# Patient Record
Sex: MALE | Race: WHITE | NOT HISPANIC OR LATINO | Employment: OTHER | URBAN - METROPOLITAN AREA
[De-identification: names, ages, dates, MRNs, and addresses within clinical notes are randomized per-mention and may not be internally consistent; named-entity substitution may affect disease eponyms.]

---

## 2022-11-21 ENCOUNTER — TELEPHONE (OUTPATIENT)
Dept: ONCOLOGY | Facility: CLINIC | Age: 47
End: 2022-11-21

## 2022-11-21 NOTE — TELEPHONE ENCOUNTER
Caller: DAVID PANIAGUA    Relationship: Emergency Contact    Best call back number: [phone]    What is the best time to reach you: ANYTIME    Who are you requesting to speak with (clinical staff, provider,  specific staff member): SCHEDULING    What was the call regarding: PLEASE CALL DAVID TO R/S PTS MISSED NEW PT APPT.     Do you require a callback: YES

## 2022-11-29 NOTE — PROGRESS NOTES
"REFERRING PROVIDER:    José Garcia, APRN  [address] [place] [address]    REASON FOR CONSULTATION:    Thrombocytopenia    HISTORY OF PRESENT ILLNESS:  Helder Abbott is a 47 y.o. male who is referred today for further evaluation of thrombocytopenia.    CBC on 8/30/2022 with a white blood cell count of 6.2, hemoglobin 17.0, platelets 123,000.  TSH normal.  Vitamin B12 greater than 2000.    Prior to that platelets were 152,000 on 6/29/2022.    He complains of right hip pain.  Otherwise no new issues.  He had a nosebleed a couple of weeks ago but this was a transient issue and he has not had any other nosebleeds or any other bleeding.        Past Medical History:   Diagnosis Date   • Benign essential hypertension    • Bipolar disorder (HCC)    • Dyslipidemia    • GERD (gastroesophageal reflux disease)    • History of stroke    • Hypothyroidism        No past surgical history on file.    SOCIAL HISTORY:   has no history on file for tobacco use, alcohol use, and drug use.    FAMILY HISTORY:  family history is not on file.    ALLERGIES:  No Known Allergies    MEDICATIONS:  The medication list has been reviewed with the patient by the medical assistant, and the list has been updated in the electronic medical record, which I reviewed.  Medication dosages and frequencies were confirmed to be accurate.    Review of Systems   Musculoskeletal: Positive for arthralgias (Right hip pain).   All other systems reviewed and are negative.      PHYSICAL EXAMINATION:  Vitals:    11/30/22 1412   BP: 128/90   Pulse: 71   Resp: 18   Temp: 97.9 °F (36.6 °C)   TempSrc: Temporal   SpO2: 99%   Weight: 86.2 kg (190 lb)   Height: 185.4 cm (73\")   PainSc: 10-Worst pain ever   PainLoc: Leg  Comment: right     General:  No acute distress, awake, alert and oriented  Skin:  Warm and dry, no visible rash  HEENT:  Normocephalic/atraumatic.  Chest:  Normal respiratory effort  Extremities:  No visible clubbing, cyanosis, or " edema  Neuro/psych:  Grossly nonfocal.  Normal mood and affect.    DIAGNOSTIC DATA:  CBC & Differential (11/30/2022 13:34)      IMAGING:    None reviewed    ASSESSMENT:  This is a 47 y.o. male with:    *Thrombocytopenia  · CBC on 8/30/2022 with a white blood cell count of 6.2, hemoglobin 17.0, platelets 123,000.  TSH normal.  Vitamin B12 greater than 2000.  · Prior to that platelets were 152,000 on 6/29/2022.  • 11/30/2022: Platelets normal at 154,000.  Normal white blood cell count and hemoglobin.    *Bipolar disorder    *History of stroke    PLAN:   1. Platelets are normal today.  The lowest platelet count I have access to was 123,000.  Mild thrombocytopenia may be due to medication.  As long as platelets are over 100,000 there is really no concern.  Should platelets drop further I will be glad to reevaluate.  No follow-up will be scheduled at this time.  We are certainly available to see him at any point if needed.    ORDERS PLACED DURING THIS ENCOUNTER  Orders Placed This Encounter   Procedures   • CBC & Differential     Standing Status:   Future     Number of Occurrences:   1     Standing Expiration Date:   11/29/2023     Order Specific Question:   Manual Differential     Answer:   No     Order Specific Question:   Release to patient     Answer:   Routine Release

## 2022-11-30 ENCOUNTER — CONSULT (OUTPATIENT)
Dept: ONCOLOGY | Facility: CLINIC | Age: 47
End: 2022-11-30

## 2022-11-30 ENCOUNTER — LAB (OUTPATIENT)
Dept: LAB | Facility: HOSPITAL | Age: 47
End: 2022-11-30

## 2022-11-30 VITALS
DIASTOLIC BLOOD PRESSURE: 90 MMHG | HEART RATE: 71 BPM | WEIGHT: 190 LBS | BODY MASS INDEX: 25.18 KG/M2 | OXYGEN SATURATION: 99 % | RESPIRATION RATE: 18 BRPM | HEIGHT: 73 IN | TEMPERATURE: 97.9 F | SYSTOLIC BLOOD PRESSURE: 128 MMHG

## 2022-11-30 DIAGNOSIS — D69.6 THROMBOCYTOPENIA: Primary | ICD-10-CM

## 2022-11-30 DIAGNOSIS — D69.6 THROMBOCYTOPENIA: ICD-10-CM

## 2022-11-30 LAB
BASOPHILS # BLD AUTO: 0.03 10*3/MM3 (ref 0–0.2)
BASOPHILS NFR BLD AUTO: 0.4 % (ref 0–1.5)
DEPRECATED RDW RBC AUTO: 48.3 FL (ref 37–54)
EOSINOPHIL # BLD AUTO: 0.01 10*3/MM3 (ref 0–0.4)
EOSINOPHIL NFR BLD AUTO: 0.1 % (ref 0.3–6.2)
ERYTHROCYTE [DISTWIDTH] IN BLOOD BY AUTOMATED COUNT: 15.6 % (ref 12.3–15.4)
HCT VFR BLD AUTO: 46.3 % (ref 37.5–51)
HGB BLD-MCNC: 15.7 G/DL (ref 13–17.7)
IMM GRANULOCYTES # BLD AUTO: 0.04 10*3/MM3 (ref 0–0.05)
IMM GRANULOCYTES NFR BLD AUTO: 0.5 % (ref 0–0.5)
LYMPHOCYTES # BLD AUTO: 2.89 10*3/MM3 (ref 0.7–3.1)
LYMPHOCYTES NFR BLD AUTO: 36.6 % (ref 19.6–45.3)
MCH RBC QN AUTO: 29.4 PG (ref 26.6–33)
MCHC RBC AUTO-ENTMCNC: 33.9 G/DL (ref 31.5–35.7)
MCV RBC AUTO: 86.7 FL (ref 79–97)
MONOCYTES # BLD AUTO: 0.84 10*3/MM3 (ref 0.1–0.9)
MONOCYTES NFR BLD AUTO: 10.6 % (ref 5–12)
NEUTROPHILS NFR BLD AUTO: 4.08 10*3/MM3 (ref 1.7–7)
NEUTROPHILS NFR BLD AUTO: 51.8 % (ref 42.7–76)
NRBC BLD AUTO-RTO: 0 /100 WBC (ref 0–0.2)
PLATELET # BLD AUTO: 154 10*3/MM3 (ref 140–450)
PMV BLD AUTO: 10.8 FL (ref 6–12)
RBC # BLD AUTO: 5.34 10*6/MM3 (ref 4.14–5.8)
WBC NRBC COR # BLD: 7.89 10*3/MM3 (ref 3.4–10.8)

## 2022-11-30 PROCEDURE — 85025 COMPLETE CBC W/AUTO DIFF WBC: CPT

## 2022-11-30 PROCEDURE — 36415 COLL VENOUS BLD VENIPUNCTURE: CPT

## 2022-11-30 PROCEDURE — 99203 OFFICE O/P NEW LOW 30 MIN: CPT | Performed by: INTERNAL MEDICINE

## 2022-11-30 RX ORDER — LISINOPRIL 40 MG/1
TABLET ORAL
COMMUNITY
Start: 2022-11-18

## 2022-11-30 RX ORDER — ARIPIPRAZOLE 400 MG
KIT INTRAMUSCULAR
COMMUNITY
Start: 2022-10-11

## 2022-11-30 RX ORDER — DIVALPROEX SODIUM 500 MG/1
TABLET, DELAYED RELEASE ORAL
COMMUNITY
Start: 2022-10-27

## 2022-11-30 RX ORDER — MIRTAZAPINE 15 MG/1
TABLET, FILM COATED ORAL
COMMUNITY
Start: 2022-10-11

## 2022-11-30 RX ORDER — TRIAMCINOLONE ACETONIDE 1 MG/G
CREAM TOPICAL
COMMUNITY
Start: 2022-10-19

## 2022-11-30 RX ORDER — LEVOTHYROXINE SODIUM 0.05 MG/1
TABLET ORAL
COMMUNITY
Start: 2022-11-18

## 2022-11-30 RX ORDER — APIXABAN 5 MG/1
TABLET, FILM COATED ORAL
COMMUNITY
Start: 2022-11-21

## 2022-11-30 RX ORDER — METOPROLOL SUCCINATE 25 MG/1
TABLET, EXTENDED RELEASE ORAL
COMMUNITY
Start: 2022-11-18

## 2022-11-30 RX ORDER — BACLOFEN 5 MG/1
TABLET ORAL
COMMUNITY
Start: 2022-11-29

## 2022-11-30 RX ORDER — FAMOTIDINE 20 MG/1
TABLET, FILM COATED ORAL
COMMUNITY
Start: 2022-11-18

## 2022-11-30 RX ORDER — DIGOXIN 125 MCG
TABLET ORAL
COMMUNITY
Start: 2022-11-18

## 2022-11-30 RX ORDER — ATORVASTATIN CALCIUM 40 MG/1
TABLET, FILM COATED ORAL
COMMUNITY
Start: 2022-11-18

## 2022-11-30 RX ORDER — OLANZAPINE 5 MG/1
TABLET, ORALLY DISINTEGRATING ORAL
COMMUNITY
Start: 2022-11-28

## 2022-11-30 RX ORDER — PSEUDOEPHED/ACETAMINOPH/DIPHEN 30MG-500MG
500 TABLET ORAL 4 TIMES DAILY PRN
COMMUNITY
Start: 2022-11-15

## 2022-11-30 RX ORDER — NYSTATIN 100000 [USP'U]/G
POWDER TOPICAL
COMMUNITY
Start: 2022-11-18

## 2022-11-30 RX ORDER — BENZTROPINE MESYLATE 0.5 MG/1
TABLET ORAL
COMMUNITY
Start: 2022-10-11

## 2022-11-30 RX ORDER — IBUPROFEN 600 MG/1
600 TABLET ORAL 4 TIMES DAILY
COMMUNITY
Start: 2022-11-15

## 2024-04-30 ENCOUNTER — APPOINTMENT (OUTPATIENT)
Dept: GENERAL RADIOLOGY | Facility: HOSPITAL | Age: 49
DRG: 388 | End: 2024-04-30
Payer: MEDICARE

## 2024-04-30 ENCOUNTER — APPOINTMENT (OUTPATIENT)
Dept: CT IMAGING | Facility: HOSPITAL | Age: 49
DRG: 388 | End: 2024-04-30
Payer: MEDICARE

## 2024-04-30 ENCOUNTER — HOSPITAL ENCOUNTER (INPATIENT)
Facility: HOSPITAL | Age: 49
LOS: 6 days | Discharge: HOME | DRG: 388 | End: 2024-05-06
Attending: EMERGENCY MEDICINE | Admitting: STUDENT IN AN ORGANIZED HEALTH CARE EDUCATION/TRAINING PROGRAM
Payer: MEDICARE

## 2024-04-30 DIAGNOSIS — K56.600 PARTIAL SMALL BOWEL OBSTRUCTION: ICD-10-CM

## 2024-04-30 DIAGNOSIS — Z79.01 CHRONIC ANTICOAGULATION: ICD-10-CM

## 2024-04-30 DIAGNOSIS — K56.41 FECAL IMPACTION: ICD-10-CM

## 2024-04-30 DIAGNOSIS — R63.8 DECREASED ORAL INTAKE: ICD-10-CM

## 2024-04-30 DIAGNOSIS — N17.9 AKI (ACUTE KIDNEY INJURY): ICD-10-CM

## 2024-04-30 DIAGNOSIS — R19.7 DIARRHEA, UNSPECIFIED TYPE: ICD-10-CM

## 2024-04-30 DIAGNOSIS — R10.84 GENERALIZED ABDOMINAL PAIN: Primary | ICD-10-CM

## 2024-04-30 DIAGNOSIS — R29.6 FREQUENT FALLS: ICD-10-CM

## 2024-04-30 PROBLEM — I10 ESSENTIAL HYPERTENSION: Status: ACTIVE | Noted: 2024-04-30

## 2024-04-30 PROBLEM — K21.9 GERD WITHOUT ESOPHAGITIS: Status: ACTIVE | Noted: 2024-04-30

## 2024-04-30 PROBLEM — F31.9 BIPOLAR DISORDER: Chronic | Status: ACTIVE | Noted: 2024-04-30

## 2024-04-30 PROBLEM — Z86.73 HISTORY OF CVA (CEREBROVASCULAR ACCIDENT): Status: ACTIVE | Noted: 2024-04-30

## 2024-04-30 PROBLEM — E03.9 HYPOTHYROIDISM (ACQUIRED): Status: ACTIVE | Noted: 2024-04-30

## 2024-04-30 PROBLEM — E78.5 HLD (HYPERLIPIDEMIA): Status: ACTIVE | Noted: 2024-04-30

## 2024-04-30 LAB
ABO GROUP BLD: NORMAL
ALBUMIN SERPL-MCNC: 3.9 G/DL (ref 3.5–5.2)
ALBUMIN/GLOB SERPL: 1.5 G/DL
ALP SERPL-CCNC: 80 U/L (ref 39–117)
ALT SERPL W P-5'-P-CCNC: 9 U/L (ref 1–41)
ANION GAP SERPL CALCULATED.3IONS-SCNC: 9.5 MMOL/L (ref 5–15)
AST SERPL-CCNC: 18 U/L (ref 1–40)
B PARAPERT DNA SPEC QL NAA+PROBE: NOT DETECTED
B PERT DNA SPEC QL NAA+PROBE: NOT DETECTED
BASOPHILS # BLD AUTO: 0.02 10*3/MM3 (ref 0–0.2)
BASOPHILS NFR BLD AUTO: 0.3 % (ref 0–1.5)
BILIRUB SERPL-MCNC: 0.5 MG/DL (ref 0–1.2)
BILIRUB UR QL STRIP: NEGATIVE
BLD GP AB SCN SERPL QL: NEGATIVE
BUN SERPL-MCNC: 16 MG/DL (ref 6–20)
BUN/CREAT SERPL: 10.3 (ref 7–25)
C PNEUM DNA NPH QL NAA+NON-PROBE: NOT DETECTED
CALCIUM SPEC-SCNC: 9.5 MG/DL (ref 8.6–10.5)
CHLORIDE SERPL-SCNC: 101 MMOL/L (ref 98–107)
CK SERPL-CCNC: 78 U/L (ref 20–200)
CLARITY UR: CLEAR
CO2 SERPL-SCNC: 27.5 MMOL/L (ref 22–29)
COLOR UR: ABNORMAL
CREAT SERPL-MCNC: 1.55 MG/DL (ref 0.76–1.27)
D-LACTATE SERPL-SCNC: 0.8 MMOL/L (ref 0.5–2)
D-LACTATE SERPL-SCNC: 2.6 MMOL/L (ref 0.5–2)
DEPRECATED RDW RBC AUTO: 45.5 FL (ref 37–54)
DIGOXIN SERPL-MCNC: 0.7 NG/ML (ref 0.6–1.2)
EGFRCR SERPLBLD CKD-EPI 2021: 54.5 ML/MIN/1.73
EOSINOPHIL # BLD AUTO: 0.01 10*3/MM3 (ref 0–0.4)
EOSINOPHIL NFR BLD AUTO: 0.1 % (ref 0.3–6.2)
ERYTHROCYTE [DISTWIDTH] IN BLOOD BY AUTOMATED COUNT: 14.1 % (ref 12.3–15.4)
FLUAV SUBTYP SPEC NAA+PROBE: NOT DETECTED
FLUBV RNA ISLT QL NAA+PROBE: NOT DETECTED
GLOBULIN UR ELPH-MCNC: 2.6 GM/DL
GLUCOSE SERPL-MCNC: 96 MG/DL (ref 65–99)
GLUCOSE UR STRIP-MCNC: NEGATIVE MG/DL
HADV DNA SPEC NAA+PROBE: NOT DETECTED
HCOV 229E RNA SPEC QL NAA+PROBE: NOT DETECTED
HCOV HKU1 RNA SPEC QL NAA+PROBE: NOT DETECTED
HCOV NL63 RNA SPEC QL NAA+PROBE: NOT DETECTED
HCOV OC43 RNA SPEC QL NAA+PROBE: NOT DETECTED
HCT VFR BLD AUTO: 40.7 % (ref 37.5–51)
HGB BLD-MCNC: 13.1 G/DL (ref 13–17.7)
HGB UR QL STRIP.AUTO: NEGATIVE
HMPV RNA NPH QL NAA+NON-PROBE: NOT DETECTED
HPIV1 RNA ISLT QL NAA+PROBE: NOT DETECTED
HPIV2 RNA SPEC QL NAA+PROBE: NOT DETECTED
HPIV3 RNA NPH QL NAA+PROBE: NOT DETECTED
HPIV4 P GENE NPH QL NAA+PROBE: NOT DETECTED
IMM GRANULOCYTES # BLD AUTO: 0.06 10*3/MM3 (ref 0–0.05)
IMM GRANULOCYTES NFR BLD AUTO: 0.8 % (ref 0–0.5)
INR PPP: 1.2 (ref 0.9–1.1)
KETONES UR QL STRIP: ABNORMAL
LEUKOCYTE ESTERASE UR QL STRIP.AUTO: NEGATIVE
LIPASE SERPL-CCNC: 18 U/L (ref 13–60)
LYMPHOCYTES # BLD AUTO: 1.27 10*3/MM3 (ref 0.7–3.1)
LYMPHOCYTES NFR BLD AUTO: 17.2 % (ref 19.6–45.3)
M PNEUMO IGG SER IA-ACNC: NOT DETECTED
MAGNESIUM SERPL-MCNC: 1.6 MG/DL (ref 1.6–2.6)
MCH RBC QN AUTO: 28.7 PG (ref 26.6–33)
MCHC RBC AUTO-ENTMCNC: 32.2 G/DL (ref 31.5–35.7)
MCV RBC AUTO: 89.1 FL (ref 79–97)
MONOCYTES # BLD AUTO: 0.36 10*3/MM3 (ref 0.1–0.9)
MONOCYTES NFR BLD AUTO: 4.9 % (ref 5–12)
NEUTROPHILS NFR BLD AUTO: 5.65 10*3/MM3 (ref 1.7–7)
NEUTROPHILS NFR BLD AUTO: 76.7 % (ref 42.7–76)
NITRITE UR QL STRIP: NEGATIVE
NRBC BLD AUTO-RTO: 0 /100 WBC (ref 0–0.2)
NT-PROBNP SERPL-MCNC: 1257 PG/ML (ref 0–450)
PH UR STRIP.AUTO: 5.5 [PH] (ref 5–8)
PLATELET # BLD AUTO: 162 10*3/MM3 (ref 140–450)
PMV BLD AUTO: 10.2 FL (ref 6–12)
POTASSIUM SERPL-SCNC: 4.1 MMOL/L (ref 3.5–5.2)
PROCALCITONIN SERPL-MCNC: 0.06 NG/ML (ref 0–0.25)
PROT SERPL-MCNC: 6.5 G/DL (ref 6–8.5)
PROT UR QL STRIP: NEGATIVE
PROTHROMBIN TIME: 15.5 SECONDS (ref 11.7–14.2)
RBC # BLD AUTO: 4.57 10*6/MM3 (ref 4.14–5.8)
RH BLD: POSITIVE
RHINOVIRUS RNA SPEC NAA+PROBE: NOT DETECTED
RSV RNA NPH QL NAA+NON-PROBE: NOT DETECTED
SARS-COV-2 RNA NPH QL NAA+NON-PROBE: NOT DETECTED
SODIUM SERPL-SCNC: 138 MMOL/L (ref 136–145)
SP GR UR STRIP: 1.02 (ref 1–1.03)
T&S EXPIRATION DATE: NORMAL
TROPONIN T SERPL HS-MCNC: 34 NG/L
TSH SERPL DL<=0.05 MIU/L-ACNC: 3.19 UIU/ML (ref 0.27–4.2)
UROBILINOGEN UR QL STRIP: ABNORMAL
VALPROATE SERPL-MCNC: 94 MCG/ML (ref 50–125)
WBC NRBC COR # BLD AUTO: 7.37 10*3/MM3 (ref 3.4–10.8)

## 2024-04-30 PROCEDURE — 87040 BLOOD CULTURE FOR BACTERIA: CPT | Performed by: STUDENT IN AN ORGANIZED HEALTH CARE EDUCATION/TRAINING PROGRAM

## 2024-04-30 PROCEDURE — 81003 URINALYSIS AUTO W/O SCOPE: CPT | Performed by: EMERGENCY MEDICINE

## 2024-04-30 PROCEDURE — 93005 ELECTROCARDIOGRAM TRACING: CPT | Performed by: EMERGENCY MEDICINE

## 2024-04-30 PROCEDURE — 86900 BLOOD TYPING SEROLOGIC ABO: CPT | Performed by: EMERGENCY MEDICINE

## 2024-04-30 PROCEDURE — 25010000002 ONDANSETRON PER 1 MG: Performed by: EMERGENCY MEDICINE

## 2024-04-30 PROCEDURE — 83735 ASSAY OF MAGNESIUM: CPT | Performed by: EMERGENCY MEDICINE

## 2024-04-30 PROCEDURE — 84484 ASSAY OF TROPONIN QUANT: CPT | Performed by: EMERGENCY MEDICINE

## 2024-04-30 PROCEDURE — 83880 ASSAY OF NATRIURETIC PEPTIDE: CPT | Performed by: EMERGENCY MEDICINE

## 2024-04-30 PROCEDURE — 83690 ASSAY OF LIPASE: CPT | Performed by: EMERGENCY MEDICINE

## 2024-04-30 PROCEDURE — 80053 COMPREHEN METABOLIC PANEL: CPT | Performed by: EMERGENCY MEDICINE

## 2024-04-30 PROCEDURE — 99285 EMERGENCY DEPT VISIT HI MDM: CPT

## 2024-04-30 PROCEDURE — 85610 PROTHROMBIN TIME: CPT | Performed by: EMERGENCY MEDICINE

## 2024-04-30 PROCEDURE — 82550 ASSAY OF CK (CPK): CPT | Performed by: EMERGENCY MEDICINE

## 2024-04-30 PROCEDURE — 36415 COLL VENOUS BLD VENIPUNCTURE: CPT

## 2024-04-30 PROCEDURE — 74176 CT ABD & PELVIS W/O CONTRAST: CPT

## 2024-04-30 PROCEDURE — 70450 CT HEAD/BRAIN W/O DYE: CPT

## 2024-04-30 PROCEDURE — 80162 ASSAY OF DIGOXIN TOTAL: CPT | Performed by: EMERGENCY MEDICINE

## 2024-04-30 PROCEDURE — 80164 ASSAY DIPROPYLACETIC ACD TOT: CPT | Performed by: EMERGENCY MEDICINE

## 2024-04-30 PROCEDURE — 25010000002 PIPERACILLIN SOD-TAZOBACTAM PER 1 G: Performed by: STUDENT IN AN ORGANIZED HEALTH CARE EDUCATION/TRAINING PROGRAM

## 2024-04-30 PROCEDURE — 84443 ASSAY THYROID STIM HORMONE: CPT | Performed by: EMERGENCY MEDICINE

## 2024-04-30 PROCEDURE — 85025 COMPLETE CBC W/AUTO DIFF WBC: CPT | Performed by: EMERGENCY MEDICINE

## 2024-04-30 PROCEDURE — 87147 CULTURE TYPE IMMUNOLOGIC: CPT | Performed by: STUDENT IN AN ORGANIZED HEALTH CARE EDUCATION/TRAINING PROGRAM

## 2024-04-30 PROCEDURE — 86901 BLOOD TYPING SEROLOGIC RH(D): CPT | Performed by: EMERGENCY MEDICINE

## 2024-04-30 PROCEDURE — 83605 ASSAY OF LACTIC ACID: CPT | Performed by: EMERGENCY MEDICINE

## 2024-04-30 PROCEDURE — 93010 ELECTROCARDIOGRAM REPORT: CPT | Performed by: INTERNAL MEDICINE

## 2024-04-30 PROCEDURE — 86850 RBC ANTIBODY SCREEN: CPT | Performed by: EMERGENCY MEDICINE

## 2024-04-30 PROCEDURE — 0202U NFCT DS 22 TRGT SARS-COV-2: CPT | Performed by: EMERGENCY MEDICINE

## 2024-04-30 PROCEDURE — 87154 CUL TYP ID BLD PTHGN 6+ TRGT: CPT | Performed by: STUDENT IN AN ORGANIZED HEALTH CARE EDUCATION/TRAINING PROGRAM

## 2024-04-30 PROCEDURE — 71045 X-RAY EXAM CHEST 1 VIEW: CPT

## 2024-04-30 PROCEDURE — 25810000003 SODIUM CHLORIDE 0.9 % SOLUTION: Performed by: EMERGENCY MEDICINE

## 2024-04-30 PROCEDURE — 84145 PROCALCITONIN (PCT): CPT | Performed by: EMERGENCY MEDICINE

## 2024-04-30 RX ORDER — SODIUM CHLORIDE 9 MG/ML
125 INJECTION, SOLUTION INTRAVENOUS CONTINUOUS
Status: DISCONTINUED | OUTPATIENT
Start: 2024-04-30 | End: 2024-05-02

## 2024-04-30 RX ORDER — PANTOPRAZOLE SODIUM 40 MG/10ML
40 INJECTION, POWDER, LYOPHILIZED, FOR SOLUTION INTRAVENOUS
Status: DISCONTINUED | OUTPATIENT
Start: 2024-05-01 | End: 2024-05-06 | Stop reason: HOSPADM

## 2024-04-30 RX ORDER — SODIUM CHLORIDE 0.9 % (FLUSH) 0.9 %
10 SYRINGE (ML) INJECTION AS NEEDED
Status: DISCONTINUED | OUTPATIENT
Start: 2024-04-30 | End: 2024-05-06 | Stop reason: HOSPADM

## 2024-04-30 RX ORDER — PANTOPRAZOLE SODIUM 40 MG/10ML
40 INJECTION, POWDER, LYOPHILIZED, FOR SOLUTION INTRAVENOUS ONCE
Status: COMPLETED | OUTPATIENT
Start: 2024-04-30 | End: 2024-04-30

## 2024-04-30 RX ORDER — BISACODYL 10 MG
10 SUPPOSITORY, RECTAL RECTAL DAILY PRN
Status: DISCONTINUED | OUTPATIENT
Start: 2024-04-30 | End: 2024-05-06 | Stop reason: HOSPADM

## 2024-04-30 RX ORDER — AMOXICILLIN 250 MG
2 CAPSULE ORAL 2 TIMES DAILY
Status: DISCONTINUED | OUTPATIENT
Start: 2024-04-30 | End: 2024-05-06 | Stop reason: HOSPADM

## 2024-04-30 RX ORDER — ONDANSETRON 2 MG/ML
4 INJECTION INTRAMUSCULAR; INTRAVENOUS EVERY 6 HOURS PRN
Status: DISCONTINUED | OUTPATIENT
Start: 2024-04-30 | End: 2024-05-06 | Stop reason: HOSPADM

## 2024-04-30 RX ORDER — POLYETHYLENE GLYCOL 3350 17 G/17G
17 POWDER, FOR SOLUTION ORAL DAILY PRN
Status: DISCONTINUED | OUTPATIENT
Start: 2024-04-30 | End: 2024-05-06 | Stop reason: HOSPADM

## 2024-04-30 RX ORDER — SODIUM CHLORIDE 9 MG/ML
40 INJECTION, SOLUTION INTRAVENOUS AS NEEDED
Status: DISCONTINUED | OUTPATIENT
Start: 2024-04-30 | End: 2024-05-06 | Stop reason: HOSPADM

## 2024-04-30 RX ORDER — BISACODYL 5 MG/1
5 TABLET, DELAYED RELEASE ORAL DAILY PRN
Status: DISCONTINUED | OUTPATIENT
Start: 2024-04-30 | End: 2024-05-06 | Stop reason: HOSPADM

## 2024-04-30 RX ORDER — ONDANSETRON 2 MG/ML
4 INJECTION INTRAMUSCULAR; INTRAVENOUS ONCE
Status: COMPLETED | OUTPATIENT
Start: 2024-04-30 | End: 2024-04-30

## 2024-04-30 RX ORDER — SODIUM CHLORIDE 0.9 % (FLUSH) 0.9 %
10 SYRINGE (ML) INJECTION EVERY 12 HOURS SCHEDULED
Status: DISCONTINUED | OUTPATIENT
Start: 2024-04-30 | End: 2024-05-06 | Stop reason: HOSPADM

## 2024-04-30 RX ORDER — NITROGLYCERIN 0.4 MG/1
0.4 TABLET SUBLINGUAL
Status: DISCONTINUED | OUTPATIENT
Start: 2024-04-30 | End: 2024-05-06 | Stop reason: HOSPADM

## 2024-04-30 RX ADMIN — SODIUM CHLORIDE 100 ML/HR: 900 INJECTION INTRAVENOUS at 18:16

## 2024-04-30 RX ADMIN — PIPERACILLIN AND TAZOBACTAM 3.38 G: 3; .375 INJECTION, POWDER, FOR SOLUTION INTRAVENOUS at 22:17

## 2024-04-30 RX ADMIN — PANTOPRAZOLE SODIUM 40 MG: 40 INJECTION, POWDER, FOR SOLUTION INTRAVENOUS at 17:42

## 2024-04-30 RX ADMIN — SODIUM CHLORIDE 1000 ML: 9 INJECTION, SOLUTION INTRAVENOUS at 17:33

## 2024-04-30 RX ADMIN — SODIUM CHLORIDE 500 ML: 9 INJECTION, SOLUTION INTRAVENOUS at 18:14

## 2024-04-30 RX ADMIN — ONDANSETRON 4 MG: 2 INJECTION INTRAMUSCULAR; INTRAVENOUS at 17:42

## 2024-04-30 NOTE — ED PROVIDER NOTES
ED Course as of 04/30/24 1948 Tue Apr 30, 2024   1810 EKG ER MD interpretation   Time: 18: 06  Rhythm and rate: Atrial fibrillation with a rate of 85  Axis: Normal  P waves: Normal  QRS complexes: Normal  ST segments: no elevation nor depressions  Baseline wander but no obvious T wave abnormalities [AR]   1836 Per patient's [facility] records patient's creatinine was 0.88 on January 23, 2024.  Patient does have an JOSIAH today with a bump in his creatinine to 1.55.    Patient has an elevated lactic acid being treated appropriately with IV fluids currently and no specific infectious source identified yet.  Patient's viral respiratory panel, urine, and CT abdomen/pelvis are pending at this time. [AR]   1903 Pt care assumed from Dr. Dickinson pending CT scan and admission. [MP]   1926 Consult placed to general surgery and to A.  Soapsuds enema ordered [MP]   1940 Spoke with Dr. Sheldon with [facility].  I reviewed patient presentation and ED findings.  He agrees to admit the patient to a [facility] observation bed. [MP]   1947 I spoke with Dr. Lennon with general surgery. Reviewed patient presentation and ED findings. He recommends NPO, can put NG if patient starts vomiting, agrees with enema. [MP]      ED Course User Index  [AR] Kailee Dickinson MD  [MP] Krystal Davidson PA-C Pleiss, Melanie M, PA-C  04/30/24 1948

## 2024-04-30 NOTE — Clinical Note
Level of Care: Med/Surg [1]   Diagnosis: Partial bowel obstruction [[id number]]   Admitting Physician: DANIEL CARPIO [[id number]]   Attending Physician: DANIEL CARPIO [[id number]]

## 2024-04-30 NOTE — ED NOTES
Patient's caregiver is concerned that the patient's eating has decreased significantly. Patient pointed to his stomach and said it was painful.

## 2024-04-30 NOTE — H&P
Patient Name:  Helder Abbott  YOB: 1975  MRN:  [id number]  Admit Date:  4/30/2024  Patient Care Team:  José Garcia APRN as PCP - General (Family Medicine)  Gian Marquez MD as Consulting Physician (Hematology and Oncology)  José Garcia APRN as Referring Physician (Family Medicine)  Phoenix Santa MD as Consulting Physician (Hematology and Oncology)      Subjective   History Present Illness     Chief Complaint   Patient presents with    Fatigue     Increased weakness, increase in falls    Fall     History of Present Illness    Patient is a 49-year-old male with a history of including but not limited to bipolar disorder, essential hypertension, hypothyroidism, CVA , atrial fibrillation on Eliquis, moderate cognitive impairment at baseline presents to the ED with complaint of acute weakness, poor intake, diarrhea, falls.  History obtained from caregiver at bedside as patient unable to provide much history secondary to AMS.  Caregiver reports he started taking care of the patient about 1 week ago, and patient confused at baseline however has been more lethargic lately per caregiver.  Has been having diarrhea, 3-4 times a day, however when asked if he had normal BM caregiver reported he is normal for formed BM likely was approximately 1 week ago.  Patient had a fall approximately 2 weeks ago, sustained face and head injury, was evaluated in ED and CT at that time showed left forehead scalp hematoma/laceration,  no evidence of underlying fractures or an acute intracranial process.  Patient is oriented to name only, speech very difficult to understand.  Unable to describe much symptoms, when asked if he has abdominal pain did replied yes.        Review of Systems   UTO 2/2 AMS  Personal History     Past Medical History:   Diagnosis Date    Benign essential hypertension     Bipolar disorder     Dyslipidemia     GERD (gastroesophageal reflux disease)     Heart disease     History of  stroke     Hypothyroidism      No past surgical history on file.  Family History   Problem Relation Age of Onset    Diabetes Other     Hypertension Other     Heart disease Other      Social History     Tobacco Use    Smoking status: Never   Substance Use Topics    Alcohol use: Never    Drug use: Never     No current facility-administered medications on file prior to encounter.     Current Outpatient Medications on File Prior to Encounter   Medication Sig Dispense Refill    Abilify Maintena 400 MG Suspension Reconstituted ER IM injection ER       Acetaminophen Extra Strength 500 MG tablet Take 500 mg by mouth 4 (Four) Times a Day As Needed.      atorvastatin (LIPITOR) 40 MG tablet       Baclofen 5 MG tablet       benztropine (COGENTIN) 0.5 MG tablet       Diclofenac Sodium (VOLTAREN) 1 % gel gel       digoxin (LANOXIN) 125 MCG tablet       divalproex (DEPAKOTE) 500 MG DR tablet       Eliquis 5 MG tablet tablet       famotidine (PEPCID) 20 MG tablet       ibuprofen (ADVIL,MOTRIN) 600 MG tablet Take 600 mg by mouth 4 (Four) Times a Day.      levothyroxine (SYNTHROID, LEVOTHROID) 50 MCG tablet       lisinopril (PRINIVIL,ZESTRIL) 40 MG tablet       metoprolol succinate XL (TOPROL-XL) 25 MG 24 hr tablet       mirtazapine (REMERON) 15 MG tablet       nystatin 211013 UNIT/GM powder       OLANZapine zydis (zyPREXA) 5 MG disintegrating tablet       triamcinolone (KENALOG) 0.1 % cream        Allergies   Allergen Reactions    Clonazepam Unknown (See Comments)    Fluoxetine Unknown (See Comments)    Grass Unknown - High Severity    Grass Pollen(K-O-R-T-Swt Kennedy) Unknown - Low Severity    Hydroxyzine Unknown (See Comments)    Methylphenidate Unknown (See Comments)    Quetiapine Unknown - Low Severity    Risperidone Unknown (See Comments)       Objective    Objective     Vital Signs  Temp:  [97.2 °F (36.2 °C)] 97.2 °F (36.2 °C)  Heart Rate:  [64-90] 86  Resp:  [18] 18  BP: ()/(46-94) 108/70  SpO2:  [91 %-100 %] 96 %  on   ;    Device (Oxygen Therapy): room air  There is no height or weight on file to calculate BMI.    Physical Exam    General: Laying in bed, lethargic, ill-appearing,  HEENT: Normocephalic, atraumatic  Eyes: PERRL, EOMI, anicteric sclerae  Lungs: CTA, no wheezing  CV: Regular rate and rhythm, no murmurs rubs   Abdomen: Soft, mildly distended, tenderness to palpation  Extremities: No significant peripheral edema  Skin: Clean/dry/intact, no rashes  Neuro: Oriented to name only, no gross focal neurological deficits appreciated, moving all extremities spontaneously        Results Review:  I reviewed the patient's new clinical results.  I reviewed the patient's new imaging results and agree with the interpretation.  I reviewed the patient's other test results and agree with the interpretation  I personally viewed and interpreted the patient's EKG/Telemetry data  Discussed with ED provider.    Lab Results (last 24 hours)       Procedure Component Value Units Date/Time    CBC & Differential [[id number]]  (Abnormal) Collected: 04/30/24 1732    Specimen: Blood Updated: 04/30/24 1801    Narrative:      The following orders were created for panel order CBC & Differential.  Procedure                               Abnormality         Status                     ---------                               -----------         ------                     CBC Auto Differential[[id number]]        Abnormal            Final result                 Please view results for these tests on the individual orders.    Comprehensive Metabolic Panel [[id number]]  (Abnormal) Collected: 04/30/24 1732    Specimen: Blood Updated: 04/30/24 1830     Glucose 96 mg/dL      BUN 16 mg/dL      Creatinine 1.55 mg/dL      Sodium 138 mmol/L      Potassium 4.1 mmol/L      Chloride 101 mmol/L      CO2 27.5 mmol/L      Calcium 9.5 mg/dL      Total Protein 6.5 g/dL      Albumin 3.9 g/dL      ALT (SGPT) 9 U/L      AST (SGOT) 18 U/L      Alkaline Phosphatase 80 U/L      Total  Bilirubin 0.5 mg/dL      Globulin 2.6 gm/dL      A/G Ratio 1.5 g/dL      BUN/Creatinine Ratio 10.3     Anion Gap 9.5 mmol/L      eGFR 54.5 mL/min/1.73     Narrative:      GFR Normal >60  Chronic Kidney Disease <60  Kidney Failure <15      Protime-INR [[id number]]  (Abnormal) Collected: 04/30/24 1732    Specimen: Blood Updated: 04/30/24 1811     Protime 15.5 Seconds      INR 1.20    Lipase [[id number]]  (Normal) Collected: 04/30/24 1732    Specimen: Blood Updated: 04/30/24 1830     Lipase 18 U/L     BNP [[id number]]  (Abnormal) Collected: 04/30/24 1732    Specimen: Blood Updated: 04/30/24 1831     proBNP 1,257.0 pg/mL     Narrative:      This assay is used as an aid in the diagnosis of individuals suspected of having heart failure. It can be used as an aid in the diagnosis of acute decompensated heart failure (ADHF) in patients presenting with signs and symptoms of ADHF to the emergency department (ED). In addition, NT-proBNP of <300 pg/mL indicates ADHF is not likely.    Age Range Result Interpretation  NT-proBNP Concentration (pg/mL:      <50             Positive            >450                   Gray                 300-450                    Negative             <300    50-75           Positive            >900                  Gray                300-900                  Negative            <300      >75             Positive            >1800                  Gray                300-1800                  Negative            <300    Single High Sensitivity Troponin T [[id number]]  (Abnormal) Collected: 04/30/24 1732    Specimen: Blood Updated: 04/30/24 1831     HS Troponin T 34 ng/L     Narrative:      High Sensitive Troponin T Reference Range:  <14.0 ng/L- Negative Female for AMI  <22.0 ng/L- Negative Male for AMI  >=14 - Abnormal Female indicating possible myocardial injury.  >=22 - Abnormal Male indicating possible myocardial injury.   Clinicians would have to utilize clinical acumen, EKG, Troponin, and serial  "changes to determine if it is an Acute Myocardial Infarction or myocardial injury due to an underlying chronic condition.         Lactic Acid, Plasma [[id number]]  (Abnormal) Collected: 04/30/24 1732    Specimen: Blood Updated: 04/30/24 1827     Lactate 2.6 mmol/L     Procalcitonin [[id number]]  (Normal) Collected: 04/30/24 1732    Specimen: Blood Updated: 04/30/24 1831     Procalcitonin 0.06 ng/mL     Narrative:      As a Marker for Sepsis (Non-Neonates):    1. <0.5 ng/mL represents a low risk of severe sepsis and/or septic shock.  2. >2 ng/mL represents a high risk of severe sepsis and/or septic shock.    As a Marker for Lower Respiratory Tract Infections that require antibiotic therapy:    PCT on Admission    Antibiotic Therapy       6-12 Hrs later    >0.5                Strongly Recommended  >0.25 - <0.5        Recommended   0.1 - 0.25          Discouraged              Remeasure/reassess PCT  <0.1                Strongly Discouraged     Remeasure/reassess PCT    As 28 day mortality risk marker: \"Change in Procalcitonin Result\" (>80% or <=80%) if Day 0 (or Day 1) and Day 4 values are available. Refer to http://www.[employer]-pct-calculator.com    Change in PCT <=80%  A decrease of PCT levels below or equal to 80% defines a positive change in PCT test result representing a higher risk for 28-day all-cause mortality of patients diagnosed with severe sepsis for septic shock.    Change in PCT >80%  A decrease of PCT levels of more than 80% defines a negative change in PCT result representing a lower risk for 28-day all-cause mortality of patients diagnosed with severe sepsis or septic shock.       CK [[id number]]  (Normal) Collected: 04/30/24 1732    Specimen: Blood Updated: 04/30/24 1830     Creatine Kinase 78 U/L     Magnesium [[id number]]  (Normal) Collected: 04/30/24 1732    Specimen: Blood Updated: 04/30/24 1830     Magnesium 1.6 mg/dL     TSH [[id number]]  (Normal) Collected: 04/30/24 1732    Specimen: Blood Updated: " 04/30/24 1831     TSH 3.190 uIU/mL     Digoxin Level [[id number]]  (Normal) Collected: 04/30/24 1732    Specimen: Blood Updated: 04/30/24 1830     Digoxin 0.70 ng/mL     Valproic Acid Level, Total [[id number]]  (Normal) Collected: 04/30/24 1732    Specimen: Blood Updated: 04/30/24 1830     Valproic Acid 94.0 mcg/mL     Narrative:      Therapeutic Ranges for Valproic Acid    Epilepsy:       mcg/ml  Bipolar/Enedelia  up to 125 mcg/ml      CBC Auto Differential [[id number]]  (Abnormal) Collected: 04/30/24 1732    Specimen: Blood Updated: 04/30/24 1801     WBC 7.37 10*3/mm3      RBC 4.57 10*6/mm3      Hemoglobin 13.1 g/dL      Hematocrit 40.7 %      MCV 89.1 fL      MCH 28.7 pg      MCHC 32.2 g/dL      RDW 14.1 %      RDW-SD 45.5 fl      MPV 10.2 fL      Platelets 162 10*3/mm3      Neutrophil % 76.7 %      Lymphocyte % 17.2 %      Monocyte % 4.9 %      Eosinophil % 0.1 %      Basophil % 0.3 %      Immature Grans % 0.8 %      Neutrophils, Absolute 5.65 10*3/mm3      Lymphocytes, Absolute 1.27 10*3/mm3      Monocytes, Absolute 0.36 10*3/mm3      Eosinophils, Absolute 0.01 10*3/mm3      Basophils, Absolute 0.02 10*3/mm3      Immature Grans, Absolute 0.06 10*3/mm3      nRBC 0.0 /100 WBC     Respiratory Panel PCR w/COVID-19(SARS-CoV-2) LOPEZ/MAXINE/VALENTE/PAD/COR/JERILYN In-House, NP Swab in [facility], 2 HR TAT - Swab, Nasopharynx [[id number]]  (Normal) Collected: 04/30/24 1756    Specimen: Swab from Nasopharynx Updated: 04/30/24 1854     ADENOVIRUS, PCR Not Detected     Coronavirus 229E Not Detected     Coronavirus HKU1 Not Detected     Coronavirus NL63 Not Detected     Coronavirus OC43 Not Detected     COVID19 Not Detected     Human Metapneumovirus Not Detected     Human Rhinovirus/Enterovirus Not Detected     Influenza A PCR Not Detected     Influenza B PCR Not Detected     Parainfluenza Virus 1 Not Detected     Parainfluenza Virus 2 Not Detected     Parainfluenza Virus 3 Not Detected     Parainfluenza Virus 4 Not Detected     RSV, PCR  Not Detected     Bordetella pertussis pcr Not Detected     Bordetella parapertussis PCR Not Detected     Chlamydophila pneumoniae PCR Not Detected     Mycoplasma pneumo by PCR Not Detected    Narrative:      In the setting of a positive respiratory panel with a viral infection PLUS a negative procalcitonin without other underlying concern for bacterial infection, consider observing off antibiotics or discontinuation of antibiotics and continue supportive care. If the respiratory panel is positive for atypical bacterial infection (Bordetella pertussis, Chlamydophila pneumoniae, or Mycoplasma pneumoniae), consider antibiotic de-escalation to target atypical bacterial infection.    Urinalysis With Microscopic If Indicated (No Culture) - Urine, Clean Catch [[id number]]  (Abnormal) Collected: 04/30/24 1903    Specimen: Urine, Clean Catch Updated: 04/30/24 1937     Color, UA Dark Yellow     Appearance, UA Clear     pH, UA 5.5     Specific Gravity, UA 1.020     Glucose, UA Negative     Ketones, UA Trace     Bilirubin, UA Negative     Blood, UA Negative     Protein, UA Negative     Leuk Esterase, UA Negative     Nitrite, UA Negative     Urobilinogen, UA 1.0 E.U./dL    Narrative:      Urine microscopic not indicated.    STAT Lactic Acid, Reflex [[id number]] Collected: 04/30/24 2103    Specimen: Blood Updated: 04/30/24 2107            Imaging Results (Last 24 Hours)       Procedure Component Value Units Date/Time    CT Abdomen Pelvis Without Contrast [[id number]] Collected: 04/30/24 1900     Updated: 04/30/24 1909    Narrative:      CT ABDOMEN PELVIS WO CONTRAST-     HISTORY: Fall, hypotension, abdominal pain.     TECHNIQUE:  CT of the abdomen and pelvis was performed without  intravenous contrast. Reformatted images were reviewed. Evaluation of  solid organs and vasculature is limited without intravenous contrast.  Radiation dose reduction techniques were utilized, including automated  exposure control and exposure modulation  based on body size.     COMPARISON: None     FINDINGS:     Lung bases and pleural spaces are clear.  The liver is normal in size. The gallbladder is present. The spleen is  normal in size. There are no pancreatic calcifications. The adrenal  glands are within normal limits. There are no renal stones or  hydronephrosis.  No pathologically enlarged abdominal or pelvic lymph nodes are  identified. There is at least mild calcific atherosclerosis. There is  small volume ascites.  The appendix is within normal limits. There is a relatively large  inspissated rectal and sigmoid colonic stool burden. The distal small  bowel is diffusely dilated and predominantly fluid-filled to the level  of the cecum. The bladder is well distended. The prostate is present.  There is multilevel degenerative disc disease. There is transitional  lumbosacral anatomy. There is mild leftward curvature of the lumbar  spine. There are old rib fractures.          Impression:         1.  Large distal colonic and rectal stool burden concerning for fecal  impaction. Relatively diffusely dilated fluid-filled loops of small  bowel to the level of the cecum may be due to at least partial  obstruction due to the distal stool burden versus ileus and/or  nonspecific enteritis in the appropriate clinical setting.  2.  Small-volume ascites, which may be reactive.  3.  No CT evidence of acute traumatic injury in the abdomen or pelvis.        This report was finalized on 4/30/2024 7:06 PM by Dr. Akua Gutierrez M.D  on Workstation: [id number]       CT Head Without Contrast [[id number]] Collected: 04/30/24 1900     Updated: 04/30/24 1907    Narrative:      CT HEAD WO CONTRAST-     INDICATIONS: Trauma     TECHNIQUE: Radiation dose reduction techniques were utilized, including  automated exposure control and exposure modulation based on body size.  Noncontrast head CT     COMPARISON: None available     FINDINGS:        The images are degraded by motion artifact.      No acute intracranial hemorrhage, midline shift or mass effect. No acute  territorial infarct is identified.     Ventricles, cisterns, cerebral sulci are unremarkable for patient age.     The visualized paranasal sinuses, orbits, mastoid air cells are  unremarkable. There appears to be a nondisplaced anterior nasal  fracture, axial image 11, although assessment is limited by motion  artifact. Subcutaneous soft tissue swelling is seen of the forehead.             Impression:         No acute intracranial hemorrhage or hydrocephalus. If there is further  clinical concern, MRI could be considered for further evaluation.     This report was finalized on 4/30/2024 7:04 PM by Dr. Alfred Cavazos M.D on Workstation: [id number]       XR Chest 1 View [[id number]] Collected: 04/30/24 1809     Updated: 04/30/24 1812    Narrative:      XR CHEST 1 VW-     HISTORY: Male who is 49 years-old, cough and chills     TECHNIQUE: Frontal view of the chest     COMPARISON: None available     FINDINGS: Heart, mediastinum and pulmonary vasculature are unremarkable.  No focal pulmonary consolidation, pleural effusion, or pneumothorax,  follow-up as indications persist. Gas-filled loops of bowel are partly  included, correlate clinically. No acute osseous process.       Impression:      As described.           This report was finalized on 4/30/2024 6:09 PM by Dr. Alfred Cavazos M.D on Workstation: [id number]                   ECG 12 Lead Altered Mental Status   Preliminary Result   HEART RATE= 85  bpm   RR Interval= 706  ms   CA Interval=   ms   P Horizontal Axis=   deg   P Front Axis=   deg   QRSD Interval= 87  ms   QT Interval= 348  ms   QTcB= 414  ms   QRS Axis= 4  deg   T Wave Axis=   deg   - ABNORMAL ECG -   Atrial fibrillation   Ventricular premature complex   Low voltage, extremity and precordial leads   Anteroseptal infarct, old   Nonspecific T abnormalities, lateral leads   Baseline wander in lead(s) III,V1,V4,V6   Electronically  Signed By:    Date and Time of Study: 2024-04-30 18:06:56           Assessment/Plan     Active Hospital Problems    Diagnosis  POA    **Partial bowel obstruction [K56.600]  Yes    JOSIAH (acute kidney injury) [N17.9]  Unknown    Essential hypertension [I10]  Unknown    Hypothyroidism (acquired) [E03.9]  Unknown    History of CVA (cerebrovascular accident) [Z86.73]  Not Applicable    GERD without esophagitis [K21.9]  Unknown    HLD (hyperlipidemia) [E78.5]  Unknown    Bipolar disorder [F31.9]  Unknown    Fecal impaction [K56.41]  Unknown      Resolved Hospital Problems   No resolved problems to display.     Patient is a 49-year-old male with a history of including but not limited to bipolar disorder, essential hypertension, hypothyroidism, CVA , atrial fibrillation on Eliquis, moderate cognitive impairment at baseline presents to the ED with complaint of acute weakness, poor intake, diarrhea, falls    Fecal impaction/partial small bowel obstruction/versus ileus/enteritis  -CT scan showed large distal colonic and rectal stool burden concerning for fecal impaction. Relatively diffusely dilated fluid-filled loops of small bowel to the level of the cecum may be due to at least partialobstruction due to the distal stool burden versus ileus and/or nonspecific enteritis in the appropriate clinical setting. 2.  Small-volume ascites, which may be reactive.  Patient's blood pressure was 70/46 on admission, WBC normal, lactic acid was elevated at 2.6, afebrile.  Blood pressure improved with IV fluids.  Due to hypotension elevated lactic acid we will start on Zosyn empirically, blood cultures ordered.  Will repeat lactic acid.  -Strict n.p.o.,   -Supportive care, IV fluids, antiemetics  -Gastrointestinal panel ordered in the setting of diarrhea and enteritis findings on CT  -Soapsuds enema ordered in the ED-monitor response  -General surgery consulted      Atrial fibrillation  -On metoprolol, digoxin, Eliquis-holding currently  secondary to strict n.p.o.  -Initiated on IV metoprolol 2.5 mg every 8 hours  -Follow-up general surgery recommendations in a.m., if no interventions can be initiated therapeutic Lovenox if not cleared for p.o. intake, if cleared for p.o. intake Eliquis can be resumed.    Hypothyroidism  -TSH normal, resume p.o. levothyroxine once cleared for p.o. intake      Bipolar disorder/moderate cognitive impairment/history of CVA  -Likely secondary to dehydration/fecal impaction as above  -Initial CT head with no acute findings  -Holding meds secondary to strict n.p.o.  -Neurology consult in a.m.      JOSIAH  -Creatinine 1.55 on admission, creatinine on 02/07/2024 was 0.88  -Suspect prerenal secondary to diarrhea/decreased p.o. intake IV fluids  -Repeat BMP in a.m.        I discussed the patient's findings and my recommendations with patient and ED provider.    VTE Prophylaxis - SCDs.  Code Status - Full code.       Smiley Sheldon MD  [facility]  04/30/24  21:53 EDT

## 2024-04-30 NOTE — CASE MANAGEMENT/SOCIAL WORK
Discharge Planning Assessment  [facility]     Patient Name: Helder Abbott  MRN: [id number]  Today's Date: 4/30/2024    Admit Date: 4/30/2024        Discharge Needs Assessment    No documentation.                  Discharge Plan       Row Name 04/30/24 1741       Plan    Plan Comments Patient with legal guardian on file and [place]. No paperwork has been scanned into Epic, but consent to treat was obtained by registration team from [facility] with [employer]. Banner is present upon chart review. Verified with [facility] that patient's legal guardian is Carla Velasco. Unable to request paperwork from [employer]. ER disposition pending. CHERELLE Wei CCP manager updated, per policy. LEONIDES Singh RN                  Continued Care and Services - Admitted Since 4/30/2024    No active coordination exists for this encounter.          Demographic Summary    No documentation.                  Functional Status    No documentation.                  Psychosocial    No documentation.                  Abuse/Neglect    No documentation.                  Legal    No documentation.                  Substance Abuse    No documentation.                  Patient Forms    No documentation.                     Santos Ngo RN

## 2024-04-30 NOTE — ED PROVIDER NOTES
" EMERGENCY DEPARTMENT ENCOUNTER    Room Number:  20/20  PCP: José Garcia APRN  Independent Historians: Guardian    HPI:  Chief Complaint: had concerns including Fatigue (Increased weakness, increase in falls) and Fall.      A complete HPI/ROS/PMH/PSH/SH/FH are unobtainable due to: None and Poor historian with autism and cognitive impairment    Chronic or social conditions impacting patient care (Social Determinants of Health): None      Context: Helder Abbott is a 49 y.o. male with a medical history of A-fib on digoxin and Eliquis, bipolar disorder, prior stroke, hypothyroidism, cognitive impairment who presents to the ED c/o acute gen weakness, no appetite and very little po intake, diarrhea, cough, chills, and dec activity with several falls all over the last week.  He had some behavioral issues and falls even 2-3 weeks ago and sustained face and head injury s/p ER eval and f/u with pcp at [facility].  Today patient more \"lethargic\" than baseline and seems to be getting worse so caregiver brought him in for eval.          Review of prior external notes (non-ED) -and- Review of prior external test results outside of this encounter: Cr in Jan 2024 was 0.88    Prescription drug monitoring program review:     N/A    PAST MEDICAL HISTORY  Active Ambulatory Problems     Diagnosis Date Noted    No Active Ambulatory Problems     Resolved Ambulatory Problems     Diagnosis Date Noted    No Resolved Ambulatory Problems     Past Medical History:   Diagnosis Date    Benign essential hypertension     Bipolar disorder     Dyslipidemia     GERD (gastroesophageal reflux disease)     Heart disease     History of stroke     Hypothyroidism          PAST SURGICAL HISTORY  No past surgical history on file.      FAMILY HISTORY  Family History   Problem Relation Age of Onset    Diabetes Other     Hypertension Other     Heart disease Other          SOCIAL HISTORY  Social History     Socioeconomic History    Marital status: " Single   Tobacco Use    Smoking status: Never   Substance and Sexual Activity    Alcohol use: Never    Drug use: Never         ALLERGIES  Clonazepam, Fluoxetine, Grass, Grass pollen([id number]-t-swt benigno), Hydroxyzine, Methylphenidate, Quetiapine, and Risperidone        REVIEW OF SYSTEMS  Review of Systems  Included in HPI  All systems reviewed and negative except for those discussed in HPI.      PHYSICAL EXAM    I have reviewed the triage vital signs and nursing notes.    ED Triage Vitals   Temp Heart Rate Resp BP SpO2   04/30/24 1638 04/30/24 1638 04/30/24 1638 04/30/24 1649 04/30/24 1638   97.2 °F (36.2 °C) 90 18 (!) 70/46 91 %      Temp src Heart Rate Source Patient Position BP Location FiO2 (%)   04/30/24 1638 04/30/24 1638 -- 04/30/24 1649 --   Tympanic Monitor  Left arm        Physical Exam  GENERAL: dysarthric speech with some yes and no answers, alert, ill appearing with healing wounds to face and ecchymoses that are faint and resolving  SKIN: Warm, dry, generalized pallor  HENT: Normocephalic, facial injury as above  EYES: no scleral icterus, eomi  CV: regular rhythm, regular rate  RESPIRATORY: normal effort, diminished at the bases with no wheezing and no rhonchi  ABDOMEN: soft, upper abd ttp with no rebound, exam limited by patient cooperation as he keeps holding his right arm over upper abdomen and moaning in pain, nondistended  NEURO: alert, moves all extremities, follows some commands                                                                LAB RESULTS  Recent Results (from the past 24 hour(s))   Comprehensive Metabolic Panel    Collection Time: 04/30/24  5:32 PM    Specimen: Blood   Result Value Ref Range    Glucose 96 65 - 99 mg/dL    BUN 16 6 - 20 mg/dL    Creatinine 1.55 (H) 0.76 - 1.27 mg/dL    Sodium 138 136 - 145 mmol/L    Potassium 4.1 3.5 - 5.2 mmol/L    Chloride 101 98 - 107 mmol/L    CO2 27.5 22.0 - 29.0 mmol/L    Calcium 9.5 8.6 - 10.5 mg/dL    Total Protein 6.5 6.0 - 8.5 g/dL    Albumin  3.9 3.5 - 5.2 g/dL    ALT (SGPT) 9 1 - 41 U/L    AST (SGOT) 18 1 - 40 U/L    Alkaline Phosphatase 80 39 - 117 U/L    Total Bilirubin 0.5 0.0 - 1.2 mg/dL    Globulin 2.6 gm/dL    A/G Ratio 1.5 g/dL    BUN/Creatinine Ratio 10.3 7.0 - 25.0    Anion Gap 9.5 5.0 - 15.0 mmol/L    eGFR 54.5 (L) >60.0 mL/min/1.73   Protime-INR    Collection Time: 04/30/24  5:32 PM    Specimen: Blood   Result Value Ref Range    Protime 15.5 (H) 11.7 - 14.2 Seconds    INR 1.20 (H) 0.90 - 1.10   Lipase    Collection Time: 04/30/24  5:32 PM    Specimen: Blood   Result Value Ref Range    Lipase 18 13 - 60 U/L   BNP    Collection Time: 04/30/24  5:32 PM    Specimen: Blood   Result Value Ref Range    proBNP 1,257.0 (H) 0.0 - 450.0 pg/mL   Single High Sensitivity Troponin T    Collection Time: 04/30/24  5:32 PM    Specimen: Blood   Result Value Ref Range    HS Troponin T 34 (H) <22 ng/L   Lactic Acid, Plasma    Collection Time: 04/30/24  5:32 PM    Specimen: Blood   Result Value Ref Range    Lactate 2.6 (C) 0.5 - 2.0 mmol/L   Procalcitonin    Collection Time: 04/30/24  5:32 PM    Specimen: Blood   Result Value Ref Range    Procalcitonin 0.06 0.00 - 0.25 ng/mL   CK    Collection Time: 04/30/24  5:32 PM    Specimen: Blood   Result Value Ref Range    Creatine Kinase 78 20 - 200 U/L   Magnesium    Collection Time: 04/30/24  5:32 PM    Specimen: Blood   Result Value Ref Range    Magnesium 1.6 1.6 - 2.6 mg/dL   TSH    Collection Time: 04/30/24  5:32 PM    Specimen: Blood   Result Value Ref Range    TSH 3.190 0.270 - 4.200 uIU/mL   Digoxin Level    Collection Time: 04/30/24  5:32 PM    Specimen: Blood   Result Value Ref Range    Digoxin 0.70 0.60 - 1.20 ng/mL   Valproic Acid Level, Total    Collection Time: 04/30/24  5:32 PM    Specimen: Blood   Result Value Ref Range    Valproic Acid 94.0 50.0 - 125.0 mcg/mL   Type & Screen    Collection Time: 04/30/24  5:32 PM    Specimen: Blood   Result Value Ref Range    ABO Type O     RH type Positive     Antibody  Screen Negative     T&S Expiration Date 5/3/2024 11:59:59 PM    CBC Auto Differential    Collection Time: 04/30/24  5:32 PM    Specimen: Blood   Result Value Ref Range    WBC 7.37 3.40 - 10.80 10*3/mm3    RBC 4.57 4.14 - 5.80 10*6/mm3    Hemoglobin 13.1 13.0 - 17.7 g/dL    Hematocrit 40.7 37.5 - 51.0 %    MCV 89.1 79.0 - 97.0 fL    MCH 28.7 26.6 - 33.0 pg    MCHC 32.2 31.5 - 35.7 g/dL    RDW 14.1 12.3 - 15.4 %    RDW-SD 45.5 37.0 - 54.0 fl    MPV 10.2 6.0 - 12.0 fL    Platelets 162 140 - 450 10*3/mm3    Neutrophil % 76.7 (H) 42.7 - 76.0 %    Lymphocyte % 17.2 (L) 19.6 - 45.3 %    Monocyte % 4.9 (L) 5.0 - 12.0 %    Eosinophil % 0.1 (L) 0.3 - 6.2 %    Basophil % 0.3 0.0 - 1.5 %    Immature Grans % 0.8 (H) 0.0 - 0.5 %    Neutrophils, Absolute 5.65 1.70 - 7.00 10*3/mm3    Lymphocytes, Absolute 1.27 0.70 - 3.10 10*3/mm3    Monocytes, Absolute 0.36 0.10 - 0.90 10*3/mm3    Eosinophils, Absolute 0.01 0.00 - 0.40 10*3/mm3    Basophils, Absolute 0.02 0.00 - 0.20 10*3/mm3    Immature Grans, Absolute 0.06 (H) 0.00 - 0.05 10*3/mm3    nRBC 0.0 0.0 - 0.2 /100 WBC   Respiratory Panel PCR w/COVID-19(SARS-CoV-2) LOPEZ/MAXINE/VALENTE/PAD/COR/JERILYN In-House, NP Swab in [facility], 2 HR TAT - Swab, Nasopharynx    Collection Time: 04/30/24  5:56 PM    Specimen: Nasopharynx; Swab   Result Value Ref Range    ADENOVIRUS, PCR Not Detected Not Detected    Coronavirus 229E Not Detected Not Detected    Coronavirus HKU1 Not Detected Not Detected    Coronavirus NL63 Not Detected Not Detected    Coronavirus OC43 Not Detected Not Detected    COVID19 Not Detected Not Detected - Ref. Range    Human Metapneumovirus Not Detected Not Detected    Human Rhinovirus/Enterovirus Not Detected Not Detected    Influenza A PCR Not Detected Not Detected    Influenza B PCR Not Detected Not Detected    Parainfluenza Virus 1 Not Detected Not Detected    Parainfluenza Virus 2 Not Detected Not Detected    Parainfluenza Virus 3 Not Detected Not Detected    Parainfluenza Virus 4 Not  Detected Not Detected    RSV, PCR Not Detected Not Detected    Bordetella pertussis pcr Not Detected Not Detected    Bordetella parapertussis PCR Not Detected Not Detected    Chlamydophila pneumoniae PCR Not Detected Not Detected    Mycoplasma pneumo by PCR Not Detected Not Detected   ECG 12 Lead Altered Mental Status    Collection Time: 04/30/24  6:06 PM   Result Value Ref Range    QT Interval 348 ms    QTC Interval 414 ms   Urinalysis With Microscopic If Indicated (No Culture) - Urine, Clean Catch    Collection Time: 04/30/24  7:03 PM    Specimen: Urine, Clean Catch   Result Value Ref Range    Color, UA Dark Yellow (A) Yellow, Straw    Appearance, UA Clear Clear    pH, UA 5.5 5.0 - 8.0    Specific Gravity, UA 1.020 1.005 - 1.030    Glucose, UA Negative Negative    Ketones, UA Trace (A) Negative    Bilirubin, UA Negative Negative    Blood, UA Negative Negative    Protein, UA Negative Negative    Leuk Esterase, UA Negative Negative    Nitrite, UA Negative Negative    Urobilinogen, UA 1.0 E.U./dL 0.2 - 1.0 E.U./dL   STAT Lactic Acid, Reflex    Collection Time: 04/30/24  9:03 PM    Specimen: Blood   Result Value Ref Range    Lactate 0.8 0.5 - 2.0 mmol/L         RADIOLOGY  CT Abdomen Pelvis Without Contrast    Result Date: 4/30/2024  CT ABDOMEN PELVIS WO CONTRAST-  HISTORY: Fall, hypotension, abdominal pain.  TECHNIQUE:  CT of the abdomen and pelvis was performed without intravenous contrast. Reformatted images were reviewed. Evaluation of solid organs and vasculature is limited without intravenous contrast. Radiation dose reduction techniques were utilized, including automated exposure control and exposure modulation based on body size.  COMPARISON: None  FINDINGS:  Lung bases and pleural spaces are clear. The liver is normal in size. The gallbladder is present. The spleen is normal in size. There are no pancreatic calcifications. The adrenal glands are within normal limits. There are no renal stones or  hydronephrosis. No pathologically enlarged abdominal or pelvic lymph nodes are identified. There is at least mild calcific atherosclerosis. There is small volume ascites. The appendix is within normal limits. There is a relatively large inspissated rectal and sigmoid colonic stool burden. The distal small bowel is diffusely dilated and predominantly fluid-filled to the level of the cecum. The bladder is well distended. The prostate is present. There is multilevel degenerative disc disease. There is transitional lumbosacral anatomy. There is mild leftward curvature of the lumbar spine. There are old rib fractures.        1.  Large distal colonic and rectal stool burden concerning for fecal impaction. Relatively diffusely dilated fluid-filled loops of small bowel to the level of the cecum may be due to at least partial obstruction due to the distal stool burden versus ileus and/or nonspecific enteritis in the appropriate clinical setting. 2.  Small-volume ascites, which may be reactive. 3.  No CT evidence of acute traumatic injury in the abdomen or pelvis.   This report was finalized on 4/30/2024 7:06 PM by Dr. Akua Gutierrez M.D on Workstation: [id number]      CT Head Without Contrast    Result Date: 4/30/2024  CT HEAD WO CONTRAST-  INDICATIONS: Trauma  TECHNIQUE: Radiation dose reduction techniques were utilized, including automated exposure control and exposure modulation based on body size. Noncontrast head CT  COMPARISON: None available  FINDINGS:   The images are degraded by motion artifact.  No acute intracranial hemorrhage, midline shift or mass effect. No acute territorial infarct is identified.  Ventricles, cisterns, cerebral sulci are unremarkable for patient age.  The visualized paranasal sinuses, orbits, mastoid air cells are unremarkable. There appears to be a nondisplaced anterior nasal fracture, axial image 11, although assessment is limited by motion artifact. Subcutaneous soft tissue swelling is  seen of the forehead.         No acute intracranial hemorrhage or hydrocephalus. If there is further clinical concern, MRI could be considered for further evaluation.  This report was finalized on 4/30/2024 7:04 PM by Dr. Alfred Cavazos M.D on Workstation: [id number]      XR Chest 1 View    Result Date: 4/30/2024  XR CHEST 1 VW-  HISTORY: Male who is 49 years-old, cough and chills  TECHNIQUE: Frontal view of the chest  COMPARISON: None available  FINDINGS: Heart, mediastinum and pulmonary vasculature are unremarkable. No focal pulmonary consolidation, pleural effusion, or pneumothorax, follow-up as indications persist. Gas-filled loops of bowel are partly included, correlate clinically. No acute osseous process.      As described.    This report was finalized on 4/30/2024 6:09 PM by Dr. Alfred Cavazos M.D on Workstation: [id number]         MEDICATIONS GIVEN IN ER  Medications   sodium chloride 0.9 % flush 10 mL (has no administration in time range)   sodium chloride 0.9 % infusion (100 mL/hr Intravenous New Bag 4/30/24 1816)   piperacillin-tazobactam (ZOSYN) 3.375 g IVPB in 100 mL NS MBP (CD) (3.375 g Intravenous New Bag 4/30/24 2217)   piperacillin-tazobactam (ZOSYN) 3.375 g IVPB in 100 mL NS MBP (CD) (has no administration in time range)   sodium chloride 0.9 % flush 10 mL (has no administration in time range)   sodium chloride 0.9 % flush 10 mL (has no administration in time range)   sodium chloride 0.9 % infusion 40 mL (has no administration in time range)   sennosides-docusate (PERICOLACE) 8.6-50 MG per tablet 2 tablet (has no administration in time range)     And   polyethylene glycol (MIRALAX) packet 17 g (has no administration in time range)     And   bisacodyl (DULCOLAX) EC tablet 5 mg (has no administration in time range)     And   bisacodyl (DULCOLAX) suppository 10 mg (has no administration in time range)   nitroglycerin (NITROSTAT) SL tablet 0.4 mg (has no administration in time range)   metoprolol  tartrate (LOPRESSOR) injection 2.5 mg (has no administration in time range)   pantoprazole (PROTONIX) injection 40 mg (has no administration in time range)   ondansetron (ZOFRAN) injection 4 mg (has no administration in time range)   sodium chloride 0.9 % bolus 1,000 mL (0 mL Intravenous Stopped 4/30/24 1903)   pantoprazole (PROTONIX) injection 40 mg (40 mg Intravenous Given 4/30/24 1742)   ondansetron (ZOFRAN) injection 4 mg (4 mg Intravenous Given 4/30/24 1742)   sodium chloride 0.9 % bolus 500 mL (0 mL Intravenous Stopped 4/30/24 1903)         ORDERS PLACED DURING THIS VISIT:  Orders Placed This Encounter   Procedures    Respiratory Panel PCR w/COVID-19(SARS-CoV-2) LOPE[facility]/MAXINE/VALENTE/PAD/COR/JERILYN In-House, NP Swab in UTM/VTM, 2 HR TAT - Swab, Nasopharynx    Gastrointestinal Panel, PCR - Stool, Per Rectum    Blood Culture - Blood,    Blood Culture - Blood,    XR Chest 1 View    CT Head Without Contrast    CT Abdomen Pelvis Without Contrast    Comprehensive Metabolic Panel    Protime-INR    Lipase    Urinalysis With Microscopic If Indicated (No Culture) - Urine, Clean Catch    BNP    Single High Sensitivity Troponin T    Lactic Acid, Plasma    Procalcitonin    CK    Magnesium    TSH    Digoxin Level    Valproic Acid Level, Total    CBC Auto Differential    STAT Lactic Acid, Reflex    Basic Metabolic Panel    CBC (No Diff)    Magnesium    Phosphorus    NPO Diet NPO Type: Strict NPO    Soap suds enema    Vital Signs    Notify Physician (With Default Parameters)    Up with assistance    Intake & Output    Weigh patient    Daily Weights    Oral Care    Place Sequential Compression Device    Maintain Sequential Compression Device    Maintain IV Access    Telemetry - Place Orders & Notify Provider of Results When Patient Experiences Acute Chest Pain, Dysrhythmia or Respiratory Distress    May Be Off Telemetry for Tests    Code Status and Medical Interventions:    [facility] (on-call MD unless specified) Details    Surgery (on-call MD  unless specified)    Inpatient Neurology Consult General    ECG 12 Lead Altered Mental Status    Type & Screen    Insert Peripheral IV    Insert 2nd peripheral IV    Insert Peripheral IV    Initiate Observation Status    Inpatient Admission    Transfer Patient    CBC & Differential         OUTPATIENT MEDICATION MANAGEMENT:  Current Facility-Administered Medications Ordered in Epic   Medication Dose Route Frequency Provider Last Rate Last Admin    sennosides-docusate (PERICOLACE) 8.6-50 MG per tablet 2 tablet  2 tablet Oral BID Smiley Sheldon MD        And    polyethylene glycol (MIRALAX) packet 17 g  17 g Oral Daily PRN Smiley Sheldon MD        And    bisacodyl (DULCOLAX) EC tablet 5 mg  5 mg Oral Daily PRN Smiley Sheldon MD        And    bisacodyl (DULCOLAX) suppository 10 mg  10 mg Rectal Daily PRN Smiley Sheldon MD        metoprolol tartrate (LOPRESSOR) injection 2.5 mg  2.5 mg Intravenous Q8H Smiley Sheldon MD        nitroglycerin (NITROSTAT) SL tablet 0.4 mg  0.4 mg Sublingual Q5 Min PRN Smiley Sheldon MD        ondansetron (ZOFRAN) injection 4 mg  4 mg Intravenous Q6H PRN Smiley Sheldon MD        [START ON 5/1/2024] pantoprazole (PROTONIX) injection 40 mg  40 mg Intravenous Q AM Smiley Sheldon MD        piperacillin-tazobactam (ZOSYN) 3.375 g IVPB in 100 mL NS MBP (CD)  3.375 g Intravenous Once Smiley Sheldon MD   3.375 g at 04/30/24 2217    [START ON 5/1/2024] piperacillin-tazobactam (ZOSYN) 3.375 g IVPB in 100 mL NS MBP (CD)  3.375 g Intravenous Q8H Smiley Sheldon MD        sodium chloride 0.9 % flush 10 mL  10 mL Intravenous PRN Kailee Dickinson MD        sodium chloride 0.9 % flush 10 mL  10 mL Intravenous Q12H Smiley Sheldon MD        sodium chloride 0.9 % flush 10 mL  10 mL Intravenous PRN Smiley Sheldon MD        sodium chloride 0.9 % infusion 40 mL  40 mL Intravenous PRN Smiley Sheldon MD        sodium chloride 0.9 %  infusion  100 mL/hr Intravenous Continuous Kailee Dickinson  mL/hr at 04/30/24 1816 100 mL/hr at 04/30/24 1816     Current Outpatient Medications Ordered in Epic   Medication Sig Dispense Refill    Abilify Maintena 400 MG Suspension Reconstituted ER IM injection ER       Acetaminophen Extra Strength 500 MG tablet Take 500 mg by mouth 4 (Four) Times a Day As Needed.      atorvastatin (LIPITOR) 40 MG tablet       Baclofen 5 MG tablet       benztropine (COGENTIN) 0.5 MG tablet       Diclofenac Sodium (VOLTAREN) 1 % gel gel       digoxin (LANOXIN) 125 MCG tablet       divalproex (DEPAKOTE) 500 MG DR tablet       Eliquis 5 MG tablet tablet       famotidine (PEPCID) 20 MG tablet       ibuprofen (ADVIL,MOTRIN) 600 MG tablet Take 600 mg by mouth 4 (Four) Times a Day.      levothyroxine (SYNTHROID, LEVOTHROID) 50 MCG tablet       lisinopril (PRINIVIL,ZESTRIL) 40 MG tablet       metoprolol succinate XL (TOPROL-XL) 25 MG 24 hr tablet       mirtazapine (REMERON) 15 MG tablet       nystatin 890736 UNIT/GM powder       OLANZapine zydis (zyPREXA) 5 MG disintegrating tablet       triamcinolone (KENALOG) 0.1 % cream            PROCEDURES  Procedures            PROGRESS, DATA ANALYSIS, CONSULTS, AND MEDICAL DECISION MAKING  All labs have been independently interpreted by me.  All radiology studies have been reviewed by me. All EKG's have been independently viewed and interpreted by me.  Discussion below represents my analysis of pertinent findings related to patient's condition, differential diagnosis, treatment plan and final disposition.    Differential diagnosis includes but is not limited to   The differential diagnosis for this patient includes: appendicitis, pancreatitis, cholecystitis/biliary colic, PUD, gastritis, pneumonia, ureteral stone, sbo, hernia, colitis, diverticulitis, AAA, malignancy, gastroenteritis, food intolerances. Abdominal exam is without peritoneal signs.  This patient is ill-appearing and his  exam and History complicated by his chronic cognitive impairment and inability to communicate effectively.  Plan serum labs to include sepsis screening, urinalysis, respiratory viral panel, CT head given the patient is on anticoagulation, chest x-ray to assess for pneumonia, and CT abdomen pelvis given patient's significant upper abdominal tenderness and history of decreased p.o. intake and diarrhea, IV fluids to be bolused given patient's soft initial blood pressure, will offer pain control as needed as well.  Will closely reassess          ED Course as of 04/30/24 2228 Tue Apr 30, 2024   1810 EKG ER MD interpretation   Time: 18: 06  Rhythm and rate: Atrial fibrillation with a rate of 85  Axis: Normal  P waves: Normal  QRS complexes: Normal  ST segments: no elevation nor depressions  Baseline wander but no obvious T wave abnormalities [AR]   1836 Per patient's [facility] records patient's creatinine was 0.88 on January 23, 2024.  Patient does have an JOSIAH today with a bump in his creatinine to 1.55.    Patient has an elevated lactic acid being treated appropriately with IV fluids currently and no specific infectious source identified yet.  Patient's viral respiratory panel, urine, and CT abdomen/pelvis are pending at this time. [AR]   1903 Pt care assumed from Dr. Dickinson pending CT scan and admission. [MP]   1926 Consult placed to general surgery and to [facility].  Soapsuds enema ordered [MP]   1940 Spoke with Dr. Sheldon with [facility].  I reviewed patient presentation and ED findings.  He agrees to admit the patient to a [facility] observation bed. [MP]   1947 I spoke with Dr. Lennon with general surgery. Reviewed patient presentation and ED findings. He recommends NPO, can put NG if patient starts vomiting, agrees with enema. [MP]      ED Course User Index  [AR] Kailee Dickinson MD  [MP] Krystal Davidson PA-C             AS OF 22:28 EDT VITALS:    BP - 124/93  HR - 83  TEMP - 97.2 °F (36.2 °C) (Tympanic)  O2 SATS -  90%      COMPLEXITY OF CARE  The patient requires admission.    DIAGNOSIS  Final diagnoses:   Generalized abdominal pain   Decreased oral intake   Diarrhea, unspecified type   Frequent falls   Chronic anticoagulation   JOSIAH (acute kidney injury)   Fecal impaction   Partial small bowel obstruction         DISPOSITION  ED Disposition       ED Disposition   Decision to Admit    Condition   --    Comment   Level of Care: Med/Surg [1]   Diagnosis: Partial bowel obstruction [007034]   Admitting Physician: DANIEL CARPIO [[id number]]   Attending Physician: DANIEL CARPIO [[id number]]   Certification: I Certify That Inpatient Hospital Services Are Medically Necessary For Greater Than 2 Midnights                  Please note that portions of this document were completed with a voice recognition program.    Note Disclaimer: At [facility], we believe that sharing information builds trust and better relationships. You are receiving this note because you recently visited [facility]. It is possible you will see health information before a provider has talked with you about it. This kind of information can be easy to misunderstand. To help you fully understand what it means for your health, we urge you to discuss this note with your provider.         Kailee Dickinson MD  04/30/24 8161

## 2024-05-01 LAB
ANION GAP SERPL CALCULATED.3IONS-SCNC: 9.3 MMOL/L (ref 5–15)
BUN SERPL-MCNC: 14 MG/DL (ref 6–20)
BUN/CREAT SERPL: 15.7 (ref 7–25)
CALCIUM SPEC-SCNC: 8.1 MG/DL (ref 8.6–10.5)
CHLORIDE SERPL-SCNC: 106 MMOL/L (ref 98–107)
CO2 SERPL-SCNC: 23.7 MMOL/L (ref 22–29)
CREAT SERPL-MCNC: 0.89 MG/DL (ref 0.76–1.27)
DEPRECATED RDW RBC AUTO: 49.5 FL (ref 37–54)
EGFRCR SERPLBLD CKD-EPI 2021: 105.1 ML/MIN/1.73
ERYTHROCYTE [DISTWIDTH] IN BLOOD BY AUTOMATED COUNT: 14.7 % (ref 12.3–15.4)
GLUCOSE SERPL-MCNC: 65 MG/DL (ref 65–99)
HCT VFR BLD AUTO: 34.6 % (ref 37.5–51)
HGB BLD-MCNC: 11.3 G/DL (ref 13–17.7)
MAGNESIUM SERPL-MCNC: 1.5 MG/DL (ref 1.6–2.6)
MAGNESIUM SERPL-MCNC: 2 MG/DL (ref 1.6–2.6)
MCH RBC QN AUTO: 29.7 PG (ref 26.6–33)
MCHC RBC AUTO-ENTMCNC: 32.7 G/DL (ref 31.5–35.7)
MCV RBC AUTO: 91.1 FL (ref 79–97)
PHOSPHATE SERPL-MCNC: 3.2 MG/DL (ref 2.5–4.5)
PLATELET # BLD AUTO: 119 10*3/MM3 (ref 140–450)
PMV BLD AUTO: 10.3 FL (ref 6–12)
POTASSIUM SERPL-SCNC: 4.3 MMOL/L (ref 3.5–5.2)
QT INTERVAL: 348 MS
QTC INTERVAL: 414 MS
RBC # BLD AUTO: 3.8 10*6/MM3 (ref 4.14–5.8)
SODIUM SERPL-SCNC: 139 MMOL/L (ref 136–145)
WBC NRBC COR # BLD AUTO: 5.28 10*3/MM3 (ref 3.4–10.8)

## 2024-05-01 PROCEDURE — 84100 ASSAY OF PHOSPHORUS: CPT | Performed by: STUDENT IN AN ORGANIZED HEALTH CARE EDUCATION/TRAINING PROGRAM

## 2024-05-01 PROCEDURE — 87507 IADNA-DNA/RNA PROBE TQ 12-25: CPT | Performed by: STUDENT IN AN ORGANIZED HEALTH CARE EDUCATION/TRAINING PROGRAM

## 2024-05-01 PROCEDURE — 25010000002 MAGNESIUM SULFATE IN D5W 1G/100ML (PREMIX) 1-5 GM/100ML-% SOLUTION: Performed by: INTERNAL MEDICINE

## 2024-05-01 PROCEDURE — 25810000003 SODIUM CHLORIDE 0.9 % SOLUTION: Performed by: EMERGENCY MEDICINE

## 2024-05-01 PROCEDURE — 99221 1ST HOSP IP/OBS SF/LOW 40: CPT | Performed by: SURGERY

## 2024-05-01 PROCEDURE — 25010000002 PIPERACILLIN SOD-TAZOBACTAM PER 1 G: Performed by: STUDENT IN AN ORGANIZED HEALTH CARE EDUCATION/TRAINING PROGRAM

## 2024-05-01 PROCEDURE — 80048 BASIC METABOLIC PNL TOTAL CA: CPT | Performed by: STUDENT IN AN ORGANIZED HEALTH CARE EDUCATION/TRAINING PROGRAM

## 2024-05-01 PROCEDURE — 25810000003 SODIUM CHLORIDE 0.9 % SOLUTION: Performed by: INTERNAL MEDICINE

## 2024-05-01 PROCEDURE — 99222 1ST HOSP IP/OBS MODERATE 55: CPT | Performed by: NURSE PRACTITIONER

## 2024-05-01 PROCEDURE — 36415 COLL VENOUS BLD VENIPUNCTURE: CPT | Performed by: STUDENT IN AN ORGANIZED HEALTH CARE EDUCATION/TRAINING PROGRAM

## 2024-05-01 PROCEDURE — 97162 PT EVAL MOD COMPLEX 30 MIN: CPT

## 2024-05-01 PROCEDURE — 87040 BLOOD CULTURE FOR BACTERIA: CPT | Performed by: STUDENT IN AN ORGANIZED HEALTH CARE EDUCATION/TRAINING PROGRAM

## 2024-05-01 PROCEDURE — 83735 ASSAY OF MAGNESIUM: CPT | Performed by: INTERNAL MEDICINE

## 2024-05-01 PROCEDURE — 83735 ASSAY OF MAGNESIUM: CPT | Performed by: STUDENT IN AN ORGANIZED HEALTH CARE EDUCATION/TRAINING PROGRAM

## 2024-05-01 PROCEDURE — 85027 COMPLETE CBC AUTOMATED: CPT | Performed by: STUDENT IN AN ORGANIZED HEALTH CARE EDUCATION/TRAINING PROGRAM

## 2024-05-01 RX ORDER — MAGNESIUM SULFATE 1 G/100ML
1 INJECTION INTRAVENOUS
Status: COMPLETED | OUTPATIENT
Start: 2024-05-01 | End: 2024-05-01

## 2024-05-01 RX ADMIN — PIPERACILLIN AND TAZOBACTAM 3.38 G: 3; .375 INJECTION, POWDER, FOR SOLUTION INTRAVENOUS at 20:22

## 2024-05-01 RX ADMIN — Medication 10 ML: at 00:05

## 2024-05-01 RX ADMIN — METOPROLOL TARTRATE 2.5 MG: 1 INJECTION, SOLUTION INTRAVENOUS at 00:07

## 2024-05-01 RX ADMIN — PIPERACILLIN AND TAZOBACTAM 3.38 G: 3; .375 INJECTION, POWDER, FOR SOLUTION INTRAVENOUS at 05:43

## 2024-05-01 RX ADMIN — PANTOPRAZOLE SODIUM 40 MG: 40 INJECTION, POWDER, FOR SOLUTION INTRAVENOUS at 05:30

## 2024-05-01 RX ADMIN — MAGNESIUM SULFATE HEPTAHYDRATE 1 G: 1 INJECTION, SOLUTION INTRAVENOUS at 12:00

## 2024-05-01 RX ADMIN — Medication 10 ML: at 07:20

## 2024-05-01 RX ADMIN — METOPROLOL TARTRATE 2.5 MG: 1 INJECTION, SOLUTION INTRAVENOUS at 05:32

## 2024-05-01 RX ADMIN — SODIUM CHLORIDE 100 ML/HR: 900 INJECTION INTRAVENOUS at 04:11

## 2024-05-01 RX ADMIN — METOPROLOL TARTRATE 2.5 MG: 1 INJECTION, SOLUTION INTRAVENOUS at 14:01

## 2024-05-01 RX ADMIN — METOPROLOL TARTRATE 2.5 MG: 1 INJECTION, SOLUTION INTRAVENOUS at 20:22

## 2024-05-01 RX ADMIN — Medication 10 ML: at 20:22

## 2024-05-01 RX ADMIN — MAGNESIUM SULFATE HEPTAHYDRATE 1 G: 1 INJECTION, SOLUTION INTRAVENOUS at 14:01

## 2024-05-01 RX ADMIN — MAGNESIUM SULFATE HEPTAHYDRATE 1 G: 1 INJECTION, SOLUTION INTRAVENOUS at 13:01

## 2024-05-01 RX ADMIN — PIPERACILLIN AND TAZOBACTAM 3.38 G: 3; .375 INJECTION, POWDER, FOR SOLUTION INTRAVENOUS at 11:56

## 2024-05-01 RX ADMIN — SODIUM CHLORIDE 125 ML/HR: 900 INJECTION INTRAVENOUS at 22:24

## 2024-05-01 NOTE — PLAN OF CARE
Goal Outcome Evaluation:      Pt admitted this shift , database and assessment completed. Lethargic, disoriented x 4. 2 L NC. Afib on tele, afebril. No N/V noted. Plan of care is ongoing.

## 2024-05-01 NOTE — PROGRESS NOTES
Discharge Planning Assessment  [facility]     Patient Name: Helder Abbott  MRN: [id number]  Today's Date: 5/1/2024    Admit Date: 4/30/2024    Plan: Return to family group home with caregiver   Discharge Needs Assessment       Row Name 05/01/24 0949       Living Environment    People in Home other (see comments)    Unique Family Situation Caregiver Karan Waddell [phone]    Current Living Arrangements home    Potentially Unsafe Housing Conditions none    Primary Care Provided by other (see comments)  Caregiver    Provides Primary Care For no one, unable/limited ability to care for self    Family Caregiver if Needed other (see comments)    Quality of Family Relationships non-existent    Able to Return to Prior Arrangements yes       Resource/Environmental Concerns    Resource/Environmental Concerns none    Transportation Concerns none       Transition Planning    Patient/Family Anticipates Transition to home with help/services    Patient/Family Anticipated Services at Transition none       Discharge Needs Assessment    Equipment Currently Used at Home none    Concerns to be Addressed denies needs/concerns at this time    Anticipated Changes Related to Illness none    Equipment Needed After Discharge none                   Discharge Plan       Row Name 05/01/24 0941       Plan    Plan Return to family group home with caregiver    Plan Comments Spoke with guardian Carla Velasco [phone] by telephone.  Patient lives in a family care home with caregiver Karan Waddell [phone].  PCP is José Garcia with [facility].  Pharmacy is [facility] in [place], [place].  Copy of guardianship paperwork requested and guardian will send.  Plan is to return to group home at [place].  CCP will follow.  Kar VILLARREAL                  Continued Care and Services - Admitted Since 4/30/2024    No active coordination exists for this encounter.       Expected Discharge Date and Time       Expected Discharge Date  Expected Discharge Time    May 4, 2024            Demographic Summary       Row Name 05/01/24 0948       General Information    Admission Type inpatient    Arrived From home    Referral Source admission list    Reason for Consult discharge planning    Preferred Language English                   Functional Status       Row Name 05/01/24 0948       Functional Status    Usual Activity Tolerance poor    Current Activity Tolerance poor       Functional Status, IADL    Medications completely dependent    Meal Preparation completely dependent    Housekeeping completely dependent    Laundry completely dependent    Shopping completely dependent       Mental Status Summary    Recent Changes in Mental Status/Cognitive Functioning unable to assess                          Becky S. [place], RN

## 2024-05-01 NOTE — CONSULTS
"Nutrition Services    Patient Name:  Helder Abbott  YOB: 1975  MRN: [id number]  Admit Date:  4/30/2024    Assessment Date:  05/01/24    Summary: Consulted per RN Admit Screen/ MSA:  Consulted per RN Admit Screen for weight loss and FERNY. Pt with history of bipolar disorder, HTN, CVA, hypothyroidism, Afib, and moderate cognitive impairment at baseline here after fatigue, weakness, poor po intake, diarrhea and fall at home. CT abdomen showed fecal impaction/partial SBO vs ileus/enteritis. Enema given and pt had 1 BM 4/30 and 2 BM's today. Surgery consulted and only recommends bowel regimen with no indication for disimpaction at this time.   NPO x 2 days  Meds: IVF@125 ml/hr, protonix, pericolce, zosyn  Labs reviewed.   BMI 20.9  Pt with 30 lb (16%) wt loss x 7 months.    Severe Malnutrition (Based on ASPEN/AND criteria, pt meets nutrition diagnosis of Severe Malnutrition of Chronic Disease due to inadequate po intake, 30 lb (16%) wt loss x 7 months and severe fat/muscle wasting noted on NFPE.)    Recommend:  Advance diet as tolerates to regular diet.  Stronger bowel regimen to include laxative daily.  Boost Plus BID (chocolate) once diet advanced.    Will follow per protocol.    CLINICAL NUTRITION ASSESSMENT      Reason for Assessment Malnutrition Severity Assessment (MSA), MST score 2+, Nurse Admission Screen     Diagnosis/Problem   fatigue, weakness, poor po intake, diarrhea and fall at home. CT abdomen showed fecal impaction/partial SBO vs ileus/enteritis   Medical/Surgical History Past Medical History:   Diagnosis Date    Benign essential hypertension     Bipolar disorder     Dyslipidemia     GERD (gastroesophageal reflux disease)     Heart disease     History of stroke     Hypothyroidism        History reviewed. No pertinent surgical history.     Anthropometrics        Current Height  Current Weight  BMI kg/m2 Height: 185.4 cm (73\")  Weight: 72.1 kg (158 lb 15.2 oz) (05/01/24 0534)  Body mass " "index is 20.97 kg/m².   Adjusted BMI (if applicable)    BMI Category Normal/Healthy (18.4 - 24.9)   Ideal Body Weight (IBW) 176 lb   Usual Body Weight (UBW) 188 lb   Weight Trend Loss, Amount/Timeframe: 30 lb (16%) x 7 months   Weight History Wt Readings from Last 30 Encounters:   05/01/24 0534 72.1 kg (158 lb 15.2 oz)   04/30/24 2323 72.2 kg (159 lb 2.8 oz)   11/30/22 1412 86.2 kg (190 lb)      --  Estimated/Assessed Needs        Current Weight  Weight: 72.1 kg (158 lb 15.2 oz) (05/01/24 0534)       Energy Requirements    Weight for Calculation 72 kg   Method for Estimation  25 kcal/kg, 30 kcal/kg   EST Needs (kcal/day) 5805-7542 kcals       Protein Requirements    Weight for Calculation 72 kg   EST Protein Needs (g/kg) 1.2 - 1.5 gm/kg   EST Daily Needs (g/day)  g       Fluid Requirements     Method for Estimation 30 mL/kg    EST Needs (mL/day) 2160 ml     Labs       Pertinent Labs    Results from last 7 days   Lab Units 05/01/24  0543 04/30/24  1732   SODIUM mmol/L 139 138   POTASSIUM mmol/L 4.3 4.1   CHLORIDE mmol/L 106 101   CO2 mmol/L 23.7 27.5   BUN mg/dL 14 16   CREATININE mg/dL 0.89 1.55*   CALCIUM mg/dL 8.1* 9.5   BILIRUBIN mg/dL  --  0.5   ALK PHOS U/L  --  80   ALT (SGPT) U/L  --  9   AST (SGOT) U/L  --  18   GLUCOSE mg/dL 65 96     Results from last 7 days   Lab Units 05/01/24  0543 04/30/24  1732   MAGNESIUM mg/dL 1.5* 1.6   PHOSPHORUS mg/dL 3.2  --    HEMOGLOBIN g/dL 11.3* 13.1   HEMATOCRIT % 34.6* 40.7   WBC 10*3/mm3 5.28 7.37   ALBUMIN g/dL  --  3.9     Results from last 7 days   Lab Units 05/01/24  0543 04/30/24  1732   INR   --  1.20*   PLATELETS 10*3/mm3 119* 162     COVID19   Date Value Ref Range Status   04/30/2024 Not Detected Not Detected - Ref. Range Final     No results found for: \"HGBA1C\"       Medications           Scheduled Medications metoprolol tartrate, 2.5 mg, Intravenous, Q8H  pantoprazole, 40 mg, Intravenous, Q AM  piperacillin-tazobactam, 3.375 g, Intravenous, " Q8H  senna-docusate sodium, 2 tablet, Oral, BID  sodium chloride, 10 mL, Intravenous, Q12H       Infusions sodium chloride, 125 mL/hr, Last Rate: 125 mL/hr (05/01/24 1431)       PRN Medications   senna-docusate sodium **AND** polyethylene glycol **AND** bisacodyl **AND** bisacodyl    nitroglycerin    ondansetron    [COMPLETED] Insert Peripheral IV **AND** sodium chloride    sodium chloride    sodium chloride     Physical Findings          General Findings confused, disoriented, impaired speech, room air   Oral/Mouth Cavity tooth or teeth missing   Edema  not assessed   Gastrointestinal diarrhea, fecal incontinence, hypoactive bowel sounds, last bowel movement: 5/1 after enema   Skin  skin intact, bruising   Tubes/Drains/Lines none   NFPE See Malnutrition Severity Assessment, Date Completed: 5/1   --  Malnutrition Severity Assessment      Patient meets criteria for : Severe Malnutrition (Based on ASPEN/AND criteria, pt meets nutrition diagnosis of Severe Malnutrition of Chronic Disease due to inadequate po intake, 30 lb (16%) wt loss x 7 months and severe fat/muscle wasting noted on NFPE.)  Malnutrition Type (Last 8 Hours)       Malnutrition Severity Assessment       Row Name 05/01/24 1726       Malnutrition Severity Assessment    Malnutrition Type Chronic Disease - Related Malnutrition      Row Name 05/01/24 1726       Insufficient Energy Intake     Insufficient Energy Intake Findings Severe    Insufficient Energy Intake  <75% of est. energy requirement for > or equal to 3 months      Row Name 05/01/24 1726       Unintentional Weight Loss     Unintentional Weight Loss Findings Severe    Unintentional Weight Loss  Weight loss greater than 10% in six months  30 lb (16%) wt loss x 7 months      Row Name 05/01/24 1726       Muscle Loss    Loss of Muscle Mass Findings Severe    Judaism Region Moderate - slight depression    Clavicle Bone Region Severe - protruding prominent bone    Acromion Bone Region Severe - squared  shoulders, bones, and acromion process protrusion prominent    Scapular Bone Region Severe - prominent bones, depressions easily visible between ribs, scapula, spine, shoulders    Dorsal Hand Region Severe - prominent depression    Patellar Region Severe - prominent bone, square looking, very little muscle definition    Anterior Thigh Region Severe - line/depression along thigh, obviously thin    Posterior Calf Region Severe - thin with very little definition/firmness      Row Name 05/01/24 1726       Fat Loss    Subcutaneous Fat Loss Findings Severe    Orbital Region  Severe - pronounced hollowness/depression, dark circles, loose saggy skin    Upper Arm Region Severe - mostly skin, very little space between folds, fingers touch      Row Name 05/01/24 1726       Criteria Met (Must meet criteria for severity in at least 2 of these categories: M Wasting, Fat Loss, Fluid, Secondary Signs, Wt. Status, Intake)    Patient meets criteria for  Severe Malnutrition  Based on ASPEN/AND criteria, pt meets nutrition diagnosis of Severe Malnutrition of Chronic Disease due to inadequate po intake, 30 lb (16%) wt loss x 7 months and severe fat/muscle wasting noted on NFPE.                       Current Nutrition Orders & Evaluation of Intake       Oral Nutrition     Food Allergies NKFA   Current PO Diet NPO Diet NPO Type: Strict NPO   Supplement n/a   PO Evaluation     % PO Intake N/a    Factors Affecting Intake: altered GI function, fatigue, weakness   --  PES STATEMENT / NUTRITION DIAGNOSIS      Nutrition Dx Problem  Problem: Malnutrition (severe)  Etiology: Factors Affecting Nutrition - FERNY, weakness, fatigue    Signs/Symptoms: Report of Minimal PO Intake and Unintended Weight Change     NUTRITION INTERVENTION / PLAN OF CARE      Intervention Goal(s) Maintain nutrition status, Reduce/improve symptoms, Meet estimated needs, Disease management/therapy, Initiate feeding/diet, Tolerate PO , Increase intake, Accepts oral nutrition  supplement, and Maintain weight         RD Intervention/Action Await initiation/advancement of PO diet, Continue to monitor, Care plan reviewed, and Recommend/order: oral nutritional supplement   --      Prescription/Orders:       PO Diet ADAT to regular diet      Supplements Boost BID (chocolate) once diet advanced      Enteral Nutrition       Parenteral Nutrition    New Prescription Ordered? No, recommended   --      Monitor/Evaluation Per protocol   Discharge Plan/Needs Pending clinical course   --    RD to follow per protocol.      Electronically signed by:  Becki Trejo RD  05/01/24 16:34 EDT

## 2024-05-01 NOTE — PLAN OF CARE
Goal Outcome Evaluation:  Plan of Care Reviewed With: patient              Patient is a 49 y.o. male admitted to [facility] for partial SBO, JOSIAH, decreased oral intake, falls on 4/30/2024. PMHx includes bipolar disorder, HTN, CVA, aifb. Patient is unable to state PLOF today but per chart lives in group home. He is Aox2 this AM. Today, patient performed bed mobility with CGA and sat EOB with CGA. Pt denies STS and ambulation today. He does report some chest pain and RN notified. Strength, activity tolerance, pain deficits noted. Patient may benefit from skilled PT services to address functional deficits and improve level of independence prior to discharge.       Anticipated Discharge Disposition (PT):  (plan to return to group home. PT rec PT at [place])

## 2024-05-01 NOTE — PROGRESS NOTES
Name: Helder Abbott ADMIT: 2024   : 1975  PCP: José Garcia APRN    MRN: [id number] LOS: 1 days   AGE/SEX: 49 y.o. male  ROOM: [facility]   Subjective   Chief Complaint   Patient presents with    Fatigue     Increased weakness, increase in falls    Fall     Patient is a 49-year-old male with a history of including but not limited to bipolar disorder, essential hypertension, hypothyroidism, CVA , atrial fibrillation on Eliquis, moderate cognitive impairment at baseline presents to the ED with complaint of acute weakness, poor intake. CT with fecal impaction possible p.bowel obstruction    On IVFs  S/p enema  +BM      Objective   Vital Signs  Temp:  [97.2 °F (36.2 °C)-98.5 °F (36.9 °C)] 98.2 °F (36.8 °C)  Heart Rate:  [64-90] 82  Resp:  [16-18] 18  BP: ()/() 115/75  SpO2:  [90 %-100 %] 100 %  on  Flow (L/min):  [2] 2;   Device (Oxygen Therapy): room air  Body mass index is 20.97 kg/m².    Physical Exam  Constitutional:       General: He is in acute distress.      Appearance: He is ill-appearing.   HENT:      Head: Normocephalic and atraumatic.   Eyes:      General: No scleral icterus.  Cardiovascular:      Rate and Rhythm: Normal rate and regular rhythm.      Heart sounds: Normal heart sounds.   Pulmonary:      Effort: Pulmonary effort is normal. No respiratory distress.      Breath sounds: Normal breath sounds.   Abdominal:      General: There is no distension.      Palpations: Abdomen is soft.      Tenderness: There is no abdominal tenderness. There is no guarding.   Musculoskeletal:      Cervical back: Neck supple.   Neurological:      Mental Status: He is alert.     Awake but is chronically and acutely ill. Oriented to self, poor overall memory and cognition.     Results Review:       I reviewed the patient's new clinical results.  Results from last 7 days   Lab Units 24  0543 24  1732   WBC 10*3/mm3 5.28 7.37   HEMOGLOBIN g/dL 11.3* 13.1   PLATELETS 10*3/mm3 119* 162  "    Results from last 7 days   Lab Units 05/01/24  0543 04/30/24  1732   SODIUM mmol/L 139 138   POTASSIUM mmol/L 4.3 4.1   CHLORIDE mmol/L 106 101   CO2 mmol/L 23.7 27.5   BUN mg/dL 14 16   CREATININE mg/dL 0.89 1.55*   GLUCOSE mg/dL 65 96   Estimated Creatinine Clearance: 102.4 mL/min (by C-G formula based on SCr of 0.89 mg/dL).  Results from last 7 days   Lab Units 04/30/24  1732   ALBUMIN g/dL 3.9   BILIRUBIN mg/dL 0.5   ALK PHOS U/L 80   AST (SGOT) U/L 18   ALT (SGPT) U/L 9     Results from last 7 days   Lab Units 05/01/24  0543 04/30/24  1732   CALCIUM mg/dL 8.1* 9.5   ALBUMIN g/dL  --  3.9   MAGNESIUM mg/dL 1.5* 1.6   PHOSPHORUS mg/dL 3.2  --      Results from last 7 days   Lab Units 04/30/24  2103 04/30/24  1732   PROCALCITONIN ng/mL  --  0.06   LACTATE mmol/L 0.8 2.6*       Coag   Results from last 7 days   Lab Units 04/30/24  1732   INR  1.20*     HbA1C No results found for: \"HGBA1C\"  Infection   Results from last 7 days   Lab Units 04/30/24  1732   PROCALCITONIN ng/mL 0.06     Radiology(recent) CT Abdomen Pelvis Without Contrast    Result Date: 4/30/2024   1.  Large distal colonic and rectal stool burden concerning for fecal impaction. Relatively diffusely dilated fluid-filled loops of small bowel to the level of the cecum may be due to at least partial obstruction due to the distal stool burden versus ileus and/or nonspecific enteritis in the appropriate clinical setting. 2.  Small-volume ascites, which may be reactive. 3.  No CT evidence of acute traumatic injury in the abdomen or pelvis.   This report was finalized on 4/30/2024 7:06 PM by Dr. Akua Gutierrez M.D on Workstation: [id number]      CT Head Without Contrast    Result Date: 4/30/2024   No acute intracranial hemorrhage or hydrocephalus. If there is further clinical concern, MRI could be considered for further evaluation.  This report was finalized on 4/30/2024 7:04 PM by Dr. Alfred Cavazos M.D on Workstation: [id number]      XR Chest 1 " "View    Result Date: 4/30/2024  As described.    This report was finalized on 4/30/2024 6:09 PM by Dr. Alfred Cavazos M.D on Workstation: [id number]     HS Troponin T   Date Value Ref Range Status   04/30/2024 34 (H) <22 ng/L Final     No components found for: \"TSH;2\"    magnesium sulfate, 1 g, Intravenous, Q1H  metoprolol tartrate, 2.5 mg, Intravenous, Q8H  pantoprazole, 40 mg, Intravenous, Q AM  piperacillin-tazobactam, 3.375 g, Intravenous, Q8H  senna-docusate sodium, 2 tablet, Oral, BID  sodium chloride, 10 mL, Intravenous, Q12H      sodium chloride, 125 mL/hr, Last Rate: 100 mL/hr (05/01/24 0411)    NPO Diet NPO Type: Strict NPO      Assessment & Plan      Active Hospital Problems    Diagnosis  POA    **Partial bowel obstruction [K56.600]  Yes    JOSIAH (acute kidney injury) [N17.9]  Unknown    Essential hypertension [I10]  Unknown    Hypothyroidism (acquired) [E03.9]  Unknown    History of CVA (cerebrovascular accident) [Z86.73]  Not Applicable    GERD without esophagitis [K21.9]  Unknown    HLD (hyperlipidemia) [E78.5]  Unknown    Bipolar disorder [F31.9]  Unknown    Fecal impaction [K56.41]  Unknown      Resolved Hospital Problems   No resolved problems to display.       Patient is a 49-year-old male with a history of including but not limited to bipolar disorder, essential hypertension, hypothyroidism, CVA , atrial fibrillation on Eliquis, moderate cognitive impairment at baseline presents to the ED with complaint of acute weakness, poor intake. CT with fecal impaction possible p.bowel obstruction     Fecal impaction/partial small bowel obstruction/versus ileus/enteritis  -Suspect symptoms related to fecal impaction. Given enemas. Surgery consulted     Due to hypotension elevated lactic acid was started on Zosyn empirically,  -Supportive care, IV fluids, antiemetics       Atrial fibrillation  -On metoprolol, digoxin, Eliquis-holding currently secondary to strict n.p.o.  -Initiated on IV metoprolol 2.5 mg " every 8 hours  -Follow-up general surgery recommendations in a.m., if no interventions can be initiated therapeutic Lovenox if not cleared for p.o. intake, if cleared for p.o. intake Eliquis can be resumed.     Hypothyroidism  -TSH normal, resume p.o. levothyroxine once cleared for p.o. intake        Bipolar disorder/moderate cognitive impairment/history of CVA  -Likely secondary to dehydration/fecal impaction as above  -Initial CT head with no acute findings        JOSIAH  -improved with fluids      Hypomag  -replace      VTE Prophylaxis - SCDs.      ROSA MARIA RN  Reviewed records     Vinny Suggs MD  [facility]  05/01/24  09:57 EDT

## 2024-05-01 NOTE — THERAPY EVALUATION
Patient Name: Helder Abbott  : 1975    MRN: [id number]                              Today's Date: 2024       Admit Date: 2024    Visit Dx:     ICD-10-CM ICD-9-CM   1. Generalized abdominal pain  R10.84 789.07   2. Decreased oral intake  R63.8 783.9   3. Diarrhea, unspecified type  R19.7 787.91   4. Frequent falls  R29.6 V15.88   5. Chronic anticoagulation  Z79.01 V58.61   6. JOSIAH (acute kidney injury)  N17.9 584.9   7. Fecal impaction  K56.41 560.32   8. Partial small bowel obstruction  K56.600 560.9     Patient Active Problem List   Diagnosis    JOSIAH (acute kidney injury)    Essential hypertension    Hypothyroidism (acquired)    History of CVA (cerebrovascular accident)    GERD without esophagitis    HLD (hyperlipidemia)    Bipolar disorder    Partial bowel obstruction    Fecal impaction     Past Medical History:   Diagnosis Date    Benign essential hypertension     Bipolar disorder     Dyslipidemia     GERD (gastroesophageal reflux disease)     Heart disease     History of stroke     Hypothyroidism      History reviewed. No pertinent surgical history.   General Information       Row Name 24 1133          Physical Therapy Time and Intention    Document Type evaluation  -CB     Mode of Treatment individual therapy;physical therapy  -CB       Row Name 24 1133          General Information    Patient Profile Reviewed yes  -CB     Prior Level of Function --  multiple falls but pt unable to state PLOF.  -CB     Existing Precautions/Restrictions fall   -CB     Barriers to Rehab cognitive status  -CB       Row Name 24 1133          Living Environment    People in Home --  group home  -CB       Row Name 24 1133          Cognition    Orientation Status (Cognition) oriented to;person;place  -CB       Row Name 24 1133          Safety Issues, Functional Mobility    Safety Issues Affecting Function (Mobility) insight into deficits/self-awareness  -CB     Impairments Affecting  Function (Mobility) endurance/activity tolerance;strength;pain  -CB               User Key  (r) = Recorded By, (t) = Taken By, (c) = Cosigned By      Initials Name Provider Type    Shirlene Hazel PT Physical Therapist                   Mobility       Row Name 05/01/24 1137          Bed Mobility    Bed Mobility supine-sit;sit-supine  -CB     Supine-Sit [place] (Bed Mobility) contact guard  -CB     Sit-Supine [place] (Bed Mobility) contact guard  -CB     Assistive Device (Bed Mobility) head of bed elevated;bed rails  -CB     Comment, (Bed Mobility) sat EOB 5min CGA  -CB               User Key  (r) = Recorded By, (t) = Taken By, (c) = Cosigned By      Initials Name Provider Type    Shirlene Hazel PT Physical Therapist                   Obj/Interventions       Row Name 05/01/24 1138          Range of Motion Comprehensive    General Range of Motion bilateral lower extremity ROM WFL  -CB       Row Name 05/01/24 1138          Strength Comprehensive (MMT)    General Manual Muscle Testing (MMT) Assessment lower extremity strength deficits identified  -CB       Row Name 05/01/24 1138          Balance    Balance Assessment sitting static balance  -CB     Static Sitting Balance contact guard;verbal cues  -CB     Position, Sitting Balance sitting edge of bed  -CB               User Key  (r) = Recorded By, (t) = Taken By, (c) = Cosigned By      Initials Name Provider Type    Shirlene Hazel, JIMMIE Physical Therapist                   Goals/Plan       Row Name 05/01/24 1140          Bed Mobility Goal 1 (PT)    Activity/Assistive Device (Bed Mobility Goal 1, PT) bed mobility activities, all  -CB     [place] Level/Cues Needed (Bed Mobility Goal 1, PT) standby assist  -CB     Time Frame (Bed Mobility Goal 1, PT) long term goal (LTG);1 week  -CB       Row Name 05/01/24 1140          Transfer Goal 1 (PT)    Activity/Assistive Device (Transfer Goal 1, PT) sit-to-stand/stand-to-sit;bed-to-chair/chair-to-bed  -CB      [place] Level/Cues Needed (Transfer Goal 1, PT) standby assist  -CB     Time Frame (Transfer Goal 1, PT) long term goal (LTG);1 week  -CB       Row Name 05/01/24 1140          Gait Training Goal 1 (PT)    Activity/Assistive Device (Gait Training Goal 1, PT) gait (walking locomotion);improve balance and speed;increase endurance/gait distance;decrease fall risk;diminish gait deviation  -CB     [place] Level (Gait Training Goal 1, PT) contact guard required  -CB     Distance (Gait Training Goal 1, PT) 50ft  -CB     Time Frame (Gait Training Goal 1, PT) long term goal (LTG);1 week  -CB       Row Name 05/01/24 1149          Therapy Assessment/Plan (PT)    Planned Therapy Interventions (PT) balance training;bed mobility training;gait training;home exercise program;patient/family education;transfer training;strengthening  -CB               User Key  (r) = Recorded By, (t) = Taken By, (c) = Cosigned By      Initials Name Provider Type    CB Shirlene Holman, PT Physical Therapist                   Clinical Impression       Row Name 05/01/24 1146          Pain    Pre/Posttreatment Pain Comment pt reports chest pain- RN notified  -CB     Pain Intervention(s) Repositioned;Rest  -CB       Row Name 05/01/24 1142          Plan of Care Review    Plan of Care Reviewed With patient  -CB     Outcome Evaluation Patient is a 49 y.o. male admitted to [facility] for partial SBO, JOSIAH, decreased oral intake, falls on 4/30/2024. PMHx includes bipolar disorder, HTN, CVA, aifb. Patient is unable to state PLOF today but per chart lives in group home. He is Aox2 this AM. Today, patient performed bed mobility with CGA and sat EOB with CGA. Pt denies STS and ambulation today. He does report some chest pain and RN notified. Strength, activity tolerance, pain deficits noted. Patient may benefit from skilled PT services to address functional deficits and improve level of independence prior to discharge.  -CB       Row Name 05/01/24 4488           Therapy Assessment/Plan (PT)    Rehab Potential (PT) good, to achieve stated therapy goals  -CB     Criteria for Skilled Interventions Met (PT) yes  -CB     Therapy Frequency (PT) 5 times/wk  -CB       Row Name 05/01/24 1140          Positioning and Restraints    Pre-Treatment Position in bed  -CB     Post Treatment Position bed  -CB     In Bed notified nsg;side lying right;call light within reach;encouraged to call for assist;exit alarm on;side rails up x3  -CB               User Key  (r) = Recorded By, (t) = Taken By, (c) = Cosigned By      Initials Name Provider Type    Shirlene Hazel, PT Physical Therapist                   Outcome Measures       Row Name 05/01/24 1144 05/01/24 0721       How much help from another person do you currently need...    Turning from your back to your side while in flat bed without using bedrails? 3  -CB 2  -MS    Moving from lying on back to sitting on the side of a flat bed without bedrails? 3  -CB 2  -MS    Moving to and from a bed to a chair (including a wheelchair)? 2  -CB 2  -MS    Standing up from a chair using your arms (e.g., wheelchair, bedside chair)? 2  -CB 1  -MS    Climbing 3-5 steps with a railing? 1  -CB 1  -MS    To walk in hospital room? 1  -CB 1  -MS    AM-PAC 6 Clicks Score (PT) 12  -CB 9  -MS    Highest Level of Mobility Goal 4 --> Transfer to chair/commode  -CB 3 --> Sit at edge of bed  -MS      Row Name 05/01/24 0000          How much help from another person do you currently need...    Turning from your back to your side while in flat bed without using bedrails? 2  -YR     Moving from lying on back to sitting on the side of a flat bed without bedrails? 2  -YR     Moving to and from a bed to a chair (including a wheelchair)? 2  -YR     Standing up from a chair using your arms (e.g., wheelchair, bedside chair)? 1  -YR     Climbing 3-5 steps with a railing? 1  -YR     To walk in hospital room? 1  -YR     AM-PAC 6 Clicks Score (PT) 9  -YR     Highest Level of  Mobility Goal 3 --> Sit at edge of bed  -YR       Row Name 05/01/24 1144          Functional Assessment    Outcome Measure Options AM-PAC 6 Clicks Basic Mobility (PT)  -CB               User Key  (r) = Recorded By, (t) = Taken By, (c) = Cosigned By      Initials Name Provider Type    Santos Child, RN Registered Nurse    Shirlene Hazel, PT Physical Therapist    Mariya Han RN Registered Nurse                                 Physical Therapy Education       Title: PT OT SLP Therapies (In Progress)       Topic: Physical Therapy (In Progress)       Point: Mobility training (In Progress)       Learning Progress Summary             Patient Acceptance, E,TB, NR by CB at 5/1/2024 1144                         Point: Home exercise program (Not Started)       Learner Progress:  Not documented in this visit.              Point: Body mechanics (Not Started)       Learner Progress:  Not documented in this visit.              Point: Precautions (Not Started)       Learner Progress:  Not documented in this visit.                              User Key       Initials Effective Dates Name Provider Type Discipline     10/22/21 -  Shirlene Holman, PT Physical Therapist PT                  PT Recommendation and Plan  Planned Therapy Interventions (PT): balance training, bed mobility training, gait training, home exercise program, patient/family education, transfer training, strengthening  Plan of Care Reviewed With: patient  Outcome Evaluation: Patient is a 49 y.o. male admitted to [facility] for partial SBO, JOSIAH, decreased oral intake, falls on 4/30/2024. PMHx includes bipolar disorder, HTN, CVA, aifb. Patient is unable to state PLOF today but per chart lives in group home. He is Aox2 this AM. Today, patient performed bed mobility with CGA and sat EOB with CGA. Pt denies STS and ambulation today. He does report some chest pain and RN notified. Strength, activity tolerance, pain deficits noted. Patient may benefit from  skilled PT services to address functional deficits and improve level of independence prior to discharge.     Time Calculation:         PT Charges       Row Name 05/01/24 1147             Time Calculation    Start Time 1045  -CB      Stop Time 1056  -CB      Time Calculation (min) 11 min  -CB      PT Received On 05/01/24  -CB      PT - Next Appointment 05/02/24  -CB      PT Goal Re-Cert Due Date 05/08/24  -CB                User Key  (r) = Recorded By, (t) = Taken By, (c) = Cosigned By      Initials Name Provider Type    CB Shirlene Holman, PT Physical Therapist                  Therapy Charges for Today       Code Description Service Date Service Provider Modifiers Qty    14528321867 HC PT EVAL MOD COMPLEXITY 3 5/1/2024 Shirlene Holman, PT GP 1            PT G-Codes  Outcome Measure Options: AM-PAC 6 Clicks Basic Mobility (PT)  AM-PAC 6 Clicks Score (PT): 12  PT Discharge Summary  Anticipated Discharge Disposition (PT):  (plan to return to group home. PT rec PT at dc)    Shirlene Holman PT  5/1/2024

## 2024-05-01 NOTE — CONSULTS
Neurology Consult Note    Consult Date: 5/1/2024    Referring MD: Kailee Dickinson MD    Reason for Consult I have been asked to see the patient in neurological consultation to render advice and opinion regarding AMS    Helder Abbott is a 49 y.o. male with hypertension, hyperlipidemia hypothyroidism, reported history of stroke, bipolar disorder, GERD, A-fib on Eliquis, and reported moderate cognitive repair, currently residing in a group home, who presented with weakness, decreased p.o. intake, diarrhea, falls, and lethargy.CT abdomen concerning for fecal impaction/partial small bowel obstruction/versus ileus/enteritis.    Neurology consulted for altered mental status.  Patient unable to provide history.  I did call and speak with his caregiver Javan Diehl who reports patient is a salgado of the [facility] and has been residing in a group home.  Karan has only been caring for the patient for the last week but reports over the past 3 days the patient has become lethargic with decreased p.o. intake increased falls.  He states the patient has not missed any medications.  The patient is ambulatory and conversant at baseline.    Eliquis is currently on hold.  He was on atorvastatin 40 mg daily prior to admission.  Initial CT head was negative for acute findings, ECG showed A-fib, initially he has been hypotensive with SBP down to 70/46 but has been as high as 207/186.  He has been afebrile, initial white count 11.4, now normal, creatinine was 1.5, improved to 0.89.  Initial lactic acid level was 2.6. TSH 3.19, CK 78, VPA level 94, UA unremarkable. He was started on antibiotics.  On exam he does have lethargy and dysarthria and is unable to provide much history, c/o abdominal pain, pupils dilated but symmetric/reactive on exam today.    Past Medical/Surgical Hx:  Past Medical History:   Diagnosis Date    Benign essential hypertension     Bipolar disorder     Dyslipidemia     GERD (gastroesophageal reflux disease)      Heart disease     History of stroke     Hypothyroidism      History reviewed. No pertinent surgical history.    Medications On Admission  Medications Prior to Admission   Medication Sig Dispense Refill Last Dose    atorvastatin (LIPITOR) 40 MG tablet    4/29/2024    benztropine (COGENTIN) 0.5 MG tablet Take 2 tablets by mouth 2 (Two) Times a Day.   Patient Taking Differently    digoxin (LANOXIN) 125 MCG tablet    4/30/2024    divalproex (DEPAKOTE) 500 MG DR tablet    4/29/2024    Eliquis 5 MG tablet tablet    4/30/2024    famotidine (PEPCID) 20 MG tablet    4/30/2024    levothyroxine (SYNTHROID, LEVOTHROID) 50 MCG tablet    4/30/2024    lisinopril (PRINIVIL,ZESTRIL) 40 MG tablet    4/30/2024    metoprolol succinate XL (TOPROL-XL) 25 MG 24 hr tablet    4/30/2024    mirtazapine (REMERON) 15 MG tablet    4/29/2024    OLANZapine zydis (zyPREXA) 5 MG disintegrating tablet    4/29/2024    triamcinolone (KENALOG) 0.1 % cream    4/29/2024    Abilify Maintena 400 MG Suspension Reconstituted ER IM injection ER        Acetaminophen Extra Strength 500 MG tablet Take 1 tablet by mouth 4 (Four) Times a Day As Needed.   Unknown    Baclofen 5 MG tablet    Unknown    Diclofenac Sodium (VOLTAREN) 1 % gel gel    Unknown    ibuprofen (ADVIL,MOTRIN) 600 MG tablet Take 1 tablet by mouth 4 (Four) Times a Day.   Unknown    nystatin 463177 UNIT/GM powder    Unknown       Allergies:  Allergies   Allergen Reactions    Clonazepam Unknown (See Comments)    Fluoxetine Unknown (See Comments)    Grass Unknown - High Severity    Grass Pollen(K-O-R-T-Swt Kennedy) Unknown - Low Severity    Hydroxyzine Unknown (See Comments)    Methylphenidate Unknown (See Comments)    Quetiapine Unknown - Low Severity    Risperidone Unknown (See Comments)       Social Hx:  Social History     Socioeconomic History    Marital status: Single   Tobacco Use    Smoking status: Never   Vaping Use    Vaping status: Never Used   Substance and Sexual Activity    Alcohol use:  "Never    Drug use: Never    Sexual activity: Defer       Family Hx:  Family History   Problem Relation Age of Onset    Diabetes Other     Hypertension Other     Heart disease Other        REVIEW OF SYSTEMS:  Constitutional: [No fevers]   Eye: [No change in vision]   HEENT: NO h/a,+ slurred speech  Respiratory: No SOB/cough  Cardiovascular: No CP/syncope  Gastrointestinal: +abdominal pain, diarrhea  Genitourinary: [Normal bladder function]   Musculoskeletal: fall with facial and BLE ecchymosis  Skin: [No itching, burning, pain]   Endocrinology: [No heat or cold intolerance]   Psychiatric: Increased sleep  Neurologic: [See HPI, above]     Exam    /75 (BP Location: Left arm, Patient Position: Lying)   Pulse 82   Temp 98.2 °F (36.8 °C) (Oral)   Resp 18   Ht 185.4 cm (73\")   Wt 72.1 kg (158 lb 15.2 oz)   SpO2 100%   BMI 20.97 kg/m²   General appearance: Appears older than stated age, NAD.  HEENT: Normocephalic.   Neck and s: Supple  Cardiac: Regular rate and rhythm. No murmurs.   Chest Exam: Clear to auscultation bilaterally, no wheezes, no rhonchi.  Extremities: Normal, no edema.   Skin: No rashes or birthmarks.     Higher integrative function: Drowsy, arouses to voice.  Oriented to self, city, and hospital only.  Not oriented to age/date/recent events.  Speech not understandable at times.  Follows a few simple commands.  Cranial nerves: Visual fields grossly intact, extraocular movements full, pupils are 7 mm, round, reactive.  Face appears symmetric, symmetric palatal rise, tongue midline.  Moderate to severe dysarthria.  Motor: Bilateral upper extremities at least 4 out of 5, moving bilateral lower extremities spontaneously and symmetrically, at least 2 out of 5.  Bilateral upper extremity asterixis.  Sensation: Intact to light touch in all extremities.  Station and gait: Deferred.  Muscle stretch reflexes: Plantar reflexes are flexor bilaterally.   Coordination: Not following complex " "commands.          DATA:    Lab Results   Component Value Date    GLUCOSE 65 05/01/2024    CALCIUM 8.1 (L) 05/01/2024     05/01/2024    K 4.3 05/01/2024    CO2 23.7 05/01/2024     05/01/2024    BUN 14 05/01/2024    CREATININE 0.89 05/01/2024    BCR 15.7 05/01/2024    ANIONGAP 9.3 05/01/2024     Lab Results   Component Value Date    WBC 5.28 05/01/2024    HGB 11.3 (L) 05/01/2024    HCT 34.6 (L) 05/01/2024    MCV 91.1 05/01/2024     (L) 05/01/2024     No results found for: \"CHOL\"  No results found for: \"HDL\"  No results found for: \"LDL\"  No results found for: \"TRIG\"  No results found for: \"HGBA1C\"  Lab Results   Component Value Date    INR 1.20 (H) 04/30/2024    INR 1.1 07/29/2023    PROTIME 15.5 (H) 04/30/2024    PROTIME 11.7 07/29/2023       Imaging review: CT head images viewed by me, no acute findings seen.     CT Abdomen Pelvis Without Contrast    Result Date: 4/30/2024  CT ABDOMEN PELVIS WO CONTRAST-  HISTORY: Fall, hypotension, abdominal pain.  TECHNIQUE:  CT of the abdomen and pelvis was performed without intravenous contrast. Reformatted images were reviewed. Evaluation of solid organs and vasculature is limited without intravenous contrast. Radiation dose reduction techniques were utilized, including automated exposure control and exposure modulation based on body size.  COMPARISON: None  FINDINGS:  Lung bases and pleural spaces are clear. The liver is normal in size. The gallbladder is present. The spleen is normal in size. There are no pancreatic calcifications. The adrenal glands are within normal limits. There are no renal stones or hydronephrosis. No pathologically enlarged abdominal or pelvic lymph nodes are identified. There is at least mild calcific atherosclerosis. There is small volume ascites. The appendix is within normal limits. There is a relatively large inspissated rectal and sigmoid colonic stool burden. The distal small bowel is diffusely dilated and predominantly " fluid-filled to the level of the cecum. The bladder is well distended. The prostate is present. There is multilevel degenerative disc disease. There is transitional lumbosacral anatomy. There is mild leftward curvature of the lumbar spine. There are old rib fractures.        1.  Large distal colonic and rectal stool burden concerning for fecal impaction. Relatively diffusely dilated fluid-filled loops of small bowel to the level of the cecum may be due to at least partial obstruction due to the distal stool burden versus ileus and/or nonspecific enteritis in the appropriate clinical setting. 2.  Small-volume ascites, which may be reactive. 3.  No CT evidence of acute traumatic injury in the abdomen or pelvis.   This report was finalized on 4/30/2024 7:06 PM by Dr. Akua Gutierrez M.D on Workstation: [id number]      CT Head Without Contrast    Result Date: 4/30/2024  CT HEAD WO CONTRAST-  INDICATIONS: Trauma  TECHNIQUE: Radiation dose reduction techniques were utilized, including automated exposure control and exposure modulation based on body size. Noncontrast head CT  COMPARISON: None available  FINDINGS:   The images are degraded by motion artifact.  No acute intracranial hemorrhage, midline shift or mass effect. No acute territorial infarct is identified.  Ventricles, cisterns, cerebral sulci are unremarkable for patient age.  The visualized paranasal sinuses, orbits, mastoid air cells are unremarkable. There appears to be a nondisplaced anterior nasal fracture, axial image 11, although assessment is limited by motion artifact. Subcutaneous soft tissue swelling is seen of the forehead.         No acute intracranial hemorrhage or hydrocephalus. If there is further clinical concern, MRI could be considered for further evaluation.  This report was finalized on 4/30/2024 7:04 PM by Dr. Alfred Cavazos M.D on Workstation: [id number]      XR Chest 1 View    Result Date: 4/30/2024  XR CHEST 1 VW-  HISTORY: Male who is  49 years-old, cough and chills  TECHNIQUE: Frontal view of the chest  COMPARISON: None available  FINDINGS: Heart, mediastinum and pulmonary vasculature are unremarkable. No focal pulmonary consolidation, pleural effusion, or pneumothorax, follow-up as indications persist. Gas-filled loops of bowel are partly included, correlate clinically. No acute osseous process.      As described.    This report was finalized on 4/30/2024 6:09 PM by Dr. Alfred Cavazos M.D on Workstation: [id number]         Impression/Plan:  1) AMS felt most likely related to acute medical issues (fecal impaction, hypotension, elevated lactic acid, JOSIAH, etc) in the setting of moderate cognitive impairment with history of bipolar disorder and prior stroke.  We are also concerned he may be going through withdrawal as his pupils are dilated, recommend restarting any of his psychiatric medications which may have been held.  Exam is overall nonfocal.  No further neurologic workup recommended at this time however if underlying medical conditions improve and mental status does not we can consider further testing at that time.  Would restart Eliquis when okay from a medical standpoint (surgery consulted).      2) history of A-fib, anticoagulated PTA  3) bipolar disorder  4) h/o stroke  5) JOSIAH/dehydration- improved with fluids.    The patient was seen with Dr Waller today. Will see again as needed.      Niacinamide Counseling: I recommended taking niacin or niacinamide, also know as vitamin B3, twice daily. Recent evidence suggests that taking vitamin B3 (500 mg twice daily) can reduce the risk of actinic keratoses and non-melanoma skin cancers. Side effects of vitamin B3 include flushing and headache.

## 2024-05-01 NOTE — ED NOTES
Nursing report ED to floor  Helder Abbott  49 y.o.  male    HPI :  HPI (Adult)  Stated Reason for Visit: patient in for a fall that injured his left knee area, increased fatigue and falls  History Obtained From: other (see comments) (P)    Chief Complaint  Chief Complaint   Patient presents with    Fatigue     Increased weakness, increase in falls    Fall       Admitting doctor:   Smiley Sheldon MD    Admitting diagnosis:   The primary encounter diagnosis was Generalized abdominal pain. Diagnoses of Decreased oral intake, Diarrhea, unspecified type, Frequent falls, Chronic anticoagulation, JOSIAH (acute kidney injury), Fecal impaction, and Partial small bowel obstruction were also pertinent to this visit.    Code status:   Current Code Status       Date Active Code Status Order ID Comments User Context       4/30/2024 2119 CPR (Attempt to Resuscitate) [id number]  Smiley Sheldon MD ED        Question Answer    Code Status (Patient has no pulse and is not breathing) CPR (Attempt to Resuscitate)    Medical Interventions (Patient has pulse or is breathing) Full Support    Level Of Support Discussed With Patient                    Allergies:   Clonazepam, Fluoxetine, Grass, Grass pollen([id number]-[id number]-swt benigno), Hydroxyzine, Methylphenidate, Quetiapine, and Risperidone    Isolation:   No active isolations    Intake and Output  No intake or output data in the 24 hours ending 04/30/24 2229    Weight:   There were no vitals filed for this visit.    Most recent vitals:   Vitals:    04/30/24 1921 04/30/24 2036 04/30/24 2041 04/30/24 2206   BP: 103/94 108/70  124/93   BP Location:       Pulse:  86  83   Resp:       Temp:       TempSrc:       SpO2:   96% 90%       Active LDAs/IV Access:   Lines, Drains & Airways       Active LDAs       Name Placement date Placement time Site Days    Peripheral IV 04/30/24 1721 Anterior;Left Forearm 04/30/24  1721  Forearm  less than 1    Peripheral IV 04/30/24 1934 Anterior;Right;Upper Arm  04/30/24  1934  Arm  less than 1                    Labs (abnormal labs have a star):   Labs Reviewed   COMPREHENSIVE METABOLIC PANEL - Abnormal; Notable for the following components:       Result Value    Creatinine 1.55 (*)     eGFR 54.5 (*)     All other components within normal limits    Narrative:     GFR Normal >60  Chronic Kidney Disease <60  Kidney Failure <15     PROTIME-INR - Abnormal; Notable for the following components:    Protime 15.5 (*)     INR 1.20 (*)     All other components within normal limits   URINALYSIS W/ MICROSCOPIC IF INDICATED (NO CULTURE) - Abnormal; Notable for the following components:    Color, UA Dark Yellow (*)     Ketones, UA Trace (*)     All other components within normal limits    Narrative:     Urine microscopic not indicated.   BNP (IN-HOUSE) - Abnormal; Notable for the following components:    proBNP 1,257.0 (*)     All other components within normal limits    Narrative:     This assay is used as an aid in the diagnosis of individuals suspected of having heart failure. It can be used as an aid in the diagnosis of acute decompensated heart failure (ADHF) in patients presenting with signs and symptoms of ADHF to the emergency department (ED). In addition, NT-proBNP of <300 pg/mL indicates ADHF is not likely.    Age Range Result Interpretation  NT-proBNP Concentration (pg/mL:      <50             Positive            >450                   Gray                 300-450                    Negative             <300    50-75           Positive            >900                  Gray                300-900                  Negative            <300      >75             Positive            >1800                  Gray                300-1800                  Negative            <300   SINGLE HS TROPONIN T - Abnormal; Notable for the following components:    HS Troponin T 34 (*)     All other components within normal limits    Narrative:     High Sensitive Troponin T Reference Range:  <14.0  ng/L- Negative Female for AMI  <22.0 ng/L- Negative Male for AMI  >=14 - Abnormal Female indicating possible myocardial injury.  >=22 - Abnormal Male indicating possible myocardial injury.   Clinicians would have to utilize clinical acumen, EKG, Troponin, and serial changes to determine if it is an Acute Myocardial Infarction or myocardial injury due to an underlying chronic condition.        LACTIC ACID, PLASMA - Abnormal; Notable for the following components:    Lactate 2.6 (*)     All other components within normal limits   CBC WITH AUTO DIFFERENTIAL - Abnormal; Notable for the following components:    Neutrophil % 76.7 (*)     Lymphocyte % 17.2 (*)     Monocyte % 4.9 (*)     Eosinophil % 0.1 (*)     Immature Grans % 0.8 (*)     Immature Grans, Absolute 0.06 (*)     All other components within normal limits   RESPIRATORY PANEL PCR W/ COVID-19 (SARS-COV-2), NP SWAB IN UTM/VTP, 2 HR TAT - Normal    Narrative:     In the setting of a positive respiratory panel with a viral infection PLUS a negative procalcitonin without other underlying concern for bacterial infection, consider observing off antibiotics or discontinuation of antibiotics and continue supportive care. If the respiratory panel is positive for atypical bacterial infection (Bordetella pertussis, Chlamydophila pneumoniae, or Mycoplasma pneumoniae), consider antibiotic de-escalation to target atypical bacterial infection.   LIPASE - Normal   PROCALCITONIN - Normal    Narrative:     As a Marker for Sepsis (Non-Neonates):    1. <0.5 ng/mL represents a low risk of severe sepsis and/or septic shock.  2. >2 ng/mL represents a high risk of severe sepsis and/or septic shock.    As a Marker for Lower Respiratory Tract Infections that require antibiotic therapy:    PCT on Admission    Antibiotic Therapy       6-12 Hrs later    >0.5                Strongly Recommended  >0.25 - <0.5        Recommended   0.1 - 0.25          Discouraged              Remeasure/reassess  "PCT  <0.1                Strongly Discouraged     Remeasure/reassess PCT    As 28 day mortality risk marker: \"Change in Procalcitonin Result\" (>80% or <=80%) if Day 0 (or Day 1) and Day 4 values are available. Refer to http://[email or web address]-pct-calculator.com    Change in PCT <=80%  A decrease of PCT levels below or equal to 80% defines a positive change in PCT test result representing a higher risk for 28-day all-cause mortality of patients diagnosed with severe sepsis for septic shock.    Change in PCT >80%  A decrease of PCT levels of more than 80% defines a negative change in PCT result representing a lower risk for 28-day all-cause mortality of patients diagnosed with severe sepsis or septic shock.      CK - Normal   MAGNESIUM - Normal   TSH - Normal   DIGOXIN LEVEL - Normal   VALPROIC ACID LEVEL, TOTAL - Normal    Narrative:     Therapeutic Ranges for Valproic Acid    Epilepsy:       mcg/ml  Bipolar/Enedelia  up to 125 mcg/ml     LACTIC ACID, REFLEX - Normal   GASTROINTESTINAL PANEL, PCR (PREFERRED) DOES NOT INCLUDE CDIFF   BLOOD CULTURE   BLOOD CULTURE   BASIC METABOLIC PANEL   CBC (NO DIFF)   MAGNESIUM   PHOSPHORUS   TYPE AND SCREEN   CBC AND DIFFERENTIAL    Narrative:     The following orders were created for panel order CBC & Differential.  Procedure                               Abnormality         Status                     ---------                               -----------         ------                     CBC Auto Differential[[id number]]        Abnormal            Final result                 Please view results for these tests on the individual orders.       EKG:   ECG 12 Lead Altered Mental Status   Preliminary Result   HEART RATE= 85  bpm   RR Interval= 706  ms   IN Interval=   ms   P Horizontal Axis=   deg   P Front Axis=   deg   QRSD Interval= 87  ms   QT Interval= 348  ms   QTcB= 414  ms   QRS Axis= 4  deg   T Wave Axis=   deg   - ABNORMAL ECG -   Atrial fibrillation   Ventricular premature " complex   Low voltage, extremity and precordial leads   Anteroseptal infarct, old   Nonspecific T abnormalities, lateral leads   Baseline wander in lead(s) III,V1,V4,V6   Electronically Signed By:    Date and Time of Study: 2024-04-30 18:06:56          Meds given in ED:   Medications   sodium chloride 0.9 % flush 10 mL (has no administration in time range)   sodium chloride 0.9 % infusion (100 mL/hr Intravenous New Bag 4/30/24 1816)   piperacillin-tazobactam (ZOSYN) 3.375 g IVPB in 100 mL NS MBP (CD) (3.375 g Intravenous New Bag 4/30/24 2217)   piperacillin-tazobactam (ZOSYN) 3.375 g IVPB in 100 mL NS MBP (CD) (has no administration in time range)   sodium chloride 0.9 % flush 10 mL (has no administration in time range)   sodium chloride 0.9 % flush 10 mL (has no administration in time range)   sodium chloride 0.9 % infusion 40 mL (has no administration in time range)   sennosides-docusate (PERICOLACE) 8.6-50 MG per tablet 2 tablet (has no administration in time range)     And   polyethylene glycol (MIRALAX) packet 17 g (has no administration in time range)     And   bisacodyl (DULCOLAX) EC tablet 5 mg (has no administration in time range)     And   bisacodyl (DULCOLAX) suppository 10 mg (has no administration in time range)   nitroglycerin (NITROSTAT) SL tablet 0.4 mg (has no administration in time range)   metoprolol tartrate (LOPRESSOR) injection 2.5 mg (has no administration in time range)   pantoprazole (PROTONIX) injection 40 mg (has no administration in time range)   ondansetron (ZOFRAN) injection 4 mg (has no administration in time range)   sodium chloride 0.9 % bolus 1,000 mL (0 mL Intravenous Stopped 4/30/24 1903)   pantoprazole (PROTONIX) injection 40 mg (40 mg Intravenous Given 4/30/24 1742)   ondansetron (ZOFRAN) injection 4 mg (4 mg Intravenous Given 4/30/24 1742)   sodium chloride 0.9 % bolus 500 mL (0 mL Intravenous Stopped 4/30/24 1903)       Imaging results:  CT Abdomen Pelvis Without  Contrast    Result Date: 4/30/2024   1.  Large distal colonic and rectal stool burden concerning for fecal impaction. Relatively diffusely dilated fluid-filled loops of small bowel to the level of the cecum may be due to at least partial obstruction due to the distal stool burden versus ileus and/or nonspecific enteritis in the appropriate clinical setting. 2.  Small-volume ascites, which may be reactive. 3.  No CT evidence of acute traumatic injury in the abdomen or pelvis.   This report was finalized on 4/30/2024 7:06 PM by Dr. Akua Gutierrez M.D on Workstation: [id number]      CT Head Without Contrast    Result Date: 4/30/2024   No acute intracranial hemorrhage or hydrocephalus. If there is further clinical concern, MRI could be considered for further evaluation.  This report was finalized on 4/30/2024 7:04 PM by Dr. Alfred Cavazos M.D on Workstation: [facility] [employer]      XR Chest 1 View    Result Date: 4/30/2024  As described.    This report was finalized on 4/30/2024 6:09 PM by Dr. Alfred Cavazos M.D on Workstation: [facility]       Ambulatory status:   - bedrest    Social issues:   Social History     Socioeconomic History    Marital status: Single   Tobacco Use    Smoking status: Never   Substance and Sexual Activity    Alcohol use: Never    Drug use: Never       Peripheral Neurovascular  Peripheral Neurovascular (Adult)  Peripheral Neurovascular WDL: .WDL except, neurovascular assessment lower  LLE Neurovascular Assessment  Sensation LLE: tenderness present (left knee skin tear)    Neuro Cognitive  Neuro Cognitive (Adult)  Cognitive/Neuro/Behavioral WDL: orientation, .WDL except  Orientation: disoriented x 4  Gilberto Coma Scale  Best Eye Response: 4-->(E4) spontaneous  Best Motor Response: 5-->(M5) localizes pain  Best Verbal Response: 2-->(V2) incomprehensible speech  Gilberto Coma Scale Score: 11    Learning  Learning Assessment (Adult)  Learning Readiness and Ability: cognitive limitation  noted    Respiratory  Respiratory (Adult)  Airway WDL: WDL  Respiratory WDL  Respiratory WDL: WDL    Abdominal Pain       Pain Assessments  Pain (Adult)  (0-10) Pain Rating: Rest: 9  Pain Location: other (see comments) (right leg)    NIH Stroke Scale       Amy Wen RN  04/30/24 22:29 EDT

## 2024-05-01 NOTE — CONSULTS
Cc: Constipation    History of presenting illness:   This is a unfortunate 49-year-old gentleman with a history of stroke and cognitive impairment and minimal mobility.  History is largely gathered from the chart and from patient's nurse as he is not able to add very much.  Apparently he has been more lethargic recently.  He had a fall at home and there was some concern about abdominal pain and abdominal distention which prompted a CT demonstrating evidence of constipation and possible fecal impaction.  He received an enema in the emergency department with some results and has had a few bowel movements during the day today.    Past Medical History: Hypertension, dyslipidemia, gastroesophageal reflux disease, stroke, hypothyroidism    Past Surgical History: Diabetes, hypertension, coronary artery disease    Medications: Abilify, Lipitor, baclofen, Cogentin, digoxin, Depakote, Eliquis, famotidine, levothyroxine, metoprolol, lisinopril, Remeron, Zyprexa    Allergies: Multiple, reviewed and reconciled in epic    Social History: He lives with assistance 24/7.  Non-smoker.    Family History: Negative for known colon or rectal cancer    Review of Systems:  This patient's baseline mental status, confusion and slurring of speech makes meaningful review of systems and possible    Physical Exam:  BMI: 21.0  Temperature 98.2 heart rate 82 blood pressure 115/75  General: alert and oriented, appropriate, no acute distress  Eyes: No scleral icterus, extraocular movements are intact  Neck: Supple without lymphadenopathy or thyromegaly, trachea is in the midline  Respiratory: There is good bilateral chest expansion, no use of accessory muscles is noted  Cardiovascular: No jugular venous distention or peripheral edema is seen  Gastrointestinal: Distended but soft, mild tenderness in the lower abdomen.  No guarding.  No obvious hernia appreciated.    Laboratory data: White blood cell count 5.3 hemoglobin 11.3, chemistries are in  reasonable order    Imaging data: CT reviewed and this demonstrates significant stool within the rectosigmoid.  No obvious evidence for stercoral colitis.      Assessment and plan:   -Chronic constipation and fecal impaction, he is having some bowel function now  -Enema received earlier, given the fact that he had a fairly large bowel movement earlier today I would recommend a bowel regimen only  -No indication for disimpaction at this time      Que Wright MD, FACS  General, Minimally Invasive and Endoscopic Surgery  [facility]    [address], [address]  [place], [place], [address]  P: [phone]  F: [phone]     BMI is within normal parameters. No other follow-up for BMI required.

## 2024-05-01 NOTE — PLAN OF CARE
Problem: Adult Inpatient Plan of Care  Goal: Plan of Care Review  Outcome: Ongoing, Progressing  Flowsheets (Taken 5/1/2024 1530)  Progress: improving  Plan of Care Reviewed With: patient  Outcome Evaluation: Patient has been pleasant and cooperative during shift. No complaints of pain, nausea or SOA. AOx1-2, Afib, room air, turns self. Sat EOB with PT. Caregiver at bedside. 2 bowel movements during dayshift. Increased IVFs to 125mL/hr. Replaced magnesium. Will continue to monitor and assist patient as needed.  Goal: Patient-Specific Goal (Individualized)  Outcome: Ongoing, Progressing  Goal: Absence of Hospital-Acquired Illness or Injury  Outcome: Ongoing, Progressing  Intervention: Identify and Manage Fall Risk  Recent Flowsheet Documentation  Taken 5/1/2024 1315 by Santos Tejeda RN  Safety Promotion/Fall Prevention:   assistive device/personal items within reach   fall prevention program maintained   nonskid shoes/slippers when out of bed   safety round/check completed  Taken 5/1/2024 1200 by Santos Tejeda RN  Safety Promotion/Fall Prevention:   assistive device/personal items within reach   fall prevention program maintained   nonskid shoes/slippers when out of bed   safety round/check completed  Taken 5/1/2024 1000 by Santos Tejeda RN  Safety Promotion/Fall Prevention:   assistive device/personal items within reach   fall prevention program maintained   nonskid shoes/slippers when out of bed   safety round/check completed  Taken 5/1/2024 0721 by Santos Tejeda RN  Safety Promotion/Fall Prevention:   assistive device/personal items within reach   fall prevention program maintained   nonskid shoes/slippers when out of bed   safety round/check completed  Intervention: Prevent Skin Injury  Recent Flowsheet Documentation  Taken 5/1/2024 1315 by Santos Tejeda RN  Body Position:   right   turned   position changed independently  Skin Protection:   tubing/devices free from skin contact    incontinence pads utilized  Taken 5/1/2024 1200 by Santos Tejeda RN  Body Position:   right   turned   position changed independently  Taken 5/1/2024 1000 by Santos Tejeda RN  Body Position: supine  Taken 5/1/2024 0721 by Santos Tejeda RN  Body Position:   right   turned   position changed independently  Skin Protection:   tubing/devices free from skin contact   incontinence pads utilized  Goal: Optimal Comfort and Wellbeing  Outcome: Ongoing, Progressing  Goal: Readiness for Transition of Care  Outcome: Ongoing, Progressing     Problem: Skin Injury Risk Increased  Goal: Skin Health and Integrity  Outcome: Ongoing, Progressing  Intervention: Optimize Skin Protection  Recent Flowsheet Documentation  Taken 5/1/2024 1315 by Santos Tejeda RN  Pressure Reduction Techniques: frequent weight shift encouraged  Head of Bed (HOB) Positioning: HOB flat  Pressure Reduction Devices: pressure-redistributing mattress utilized  Skin Protection:   tubing/devices free from skin contact   incontinence pads utilized  Taken 5/1/2024 1200 by Santos Tejeda RN  Head of Bed (HOB) Positioning: HOB flat  Taken 5/1/2024 1000 by Santos Tejeda RN  Head of Bed (HOB) Positioning: HOB at 30-45 degrees  Taken 5/1/2024 0721 by Santos Tejeda RN  Pressure Reduction Techniques:   frequent weight shift encouraged   weight shift assistance provided  Head of Bed (HOB) Positioning: HOB at 30 degrees  Pressure Reduction Devices: pressure-redistributing mattress utilized  Skin Protection:   tubing/devices free from skin contact   incontinence pads utilized     Problem: Behavioral Health Comorbidity  Goal: Maintenance of Behavioral Health Symptom Control  Outcome: Ongoing, Progressing     Problem: Hypertension Comorbidity  Goal: Blood Pressure in Desired Range  Outcome: Ongoing, Progressing     Problem: Fall Injury Risk  Goal: Absence of Fall and Fall-Related Injury  Outcome: Ongoing, Progressing  Intervention: Promote Injury-Free  Environment  Recent Flowsheet Documentation  Taken 5/1/2024 1315 by Santos Tejeda RN  Safety Promotion/Fall Prevention:   assistive device/personal items within reach   fall prevention program maintained   nonskid shoes/slippers when out of bed   safety round/check completed  Taken 5/1/2024 1200 by Santos Tejeda RN  Safety Promotion/Fall Prevention:   assistive device/personal items within reach   fall prevention program maintained   nonskid shoes/slippers when out of bed   safety round/check completed  Taken 5/1/2024 1000 by Santos Tejeda RN  Safety Promotion/Fall Prevention:   assistive device/personal items within reach   fall prevention program maintained   nonskid shoes/slippers when out of bed   safety round/check completed  Taken 5/1/2024 0721 by Santos Tejeda RN  Safety Promotion/Fall Prevention:   assistive device/personal items within reach   fall prevention program maintained   nonskid shoes/slippers when out of bed   safety round/check completed   Goal Outcome Evaluation:  Plan of Care Reviewed With: patient        Progress: improving  Outcome Evaluation: Patient has been pleasant and cooperative during shift. No complaints of pain, nausea or SOA. AOx1-2, Afib, room air, turns self. Sat EOB with PT. Caregiver at bedside. 2 bowel movements during dayshift. Increased IVFs to 125mL/hr. Replaced magnesium. Will continue to monitor and assist patient as needed.

## 2024-05-01 NOTE — PLAN OF CARE
Goal Outcome Evaluation:              Outcome Evaluation: New orders received. Spoke to RN. Surgery consulted. Will hold clinical swallow evaluation this date for POC. Speech to check back next date for PO appropriateness. RN in agreement.

## 2024-05-01 NOTE — CASE MANAGEMENT/SOCIAL WORK
Continued Stay Note  [facility]     Patient Name: Helder Abbott  MRN: [id number]  Today's Date: 5/1/2024    Admit Date: 4/30/2024    Plan: Return to family care home   Discharge Plan       Row Name 05/01/24 1540       Plan    Plan Return to family care home    Plan Comments CCP spoke with patient's caregiver, Karan; states prior to admission patient was independent with his mobility and he assisted him bathing. Patient does not use DME. Patient's caregiver  is working with [facility] [employer] agency on obtaining a shower chair through his Medicaid waiver. Patient's caregiver provides 24/7 care. Patient's caregiver to provide transportation at d/c. CCP will follow and assist as needed. Amy CARIAS                   Discharge Codes    No documentation.                 Expected Discharge Date and Time       Expected Discharge Date Expected Discharge Time    May 4, 2024               ARETHA Ferrera

## 2024-05-01 NOTE — PLAN OF CARE
Problem: Malnutrition  Goal: Improved Nutritional Intake  Outcome: Ongoing, Not Progressing   Goal Outcome Evaluation:   Advance diet as tolerates to regular diet. Boost Plus BID (chocolate) once diet advanced. Will follow closely for diet advancement and tolerance.

## 2024-05-01 NOTE — NURSING NOTE
Lab notified about 2nd blood culture collection in order to start ABX  therapy. Nursing will continue to monitor.

## 2024-05-02 PROBLEM — E43 SEVERE MALNUTRITION: Status: ACTIVE | Noted: 2024-05-02

## 2024-05-02 LAB
ADV 40+41 DNA STL QL NAA+NON-PROBE: NOT DETECTED
ALBUMIN SERPL-MCNC: 2.7 G/DL (ref 3.5–5.2)
ALBUMIN/GLOB SERPL: 1.4 G/DL
ALP SERPL-CCNC: 52 U/L (ref 39–117)
ALT SERPL W P-5'-P-CCNC: 7 U/L (ref 1–41)
ANION GAP SERPL CALCULATED.3IONS-SCNC: 8.2 MMOL/L (ref 5–15)
AST SERPL-CCNC: 16 U/L (ref 1–40)
ASTRO TYP 1-8 RNA STL QL NAA+NON-PROBE: NOT DETECTED
BACTERIA BLD CULT: ABNORMAL
BILIRUB SERPL-MCNC: 0.5 MG/DL (ref 0–1.2)
BOTTLE TYPE: ABNORMAL
BUN SERPL-MCNC: 7 MG/DL (ref 6–20)
BUN/CREAT SERPL: 14.3 (ref 7–25)
C CAYETANENSIS DNA STL QL NAA+NON-PROBE: NOT DETECTED
C COLI+JEJ+UPSA DNA STL QL NAA+NON-PROBE: NOT DETECTED
CALCIUM SPEC-SCNC: 7.9 MG/DL (ref 8.6–10.5)
CHLORIDE SERPL-SCNC: 110 MMOL/L (ref 98–107)
CO2 SERPL-SCNC: 22.8 MMOL/L (ref 22–29)
CREAT SERPL-MCNC: 0.49 MG/DL (ref 0.76–1.27)
CRYPTOSP DNA STL QL NAA+NON-PROBE: NOT DETECTED
DEPRECATED RDW RBC AUTO: 47.4 FL (ref 37–54)
E HISTOLYT DNA STL QL NAA+NON-PROBE: NOT DETECTED
EAEC PAA PLAS AGGR+AATA ST NAA+NON-PRB: NOT DETECTED
EC STX1+STX2 GENES STL QL NAA+NON-PROBE: NOT DETECTED
EGFRCR SERPLBLD CKD-EPI 2021: 125.8 ML/MIN/1.73
EPEC EAE GENE STL QL NAA+NON-PROBE: NOT DETECTED
ERYTHROCYTE [DISTWIDTH] IN BLOOD BY AUTOMATED COUNT: 13.9 % (ref 12.3–15.4)
ETEC LTA+ST1A+ST1B TOX ST NAA+NON-PROBE: NOT DETECTED
G LAMBLIA DNA STL QL NAA+NON-PROBE: NOT DETECTED
GLOBULIN UR ELPH-MCNC: 1.9 GM/DL
GLUCOSE SERPL-MCNC: 60 MG/DL (ref 65–99)
HCT VFR BLD AUTO: 35 % (ref 37.5–51)
HGB BLD-MCNC: 11.1 G/DL (ref 13–17.7)
MAGNESIUM SERPL-MCNC: 1.6 MG/DL (ref 1.6–2.6)
MCH RBC QN AUTO: 29.4 PG (ref 26.6–33)
MCHC RBC AUTO-ENTMCNC: 31.7 G/DL (ref 31.5–35.7)
MCV RBC AUTO: 92.6 FL (ref 79–97)
NOROVIRUS GI+II RNA STL QL NAA+NON-PROBE: NOT DETECTED
P SHIGELLOIDES DNA STL QL NAA+NON-PROBE: NOT DETECTED
PHOSPHATE SERPL-MCNC: 2.5 MG/DL (ref 2.5–4.5)
PLATELET # BLD AUTO: 87 10*3/MM3 (ref 140–450)
PMV BLD AUTO: 10.3 FL (ref 6–12)
POTASSIUM SERPL-SCNC: 4 MMOL/L (ref 3.5–5.2)
PROT SERPL-MCNC: 4.6 G/DL (ref 6–8.5)
RBC # BLD AUTO: 3.78 10*6/MM3 (ref 4.14–5.8)
RVA RNA STL QL NAA+NON-PROBE: NOT DETECTED
S ENT+BONG DNA STL QL NAA+NON-PROBE: NOT DETECTED
SAPO I+II+IV+V RNA STL QL NAA+NON-PROBE: NOT DETECTED
SHIGELLA SP+EIEC IPAH ST NAA+NON-PROBE: NOT DETECTED
SODIUM SERPL-SCNC: 141 MMOL/L (ref 136–145)
V CHOL+PARA+VUL DNA STL QL NAA+NON-PROBE: NOT DETECTED
V CHOLERAE DNA STL QL NAA+NON-PROBE: NOT DETECTED
WBC NRBC COR # BLD AUTO: 4.63 10*3/MM3 (ref 3.4–10.8)
Y ENTEROCOL DNA STL QL NAA+NON-PROBE: NOT DETECTED

## 2024-05-02 PROCEDURE — 80053 COMPREHEN METABOLIC PANEL: CPT | Performed by: INTERNAL MEDICINE

## 2024-05-02 PROCEDURE — 84100 ASSAY OF PHOSPHORUS: CPT | Performed by: INTERNAL MEDICINE

## 2024-05-02 PROCEDURE — 83735 ASSAY OF MAGNESIUM: CPT | Performed by: INTERNAL MEDICINE

## 2024-05-02 PROCEDURE — 93005 ELECTROCARDIOGRAM TRACING: CPT | Performed by: INTERNAL MEDICINE

## 2024-05-02 PROCEDURE — 93010 ELECTROCARDIOGRAM REPORT: CPT | Performed by: INTERNAL MEDICINE

## 2024-05-02 PROCEDURE — 85027 COMPLETE CBC AUTOMATED: CPT | Performed by: INTERNAL MEDICINE

## 2024-05-02 PROCEDURE — 92610 EVALUATE SWALLOWING FUNCTION: CPT

## 2024-05-02 PROCEDURE — 25010000002 PIPERACILLIN SOD-TAZOBACTAM PER 1 G: Performed by: STUDENT IN AN ORGANIZED HEALTH CARE EDUCATION/TRAINING PROGRAM

## 2024-05-02 PROCEDURE — 99222 1ST HOSP IP/OBS MODERATE 55: CPT | Performed by: INTERNAL MEDICINE

## 2024-05-02 PROCEDURE — 99231 SBSQ HOSP IP/OBS SF/LOW 25: CPT | Performed by: SURGERY

## 2024-05-02 PROCEDURE — 25810000003 SODIUM CHLORIDE 0.9 % SOLUTION: Performed by: INTERNAL MEDICINE

## 2024-05-02 RX ORDER — ATORVASTATIN CALCIUM 20 MG/1
10 TABLET, FILM COATED ORAL DAILY
Status: DISCONTINUED | OUTPATIENT
Start: 2024-05-02 | End: 2024-05-06 | Stop reason: HOSPADM

## 2024-05-02 RX ORDER — FAMOTIDINE 20 MG/1
20 TABLET, FILM COATED ORAL
Status: DISCONTINUED | OUTPATIENT
Start: 2024-05-02 | End: 2024-05-02

## 2024-05-02 RX ORDER — ACETAMINOPHEN 325 MG/1
650 TABLET ORAL EVERY 6 HOURS PRN
Status: DISCONTINUED | OUTPATIENT
Start: 2024-05-02 | End: 2024-05-06 | Stop reason: HOSPADM

## 2024-05-02 RX ORDER — DIVALPROEX SODIUM 500 MG/1
500 TABLET, DELAYED RELEASE ORAL EVERY 12 HOURS SCHEDULED
Status: DISCONTINUED | OUTPATIENT
Start: 2024-05-02 | End: 2024-05-06 | Stop reason: HOSPADM

## 2024-05-02 RX ORDER — IBUPROFEN 200 MG
1 TABLET ORAL EVERY 8 HOURS SCHEDULED
Status: DISPENSED | OUTPATIENT
Start: 2024-05-02 | End: 2024-05-05

## 2024-05-02 RX ORDER — LEVOTHYROXINE SODIUM 0.05 MG/1
50 TABLET ORAL
Status: DISCONTINUED | OUTPATIENT
Start: 2024-05-02 | End: 2024-05-06 | Stop reason: HOSPADM

## 2024-05-02 RX ORDER — DIGOXIN 125 MCG
125 TABLET ORAL DAILY
Status: DISCONTINUED | OUTPATIENT
Start: 2024-05-02 | End: 2024-05-06 | Stop reason: HOSPADM

## 2024-05-02 RX ORDER — BACLOFEN 10 MG/1
5 TABLET ORAL EVERY 12 HOURS SCHEDULED
Status: DISCONTINUED | OUTPATIENT
Start: 2024-05-02 | End: 2024-05-06 | Stop reason: HOSPADM

## 2024-05-02 RX ORDER — DEXTROSE AND SODIUM CHLORIDE 5; .45 G/100ML; G/100ML
75 INJECTION, SOLUTION INTRAVENOUS CONTINUOUS
Status: DISCONTINUED | OUTPATIENT
Start: 2024-05-02 | End: 2024-05-06 | Stop reason: HOSPADM

## 2024-05-02 RX ORDER — BENZTROPINE MESYLATE 1 MG/1
1 TABLET ORAL 2 TIMES DAILY
Status: DISCONTINUED | OUTPATIENT
Start: 2024-05-02 | End: 2024-05-06 | Stop reason: HOSPADM

## 2024-05-02 RX ORDER — METOPROLOL SUCCINATE 25 MG/1
25 TABLET, EXTENDED RELEASE ORAL
Status: DISCONTINUED | OUTPATIENT
Start: 2024-05-02 | End: 2024-05-06 | Stop reason: HOSPADM

## 2024-05-02 RX ADMIN — APIXABAN 5 MG: 5 TABLET, FILM COATED ORAL at 20:27

## 2024-05-02 RX ADMIN — BENZTROPINE MESYLATE 1 MG: 1 TABLET ORAL at 11:55

## 2024-05-02 RX ADMIN — PANTOPRAZOLE SODIUM 40 MG: 40 INJECTION, POWDER, FOR SOLUTION INTRAVENOUS at 06:15

## 2024-05-02 RX ADMIN — LEVOTHYROXINE SODIUM 50 MCG: 50 TABLET ORAL at 11:56

## 2024-05-02 RX ADMIN — DIVALPROEX SODIUM 500 MG: 500 TABLET, DELAYED RELEASE ORAL at 20:27

## 2024-05-02 RX ADMIN — BACITRACIN ZINC, NEOMYCIN, POLYMYXIN B 1 APPLICATION: 400; 3.5; 5 OINTMENT TOPICAL at 16:15

## 2024-05-02 RX ADMIN — BENZTROPINE MESYLATE 1 MG: 1 TABLET ORAL at 20:27

## 2024-05-02 RX ADMIN — NITROGLYCERIN 0.4 MG: 0.4 TABLET SUBLINGUAL at 20:46

## 2024-05-02 RX ADMIN — NITROGLYCERIN 0.4 MG: 0.4 TABLET SUBLINGUAL at 20:36

## 2024-05-02 RX ADMIN — PIPERACILLIN AND TAZOBACTAM 3.38 G: 3; .375 INJECTION, POWDER, FOR SOLUTION INTRAVENOUS at 04:36

## 2024-05-02 RX ADMIN — Medication 10 ML: at 11:58

## 2024-05-02 RX ADMIN — DIVALPROEX SODIUM 500 MG: 500 TABLET, DELAYED RELEASE ORAL at 11:54

## 2024-05-02 RX ADMIN — ATORVASTATIN CALCIUM 10 MG: 20 TABLET, FILM COATED ORAL at 11:56

## 2024-05-02 RX ADMIN — BACITRACIN ZINC, NEOMYCIN, POLYMYXIN B 1 APPLICATION: 400; 3.5; 5 OINTMENT TOPICAL at 20:47

## 2024-05-02 RX ADMIN — APIXABAN 5 MG: 5 TABLET, FILM COATED ORAL at 11:56

## 2024-05-02 RX ADMIN — Medication 10 ML: at 22:18

## 2024-05-02 RX ADMIN — BACLOFEN 5 MG: 10 TABLET ORAL at 11:55

## 2024-05-02 RX ADMIN — BACLOFEN 5 MG: 10 TABLET ORAL at 20:27

## 2024-05-02 RX ADMIN — ACETAMINOPHEN 325MG 650 MG: 325 TABLET ORAL at 18:27

## 2024-05-02 RX ADMIN — SENNOSIDES AND DOCUSATE SODIUM 2 TABLET: 50; 8.6 TABLET ORAL at 20:27

## 2024-05-02 RX ADMIN — SENNOSIDES AND DOCUSATE SODIUM 2 TABLET: 50; 8.6 TABLET ORAL at 11:56

## 2024-05-02 RX ADMIN — DEXTROSE AND SODIUM CHLORIDE 75 ML/HR: 5; 450 INJECTION, SOLUTION INTRAVENOUS at 11:57

## 2024-05-02 RX ADMIN — SODIUM CHLORIDE 125 ML/HR: 900 INJECTION INTRAVENOUS at 06:13

## 2024-05-02 RX ADMIN — METOPROLOL SUCCINATE 25 MG: 25 TABLET, EXTENDED RELEASE ORAL at 11:55

## 2024-05-02 RX ADMIN — DIGOXIN 125 MCG: 125 TABLET ORAL at 11:55

## 2024-05-02 NOTE — SIGNIFICANT NOTE
"   05/02/24 1456   OTHER   Discipline physical therapist   Rehab Time/Intention   Session Not Performed other (see comments)  (Pt declined activity; stating \"I don't want to fall\". Educated pt that PT could assist to prevent falls; however, pt became emotional and repeated that he was fearful of falling. Will follow up tomorrow for PT.)   Recommendation   PT - Next Appointment 05/03/24       "

## 2024-05-02 NOTE — PLAN OF CARE
Goal Outcome Evaluation:                      Patient alert with confusion. Patient turns self in bed. Patient with c/o leg pain. Medicated with tylenol. Patient with diet after being seen by SLP. Patient in A-fib and on room air. According to aid patient has had 4 BM's all pretty much liquid. Patient took medications with applesauce.  No acute distress noted, will continue to monitor.

## 2024-05-02 NOTE — PROGRESS NOTES
Follow-up constipation    Subjective:  Multiple bowel movements today.  Complains of some leg pain.  Multiple loose stools today.    Objective:  Afebrile with stable vitals  General: Chronic ill appearance, confused and speech is garbled  Abdomen: Soft, benign, minimally distended    Assessment and plan:  -Constipation and possible fecal impaction related to immobility  -Continue bowel regimen  -Okay for diet  -We will see as needed    Que Wright MD  General and Endoscopic Surgery  [facility]    [address], [address]  [place], [place], [address]  P: [phone]  F: [phone]

## 2024-05-02 NOTE — PROGRESS NOTES
Name: Helder Abbott ADMIT: 2024   : 1975  PCP: José Garcia APRN    MRN: [id number] LOS: 2 days   AGE/SEX: 49 y.o. male  ROOM: [facility]/   Subjective   Chief Complaint   Patient presents with    Fatigue     Increased weakness, increase in falls    Fall     Patient is a 49-year-old male with a history of including but not limited to bipolar disorder, essential hypertension, hypothyroidism, CVA , atrial fibrillation on Eliquis, moderate cognitive impairment at baseline presents to the ED with complaint of acute weakness, poor intake. CT with fecal impaction possible p.bowel obstruction    On IVFs  +BM  Feels a little poor but denies abd pain      Objective   Vital Signs  Temp:  [97.9 °F (36.6 °C)-98.4 °F (36.9 °C)] 98.4 °F (36.9 °C)  Heart Rate:  [71-82] 82  Resp:  [16-18] 16  BP: ()/(65-84) 109/65  SpO2:  [100 %] 100 %  on   ;   Device (Oxygen Therapy): room air  Body mass index is 22.16 kg/m².    Physical Exam  Constitutional:       General: He is in acute distress.      Appearance: He is ill-appearing.   HENT:      Head: Normocephalic and atraumatic.   Eyes:      General: No scleral icterus.  Cardiovascular:      Rate and Rhythm: Normal rate and regular rhythm.      Heart sounds: Normal heart sounds.   Pulmonary:      Effort: Pulmonary effort is normal. No respiratory distress.      Breath sounds: Normal breath sounds.   Abdominal:      General: There is no distension.      Palpations: Abdomen is soft.      Tenderness: There is no abdominal tenderness. There is no guarding.   Musculoskeletal:      Cervical back: Neck supple.   Neurological:      Mental Status: He is alert.     Awake but is chronically and acutely ill. Oriented to self, poor overall memory and cognition. Similar exam to yesterday     Results Review:       I reviewed the patient's new clinical results.  Results from last 7 days   Lab Units 24  0632 24  0543 24  1732   WBC 10*3/mm3 4.63 5.28 7.37  "  HEMOGLOBIN g/dL 11.1* 11.3* 13.1   PLATELETS 10*3/mm3 87* 119* 162     Results from last 7 days   Lab Units 05/02/24 0632 05/01/24 0543 04/30/24  1732   SODIUM mmol/L 141 139 138   POTASSIUM mmol/L 4.0 4.3 4.1   CHLORIDE mmol/L 110* 106 101   CO2 mmol/L 22.8 23.7 27.5   BUN mg/dL 7 14 16   CREATININE mg/dL 0.49* 0.89 1.55*   GLUCOSE mg/dL 60* 65 96   Estimated Creatinine Clearance: 196.5 mL/min (A) (by C-G formula based on SCr of 0.49 mg/dL (L)).  Results from last 7 days   Lab Units 05/02/24 0632 04/30/24  1732   ALBUMIN g/dL 2.7* 3.9   BILIRUBIN mg/dL 0.5 0.5   ALK PHOS U/L 52 80   AST (SGOT) U/L 16 18   ALT (SGPT) U/L 7 9     Results from last 7 days   Lab Units 05/02/24 0632 05/01/24 2011 05/01/24 0543 04/30/24  1732   CALCIUM mg/dL 7.9*  --  8.1* 9.5   ALBUMIN g/dL 2.7*  --   --  3.9   MAGNESIUM mg/dL 1.6 2.0 1.5* 1.6   PHOSPHORUS mg/dL 2.5  --  3.2  --      Results from last 7 days   Lab Units 04/30/24 2103 04/30/24  1732   PROCALCITONIN ng/mL  --  0.06   LACTATE mmol/L 0.8 2.6*       Coag   Results from last 7 days   Lab Units 04/30/24  1732   INR  1.20*     HbA1C No results found for: \"HGBA1C\"  Infection   Results from last 7 days   Lab Units 05/01/24 0543 04/30/24 2215 04/30/24  1732   BLOODCX  No growth at 24 hours Abnormal Stain*  --    BCIDPCR   --  Staph spp, not aureus or lugdunensis. Identification by BCID2 PCR.*  --    PROCALCITONIN ng/mL  --   --  0.06     Radiology(recent) CT Abdomen Pelvis Without Contrast    Result Date: 4/30/2024   1.  Large distal colonic and rectal stool burden concerning for fecal impaction. Relatively diffusely dilated fluid-filled loops of small bowel to the level of the cecum may be due to at least partial obstruction due to the distal stool burden versus ileus and/or nonspecific enteritis in the appropriate clinical setting. 2.  Small-volume ascites, which may be reactive. 3.  No CT evidence of acute traumatic injury in the abdomen or pelvis.   This report was " "finalized on 4/30/2024 7:06 PM by Dr. Akua Gutierrez M.D on Workstation: [id number]      CT Head Without Contrast    Result Date: 4/30/2024   No acute intracranial hemorrhage or hydrocephalus. If there is further clinical concern, MRI could be considered for further evaluation.  This report was finalized on 4/30/2024 7:04 PM by Dr. Alfred Cavazos M.D on Workstation: [id number]      XR Chest 1 View    Result Date: 4/30/2024  As described.    This report was finalized on 4/30/2024 6:09 PM by Dr. Alfred Cavazos M.D on Workstation: [id number]     HS Troponin T   Date Value Ref Range Status   04/30/2024 34 (H) <22 ng/L Final     No components found for: \"TSH;2\"    apixaban, 5 mg, Oral, Q12H  atorvastatin, 10 mg, Oral, Daily  Baclofen, 5 mg, Oral, Q12H  benztropine, 1 mg, Oral, BID  digoxin, 125 mcg, Oral, Daily  divalproex, 500 mg, Oral, Q12H  levothyroxine, 50 mcg, Oral, Q AM  metoprolol succinate XL, 25 mg, Oral, Q24H  pantoprazole, 40 mg, Intravenous, Q AM  piperacillin-tazobactam, 3.375 g, Intravenous, Q8H  senna-docusate sodium, 2 tablet, Oral, BID  sodium chloride, 10 mL, Intravenous, Q12H      sodium chloride, 125 mL/hr, Last Rate: 125 mL/hr (05/02/24 0613)    NPO Diet NPO Type: Strict NPO      Assessment & Plan      Active Hospital Problems    Diagnosis  POA    **Partial bowel obstruction [K56.600]  Yes    Severe malnutrition [E43]  Yes    JOSIAH (acute kidney injury) [N17.9]  Unknown    Essential hypertension [I10]  Unknown    Hypothyroidism (acquired) [E03.9]  Unknown    History of CVA (cerebrovascular accident) [Z86.73]  Not Applicable    GERD without esophagitis [K21.9]  Unknown    HLD (hyperlipidemia) [E78.5]  Unknown    Bipolar disorder [F31.9]  Unknown    Fecal impaction [K56.41]  Unknown      Resolved Hospital Problems   No resolved problems to display.       Patient is a 49-year-old male with a history of including but not limited to bipolar disorder, essential hypertension, hypothyroidism, CVA , atrial " fibrillation on Eliquis, moderate cognitive impairment at baseline presents to the ED with complaint of acute weakness, poor intake. CT with fecal impaction possible bowel obstruction     Fecal impaction/partial small bowel obstruction/versus ileus/enteritis  -Suspect symptoms related to fecal impaction. Given enemas. Surgery consulted. Will have on stool softeners orally     Diet after evaluation from .      Due to hypotension elevated lactic acid was started on Zosyn empirically. He does have +BC but suspect it is contaminant. Will consult ID- I think we can probably stop abx but can see what they think.        Atrial fibrillation  -On metoprolol, digoxin, Eliquis    Thrombocytopenia  -Etiology unclear. Check B12, folate. Recheck in AM. If drops further may need Hematology consultation and holding of eliquis.      Hypothyroidism  -TSH normal, resume p.o. levothyroxine         Bipolar disorder/moderate cognitive impairment/history of CVA  -Likely secondary to dehydration/fecal impaction as above  -Initial CT head with no acute findings  -Seen by Neurology who recommended restarting his chronic Psychiatric medications.         JOSIAH  -improved with fluids      Hypomag  -replace as needed     VTE Prophylaxis - SCDs, eliquis    Dispo- SNF, maybe 5/4.     ROSA MARIA RN  Reviewed records     Vinny Suggs MD  [facility]  05/02/24  09:57 EDT

## 2024-05-02 NOTE — PROGRESS NOTES
"Nutrition Services    Patient Name:  Helder Abbott  YOB: 1975  MRN: [id number]  Admit Date:  4/30/2024    Assessment Date:  05/02/24    Summary: MSA Follow up:  Pt had 3 BM's today and 5 BM's 5/1. SLP evaluated swallow today and pt advanced to regular diet with thins. Pt excited to eat and tolerated well with 75% po intake at lunch today. Will add Boost Plus BID.    Recommend:  Boost Plus BID (chocolate), will order.    Will follow per protocol.    CLINICAL NUTRITION ASSESSMENT      Reason for Assessment Follow-up Protocol     Diagnosis/Problem   fatigue, weakness, poor po intake, diarrhea and fall at home. CT abdomen showed fecal impaction/partial SBO vs ileus/enteritis         Anthropometrics        Current Height  Current Weight  BMI kg/m2 Height: 185.4 cm (73\")  Weight: 76.2 kg (167 lb 15.9 oz) (05/02/24 0500)  Body mass index is 22.16 kg/m².   Adjusted BMI (if applicable)    BMI Category Normal/Healthy (18.4 - 24.9)   Ideal Body Weight (IBW) 176 lb   Usual Body Weight (UBW) 188 lb   Weight Trend Loss, Amount/Timeframe: 30 lb (16%) x 7 months   Weight History Wt Readings from Last 30 Encounters:   05/02/24 0500 76.2 kg (167 lb 15.9 oz)   05/01/24 0534 72.1 kg (158 lb 15.2 oz)   04/30/24 2323 72.2 kg (159 lb 2.8 oz)   11/30/22 1412 86.2 kg (190 lb)      --  Estimated/Assessed Needs        Current Weight  Weight: 76.2 kg (167 lb 15.9 oz) (05/02/24 0500)       Energy Requirements    Weight for Calculation 72 kg   Method for Estimation  25 kcal/kg, 30 kcal/kg   EST Needs (kcal/day) 5806-0139 kcals       Protein Requirements    Weight for Calculation 72 kg   EST Protein Needs (g/kg) 1.2 - 1.5 gm/kg   EST Daily Needs (g/day)  g       Fluid Requirements     Method for Estimation 30 mL/kg    EST Needs (mL/day) 2160 ml     Labs       Pertinent Labs    Results from last 7 days   Lab Units 05/02/24  0632 05/01/24  0543 04/30/24  1732   SODIUM mmol/L 141 139 138   POTASSIUM mmol/L 4.0 4.3 4.1 " "  CHLORIDE mmol/L 110* 106 101   CO2 mmol/L 22.8 23.7 27.5   BUN mg/dL 7 14 16   CREATININE mg/dL 0.49* 0.89 1.55*   CALCIUM mg/dL 7.9* 8.1* 9.5   BILIRUBIN mg/dL 0.5  --  0.5   ALK PHOS U/L 52  --  80   ALT (SGPT) U/L 7  --  9   AST (SGOT) U/L 16  --  18   GLUCOSE mg/dL 60* 65 96     Results from last 7 days   Lab Units 05/02/24  0632 05/01/24 2011 05/01/24  0543   MAGNESIUM mg/dL 1.6 2.0 1.5*   PHOSPHORUS mg/dL 2.5  --  3.2   HEMOGLOBIN g/dL 11.1*  --  11.3*   HEMATOCRIT % 35.0*  --  34.6*   WBC 10*3/mm3 4.63  --  5.28   ALBUMIN g/dL 2.7*  --   --      Results from last 7 days   Lab Units 05/02/24  0632 05/01/24 0543 04/30/24  1732   INR   --   --  1.20*   PLATELETS 10*3/mm3 87* 119* 162     COVID19   Date Value Ref Range Status   04/30/2024 Not Detected Not Detected - Ref. Range Final     No results found for: \"HGBA1C\"       Medications           Scheduled Medications apixaban, 5 mg, Oral, Q12H  atorvastatin, 10 mg, Oral, Daily  Baclofen, 5 mg, Oral, Q12H  benztropine, 1 mg, Oral, BID  digoxin, 125 mcg, Oral, Daily  divalproex, 500 mg, Oral, Q12H  levothyroxine, 50 mcg, Oral, Q AM  metoprolol succinate XL, 25 mg, Oral, Q24H  neomycin-bacitracin-polymyxin, 1 Application, Topical, Q8H  pantoprazole, 40 mg, Intravenous, Q AM  senna-docusate sodium, 2 tablet, Oral, BID  sodium chloride, 10 mL, Intravenous, Q12H       Infusions dextrose 5 % and sodium chloride 0.45 %, 75 mL/hr, Last Rate: 75 mL/hr (05/02/24 1157)       PRN Medications   acetaminophen    senna-docusate sodium **AND** polyethylene glycol **AND** bisacodyl **AND** bisacodyl    nitroglycerin    ondansetron    [COMPLETED] Insert Peripheral IV **AND** sodium chloride    sodium chloride    sodium chloride     Physical Findings          General Findings confused, disoriented, impaired speech, room air   Oral/Mouth Cavity tooth or teeth missing   Edema  not assessed   Gastrointestinal diarrhea, fecal incontinence, hypoactive bowel sounds, last bowel " movement: 5/1 x5, 5/2 x 3   Skin  skin intact, bruising   Tubes/Drains/Lines none   NFPE See Malnutrition Severity Assessment, Date Completed: 5/1   --  Malnutrition Severity Assessment      Patient meets criteria for : Severe Malnutrition (Based on ASPEN/AND criteria, pt meets nutrition diagnosis of Severe Malnutrition of Chronic Disease due to inadequate po intake, 30 lb (16%) wt loss x 7 months and severe fat/muscle wasting noted on NFPE.)           Current Nutrition Orders & Evaluation of Intake       Oral Nutrition     Food Allergies NKFA   Current PO Diet Diet: Regular/House; Fluid Consistency: Thin (IDDSI 0)   Supplement n/a   PO Evaluation     % PO Intake 75% lunch    Factors Affecting Intake: altered GI function, fatigue, weakness   --  PES STATEMENT / NUTRITION DIAGNOSIS      Nutrition Dx Problem  Problem: Malnutrition (severe)  Etiology: Factors Affecting Nutrition - FERNY, weakness, fatigue    Signs/Symptoms: Report of Minimal PO Intake and Unintended Weight Change     NUTRITION INTERVENTION / PLAN OF CARE      Intervention Goal(s) Maintain nutrition status, Reduce/improve symptoms, Meet estimated needs, Disease management/therapy, Initiate feeding/diet, Tolerate PO , Increase intake, Accepts oral nutrition supplement, and Maintain weight         RD Intervention/Action Encourage intake, Continue to monitor, Care plan reviewed, and Recommend/order: oral nutritional supplement   --      Prescription/Orders:       PO Diet       Supplements Boost BID (chocolate)       Enteral Nutrition       Parenteral Nutrition    New Prescription Ordered? Yes   --      Monitor/Evaluation Per protocol   Discharge Plan/Needs Pending clinical course   --    RD to follow per protocol.      Electronically signed by:  Becki Trejo RD  05/02/24 16:45 EDT

## 2024-05-02 NOTE — PLAN OF CARE
Goal Outcome Evaluation:         Pt resting in bed quietly. Bm this shift. Reoriented as needed. Turns self.

## 2024-05-02 NOTE — THERAPY EVALUATION
Acute Care - Speech Language Pathology   Swallow Initial Evaluation [facility]     Patient Name: Helder Abbott  : 1975  MRN: [id number]  Today's Date: 2024               Admit Date: 2024    Visit Dx:     ICD-10-CM ICD-9-CM   1. Generalized abdominal pain  R10.84 789.07   2. Decreased oral intake  R63.8 783.9   3. Diarrhea, unspecified type  R19.7 787.91   4. Frequent falls  R29.6 V15.88   5. Chronic anticoagulation  Z79.01 V58.61   6. JOSIAH (acute kidney injury)  N17.9 584.9   7. Fecal impaction  K56.41 560.32   8. Partial small bowel obstruction  K56.600 560.9     Patient Active Problem List   Diagnosis    JOSIAH (acute kidney injury)    Essential hypertension    Hypothyroidism (acquired)    History of CVA (cerebrovascular accident)    GERD without esophagitis    HLD (hyperlipidemia)    Bipolar disorder    Partial bowel obstruction    Fecal impaction    Severe malnutrition     Past Medical History:   Diagnosis Date    Benign essential hypertension     Bipolar disorder     Dyslipidemia     GERD (gastroesophageal reflux disease)     Heart disease     History of stroke     Hypothyroidism      History reviewed. No pertinent surgical history.    SLP Recommendation and Plan  SLP Swallowing Diagnosis: R/O pharyngeal dysphagia (24)  SLP Diet Recommendation: regular textures, thin liquids (24)  Recommended Precautions and Strategies: upright posture during/after eating, small bites of food and sips of liquid (24)  SLP Rec. for Method of Medication Administration: meds whole, as tolerated (24)     Monitor for Signs of Aspiration: yes, notify SLP if any concerns (24)     Swallow Criteria for Skilled Therapeutic Interventions Met: demonstrates skilled criteria (24)  Anticipated Discharge Disposition (SLP): unknown (24)  Rehab Potential/Prognosis, Swallowing: good, to achieve stated therapy goals (24)  Therapy Frequency  (Swallow): PRN (05/02/24 1100)  Predicted Duration Therapy Intervention (Days): until discharge (05/02/24 1100)  Oral Care Recommendations: Oral Care BID/PRN (05/02/24 1100)                                        Plan of Care Reviewed With: patient  Outcome Evaluation: Clinical swallow eval completed. Pt took trials of ice, thin (spoon/cup/straw), nectar thick (cup), pureed, soft, mixed, and regular solids. Pt had an audible swallow with spoon sips of thin. Swallow appeared timely for all other trials. Pt was happy to eat. Mastication was functional for solids. Slight lingual residue was noted with regular, but pt cleared with subsequent swallows or liquid wash. Vocal quality remianed clear throughout the eval. Recommend regular and thin; meds as tolerated; upright for meals and 30 min after; slow rate; small bites/sips. ST to follow.      SWALLOW EVALUATION (Last 72 Hours)       SLP Adult Swallow Evaluation       Row Name 05/02/24 1100                   Rehab Evaluation    Document Type evaluation  -AW        Subjective Information no complaints  -AW        Patient Observations alert;cooperative;agree to therapy  -AW        Patient/Family/Caregiver Comments/Observations Pt stated name, confused.  -AW        Patient Effort good  -AW        Symptoms Noted During/After Treatment none  -AW           General Information    Patient Profile Reviewed yes  -AW        Pertinent History Of Current Problem Pt admitted with a fall and AMS; h/o CVA, Bipolar  -AW        Current Method of Nutrition NPO  -AW        Precautions/Limitations, Vision WFL;for purposes of eval  -AW        Precautions/Limitations, Hearing WFL;for purposes of eval  -AW        Prior Level of Function-Communication unknown  -AW        Prior Level of Function-Swallowing unknown  -AW        Plans/Goals Discussed with patient  -AW        Barriers to Rehab cognitive status  -AW        Patient's Goals for Discharge patient did not state  -AW           Pain     Additional Documentation Pain Scale: Numbers Pre/Post-Treatment (Group)  -AW           Pain Scale: Numbers Pre/Post-Treatment    Pretreatment Pain Rating 0/10 - no pain  -AW        Posttreatment Pain Rating 0/10 - no pain  -AW           Oral Motor Structure and Function    Dentition Assessment natural, present and adequate  -AW        Secretion Management WNL/WFL  -AW        Mucosal Quality dry  -AW           Oral Musculature and Cranial Nerve Assessment    Oral Motor General Assessment WFL  -AW           General Eating/Swallowing Observations    Respiratory Support Currently in Use room air  -AW        Eating/Swallowing Skills fed by SLP  -AW        Positioning During Eating upright in bed  -AW        Utensils Used spoon;cup;straw  -AW        Consistencies Trialed ice chips;thin liquids;pureed;soft to chew textures;nectar/syrup-thick liquids;mixed consistency;regular textures  -AW           Clinical Swallow Eval    Clinical Swallow Evaluation Summary Clinical swallow eval completed. Pt took trials of ice, thin (spoon/cup/straw), nectar thick (cup), pureed, soft, mixed, and regular solids. Pt had an audible swallow with spoon sips of thin. Swallow appeared timely for all other trials. Pt was happy to eat. Mastication was functional for solids. Slight lingual residue was noted with regular, but pt cleared with subsequent swallows or liquid wash. Vocal quality remianed clear throughout the eval. Recommend regular and thin; meds as tolerated; upright for meals and 30 min after; slow rate; small bites/sips. ST to follow.  -AW           SLP Evaluation Clinical Impression    SLP Swallowing Diagnosis R/O pharyngeal dysphagia  -AW        Functional Impact risk of aspiration/pneumonia  -AW        Rehab Potential/Prognosis, Swallowing good, to achieve stated therapy goals  -AW        Swallow Criteria for Skilled Therapeutic Interventions Met demonstrates skilled criteria  -AW           Recommendations    Therapy Frequency  (Swallow) PRN  -AW        Predicted Duration Therapy Intervention (Days) until discharge  -AW        SLP Diet Recommendation regular textures;thin liquids  -AW        Recommended Precautions and Strategies upright posture during/after eating;small bites of food and sips of liquid  -AW        Oral Care Recommendations Oral Care BID/PRN  -AW        SLP Rec. for Method of Medication Administration meds whole;as tolerated  -AW        Monitor for Signs of Aspiration yes;notify SLP if any concerns  -AW        Anticipated Discharge Disposition (SLP) unknown  -AW           (STG) Patient will tolerate trials of    Consistencies Trialed (Tolerate trials) regular textures;thin liquids  -AW        Desired Outcome (Tolerate trials) without signs/symptoms of aspiration  -AW        Rock (Tolerate trials) independently (over 90% accuracy)  -AW        Time Frame (Tolerate trials) by discharge  -AW                  User Key  (r) = Recorded By, (t) = Taken By, (c) = Cosigned By      Initials Name Effective Dates    Katherine Webb SLP 08/28/23 -                     EDUCATION  The patient has been educated in the following areas:   Dysphagia (Swallowing Impairment) Oral Care/Hydration.        SLP GOALS       Row Name 05/02/24 1100             (STG) Patient will tolerate trials of    Consistencies Trialed (Tolerate trials) regular textures;thin liquids  -AW      Desired Outcome (Tolerate trials) without signs/symptoms of aspiration  -AW      Rock (Tolerate trials) independently (over 90% accuracy)  -AW      Time Frame (Tolerate trials) by discharge  -AW                User Key  (r) = Recorded By, (t) = Taken By, (c) = Cosigned By      Initials Name Provider Type    Katherine Webb SLP Speech and Language Pathologist                       Time Calculation:    Time Calculation- SLP       Row Name 05/02/24 1148             Time Calculation- SLP    SLP Start Time 1000  -AW      SLP Received On 05/02/24  -AW                 User Key  (r) = Recorded By, (t) = Taken By, (c) = Cosigned By      Initials Name Provider Type    AW Katherine Salvador SLP Speech and Language Pathologist                    Therapy Charges for Today       Code Description Service Date Service Provider Modifiers Qty    [id number]  ST EVAL ORAL PHARYNG SWALLOW 4 5/2/2024 Katherine Salvador SLP GN 1                 RANDOLPH Mas  5/2/2024

## 2024-05-02 NOTE — CONSULTS
Referring Provider: Dr. Vinny Suggs    Reason for Consultation: Positive blood culture positive blood culture    History of present illness:  Helder Abbott is a 49 y.o. who I am asked to evaluate and give opinion for. History is obtained from the patient and review of the old medical records which I summarize/synthesize as follows: He was brought to the emergency room by his caregiver on 4/30 due to concerns to poor p.o. intake and some abdominal pain.  He also reported feeling generalized weakness and was noted to have some lethargy.  He had some initial hypotension that resolved with fluids.  He has been afebrile throughout his hospital stay.  WBC and his Pro-Derick are normal.  CT of the abdomen pelvis showed fecal impaction and a partial bowel obstruction.  He was placed on empiric Zosyn.  Today ID has been consulted because his blood cultures are positive for coagulase-negative Staphylococcus in 1 of 2 sets.    He tells me he also had a fall and has an excoriation on his left forehead, nose, and left knee.    It seems that his bowel obstruction/fecal impaction have improved with an enema.    Past Medical History:   Diagnosis Date    Benign essential hypertension     Bipolar disorder     Dyslipidemia     GERD (gastroesophageal reflux disease)     Heart disease     History of stroke     Hypothyroidism        Antibiotic allergies and intolerances:    None    Medications:    Current Facility-Administered Medications:     apixaban (ELIQUIS) tablet 5 mg, 5 mg, Oral, Q12H, Vinny Suggs MD    atorvastatin (LIPITOR) tablet 10 mg, 10 mg, Oral, Daily, Vinny Suggs MD    baclofen (LIORESAL) tablet 5 mg, 5 mg, Oral, Q12H, Vinny Suggs MD    benztropine (COGENTIN) tablet 1 mg, 1 mg, Oral, BID, Vinny Suggs MD    sennosides-docusate (PERICOLACE) 8.6-50 MG per tablet 2 tablet, 2 tablet, Oral, BID **AND** polyethylene glycol (MIRALAX) packet 17 g, 17 g, Oral, Daily PRN **AND** bisacodyl  (DULCOLAX) EC tablet 5 mg, 5 mg, Oral, Daily PRN **AND** bisacodyl (DULCOLAX) suppository 10 mg, 10 mg, Rectal, Daily PRN, Vinny Suggs MD    dextrose 5 % and sodium chloride 0.45 % infusion, 75 mL/hr, Intravenous, Continuous, Vinny Suggs MD    digoxin (LANOXIN) tablet 125 mcg, 125 mcg, Oral, Daily, Vinny Suggs MD    divalproex (DEPAKOTE) DR tablet 500 mg, 500 mg, Oral, Q12H, Vinny Suggs MD    levothyroxine (SYNTHROID, LEVOTHROID) tablet 50 mcg, 50 mcg, Oral, Q AM, Vinny Suggs MD    metoprolol succinate XL (TOPROL-XL) 24 hr tablet 25 mg, 25 mg, Oral, Q24H, Vinny Suggs MD    nitroglycerin (NITROSTAT) SL tablet 0.4 mg, 0.4 mg, Sublingual, Q5 Min PRN, Vinny Suggs MD    ondansetron (ZOFRAN) injection 4 mg, 4 mg, Intravenous, Q6H PRN, Vinny Suggs MD    pantoprazole (PROTONIX) injection 40 mg, 40 mg, Intravenous, Q AM, Vinny Suggs MD, 40 mg at 05/02/24 0615    piperacillin-tazobactam (ZOSYN) 3.375 g IVPB in 100 mL NS MBP (CD), 3.375 g, Intravenous, Q8H, Vinny Suggs MD, 3.375 g at 05/02/24 0436    [COMPLETED] Insert Peripheral IV, , , Once **AND** sodium chloride 0.9 % flush 10 mL, 10 mL, Intravenous, PRN, Vinny Suggs MD    sodium chloride 0.9 % flush 10 mL, 10 mL, Intravenous, Q12H, Vinny Suggs MD, 10 mL at 05/01/24 2022    sodium chloride 0.9 % flush 10 mL, 10 mL, Intravenous, PRN, Vinny Suggs MD    sodium chloride 0.9 % infusion 40 mL, 40 mL, Intravenous, PRN, Vinny Suggs MD      Objective   Vital Signs   Temp:  [97.9 °F (36.6 °C)-98.4 °F (36.9 °C)] 98.4 °F (36.9 °C)  Heart Rate:  [71-82] 82  Resp:  [16-18] 16  BP: ()/(65-84) 109/65    Physical Exam:   General: awake, difficult to understand speech, follows commands  Eyes: no scleral icterus  Cardiovascular: NR  Respiratory: normal work of breathing on ambient air  GI: Abdomen is soft, not tender or distended, no rebound or guarding  :  no  "Huber catheter  Skin: Excoriation on left forehead, nose, and left knee  Vasc: PIV w/o erythema    Labs:     Lab Results   Component Value Date    WBC 4.63 05/02/2024    HGB 11.1 (L) 05/02/2024    HCT 35.0 (L) 05/02/2024    MCV 92.6 05/02/2024    PLT 87 (L) 05/02/2024       Lab Results   Component Value Date    GLUCOSE 60 (L) 05/02/2024    BUN 7 05/02/2024    CREATININE 0.49 (L) 05/02/2024    BCR 14.3 05/02/2024    CO2 22.8 05/02/2024    CALCIUM 7.9 (L) 05/02/2024    ALBUMIN 2.7 (L) 05/02/2024    AST 16 05/02/2024    ALT 7 05/02/2024       Microbiology:  4/30 RPP: neative  4/30 BCx: coag-neg Staph  5/1 GI PCR: negative    Radiology:  CT A/P:   \"1.  Large distal colonic and rectal stool burden concerning for fecal   impaction. Relatively diffusely dilated fluid-filled loops of small   bowel to the level of the cecum may be due to at least partial   obstruction due to the distal stool burden versus ileus and/or   nonspecific enteritis in the appropriate clinical setting.   2.  Small-volume ascites, which may be reactive.   3.  No CT evidence of acute traumatic injury in the abdomen or pelvis. \"    ASSESSMENT/PLAN:  Contamination of blood culture  Partial small bowel obstruction  Fecal impaction  Bipolar disorder  Atrial fibrillation on Eliquis  History of stroke  Skin excoriation    The blood culture positive for coagulase-negative Staphylococcus in 1 of 2 sets is a contaminant.  I recommend that we discontinue antibiotics at this time.    I will order some Neosporin to be placed on the left knee excoriation.    Thank you for allowing me to be involved in the care of this patient. Infectious diseases will sign off at this time with antibiotics plan in place, but please call me at [phone] if any further ID questions or new ID concerns.      "

## 2024-05-02 NOTE — PLAN OF CARE
Goal Outcome Evaluation:  Plan of Care Reviewed With: patient           Outcome Evaluation: Clinical swallow eval completed. Pt took trials of ice, thin (spoon/cup/straw), nectar thick (cup), pureed, soft, mixed, and regular solids. Pt had an audible swallow with spoon sips of thin. Swallow appeared timely for all other trials. Pt was happy to eat. Mastication was functional for solids. Slight lingual residue was noted with regular, but pt cleared with subsequent swallows or liquid wash. Vocal quality remianed clear throughout the eval. Recommend regular and thin; meds as tolerated; upright for meals and 30 min after; slow rate; small bites/sips. ST to follow.      Anticipated Discharge Disposition (SLP): unknown          SLP Swallowing Diagnosis: R/O pharyngeal dysphagia (05/02/24 1100)

## 2024-05-03 ENCOUNTER — APPOINTMENT (OUTPATIENT)
Dept: GENERAL RADIOLOGY | Facility: HOSPITAL | Age: 49
DRG: 388 | End: 2024-05-03
Payer: MEDICARE

## 2024-05-03 LAB
BACTERIA SPEC AEROBE CULT: ABNORMAL
FOLATE SERPL-MCNC: 6.02 NG/ML (ref 4.78–24.2)
GRAM STN SPEC: ABNORMAL
ISOLATED FROM: ABNORMAL
QT INTERVAL: 378 MS
QTC INTERVAL: 423 MS
VIT B12 BLD-MCNC: 735 PG/ML (ref 211–946)

## 2024-05-03 PROCEDURE — 99231 SBSQ HOSP IP/OBS SF/LOW 25: CPT | Performed by: SURGERY

## 2024-05-03 PROCEDURE — 82746 ASSAY OF FOLIC ACID SERUM: CPT | Performed by: INTERNAL MEDICINE

## 2024-05-03 PROCEDURE — 74018 RADEX ABDOMEN 1 VIEW: CPT

## 2024-05-03 PROCEDURE — 82607 VITAMIN B-12: CPT | Performed by: INTERNAL MEDICINE

## 2024-05-03 PROCEDURE — 25010000002 ONDANSETRON PER 1 MG: Performed by: INTERNAL MEDICINE

## 2024-05-03 RX ADMIN — ACETAMINOPHEN 325MG 650 MG: 325 TABLET ORAL at 12:22

## 2024-05-03 RX ADMIN — DIGOXIN 125 MCG: 125 TABLET ORAL at 08:03

## 2024-05-03 RX ADMIN — Medication 10 ML: at 08:03

## 2024-05-03 RX ADMIN — BACITRACIN ZINC, NEOMYCIN, POLYMYXIN B 1 APPLICATION: 400; 3.5; 5 OINTMENT TOPICAL at 14:12

## 2024-05-03 RX ADMIN — APIXABAN 5 MG: 5 TABLET, FILM COATED ORAL at 20:28

## 2024-05-03 RX ADMIN — LEVOTHYROXINE SODIUM 50 MCG: 50 TABLET ORAL at 03:54

## 2024-05-03 RX ADMIN — ACETAMINOPHEN 325MG 650 MG: 325 TABLET ORAL at 03:54

## 2024-05-03 RX ADMIN — DEXTROSE AND SODIUM CHLORIDE 75 ML/HR: 5; 450 INJECTION, SOLUTION INTRAVENOUS at 01:02

## 2024-05-03 RX ADMIN — SENNOSIDES AND DOCUSATE SODIUM 2 TABLET: 50; 8.6 TABLET ORAL at 08:03

## 2024-05-03 RX ADMIN — APIXABAN 5 MG: 5 TABLET, FILM COATED ORAL at 08:03

## 2024-05-03 RX ADMIN — ONDANSETRON 4 MG: 2 INJECTION INTRAMUSCULAR; INTRAVENOUS at 09:14

## 2024-05-03 RX ADMIN — DIVALPROEX SODIUM 500 MG: 500 TABLET, DELAYED RELEASE ORAL at 20:28

## 2024-05-03 RX ADMIN — BACITRACIN ZINC, NEOMYCIN, POLYMYXIN B 1 APPLICATION: 400; 3.5; 5 OINTMENT TOPICAL at 05:08

## 2024-05-03 RX ADMIN — BENZTROPINE MESYLATE 1 MG: 1 TABLET ORAL at 20:28

## 2024-05-03 RX ADMIN — METOPROLOL SUCCINATE 25 MG: 25 TABLET, EXTENDED RELEASE ORAL at 08:03

## 2024-05-03 RX ADMIN — ATORVASTATIN CALCIUM 10 MG: 20 TABLET, FILM COATED ORAL at 08:03

## 2024-05-03 RX ADMIN — DEXTROSE AND SODIUM CHLORIDE 75 ML/HR: 5; 450 INJECTION, SOLUTION INTRAVENOUS at 14:26

## 2024-05-03 RX ADMIN — Medication 10 ML: at 22:08

## 2024-05-03 RX ADMIN — PANTOPRAZOLE SODIUM 40 MG: 40 INJECTION, POWDER, FOR SOLUTION INTRAVENOUS at 05:08

## 2024-05-03 RX ADMIN — DIVALPROEX SODIUM 500 MG: 500 TABLET, DELAYED RELEASE ORAL at 08:03

## 2024-05-03 RX ADMIN — BACLOFEN 5 MG: 10 TABLET ORAL at 08:03

## 2024-05-03 RX ADMIN — BACLOFEN 5 MG: 10 TABLET ORAL at 20:28

## 2024-05-03 RX ADMIN — ACETAMINOPHEN 325MG 650 MG: 325 TABLET ORAL at 18:26

## 2024-05-03 RX ADMIN — BENZTROPINE MESYLATE 1 MG: 1 TABLET ORAL at 08:03

## 2024-05-03 NOTE — PROGRESS NOTES
Name: Helder Abbott ADMIT: 2024   : 1975  PCP: José Garcia APRN    MRN: [id number] LOS: 3 days   AGE/SEX: 49 y.o. male  ROOM: [facility]     Subjective   Subjective   Patient is seen at bedside, no new complaints.       Objective   Objective   Vital Signs  Temp:  [97.7 °F (36.5 °C)-98 °F (36.7 °C)] 97.9 °F (36.6 °C)  Heart Rate:  [76-84] 80  Resp:  [18] 18  BP: (119-156)/(76-96) 129/86  SpO2:  [96 %-100 %] 100 %  on  Flow (L/min):  [2-22] 2;   Device (Oxygen Therapy): room air  Body mass index is 22.16 kg/m².  Physical Exam  General, intermittently confused  Head and ENT, normocephalic and atraumatic.  Lungs, symmetric expansion, equal air entry bilaterally.  Heart, regular rate and rhythm.  Abdomen, soft and nontender.  Extremities, no clubbing or cyanosis.  Neuro, unable to fully evaluate  Skin: Warm and no rash.  Psych, unable to fully evaluate  Musculoskeletal, joint examination is grossly normal.        Results Review     I reviewed the patient's new clinical results.  Results from last 7 days   Lab Units 24  0632 24  0524  1732   WBC 10*3/mm3 4.63 5.28 7.37   HEMOGLOBIN g/dL 11.1* 11.3* 13.1   PLATELETS 10*3/mm3 87* 119* 162     Results from last 7 days   Lab Units 24  0632 24  0543 24  1732   SODIUM mmol/L 141 139 138   POTASSIUM mmol/L 4.0 4.3 4.1   CHLORIDE mmol/L 110* 106 101   CO2 mmol/L 22.8 23.7 27.5   BUN mg/dL 7 14 16   CREATININE mg/dL 0.49* 0.89 1.55*   GLUCOSE mg/dL 60* 65 96   EGFR mL/min/1.73 125.8 105.1 54.5*     Results from last 7 days   Lab Units 24  0632 24  1732   ALBUMIN g/dL 2.7* 3.9   BILIRUBIN mg/dL 0.5 0.5   ALK PHOS U/L 52 80   AST (SGOT) U/L 16 18   ALT (SGPT) U/L 7 9     Results from last 7 days   Lab Units 24  0632 24  0543 24  1732   CALCIUM mg/dL 7.9*  --  8.1* 9.5   ALBUMIN g/dL 2.7*  --   --  3.9   MAGNESIUM mg/dL 1.6 2.0 1.5* 1.6   PHOSPHORUS mg/dL 2.5  --  3.2  --   "    Results from last 7 days   Lab Units 04/30/24 2103 04/30/24  1732   PROCALCITONIN ng/mL  --  0.06   LACTATE mmol/L 0.8 2.6*     No results found for: \"HGBA1C\", \"POCGLU\"    No radiology results for the last day    I have personally reviewed all medications:  Scheduled Medications  apixaban, 5 mg, Oral, Q12H  atorvastatin, 10 mg, Oral, Daily  Baclofen, 5 mg, Oral, Q12H  benztropine, 1 mg, Oral, BID  digoxin, 125 mcg, Oral, Daily  divalproex, 500 mg, Oral, Q12H  levothyroxine, 50 mcg, Oral, Q AM  metoprolol succinate XL, 25 mg, Oral, Q24H  neomycin-bacitracin-polymyxin, 1 Application, Topical, Q8H  pantoprazole, 40 mg, Intravenous, Q AM  senna-docusate sodium, 2 tablet, Oral, BID  sodium chloride, 10 mL, Intravenous, Q12H    Infusions  dextrose 5 % and sodium chloride 0.45 %, 75 mL/hr, Last Rate: 75 mL/hr (05/03/24 1426)    Diet  Diet: Regular/House; Fluid Consistency: Thin (IDDSI 0)    I have personally reviewed:  [x]  Laboratory   [x]  Microbiology   [x]  Radiology   [x]  EKG/Telemetry  [x]  Cardiology/Vascular   []  Pathology    []  Records       Assessment/Plan     Active Hospital Problems    Diagnosis  POA    **Partial bowel obstruction [K56.600]  Yes    Severe malnutrition [E43]  Yes    JOSIAH (acute kidney injury) [N17.9]  Unknown    Essential hypertension [I10]  Unknown    Hypothyroidism (acquired) [E03.9]  Unknown    History of CVA (cerebrovascular accident) [Z86.73]  Not Applicable    GERD without esophagitis [K21.9]  Unknown    HLD (hyperlipidemia) [E78.5]  Unknown    Bipolar disorder [F31.9]  Unknown    Fecal impaction [K56.41]  Unknown      Resolved Hospital Problems   No resolved problems to display.       49 y.o. male admitted with Partial bowel obstruction.    Patient is a 49-year-old male with a history of including but not limited to bipolar disorder, essential hypertension, hypothyroidism, CVA , atrial fibrillation on Eliquis, moderate cognitive impairment at baseline presents to the ED with " complaint of acute weakness, poor intake. CT with fecal impaction possible bowel obstruction.    Assessment and plan  1.  Fecal impaction, partial small bowel obstruction, surgery on board, follow management recommendations.    2.  Atrial fibrillation, continue current rate control therapy, continue Eliquis.    3.  Thrombocytopenia, drop in platelet count noted.  Monitor platelet counts on repeat labs.    4.  Hypothyroidism, continue Synthroid.    5.  Bipolar disorder, cognitive impairment, history of CVA, chronic conditions.    6.  Acute kidney injury, renal function is back to baseline.    7.  CODE STATUS is full code.  Further plans based on hospital course.      Sagar Gonzalez MD  [facility]  05/03/24  17:01 EDT

## 2024-05-03 NOTE — PROGRESS NOTES
Follow-up constipation    Subjective:  Patient continues to have some on and off abdominal pain.  Difficult to assess overall.    Objective:  Afebrile, vital stable  General: Asleep but easily awakened and then complains of various discomfort, including abdominal  Abdomen: Soft, objectively benign exam    Abdominal film reviewed, there still does appear to be some stool within the rectum, nonobstructive bowel gas pattern    Assessment and plan:  -Chronic constipation and excessive stool within the rectum  -Enema today  -Continue bowel regimen    Que Wright MD  General and Endoscopic Surgery  [facility]    [address], [address]  [place], [place], [address]  P: [phone]  F: [phone]

## 2024-05-03 NOTE — PLAN OF CARE
Goal Outcome Evaluation:  Plan of Care Reviewed With: patient        Progress: improving  Outcome Evaluation: Patient   resting  in  bed.  Complained  of  chest  pain.  EKG  negative.  Nitro x 2 given.     Tylenol given  x 1.    Took  all  meds   well.    Spent  time    coloring  books.  Wound  care  left  knee  done.   Afib  on the  monitor.  Nursing  will  continue to monitor.

## 2024-05-03 NOTE — SIGNIFICANT NOTE
Checked on pt and c/o nausea, abd pain and SAHA, spoke to CHERELLE Bustos-already had all meds he can, currently pt declined PT

## 2024-05-03 NOTE — CASE MANAGEMENT/SOCIAL WORK
Continued Stay Note  [facility]     Patient Name: Helder Abbott  MRN: [id number]  Today's Date: 5/3/2024    Admit Date: 4/30/2024    Plan: Return to family care home   Discharge Plan       Row Name 05/03/24 0954       Plan    Plan Comments CCP left a voicemail for the patient's state guardian Carla Velasco this date at 9:53AM requesting she fax guardianship paperwork to  nurses station at [phone]. CCP to follow. CD, CSW.                   Discharge Codes    No documentation.                 Expected Discharge Date and Time       Expected Discharge Date Expected Discharge Time    May 4, 2024

## 2024-05-03 NOTE — PLAN OF CARE
Goal Outcome Evaluation:                                            Patient alert and oriented with forgetfulness. Patient c/o headache, left leg pain and abdominal pain. The abdominal pain has improved since lunch time-pt medicated per MAR. Patient watching television and coloring provided sheets. Patient with multiple liquid BM's today- patient incontinent of bowel and bladder. IV fluids continue to infuse. No acute distress noted, will continue to monitor.

## 2024-05-04 LAB
ALBUMIN SERPL-MCNC: 3.3 G/DL (ref 3.5–5.2)
ALBUMIN/GLOB SERPL: 1.4 G/DL
ALP SERPL-CCNC: 71 U/L (ref 39–117)
ALT SERPL W P-5'-P-CCNC: 9 U/L (ref 1–41)
ANION GAP SERPL CALCULATED.3IONS-SCNC: 9 MMOL/L (ref 5–15)
AST SERPL-CCNC: 14 U/L (ref 1–40)
BASOPHILS # BLD AUTO: 0.03 10*3/MM3 (ref 0–0.2)
BASOPHILS NFR BLD AUTO: 0.4 % (ref 0–1.5)
BILIRUB SERPL-MCNC: 0.5 MG/DL (ref 0–1.2)
BUN SERPL-MCNC: 3 MG/DL (ref 6–20)
BUN/CREAT SERPL: 5.5 (ref 7–25)
CALCIUM SPEC-SCNC: 8.3 MG/DL (ref 8.6–10.5)
CHLORIDE SERPL-SCNC: 109 MMOL/L (ref 98–107)
CO2 SERPL-SCNC: 25 MMOL/L (ref 22–29)
CREAT SERPL-MCNC: 0.55 MG/DL (ref 0.76–1.27)
DEPRECATED RDW RBC AUTO: 49.5 FL (ref 37–54)
EGFRCR SERPLBLD CKD-EPI 2021: 121.5 ML/MIN/1.73
EOSINOPHIL # BLD AUTO: 0.04 10*3/MM3 (ref 0–0.4)
EOSINOPHIL NFR BLD AUTO: 0.6 % (ref 0.3–6.2)
ERYTHROCYTE [DISTWIDTH] IN BLOOD BY AUTOMATED COUNT: 15.1 % (ref 12.3–15.4)
GLOBULIN UR ELPH-MCNC: 2.3 GM/DL
GLUCOSE SERPL-MCNC: 82 MG/DL (ref 65–99)
HCT VFR BLD AUTO: 35.9 % (ref 37.5–51)
HGB BLD-MCNC: 11.8 G/DL (ref 13–17.7)
IMM GRANULOCYTES # BLD AUTO: 0.04 10*3/MM3 (ref 0–0.05)
IMM GRANULOCYTES NFR BLD AUTO: 0.6 % (ref 0–0.5)
LYMPHOCYTES # BLD AUTO: 1.83 10*3/MM3 (ref 0.7–3.1)
LYMPHOCYTES NFR BLD AUTO: 27.4 % (ref 19.6–45.3)
MCH RBC QN AUTO: 29.5 PG (ref 26.6–33)
MCHC RBC AUTO-ENTMCNC: 32.9 G/DL (ref 31.5–35.7)
MCV RBC AUTO: 89.8 FL (ref 79–97)
MONOCYTES # BLD AUTO: 0.47 10*3/MM3 (ref 0.1–0.9)
MONOCYTES NFR BLD AUTO: 7 % (ref 5–12)
NEUTROPHILS NFR BLD AUTO: 4.26 10*3/MM3 (ref 1.7–7)
NEUTROPHILS NFR BLD AUTO: 64 % (ref 42.7–76)
NRBC BLD AUTO-RTO: 0 /100 WBC (ref 0–0.2)
PLATELET # BLD AUTO: 101 10*3/MM3 (ref 140–450)
PMV BLD AUTO: 10.4 FL (ref 6–12)
POTASSIUM SERPL-SCNC: 3.6 MMOL/L (ref 3.5–5.2)
PROT SERPL-MCNC: 5.6 G/DL (ref 6–8.5)
RBC # BLD AUTO: 4 10*6/MM3 (ref 4.14–5.8)
SODIUM SERPL-SCNC: 143 MMOL/L (ref 136–145)
WBC NRBC COR # BLD AUTO: 6.67 10*3/MM3 (ref 3.4–10.8)

## 2024-05-04 PROCEDURE — 99231 SBSQ HOSP IP/OBS SF/LOW 25: CPT | Performed by: SURGERY

## 2024-05-04 PROCEDURE — 80053 COMPREHEN METABOLIC PANEL: CPT | Performed by: INTERNAL MEDICINE

## 2024-05-04 PROCEDURE — 85025 COMPLETE CBC W/AUTO DIFF WBC: CPT | Performed by: INTERNAL MEDICINE

## 2024-05-04 PROCEDURE — 25010000002 ONDANSETRON PER 1 MG: Performed by: INTERNAL MEDICINE

## 2024-05-04 RX ADMIN — DIVALPROEX SODIUM 500 MG: 500 TABLET, DELAYED RELEASE ORAL at 08:28

## 2024-05-04 RX ADMIN — DIGOXIN 125 MCG: 125 TABLET ORAL at 08:28

## 2024-05-04 RX ADMIN — ACETAMINOPHEN 325MG 650 MG: 325 TABLET ORAL at 14:13

## 2024-05-04 RX ADMIN — METOPROLOL SUCCINATE 25 MG: 25 TABLET, EXTENDED RELEASE ORAL at 08:28

## 2024-05-04 RX ADMIN — DIVALPROEX SODIUM 500 MG: 500 TABLET, DELAYED RELEASE ORAL at 20:30

## 2024-05-04 RX ADMIN — LEVOTHYROXINE SODIUM 50 MCG: 50 TABLET ORAL at 05:59

## 2024-05-04 RX ADMIN — ATORVASTATIN CALCIUM 10 MG: 20 TABLET, FILM COATED ORAL at 08:28

## 2024-05-04 RX ADMIN — BACITRACIN ZINC, NEOMYCIN, POLYMYXIN B 1 APPLICATION: 400; 3.5; 5 OINTMENT TOPICAL at 00:04

## 2024-05-04 RX ADMIN — ONDANSETRON 4 MG: 2 INJECTION INTRAMUSCULAR; INTRAVENOUS at 17:31

## 2024-05-04 RX ADMIN — PANTOPRAZOLE SODIUM 40 MG: 40 INJECTION, POWDER, FOR SOLUTION INTRAVENOUS at 05:59

## 2024-05-04 RX ADMIN — APIXABAN 5 MG: 5 TABLET, FILM COATED ORAL at 08:28

## 2024-05-04 RX ADMIN — SENNOSIDES AND DOCUSATE SODIUM 2 TABLET: 50; 8.6 TABLET ORAL at 08:28

## 2024-05-04 RX ADMIN — BENZTROPINE MESYLATE 1 MG: 1 TABLET ORAL at 20:30

## 2024-05-04 RX ADMIN — SENNOSIDES AND DOCUSATE SODIUM 2 TABLET: 50; 8.6 TABLET ORAL at 20:30

## 2024-05-04 RX ADMIN — Medication 10 ML: at 08:29

## 2024-05-04 RX ADMIN — BACLOFEN 5 MG: 10 TABLET ORAL at 20:30

## 2024-05-04 RX ADMIN — DEXTROSE AND SODIUM CHLORIDE 75 ML/HR: 5; 450 INJECTION, SOLUTION INTRAVENOUS at 03:24

## 2024-05-04 RX ADMIN — BENZTROPINE MESYLATE 1 MG: 1 TABLET ORAL at 08:28

## 2024-05-04 RX ADMIN — BACITRACIN ZINC, NEOMYCIN, POLYMYXIN B 1 APPLICATION: 400; 3.5; 5 OINTMENT TOPICAL at 13:45

## 2024-05-04 RX ADMIN — Medication 10 ML: at 20:31

## 2024-05-04 RX ADMIN — BACLOFEN 5 MG: 10 TABLET ORAL at 08:28

## 2024-05-04 RX ADMIN — BACITRACIN ZINC, NEOMYCIN, POLYMYXIN B 1 APPLICATION: 400; 3.5; 5 OINTMENT TOPICAL at 05:59

## 2024-05-04 RX ADMIN — APIXABAN 5 MG: 5 TABLET, FILM COATED ORAL at 20:30

## 2024-05-04 NOTE — PLAN OF CARE
Goal Outcome Evaluation:      Vitals stable, pt continues to call out for attention, pleasantly confused, no other complications, will continue to monitor

## 2024-05-04 NOTE — PROGRESS NOTES
Name: Helder Abbott ADMIT: 2024   : 1975  PCP: José Garcia APRN    MRN: [id number] LOS: 4 days   AGE/SEX: 49 y.o. male  ROOM: [facility]     Subjective   Subjective   Patient is seen at bedside, no new complaints.       Objective   Objective   Vital Signs  Temp:  [97.5 °F (36.4 °C)-98.2 °F (36.8 °C)] 98.2 °F (36.8 °C)  Heart Rate:  [67-80] 67  Resp:  [18] 18  BP: (129-147)/(76-96) 143/96  SpO2:  [96 %-100 %] 96 %  on  Flow (L/min):  [2] 2;   Device (Oxygen Therapy): room air  Body mass index is 20.88 kg/m².  Physical Exam  General, intermittently confused  Head and ENT, normocephalic and atraumatic.  Lungs, symmetric expansion, equal air entry bilaterally.  Heart, regular rate and rhythm.  Abdomen, soft and nontender.  Extremities, no clubbing or cyanosis.  Neuro, unable to fully evaluate  Skin: Warm and no rash.  Psych, unable to fully evaluate  Musculoskeletal, joint examination is grossly normal.    Copied text material from yesterday's note has been reviewed for appropriate changes and remains accurate as of 5/3/24.      Results Review     I reviewed the patient's new clinical results.  Results from last 7 days   Lab Units 24  0624  0632 24  0543 24  1732   WBC 10*3/mm3 6.67 4.63 5.28 7.37   HEMOGLOBIN g/dL 11.8* 11.1* 11.3* 13.1   PLATELETS 10*3/mm3 101* 87* 119* 162     Results from last 7 days   Lab Units 24  0623 24  0632 24  0543 24  1732   SODIUM mmol/L 143 141 139 138   POTASSIUM mmol/L 3.6 4.0 4.3 4.1   CHLORIDE mmol/L 109* 110* 106 101   CO2 mmol/L 25.0 22.8 23.7 27.5   BUN mg/dL 3* 7 14 16   CREATININE mg/dL 0.55* 0.49* 0.89 1.55*   GLUCOSE mg/dL 82 60* 65 96   EGFR mL/min/1.73 121.5 125.8 105.1 54.5*     Results from last 7 days   Lab Units 24  0623 24  0632 24  1732   ALBUMIN g/dL 3.3* 2.7* 3.9   BILIRUBIN mg/dL 0.5 0.5 0.5   ALK PHOS U/L 71 52 80   AST (SGOT) U/L 14 16 18   ALT (SGPT) U/L 9 7 9     Results from  "last 7 days   Lab Units 05/04/24  0623 05/02/24  0632 05/01/24 2011 05/01/24  0543 04/30/24  1732   CALCIUM mg/dL 8.3* 7.9*  --  8.1* 9.5   ALBUMIN g/dL 3.3* 2.7*  --   --  3.9   MAGNESIUM mg/dL  --  1.6 2.0 1.5* 1.6   PHOSPHORUS mg/dL  --  2.5  --  3.2  --      Results from last 7 days   Lab Units 04/30/24 2103 04/30/24  1732   PROCALCITONIN ng/mL  --  0.06   LACTATE mmol/L 0.8 2.6*     No results found for: \"HGBA1C\", \"POCGLU\"    No radiology results for the last day    I have personally reviewed all medications:  Scheduled Medications  apixaban, 5 mg, Oral, Q12H  atorvastatin, 10 mg, Oral, Daily  Baclofen, 5 mg, Oral, Q12H  benztropine, 1 mg, Oral, BID  digoxin, 125 mcg, Oral, Daily  divalproex, 500 mg, Oral, Q12H  levothyroxine, 50 mcg, Oral, Q AM  metoprolol succinate XL, 25 mg, Oral, Q24H  neomycin-bacitracin-polymyxin, 1 Application, Topical, Q8H  pantoprazole, 40 mg, Intravenous, Q AM  senna-docusate sodium, 2 tablet, Oral, BID  sodium chloride, 10 mL, Intravenous, Q12H    Infusions  dextrose 5 % and sodium chloride 0.45 %, 75 mL/hr, Last Rate: 75 mL/hr (05/04/24 0324)    Diet  Diet: Regular/House; Fluid Consistency: Thin (IDDSI 0)    I have personally reviewed:  [x]  Laboratory   [x]  Microbiology   [x]  Radiology   [x]  EKG/Telemetry  [x]  Cardiology/Vascular   []  Pathology    []  Records       Assessment/Plan     Active Hospital Problems    Diagnosis  POA    **Partial bowel obstruction [K56.600]  Yes    Severe malnutrition [E43]  Yes    JOSIAH (acute kidney injury) [N17.9]  Unknown    Essential hypertension [I10]  Unknown    Hypothyroidism (acquired) [E03.9]  Unknown    History of CVA (cerebrovascular accident) [Z86.73]  Not Applicable    GERD without esophagitis [K21.9]  Unknown    HLD (hyperlipidemia) [E78.5]  Unknown    Bipolar disorder [F31.9]  Unknown    Fecal impaction [K56.41]  Unknown      Resolved Hospital Problems   No resolved problems to display.       49 y.o. male admitted with Partial bowel " obstruction.    Patient is a 49-year-old male with a history of including but not limited to bipolar disorder, essential hypertension, hypothyroidism, CVA , atrial fibrillation on Eliquis, moderate cognitive impairment at baseline presents to the ED with complaint of acute weakness, poor intake. CT with fecal impaction possible bowel obstruction.     Assessment and plan  1.  Fecal impaction, partial small bowel obstruction, surgery on board, follow management recommendations.     2.  Atrial fibrillation, continue current rate control therapy, continue Eliquis.     3.  Thrombocytopenia, platelet counts have improved today.     4.  Hypothyroidism, continue Synthroid.     5.  Bipolar disorder, cognitive impairment, history of CVA, chronic conditions.     6.  Acute kidney injury, renal function is back to baseline.     7.  CODE STATUS is full code.  Further plans based on hospital course.      Sagar Gonzalez MD  [facility]  05/04/24  11:11 EDT

## 2024-05-04 NOTE — PROGRESS NOTES
Follow-up constipation, fecal impaction    Subjective:  Multiple bowel movements yesterday.  No complaints.    Objective:  Afebrile, vital stable  General: Much more awake and alert today, interactive, denies abdominal pain    Assessment and plan:  -Fecal impaction and constipation, improved with bowel regimen and enemas  -Diet as tolerated  -We will see as needed    Que Wright MD  General and Endoscopic Surgery  [facility]    [address], [address]  [place], [place], [address]  P: [phone]  F: [phone]

## 2024-05-04 NOTE — PLAN OF CARE
Goal Outcome Evaluation:         Pt. Alert, oriented x2, confused, oriented to place, time, and person at this shift, Pt. Repositioning in bed self, noted trying to get out of bed at times, redirected to get in bed, Pt. Compliance, to get in bed, v/s stable , 02 sats kept over 90% on room air, no s/s acute distress noted

## 2024-05-05 LAB
ALBUMIN SERPL-MCNC: 2.7 G/DL (ref 3.5–5.2)
ALBUMIN/GLOB SERPL: 1.4 G/DL
ALP SERPL-CCNC: 70 U/L (ref 39–117)
ALT SERPL W P-5'-P-CCNC: 7 U/L (ref 1–41)
ANION GAP SERPL CALCULATED.3IONS-SCNC: 7.3 MMOL/L (ref 5–15)
AST SERPL-CCNC: 13 U/L (ref 1–40)
BASOPHILS # BLD AUTO: 0.02 10*3/MM3 (ref 0–0.2)
BASOPHILS NFR BLD AUTO: 0.3 % (ref 0–1.5)
BILIRUB SERPL-MCNC: 0.3 MG/DL (ref 0–1.2)
BUN SERPL-MCNC: 5 MG/DL (ref 6–20)
BUN/CREAT SERPL: 8.6 (ref 7–25)
CALCIUM SPEC-SCNC: 7.8 MG/DL (ref 8.6–10.5)
CHLORIDE SERPL-SCNC: 105 MMOL/L (ref 98–107)
CO2 SERPL-SCNC: 22.7 MMOL/L (ref 22–29)
CREAT SERPL-MCNC: 0.58 MG/DL (ref 0.76–1.27)
DEPRECATED RDW RBC AUTO: 47.1 FL (ref 37–54)
EGFRCR SERPLBLD CKD-EPI 2021: 119.6 ML/MIN/1.73
EOSINOPHIL # BLD AUTO: 0.08 10*3/MM3 (ref 0–0.4)
EOSINOPHIL NFR BLD AUTO: 1.3 % (ref 0.3–6.2)
ERYTHROCYTE [DISTWIDTH] IN BLOOD BY AUTOMATED COUNT: 14.5 % (ref 12.3–15.4)
GLOBULIN UR ELPH-MCNC: 1.9 GM/DL
GLUCOSE SERPL-MCNC: 78 MG/DL (ref 65–99)
HCT VFR BLD AUTO: 30.9 % (ref 37.5–51)
HGB BLD-MCNC: 10 G/DL (ref 13–17.7)
LYMPHOCYTES # BLD AUTO: 2.74 10*3/MM3 (ref 0.7–3.1)
LYMPHOCYTES NFR BLD AUTO: 43.2 % (ref 19.6–45.3)
MCH RBC QN AUTO: 29 PG (ref 26.6–33)
MCHC RBC AUTO-ENTMCNC: 32.4 G/DL (ref 31.5–35.7)
MCV RBC AUTO: 89.6 FL (ref 79–97)
MONOCYTES # BLD AUTO: 0.52 10*3/MM3 (ref 0.1–0.9)
MONOCYTES NFR BLD AUTO: 8.2 % (ref 5–12)
NEUTROPHILS NFR BLD AUTO: 2.94 10*3/MM3 (ref 1.7–7)
NEUTROPHILS NFR BLD AUTO: 46.4 % (ref 42.7–76)
PLATELET # BLD AUTO: 96 10*3/MM3 (ref 140–450)
PMV BLD AUTO: 10.6 FL (ref 6–12)
POTASSIUM SERPL-SCNC: 3.9 MMOL/L (ref 3.5–5.2)
PROT SERPL-MCNC: 4.6 G/DL (ref 6–8.5)
RBC # BLD AUTO: 3.45 10*6/MM3 (ref 4.14–5.8)
SODIUM SERPL-SCNC: 135 MMOL/L (ref 136–145)
WBC NRBC COR # BLD AUTO: 6.34 10*3/MM3 (ref 3.4–10.8)

## 2024-05-05 PROCEDURE — 80053 COMPREHEN METABOLIC PANEL: CPT | Performed by: INTERNAL MEDICINE

## 2024-05-05 PROCEDURE — 25010000002 ONDANSETRON PER 1 MG: Performed by: INTERNAL MEDICINE

## 2024-05-05 PROCEDURE — 85025 COMPLETE CBC W/AUTO DIFF WBC: CPT | Performed by: INTERNAL MEDICINE

## 2024-05-05 RX ADMIN — BENZTROPINE MESYLATE 1 MG: 1 TABLET ORAL at 20:21

## 2024-05-05 RX ADMIN — DIVALPROEX SODIUM 500 MG: 500 TABLET, DELAYED RELEASE ORAL at 20:21

## 2024-05-05 RX ADMIN — ATORVASTATIN CALCIUM 10 MG: 20 TABLET, FILM COATED ORAL at 07:49

## 2024-05-05 RX ADMIN — DEXTROSE AND SODIUM CHLORIDE 75 ML/HR: 5; 450 INJECTION, SOLUTION INTRAVENOUS at 23:59

## 2024-05-05 RX ADMIN — DIGOXIN 125 MCG: 125 TABLET ORAL at 07:50

## 2024-05-05 RX ADMIN — APIXABAN 5 MG: 5 TABLET, FILM COATED ORAL at 07:48

## 2024-05-05 RX ADMIN — METOPROLOL SUCCINATE 25 MG: 25 TABLET, EXTENDED RELEASE ORAL at 07:49

## 2024-05-05 RX ADMIN — SENNOSIDES AND DOCUSATE SODIUM 2 TABLET: 50; 8.6 TABLET ORAL at 07:49

## 2024-05-05 RX ADMIN — DIVALPROEX SODIUM 500 MG: 500 TABLET, DELAYED RELEASE ORAL at 07:48

## 2024-05-05 RX ADMIN — BACLOFEN 5 MG: 10 TABLET ORAL at 07:49

## 2024-05-05 RX ADMIN — LEVOTHYROXINE SODIUM 50 MCG: 50 TABLET ORAL at 07:49

## 2024-05-05 RX ADMIN — ACETAMINOPHEN 325MG 650 MG: 325 TABLET ORAL at 07:48

## 2024-05-05 RX ADMIN — PANTOPRAZOLE SODIUM 40 MG: 40 INJECTION, POWDER, FOR SOLUTION INTRAVENOUS at 07:50

## 2024-05-05 RX ADMIN — BACLOFEN 5 MG: 10 TABLET ORAL at 20:21

## 2024-05-05 RX ADMIN — APIXABAN 5 MG: 5 TABLET, FILM COATED ORAL at 20:21

## 2024-05-05 RX ADMIN — BENZTROPINE MESYLATE 1 MG: 1 TABLET ORAL at 07:48

## 2024-05-05 RX ADMIN — BACITRACIN ZINC, NEOMYCIN, POLYMYXIN B 1 APPLICATION: 400; 3.5; 5 OINTMENT TOPICAL at 07:54

## 2024-05-05 RX ADMIN — DEXTROSE AND SODIUM CHLORIDE 75 ML/HR: 5; 450 INJECTION, SOLUTION INTRAVENOUS at 12:44

## 2024-05-05 RX ADMIN — ONDANSETRON 4 MG: 2 INJECTION INTRAMUSCULAR; INTRAVENOUS at 11:04

## 2024-05-05 RX ADMIN — ONDANSETRON 4 MG: 2 INJECTION INTRAMUSCULAR; INTRAVENOUS at 17:26

## 2024-05-05 RX ADMIN — Medication 10 ML: at 20:21

## 2024-05-05 RX ADMIN — ACETAMINOPHEN 325MG 650 MG: 325 TABLET ORAL at 17:26

## 2024-05-05 NOTE — PLAN OF CARE
Goal Outcome Evaluation:  Plan of Care Reviewed With: patient           Outcome Evaluation: Oriented but not fully, focused on Lj, roselyning, his caregiver.  Incontinent of stool several times.   Pleasant.

## 2024-05-05 NOTE — PLAN OF CARE
Goal Outcome Evaluation:      Patient resting well during the night without issues. He is using his urinal to void independently, and VSS.

## 2024-05-06 VITALS
BODY MASS INDEX: 21.36 KG/M2 | DIASTOLIC BLOOD PRESSURE: 68 MMHG | TEMPERATURE: 98.6 F | RESPIRATION RATE: 18 BRPM | WEIGHT: 161.16 LBS | OXYGEN SATURATION: 100 % | HEART RATE: 79 BPM | HEIGHT: 73 IN | SYSTOLIC BLOOD PRESSURE: 103 MMHG

## 2024-05-06 LAB — BACTERIA SPEC AEROBE CULT: NORMAL

## 2024-05-06 PROCEDURE — 25010000002 ONDANSETRON PER 1 MG: Performed by: INTERNAL MEDICINE

## 2024-05-06 RX ORDER — POLYETHYLENE GLYCOL 3350 17 G/17G
17 POWDER, FOR SOLUTION ORAL DAILY
Qty: 510 G | Refills: 1 | Status: SHIPPED | OUTPATIENT
Start: 2024-05-06

## 2024-05-06 RX ADMIN — BACLOFEN 5 MG: 10 TABLET ORAL at 08:16

## 2024-05-06 RX ADMIN — Medication 10 ML: at 08:16

## 2024-05-06 RX ADMIN — DIGOXIN 125 MCG: 125 TABLET ORAL at 08:16

## 2024-05-06 RX ADMIN — PANTOPRAZOLE SODIUM 40 MG: 40 INJECTION, POWDER, FOR SOLUTION INTRAVENOUS at 05:19

## 2024-05-06 RX ADMIN — ONDANSETRON 4 MG: 2 INJECTION INTRAMUSCULAR; INTRAVENOUS at 05:19

## 2024-05-06 RX ADMIN — SENNOSIDES AND DOCUSATE SODIUM 2 TABLET: 50; 8.6 TABLET ORAL at 08:16

## 2024-05-06 RX ADMIN — METOPROLOL SUCCINATE 25 MG: 25 TABLET, EXTENDED RELEASE ORAL at 08:16

## 2024-05-06 RX ADMIN — ACETAMINOPHEN 325MG 650 MG: 325 TABLET ORAL at 05:19

## 2024-05-06 RX ADMIN — APIXABAN 5 MG: 5 TABLET, FILM COATED ORAL at 08:16

## 2024-05-06 RX ADMIN — LEVOTHYROXINE SODIUM 50 MCG: 50 TABLET ORAL at 05:19

## 2024-05-06 RX ADMIN — DIVALPROEX SODIUM 500 MG: 500 TABLET, DELAYED RELEASE ORAL at 08:16

## 2024-05-06 RX ADMIN — ATORVASTATIN CALCIUM 10 MG: 20 TABLET, FILM COATED ORAL at 08:16

## 2024-05-06 RX ADMIN — BENZTROPINE MESYLATE 1 MG: 1 TABLET ORAL at 08:16

## 2024-05-06 NOTE — PLAN OF CARE
Goal Outcome Evaluation:         Pt resting in bed quietly. Reoriented as needed. Turns self. Room air. Bm this shift.

## 2024-05-06 NOTE — DISCHARGE SUMMARY
Patient Name: Helder Abbott  : 1975  MRN: [id number]    Date of Admission: 2024  Date of Discharge:  2024  Primary Care Physician: José Garcia APRN      Chief Complaint:   Fatigue (Increased weakness, increase in falls) and Fall      Discharge Diagnoses     Active Hospital Problems    Diagnosis  POA    **Partial bowel obstruction [K56.600]  Yes    Severe malnutrition [E43]  Yes    JOSIAH (acute kidney injury) [N17.9]  Unknown    Essential hypertension [I10]  Unknown    Hypothyroidism (acquired) [E03.9]  Unknown    History of CVA (cerebrovascular accident) [Z86.73]  Not Applicable    GERD without esophagitis [K21.9]  Unknown    HLD (hyperlipidemia) [E78.5]  Unknown    Bipolar disorder [F31.9]  Unknown    Fecal impaction [K56.41]  Unknown      Resolved Hospital Problems   No resolved problems to display.        Hospital Course     Mr. Abbott is a 49 y.o. male with a history of bipolar disorder hypertension hypothyroidism previous stroke A-fib and moderate cognitive impairment who presented to [facility] initially complaining of weakness fatigue poor intake and abdominal discomfort.  Please see the admitting history and physical for further details.  He was found to have possible partial small bowel obstruction and fecal impaction and was admitted to the hospital for further evaluation and treatment.  Aggressive bowel regimen was initiated and he has been having bowel movements with improvement in his symptoms.  He was seen and evaluated by general surgery who quickly signed off with normalization of his symptoms and improved bowel habits.  Is recommended that he be discharged on stool softeners to continue to avoid constipation in the outpatient setting.  He otherwise denies other new issues or complaints today and is stable to discharge back to his group home setting.  Home medications have been reordered and reviewed at discharge.  All plans discussed with the patient and  questions addressed and answered    Day of Discharge     Subjective:  He denies new issues or complaints today.  He is eager to get out of the hospital.  Rested well overnight.  Denies nausea vomiting or abdominal discomfort today    Physical Exam:  Temp:  [97.9 °F (36.6 °C)-98.6 °F (37 °C)] 98.6 °F (37 °C)  Heart Rate:  [78-80] 79  Resp:  [18] 18  BP: (103-172)/() 103/68  Body mass index is 21.26 kg/m².  Physical Exam  Vitals reviewed.   Constitutional:       General: He is not in acute distress.     Appearance: He is ill-appearing.   HENT:      Head:      Comments: Bruising on his forehead and the bridge of his nose  Cardiovascular:      Rate and Rhythm: Normal rate and regular rhythm.   Pulmonary:      Effort: No respiratory distress.      Breath sounds: Normal breath sounds.   Neurological:      General: No focal deficit present.      Mental Status: He is alert. Mental status is at baseline.         Consultants     Consult Orders (all) (From admission, onward)       Start     Ordered    05/02/24 0949  Inpatient Infectious Diseases Consult  Once        Specialty:  Infectious Diseases  Provider:  Davi Levy MD    05/02/24 0949    05/01/24 0702  Inpatient Neurology Consult General  IN AM        Specialty:  Neurology  Provider:  (Not yet assigned)    04/30/24 2135 05/01/24 0118  Inpatient Case Management  Consult  Once        Provider:  (Not yet assigned)    05/01/24 0118    05/01/24 0118  Inpatient Nutrition Consult  Once        Provider:  (Not yet assigned)    05/01/24 0118 04/30/24 1927  [facility] (on-call MD unless specified) Details  Once        Specialty:  Hospitalist  Provider:  (Not yet assigned)    04/30/24 1926 04/30/24 1927  Surgery (on-call MD unless specified)  Once        Specialty:  General Surgery  Provider:  Rodney Lennon MD    04/30/24 1926                  Procedures     * Surgery not found *    Imaging Results (All)       Procedure Component Value Units  Date/Time    XR Abdomen KUB [[id number]] Collected: 05/03/24 1332     Updated: 05/03/24 1344    Narrative:      XR ABDOMEN KUB-     Clinical: Vomiting     FINDINGS: There is a nonobstructive bowel gas pattern seen. Moderate  amount of dense fecal material within the rectum. Potential impaction.  Soft tissue planes preserved. No atypical calcification seen. Remainder  is unremarkable.     This report was finalized on 5/3/2024 1:40 PM by Dr. Benji Grant M.D  on Workstation: [id number]       CT Abdomen Pelvis Without Contrast [[id number]] Collected: 04/30/24 1900     Updated: 04/30/24 1909    Narrative:      CT ABDOMEN PELVIS WO CONTRAST-     HISTORY: Fall, hypotension, abdominal pain.     TECHNIQUE:  CT of the abdomen and pelvis was performed without  intravenous contrast. Reformatted images were reviewed. Evaluation of  solid organs and vasculature is limited without intravenous contrast.  Radiation dose reduction techniques were utilized, including automated  exposure control and exposure modulation based on body size.     COMPARISON: None     FINDINGS:     Lung bases and pleural spaces are clear.  The liver is normal in size. The gallbladder is present. The spleen is  normal in size. There are no pancreatic calcifications. The adrenal  glands are within normal limits. There are no renal stones or  hydronephrosis.  No pathologically enlarged abdominal or pelvic lymph nodes are  identified. There is at least mild calcific atherosclerosis. There is  small volume ascites.  The appendix is within normal limits. There is a relatively large  inspissated rectal and sigmoid colonic stool burden. The distal small  bowel is diffusely dilated and predominantly fluid-filled to the level  of the cecum. The bladder is well distended. The prostate is present.  There is multilevel degenerative disc disease. There is transitional  lumbosacral anatomy. There is mild leftward curvature of the lumbar  spine. There are old rib  fractures.          Impression:         1.  Large distal colonic and rectal stool burden concerning for fecal  impaction. Relatively diffusely dilated fluid-filled loops of small  bowel to the level of the cecum may be due to at least partial  obstruction due to the distal stool burden versus ileus and/or  nonspecific enteritis in the appropriate clinical setting.  2.  Small-volume ascites, which may be reactive.  3.  No CT evidence of acute traumatic injury in the abdomen or pelvis.        This report was finalized on 4/30/2024 7:06 PM by Dr. Akua Gutierrez M.D  on Workstation: [id number]       CT Head Without Contrast [[id number]] Collected: 04/30/24 1900     Updated: 04/30/24 1907    Narrative:      CT HEAD WO CONTRAST-     INDICATIONS: Trauma     TECHNIQUE: Radiation dose reduction techniques were utilized, including  automated exposure control and exposure modulation based on body size.  Noncontrast head CT     COMPARISON: None available     FINDINGS:        The images are degraded by motion artifact.     No acute intracranial hemorrhage, midline shift or mass effect. No acute  territorial infarct is identified.     Ventricles, cisterns, cerebral sulci are unremarkable for patient age.     The visualized paranasal sinuses, orbits, mastoid air cells are  unremarkable. There appears to be a nondisplaced anterior nasal  fracture, axial image 11, although assessment is limited by motion  artifact. Subcutaneous soft tissue swelling is seen of the forehead.             Impression:         No acute intracranial hemorrhage or hydrocephalus. If there is further  clinical concern, MRI could be considered for further evaluation.     This report was finalized on 4/30/2024 7:04 PM by Dr. Alfred Cavazos M.D on Workstation: [id number]       XR Chest 1 View [[id number]] Collected: 04/30/24 1809     Updated: 04/30/24 1812    Narrative:      XR CHEST 1 VW-     HISTORY: Male who is 49 years-old, cough and chills     TECHNIQUE:  "Frontal view of the chest     COMPARISON: None available     FINDINGS: Heart, mediastinum and pulmonary vasculature are unremarkable.  No focal pulmonary consolidation, pleural effusion, or pneumothorax,  follow-up as indications persist. Gas-filled loops of bowel are partly  included, correlate clinically. No acute osseous process.       Impression:      As described.           This report was finalized on 4/30/2024 6:09 PM by Dr. Alfred Cavazos M.D on Workstation: [id number]                 Pertinent Labs     Results from last 7 days   Lab Units 05/05/24 0534 05/04/24 0623 05/02/24 0632 05/01/24  0543   WBC 10*3/mm3 6.34 6.67 4.63 5.28   HEMOGLOBIN g/dL 10.0* 11.8* 11.1* 11.3*   PLATELETS 10*3/mm3 96* 101* 87* 119*     Results from last 7 days   Lab Units 05/05/24 0534 05/04/24 0623 05/02/24 0632 05/01/24  0543   SODIUM mmol/L 135* 143 141 139   POTASSIUM mmol/L 3.9 3.6 4.0 4.3   CHLORIDE mmol/L 105 109* 110* 106   CO2 mmol/L 22.7 25.0 22.8 23.7   BUN mg/dL 5* 3* 7 14   CREATININE mg/dL 0.58* 0.55* 0.49* 0.89   GLUCOSE mg/dL 78 82 60* 65   EGFR mL/min/1.73 119.6 121.5 125.8 105.1     Results from last 7 days   Lab Units 05/05/24 0534 05/04/24 0623 05/02/24 0632 04/30/24  1732   ALBUMIN g/dL 2.7* 3.3* 2.7* 3.9   BILIRUBIN mg/dL 0.3 0.5 0.5 0.5   ALK PHOS U/L 70 71 52 80   AST (SGOT) U/L 13 14 16 18   ALT (SGPT) U/L 7 9 7 9     Results from last 7 days   Lab Units 05/05/24 0534 05/04/24 0623 05/02/24 0632 05/01/24 2011 05/01/24  0543 04/30/24  1732   CALCIUM mg/dL 7.8* 8.3* 7.9*  --  8.1* 9.5   ALBUMIN g/dL 2.7* 3.3* 2.7*  --   --  3.9   MAGNESIUM mg/dL  --   --  1.6 2.0 1.5* 1.6   PHOSPHORUS mg/dL  --   --  2.5  --  3.2  --      Results from last 7 days   Lab Units 04/30/24  1732   LIPASE U/L 18     Results from last 7 days   Lab Units 04/30/24  1732   CK TOTAL U/L 78   HSTROP T ng/L 34*   PROBNP pg/mL 1,257.0*   DIGOXIN LVL ng/mL 0.70           Invalid input(s): \"LDLCALC\"  Results from last 7 days "   Lab Units 05/01/24  0543 04/30/24  2215   BLOODCX  No growth at 5 days Staphylococcus, coagulase negative*   BCIDPCR   --  Staph spp, not aureus or lugdunensis. Identification by BCID2 PCR.*     Results from last 7 days   Lab Units 04/30/24  1756   COVID19  Not Detected       Test Results Pending at Discharge       Discharge Details        Discharge Medications        New Medications        Instructions Start Date   polyethylene glycol 17 g packet  Commonly known as: MIRALAX   17 g, Oral, Daily             Continue These Medications        Instructions Start Date   Abilify Maintena 400 MG Suspension Reconstituted ER IM injection ER  Generic drug: ARIPiprazole ER   No dose, route, or frequency recorded.      Acetaminophen Extra Strength 500 MG tablet   500 mg, Oral, 4 Times Daily PRN      atorvastatin 40 MG tablet  Commonly known as: LIPITOR   No dose, route, or frequency recorded.      Baclofen 5 MG tablet  Commonly known as: LIORESAL   No dose, route, or frequency recorded.      benztropine 0.5 MG tablet  Commonly known as: COGENTIN   1 mg, Oral, 2 Times Daily      Diclofenac Sodium 1 % gel gel  Commonly known as: VOLTAREN   No dose, route, or frequency recorded.      digoxin 125 MCG tablet  Commonly known as: LANOXIN   No dose, route, or frequency recorded.      divalproex 500 MG DR tablet  Commonly known as: DEPAKOTE   No dose, route, or frequency recorded.      Eliquis 5 MG tablet tablet  Generic drug: apixaban   No dose, route, or frequency recorded.      famotidine 20 MG tablet  Commonly known as: PEPCID   No dose, route, or frequency recorded.      ibuprofen 600 MG tablet  Commonly known as: ADVIL,MOTRIN   600 mg, Oral, 4 Times Daily      levothyroxine 50 MCG tablet  Commonly known as: SYNTHROID, LEVOTHROID   No dose, route, or frequency recorded.      lisinopril 40 MG tablet  Commonly known as: PRINIVIL,ZESTRIL   No dose, route, or frequency recorded.      metoprolol succinate XL 25 MG 24 hr  tablet  Commonly known as: TOPROL-XL   No dose, route, or frequency recorded.      mirtazapine 15 MG tablet  Commonly known as: REMERON   No dose, route, or frequency recorded.      nystatin 304041 UNIT/GM powder  Generic drug: nystatin   No dose, route, or frequency recorded.      OLANZapine zydis 5 MG disintegrating tablet  Commonly known as: zyPREXA   No dose, route, or frequency recorded.      triamcinolone 0.1 % cream  Commonly known as: KENALOG   No dose, route, or frequency recorded.               Allergies   Allergen Reactions    Clonazepam Unknown (See Comments)    Fluoxetine Unknown (See Comments)    Grass Unknown - High Severity    Grass Pollen(K-O-R-T-Swt Kennedy) Unknown - Low Severity    Hydroxyzine Unknown (See Comments)    Methylphenidate Unknown (See Comments)    Quetiapine Unknown - Low Severity    Risperidone Unknown (See Comments)       Discharge Disposition:  Home or Self Care      Discharge Diet:  Diet Order   Procedures    Diet: Regular/House; Fluid Consistency: Thin (IDDSI 0)       Discharge Activity:   Activity Instructions       Activity as Tolerated              CODE STATUS:    Code Status and Medical Interventions:   Ordered at: 04/30/24 2119     Level Of Support Discussed With:    Patient     Code Status (Patient has no pulse and is not breathing):    CPR (Attempt to Resuscitate)     Medical Interventions (Patient has pulse or is breathing):    Full Support       No future appointments.  Additional Instructions for the Follow-ups that You Need to Schedule       Discharge Follow-up with PCP   As directed       Currently Documented PCP:    José Garcia APRN    PCP Phone Number:    [phone]     Follow Up Details: 2-3 weeks               Follow-up Information       José Garcia APRN .    Specialty: Family Medicine  Why: 2-3 weeks  Contact information:  [address] [place] [address]  [phone]                             Additional Instructions for the Follow-ups that You  Need to Schedule       Discharge Follow-up with PCP   As directed       Currently Documented PCP:    José Garcia APRN    PCP Phone Number:    [phone]     Follow Up Details: 2-3 weeks            Time Spent on Discharge:  Greater than 30 minutes      Ronnie Rojas MD  [facility]  05/06/24  10:46 EDT

## 2024-05-06 NOTE — PROGRESS NOTES
Name: Helder Abbott ADMIT: 2024   : 1975  PCP: José Garcia APRN    MRN: [id number] LOS: 5 days   AGE/SEX: 49 y.o. male  ROOM: [facility]     Subjective   Subjective   Patient is seen at bedside, no new complaints.       Objective   Objective   Vital Signs  Temp:  [97.6 °F (36.4 °C)-98.1 °F (36.7 °C)] 98.1 °F (36.7 °C)  Heart Rate:  [78-83] 80  Resp:  [18] 18  BP: ()/() 172/113  SpO2:  [98 %-100 %] 99 %  on   ;   Device (Oxygen Therapy): room air  Body mass index is 21.03 kg/m².  Physical Exam  General, awake and alert.  Head and ENT, normocephalic and atraumatic.  Lungs, symmetric expansion, equal air entry bilaterally.  Heart, regular rate and rhythm.  Abdomen, soft and nontender.  Extremities, no clubbing or cyanosis.  Neuro, no focal deficits.  Skin: Warm and no rash.  Psych, normal mood and affect.  Musculoskeletal, joint examination is grossly normal.    Results Review     I reviewed the patient's new clinical results.  Results from last 7 days   Lab Units 24  0534 24  0624  0624  0543   WBC 10*3/mm3 6.34 6.67 4.63 5.28   HEMOGLOBIN g/dL 10.0* 11.8* 11.1* 11.3*   PLATELETS 10*3/mm3 96* 101* 87* 119*     Results from last 7 days   Lab Units 24  0534 24  0624  0632 24  0543   SODIUM mmol/L 135* 143 141 139   POTASSIUM mmol/L 3.9 3.6 4.0 4.3   CHLORIDE mmol/L 105 109* 110* 106   CO2 mmol/L 22.7 25.0 22.8 23.7   BUN mg/dL 5* 3* 7 14   CREATININE mg/dL 0.58* 0.55* 0.49* 0.89   GLUCOSE mg/dL 78 82 60* 65   EGFR mL/min/1.73 119.6 121.5 125.8 105.1     Results from last 7 days   Lab Units 24  0534 24  0623 24  0632 24  1732   ALBUMIN g/dL 2.7* 3.3* 2.7* 3.9   BILIRUBIN mg/dL 0.3 0.5 0.5 0.5   ALK PHOS U/L 70 71 52 80   AST (SGOT) U/L 13 14 16 18   ALT (SGPT) U/L 7 9 7 9     Results from last 7 days   Lab Units 24  0534 24  0623 24  0632 24  0543 24  1732  "  CALCIUM mg/dL 7.8* 8.3* 7.9*  --  8.1* 9.5   ALBUMIN g/dL 2.7* 3.3* 2.7*  --   --  3.9   MAGNESIUM mg/dL  --   --  1.6 2.0 1.5* 1.6   PHOSPHORUS mg/dL  --   --  2.5  --  3.2  --      Results from last 7 days   Lab Units 04/30/24  2103 04/30/24  1732   PROCALCITONIN ng/mL  --  0.06   LACTATE mmol/L 0.8 2.6*     No results found for: \"HGBA1C\", \"POCGLU\"    No radiology results for the last day    I have personally reviewed all medications:  Scheduled Medications  apixaban, 5 mg, Oral, Q12H  atorvastatin, 10 mg, Oral, Daily  Baclofen, 5 mg, Oral, Q12H  benztropine, 1 mg, Oral, BID  digoxin, 125 mcg, Oral, Daily  divalproex, 500 mg, Oral, Q12H  levothyroxine, 50 mcg, Oral, Q AM  metoprolol succinate XL, 25 mg, Oral, Q24H  pantoprazole, 40 mg, Intravenous, Q AM  senna-docusate sodium, 2 tablet, Oral, BID  sodium chloride, 10 mL, Intravenous, Q12H    Infusions  dextrose 5 % and sodium chloride 0.45 %, 75 mL/hr, Last Rate: 75 mL/hr (05/05/24 1244)    Diet  Diet: Regular/House; Fluid Consistency: Thin (IDDSI 0)    I have personally reviewed:  [x]  Laboratory   [x]  Microbiology   [x]  Radiology   [x]  EKG/Telemetry  [x]  Cardiology/Vascular   []  Pathology    []  Records       Assessment/Plan     Active Hospital Problems    Diagnosis  POA    **Partial bowel obstruction [K56.600]  Yes    Severe malnutrition [E43]  Yes    JOSIAH (acute kidney injury) [N17.9]  Unknown    Essential hypertension [I10]  Unknown    Hypothyroidism (acquired) [E03.9]  Unknown    History of CVA (cerebrovascular accident) [Z86.73]  Not Applicable    GERD without esophagitis [K21.9]  Unknown    HLD (hyperlipidemia) [E78.5]  Unknown    Bipolar disorder [F31.9]  Unknown    Fecal impaction [K56.41]  Unknown      Resolved Hospital Problems   No resolved problems to display.       49 y.o. male admitted with Partial bowel obstruction.    Patient is a 49-year-old male with a history of including but not limited to bipolar disorder, essential hypertension, " hypothyroidism, CVA , atrial fibrillation on Eliquis, moderate cognitive impairment at baseline presents to the ED with complaint of acute weakness, poor intake. CT with fecal impaction possible bowel obstruction.     Assessment and plan  1.  Fecal impaction, partial small bowel obstruction, surgery on board, follow management recommendations.     2.  Atrial fibrillation, continue current rate control therapy, continue Eliquis.     3.  Thrombocytopenia, platelet counts have improved today.     4.  Hypothyroidism, continue Synthroid.     5.  Bipolar disorder, cognitive impairment, history of CVA, chronic conditions.     6.  Acute kidney injury, renal function is back to baseline.     7.  CODE STATUS is full code.  Further plans based on hospital course.         Sagar Gonzalez MD  [facility]  05/05/24  22:24 EDT

## 2024-05-06 NOTE — CASE MANAGEMENT/SOCIAL WORK
Continued Stay Note  [facility]     Patient Name: Helder Abbott  MRN: [id number]  Today's Date: 5/6/2024    Admit Date: 4/30/2024    Plan: Return to family care home via care giver Karan   Discharge Plan       Row Name 05/06/24 1333       Plan    Plan Comments ELIZA received guardianship paperwork from Carla this date and placed patient stickers on all 3 papers and placed them in the HIM basket. ELIZA also faxed patient's discharge summary to Carla per her request. Fax confirmation that all 10 pages were faxed successfully this date at 1:32PM. ARETHA HERNANDEZ.      Row Name 05/06/24 1121       Plan    Plan Return to family care home via care giver Karan    Plan Comments ELIZA notified in huddle that the patient is medically stable for discharge. ELIZA called and spoke to the patient's care giver and confirmed he can transport the patient home today. Karan said he will be at the hospital at 3PM to transport the patient home. ELIZA spoke to the patient's state guardian Carla Velasco and informed her of discharge plans and transportation arrangements. ELIZA also requested a copy of guardianship paperwork be faxed to [phone]. Carla requested disharge summary be faxed to her at [phone]. ARETHA HERNANDEZ.                   Discharge Codes    No documentation.                 Expected Discharge Date and Time       Expected Discharge Date Expected Discharge Time    May 6, 2024

## 2024-05-06 NOTE — PLAN OF CARE
Goal Outcome Evaluation:                           Patient with discharge orders. Caregiver Karan will be here at 1500 to transport patient home. When karan arrives nurse will review AVS with him. No acute distress noted at this time, will continue to claryior.

## 2024-05-06 NOTE — NURSING NOTE
AVS reviewed with caregiver Karan. Follow up appointments and mediation discussed. No acute distress noted at this time. All personal items taken with patient.

## 2024-05-06 NOTE — CASE MANAGEMENT/SOCIAL WORK
Continued Stay Note  [facility]     Patient Name: Helder Abbott  MRN: [id number]  Today's Date: 5/6/2024    Admit Date: 4/30/2024    Plan: Return to family care home via care giver Karan   Discharge Plan       Row Name 05/06/24 1121       Plan    Plan Return to family care home via care giver Karan    Plan Comments CCP notified in huddle that the patient is medically stable for discharge. CCP called and spoke to the patient's care giver and confirmed he can transport the patient home today. Karan said he will be at the hospital at 3PM to transport the patient home. CCP spoke to the patient's state guardian Carla Rod and informed her of discharge plans and transportation arrangements. CCP also requested a copy of guardianship paperwork be faxed to [phone]. Carla requested disharge summary be faxed to her at [phone]. CD, CSW.                   Discharge Codes    No documentation.                 Expected Discharge Date and Time       Expected Discharge Date Expected Discharge Time    May 6, 2024

## 2024-05-07 ENCOUNTER — READMISSION MANAGEMENT (OUTPATIENT)
Dept: CALL CENTER | Facility: HOSPITAL | Age: 49
End: 2024-05-07
Payer: MEDICARE

## 2024-05-07 NOTE — CASE MANAGEMENT/SOCIAL WORK
Case Management Discharge Note      Final Note: Pt discharged home on 5/6.  DON Coles RN         Selected Continued Care - Discharged on 5/6/2024 Admission date: 4/30/2024 - Discharge disposition: Home or Self Care      Destination    No services have been selected for the patient.                Durable Medical Equipment    No services have been selected for the patient.                Dialysis/Infusion    No services have been selected for the patient.                Home Medical Care    No services have been selected for the patient.                Therapy    No services have been selected for the patient.                Community Resources    No services have been selected for the patient.                Community & DME    No services have been selected for the patient.                    Transportation Services  Private: Car    Final Discharge Disposition Code: 01 - home or self-care

## 2024-05-10 ENCOUNTER — READMISSION MANAGEMENT (OUTPATIENT)
Dept: CALL CENTER | Facility: HOSPITAL | Age: 49
End: 2024-05-10
Payer: MEDICARE

## 2024-05-15 ENCOUNTER — READMISSION MANAGEMENT (OUTPATIENT)
Dept: CALL CENTER | Facility: HOSPITAL | Age: 49
End: 2024-05-15
Payer: MEDICARE

## 2024-05-15 NOTE — OUTREACH NOTE
Medical Week 1 Survey      Flowsheet Row Responses   [facility] patient discharged from? [place]   Does the patient have one of the following disease processes/diagnoses(primary or secondary)? Other   Week 1 attempt successful? No   Unsuccessful attempts Attempt 2            Birdie BROWN - Licensed Nurse

## 2024-05-23 ENCOUNTER — READMISSION MANAGEMENT (OUTPATIENT)
Dept: CALL CENTER | Facility: HOSPITAL | Age: 49
End: 2024-05-23
Payer: MEDICARE

## 2024-05-23 NOTE — OUTREACH NOTE
Medical Week 3 Survey      Flowsheet Row Responses   [facility] patient discharged from? [place]   Does the patient have one of the following disease processes/diagnoses(primary or secondary)? Other   Week 3 attempt successful? No   Unsuccessful attempts Attempt 1   Revoke Decline to participate  [No answer by care giver/legal guardian x 3 attempts.]            Kym BASSETT - Registered Nurse

## 2024-06-10 ENCOUNTER — HOSPITAL ENCOUNTER (INPATIENT)
Facility: HOSPITAL | Age: 49
LOS: 2 days | Discharge: HOME | End: 2024-06-13
Attending: STUDENT IN AN ORGANIZED HEALTH CARE EDUCATION/TRAINING PROGRAM | Admitting: STUDENT IN AN ORGANIZED HEALTH CARE EDUCATION/TRAINING PROGRAM
Payer: MEDICARE

## 2024-06-10 ENCOUNTER — APPOINTMENT (OUTPATIENT)
Dept: GENERAL RADIOLOGY | Facility: HOSPITAL | Age: 49
End: 2024-06-10
Payer: MEDICARE

## 2024-06-10 ENCOUNTER — APPOINTMENT (OUTPATIENT)
Dept: CT IMAGING | Facility: HOSPITAL | Age: 49
End: 2024-06-10
Payer: MEDICARE

## 2024-06-10 DIAGNOSIS — F31.9 BIPOLAR AFFECTIVE DISORDER, REMISSION STATUS UNSPECIFIED: ICD-10-CM

## 2024-06-10 DIAGNOSIS — R10.9 ABDOMINAL PAIN, UNSPECIFIED ABDOMINAL LOCATION: ICD-10-CM

## 2024-06-10 DIAGNOSIS — E87.5 HYPERKALEMIA: Primary | ICD-10-CM

## 2024-06-10 LAB
ALBUMIN SERPL-MCNC: 4.3 G/DL (ref 3.5–5.2)
ALBUMIN/GLOB SERPL: 1.7 G/DL
ALP SERPL-CCNC: 84 U/L (ref 39–117)
ALT SERPL W P-5'-P-CCNC: 9 U/L (ref 1–41)
ANION GAP SERPL CALCULATED.3IONS-SCNC: 6 MMOL/L (ref 5–15)
AST SERPL-CCNC: 13 U/L (ref 1–40)
BASOPHILS # BLD AUTO: 0.03 10*3/MM3 (ref 0–0.2)
BASOPHILS NFR BLD AUTO: 0.6 % (ref 0–1.5)
BILIRUB SERPL-MCNC: 0.3 MG/DL (ref 0–1.2)
BUN SERPL-MCNC: 21 MG/DL (ref 6–20)
BUN/CREAT SERPL: 24.4 (ref 7–25)
CALCIUM SPEC-SCNC: 9.4 MG/DL (ref 8.6–10.5)
CHLORIDE SERPL-SCNC: 98 MMOL/L (ref 98–107)
CO2 SERPL-SCNC: 26 MMOL/L (ref 22–29)
CREAT SERPL-MCNC: 0.86 MG/DL (ref 0.76–1.27)
DEPRECATED RDW RBC AUTO: 43.8 FL (ref 37–54)
DIGOXIN SERPL-MCNC: 1 NG/ML (ref 0.6–1.2)
EGFRCR SERPLBLD CKD-EPI 2021: 106.1 ML/MIN/1.73
EOSINOPHIL # BLD AUTO: 0.04 10*3/MM3 (ref 0–0.4)
EOSINOPHIL NFR BLD AUTO: 0.7 % (ref 0.3–6.2)
ERYTHROCYTE [DISTWIDTH] IN BLOOD BY AUTOMATED COUNT: 13.6 % (ref 12.3–15.4)
GLOBULIN UR ELPH-MCNC: 2.5 GM/DL
GLUCOSE BLDC GLUCOMTR-MCNC: 161 MG/DL (ref 70–130)
GLUCOSE BLDC GLUCOMTR-MCNC: 62 MG/DL (ref 70–130)
GLUCOSE SERPL-MCNC: 91 MG/DL (ref 65–99)
HCT VFR BLD AUTO: 46.9 % (ref 37.5–51)
HGB BLD-MCNC: 15.6 G/DL (ref 13–17.7)
IMM GRANULOCYTES # BLD AUTO: 0.03 10*3/MM3 (ref 0–0.05)
IMM GRANULOCYTES NFR BLD AUTO: 0.6 % (ref 0–0.5)
LIPASE SERPL-CCNC: 29 U/L (ref 13–60)
LYMPHOCYTES # BLD AUTO: 1.8 10*3/MM3 (ref 0.7–3.1)
LYMPHOCYTES NFR BLD AUTO: 33.6 % (ref 19.6–45.3)
MCH RBC QN AUTO: 29.8 PG (ref 26.6–33)
MCHC RBC AUTO-ENTMCNC: 33.3 G/DL (ref 31.5–35.7)
MCV RBC AUTO: 89.5 FL (ref 79–97)
MONOCYTES # BLD AUTO: 0.42 10*3/MM3 (ref 0.1–0.9)
MONOCYTES NFR BLD AUTO: 7.8 % (ref 5–12)
NEUTROPHILS NFR BLD AUTO: 3.04 10*3/MM3 (ref 1.7–7)
NEUTROPHILS NFR BLD AUTO: 56.7 % (ref 42.7–76)
NT-PROBNP SERPL-MCNC: 357 PG/ML (ref 0–450)
PLATELET # BLD AUTO: 117 10*3/MM3 (ref 140–450)
PMV BLD AUTO: 10.6 FL (ref 6–12)
POTASSIUM SERPL-SCNC: 5.7 MMOL/L (ref 3.5–5.2)
PROT SERPL-MCNC: 6.8 G/DL (ref 6–8.5)
RBC # BLD AUTO: 5.24 10*6/MM3 (ref 4.14–5.8)
SODIUM SERPL-SCNC: 130 MMOL/L (ref 136–145)
TROPONIN T SERPL HS-MCNC: 23 NG/L
VALPROATE SERPL-MCNC: 63 MCG/ML (ref 50–125)
WBC NRBC COR # BLD AUTO: 5.36 10*3/MM3 (ref 3.4–10.8)

## 2024-06-10 PROCEDURE — 80053 COMPREHEN METABOLIC PANEL: CPT | Performed by: STUDENT IN AN ORGANIZED HEALTH CARE EDUCATION/TRAINING PROGRAM

## 2024-06-10 PROCEDURE — 25810000003 LACTATED RINGERS SOLUTION: Performed by: STUDENT IN AN ORGANIZED HEALTH CARE EDUCATION/TRAINING PROGRAM

## 2024-06-10 PROCEDURE — 71045 X-RAY EXAM CHEST 1 VIEW: CPT

## 2024-06-10 PROCEDURE — 83690 ASSAY OF LIPASE: CPT | Performed by: STUDENT IN AN ORGANIZED HEALTH CARE EDUCATION/TRAINING PROGRAM

## 2024-06-10 PROCEDURE — 93005 ELECTROCARDIOGRAM TRACING: CPT | Performed by: STUDENT IN AN ORGANIZED HEALTH CARE EDUCATION/TRAINING PROGRAM

## 2024-06-10 PROCEDURE — 80162 ASSAY OF DIGOXIN TOTAL: CPT | Performed by: STUDENT IN AN ORGANIZED HEALTH CARE EDUCATION/TRAINING PROGRAM

## 2024-06-10 PROCEDURE — 93010 ELECTROCARDIOGRAM REPORT: CPT | Performed by: INTERNAL MEDICINE

## 2024-06-10 PROCEDURE — 83735 ASSAY OF MAGNESIUM: CPT | Performed by: NURSE PRACTITIONER

## 2024-06-10 PROCEDURE — 84484 ASSAY OF TROPONIN QUANT: CPT | Performed by: STUDENT IN AN ORGANIZED HEALTH CARE EDUCATION/TRAINING PROGRAM

## 2024-06-10 PROCEDURE — 63710000001 INSULIN REGULAR HUMAN PER 5 UNITS: Performed by: STUDENT IN AN ORGANIZED HEALTH CARE EDUCATION/TRAINING PROGRAM

## 2024-06-10 PROCEDURE — 99291 CRITICAL CARE FIRST HOUR: CPT

## 2024-06-10 PROCEDURE — 82948 REAGENT STRIP/BLOOD GLUCOSE: CPT

## 2024-06-10 PROCEDURE — 83880 ASSAY OF NATRIURETIC PEPTIDE: CPT | Performed by: STUDENT IN AN ORGANIZED HEALTH CARE EDUCATION/TRAINING PROGRAM

## 2024-06-10 PROCEDURE — 74177 CT ABD & PELVIS W/CONTRAST: CPT

## 2024-06-10 PROCEDURE — 80164 ASSAY DIPROPYLACETIC ACD TOT: CPT | Performed by: STUDENT IN AN ORGANIZED HEALTH CARE EDUCATION/TRAINING PROGRAM

## 2024-06-10 PROCEDURE — 85025 COMPLETE CBC W/AUTO DIFF WBC: CPT | Performed by: STUDENT IN AN ORGANIZED HEALTH CARE EDUCATION/TRAINING PROGRAM

## 2024-06-10 PROCEDURE — 25510000001 IOPAMIDOL 61 % SOLUTION: Performed by: STUDENT IN AN ORGANIZED HEALTH CARE EDUCATION/TRAINING PROGRAM

## 2024-06-10 RX ORDER — DEXTROSE MONOHYDRATE 25 G/50ML
25 INJECTION, SOLUTION INTRAVENOUS ONCE
Status: DISCONTINUED | OUTPATIENT
Start: 2024-06-10 | End: 2024-06-10

## 2024-06-10 RX ORDER — DEXTROSE MONOHYDRATE 25 G/50ML
25 INJECTION, SOLUTION INTRAVENOUS ONCE
Status: COMPLETED | OUTPATIENT
Start: 2024-06-10 | End: 2024-06-10

## 2024-06-10 RX ADMIN — IOPAMIDOL 85 ML: 612 INJECTION, SOLUTION INTRAVENOUS at 22:27

## 2024-06-10 RX ADMIN — INSULIN HUMAN 5 UNITS: 100 INJECTION, SOLUTION PARENTERAL at 22:15

## 2024-06-10 RX ADMIN — SODIUM CHLORIDE, POTASSIUM CHLORIDE, SODIUM LACTATE AND CALCIUM CHLORIDE 1000 ML: 600; 310; 30; 20 INJECTION, SOLUTION INTRAVENOUS at 21:58

## 2024-06-10 RX ADMIN — DEXTROSE MONOHYDRATE 25 G: 25 INJECTION, SOLUTION INTRAVENOUS at 22:11

## 2024-06-11 PROBLEM — E87.5 HYPERKALEMIA: Status: ACTIVE | Noted: 2024-06-11

## 2024-06-11 LAB
ALBUMIN SERPL-MCNC: 3.2 G/DL (ref 3.5–5.2)
ALBUMIN SERPL-MCNC: 3.3 G/DL (ref 3.5–5.2)
ALBUMIN/GLOB SERPL: 1.7 G/DL
ALP SERPL-CCNC: 64 U/L (ref 39–117)
ALT SERPL W P-5'-P-CCNC: 5 U/L (ref 1–41)
AMPHET+METHAMPHET UR QL: NEGATIVE
ANION GAP SERPL CALCULATED.3IONS-SCNC: 12.8 MMOL/L (ref 5–15)
ANION GAP SERPL CALCULATED.3IONS-SCNC: 8 MMOL/L (ref 5–15)
AST SERPL-CCNC: 15 U/L (ref 1–40)
BARBITURATES UR QL SCN: NEGATIVE
BASOPHILS # BLD AUTO: 0.02 10*3/MM3 (ref 0–0.2)
BASOPHILS NFR BLD AUTO: 0.4 % (ref 0–1.5)
BENZODIAZ UR QL SCN: NEGATIVE
BILIRUB SERPL-MCNC: 0.3 MG/DL (ref 0–1.2)
BILIRUB UR QL STRIP: NEGATIVE
BUN SERPL-MCNC: 18 MG/DL (ref 6–20)
BUN SERPL-MCNC: 18 MG/DL (ref 6–20)
BUN/CREAT SERPL: 16.5 (ref 7–25)
BUN/CREAT SERPL: 17 (ref 7–25)
CALCIUM SPEC-SCNC: 8.6 MG/DL (ref 8.6–10.5)
CALCIUM SPEC-SCNC: 8.7 MG/DL (ref 8.6–10.5)
CANNABINOIDS SERPL QL: NEGATIVE
CHLORIDE SERPL-SCNC: 100 MMOL/L (ref 98–107)
CHLORIDE SERPL-SCNC: 101 MMOL/L (ref 98–107)
CLARITY UR: CLEAR
CO2 SERPL-SCNC: 17.2 MMOL/L (ref 22–29)
CO2 SERPL-SCNC: 21 MMOL/L (ref 22–29)
COCAINE UR QL: NEGATIVE
COLOR UR: YELLOW
CREAT SERPL-MCNC: 1.06 MG/DL (ref 0.76–1.27)
CREAT SERPL-MCNC: 1.09 MG/DL (ref 0.76–1.27)
DEPRECATED RDW RBC AUTO: 42.9 FL (ref 37–54)
EGFRCR SERPLBLD CKD-EPI 2021: 83.2 ML/MIN/1.73
EGFRCR SERPLBLD CKD-EPI 2021: 86 ML/MIN/1.73
EOSINOPHIL # BLD AUTO: 0.03 10*3/MM3 (ref 0–0.4)
EOSINOPHIL NFR BLD AUTO: 0.6 % (ref 0.3–6.2)
ERYTHROCYTE [DISTWIDTH] IN BLOOD BY AUTOMATED COUNT: 13.5 % (ref 12.3–15.4)
FENTANYL UR-MCNC: NEGATIVE NG/ML
GEN 5 2HR TROPONIN T REFLEX: 24 NG/L
GLOBULIN UR ELPH-MCNC: 2 GM/DL
GLUCOSE BLDC GLUCOMTR-MCNC: 108 MG/DL (ref 70–130)
GLUCOSE BLDC GLUCOMTR-MCNC: 118 MG/DL (ref 70–130)
GLUCOSE BLDC GLUCOMTR-MCNC: 50 MG/DL (ref 70–130)
GLUCOSE BLDC GLUCOMTR-MCNC: 76 MG/DL (ref 70–130)
GLUCOSE SERPL-MCNC: 109 MG/DL (ref 65–99)
GLUCOSE SERPL-MCNC: 111 MG/DL (ref 65–99)
GLUCOSE UR STRIP-MCNC: ABNORMAL MG/DL
HCT VFR BLD AUTO: 40.2 % (ref 37.5–51)
HGB BLD-MCNC: 13.5 G/DL (ref 13–17.7)
HGB UR QL STRIP.AUTO: NEGATIVE
IMM GRANULOCYTES # BLD AUTO: 0.03 10*3/MM3 (ref 0–0.05)
IMM GRANULOCYTES NFR BLD AUTO: 0.6 % (ref 0–0.5)
KETONES UR QL STRIP: NEGATIVE
LEUKOCYTE ESTERASE UR QL STRIP.AUTO: NEGATIVE
LYMPHOCYTES # BLD AUTO: 1.69 10*3/MM3 (ref 0.7–3.1)
LYMPHOCYTES NFR BLD AUTO: 31.8 % (ref 19.6–45.3)
MAGNESIUM SERPL-MCNC: 1.9 MG/DL (ref 1.6–2.6)
MCH RBC QN AUTO: 29.9 PG (ref 26.6–33)
MCHC RBC AUTO-ENTMCNC: 33.6 G/DL (ref 31.5–35.7)
MCV RBC AUTO: 88.9 FL (ref 79–97)
METHADONE UR QL SCN: NEGATIVE
MONOCYTES # BLD AUTO: 0.32 10*3/MM3 (ref 0.1–0.9)
MONOCYTES NFR BLD AUTO: 6 % (ref 5–12)
NEUTROPHILS NFR BLD AUTO: 3.23 10*3/MM3 (ref 1.7–7)
NEUTROPHILS NFR BLD AUTO: 60.6 % (ref 42.7–76)
NITRITE UR QL STRIP: NEGATIVE
OPIATES UR QL: NEGATIVE
OSMOLALITY SERPL: 274 MOSM/KG (ref 275–300)
OSMOLALITY UR: 447 MOSM/KG
OXYCODONE UR QL SCN: NEGATIVE
PH UR STRIP.AUTO: 6 [PH] (ref 5–8)
PHOSPHATE SERPL-MCNC: 4.4 MG/DL (ref 2.5–4.5)
PLATELET # BLD AUTO: 95 10*3/MM3 (ref 140–450)
PMV BLD AUTO: 10.2 FL (ref 6–12)
POTASSIUM SERPL-SCNC: 5.6 MMOL/L (ref 3.5–5.2)
POTASSIUM SERPL-SCNC: 5.7 MMOL/L (ref 3.5–5.2)
POTASSIUM SERPL-SCNC: 5.8 MMOL/L (ref 3.5–5.2)
PROT SERPL-MCNC: 5.3 G/DL (ref 6–8.5)
PROT UR QL STRIP: NEGATIVE
QT INTERVAL: 363 MS
QTC INTERVAL: 369 MS
RBC # BLD AUTO: 4.52 10*6/MM3 (ref 4.14–5.8)
SODIUM SERPL-SCNC: 129 MMOL/L (ref 136–145)
SODIUM SERPL-SCNC: 131 MMOL/L (ref 136–145)
SODIUM UR-SCNC: 79 MMOL/L
SP GR UR STRIP: 1.03 (ref 1–1.03)
TROPONIN T DELTA: 1 NG/L
TROPONIN T SERPL HS-MCNC: 23 NG/L
TSH SERPL DL<=0.05 MIU/L-ACNC: 2.98 UIU/ML (ref 0.27–4.2)
URATE SERPL-MCNC: 7.4 MG/DL (ref 3.4–7)
UROBILINOGEN UR QL STRIP: ABNORMAL
WBC NRBC COR # BLD AUTO: 5.32 10*3/MM3 (ref 3.4–10.8)

## 2024-06-11 PROCEDURE — 93005 ELECTROCARDIOGRAM TRACING: CPT | Performed by: STUDENT IN AN ORGANIZED HEALTH CARE EDUCATION/TRAINING PROGRAM

## 2024-06-11 PROCEDURE — 80053 COMPREHEN METABOLIC PANEL: CPT | Performed by: NURSE PRACTITIONER

## 2024-06-11 PROCEDURE — 80307 DRUG TEST PRSMV CHEM ANLYZR: CPT

## 2024-06-11 PROCEDURE — 25810000003 SODIUM CHLORIDE 0.9 % SOLUTION

## 2024-06-11 PROCEDURE — 25810000003 SODIUM CHLORIDE 0.9 % SOLUTION: Performed by: NURSE PRACTITIONER

## 2024-06-11 PROCEDURE — 84100 ASSAY OF PHOSPHORUS: CPT | Performed by: NURSE PRACTITIONER

## 2024-06-11 PROCEDURE — 84132 ASSAY OF SERUM POTASSIUM: CPT | Performed by: INTERNAL MEDICINE

## 2024-06-11 PROCEDURE — 0 DEXTROSE 5 % SOLUTION: Performed by: NURSE PRACTITIONER

## 2024-06-11 PROCEDURE — 36415 COLL VENOUS BLD VENIPUNCTURE: CPT | Performed by: NURSE PRACTITIONER

## 2024-06-11 PROCEDURE — 81003 URINALYSIS AUTO W/O SCOPE: CPT | Performed by: STUDENT IN AN ORGANIZED HEALTH CARE EDUCATION/TRAINING PROGRAM

## 2024-06-11 PROCEDURE — 82948 REAGENT STRIP/BLOOD GLUCOSE: CPT

## 2024-06-11 PROCEDURE — 85025 COMPLETE CBC W/AUTO DIFF WBC: CPT | Performed by: NURSE PRACTITIONER

## 2024-06-11 PROCEDURE — 84300 ASSAY OF URINE SODIUM: CPT

## 2024-06-11 PROCEDURE — 83930 ASSAY OF BLOOD OSMOLALITY: CPT

## 2024-06-11 PROCEDURE — 84550 ASSAY OF BLOOD/URIC ACID: CPT

## 2024-06-11 PROCEDURE — 83935 ASSAY OF URINE OSMOLALITY: CPT

## 2024-06-11 PROCEDURE — 84484 ASSAY OF TROPONIN QUANT: CPT | Performed by: STUDENT IN AN ORGANIZED HEALTH CARE EDUCATION/TRAINING PROGRAM

## 2024-06-11 PROCEDURE — 84443 ASSAY THYROID STIM HORMONE: CPT | Performed by: NURSE PRACTITIONER

## 2024-06-11 PROCEDURE — 93010 ELECTROCARDIOGRAM REPORT: CPT | Performed by: INTERNAL MEDICINE

## 2024-06-11 RX ORDER — POLYETHYLENE GLYCOL 3350 17 G/17G
17 POWDER, FOR SOLUTION ORAL DAILY
Status: DISCONTINUED | OUTPATIENT
Start: 2024-06-11 | End: 2024-06-13 | Stop reason: HOSPADM

## 2024-06-11 RX ORDER — OLANZAPINE 5 MG/1
5 TABLET, ORALLY DISINTEGRATING ORAL NIGHTLY
Status: DISCONTINUED | OUTPATIENT
Start: 2024-06-11 | End: 2024-06-13 | Stop reason: HOSPADM

## 2024-06-11 RX ORDER — UREA 10 %
10 LOTION (ML) TOPICAL NIGHTLY
Status: DISCONTINUED | OUTPATIENT
Start: 2024-06-11 | End: 2024-06-13 | Stop reason: HOSPADM

## 2024-06-11 RX ORDER — NITROGLYCERIN 0.4 MG/1
0.4 TABLET SUBLINGUAL
Status: DISCONTINUED | OUTPATIENT
Start: 2024-06-11 | End: 2024-06-13 | Stop reason: HOSPADM

## 2024-06-11 RX ORDER — SODIUM CHLORIDE 0.9 % (FLUSH) 0.9 %
10 SYRINGE (ML) INJECTION AS NEEDED
Status: DISCONTINUED | OUTPATIENT
Start: 2024-06-11 | End: 2024-06-13 | Stop reason: HOSPADM

## 2024-06-11 RX ORDER — FAMOTIDINE 20 MG/1
20 TABLET, FILM COATED ORAL 2 TIMES DAILY
Status: DISCONTINUED | OUTPATIENT
Start: 2024-06-11 | End: 2024-06-12

## 2024-06-11 RX ORDER — BISACODYL 10 MG
10 SUPPOSITORY, RECTAL RECTAL DAILY PRN
Status: DISCONTINUED | OUTPATIENT
Start: 2024-06-11 | End: 2024-06-13 | Stop reason: HOSPADM

## 2024-06-11 RX ORDER — ACETAMINOPHEN 160 MG/5ML
650 SOLUTION ORAL EVERY 4 HOURS PRN
Status: DISCONTINUED | OUTPATIENT
Start: 2024-06-11 | End: 2024-06-13 | Stop reason: HOSPADM

## 2024-06-11 RX ORDER — SODIUM CHLORIDE 0.9 % (FLUSH) 0.9 %
10 SYRINGE (ML) INJECTION EVERY 12 HOURS SCHEDULED
Status: DISCONTINUED | OUTPATIENT
Start: 2024-06-11 | End: 2024-06-13 | Stop reason: HOSPADM

## 2024-06-11 RX ORDER — DIGOXIN 125 MCG
125 TABLET ORAL DAILY
Status: DISCONTINUED | OUTPATIENT
Start: 2024-06-11 | End: 2024-06-13 | Stop reason: HOSPADM

## 2024-06-11 RX ORDER — MUPIROCIN CALCIUM 20 MG/G
1 CREAM TOPICAL 2 TIMES DAILY
COMMUNITY

## 2024-06-11 RX ORDER — BISACODYL 5 MG/1
5 TABLET, DELAYED RELEASE ORAL DAILY PRN
Status: DISCONTINUED | OUTPATIENT
Start: 2024-06-11 | End: 2024-06-13 | Stop reason: HOSPADM

## 2024-06-11 RX ORDER — SELENIUM 50 MCG
1 TABLET ORAL 2 TIMES DAILY
COMMUNITY

## 2024-06-11 RX ORDER — B-COMPLEX WITH VITAMIN C
1 TABLET ORAL 2 TIMES DAILY
Status: DISCONTINUED | OUTPATIENT
Start: 2024-06-11 | End: 2024-06-11 | Stop reason: CLARIF

## 2024-06-11 RX ORDER — SODIUM CHLORIDE 9 MG/ML
40 INJECTION, SOLUTION INTRAVENOUS AS NEEDED
Status: DISCONTINUED | OUTPATIENT
Start: 2024-06-11 | End: 2024-06-13 | Stop reason: HOSPADM

## 2024-06-11 RX ORDER — SODIUM CHLORIDE 9 MG/ML
125 INJECTION, SOLUTION INTRAVENOUS CONTINUOUS
Status: DISCONTINUED | OUTPATIENT
Start: 2024-06-11 | End: 2024-06-13 | Stop reason: HOSPADM

## 2024-06-11 RX ORDER — METOPROLOL SUCCINATE 25 MG/1
25 TABLET, EXTENDED RELEASE ORAL
Status: DISCONTINUED | OUTPATIENT
Start: 2024-06-11 | End: 2024-06-13

## 2024-06-11 RX ORDER — ONDANSETRON 2 MG/ML
4 INJECTION INTRAMUSCULAR; INTRAVENOUS EVERY 6 HOURS PRN
Status: DISCONTINUED | OUTPATIENT
Start: 2024-06-11 | End: 2024-06-13 | Stop reason: HOSPADM

## 2024-06-11 RX ORDER — LEVOTHYROXINE SODIUM 0.05 MG/1
50 TABLET ORAL
Status: DISCONTINUED | OUTPATIENT
Start: 2024-06-11 | End: 2024-06-13 | Stop reason: HOSPADM

## 2024-06-11 RX ORDER — UREA 10 %
10 LOTION (ML) TOPICAL NIGHTLY
COMMUNITY

## 2024-06-11 RX ORDER — DIVALPROEX SODIUM 500 MG/1
500 TABLET, DELAYED RELEASE ORAL DAILY
Status: DISCONTINUED | OUTPATIENT
Start: 2024-06-11 | End: 2024-06-13 | Stop reason: HOSPADM

## 2024-06-11 RX ORDER — TRAZODONE HYDROCHLORIDE 100 MG/1
100 TABLET ORAL NIGHTLY
COMMUNITY
End: 2024-06-13 | Stop reason: HOSPADM

## 2024-06-11 RX ORDER — BENZTROPINE MESYLATE 1 MG/1
1 TABLET ORAL 2 TIMES DAILY
Status: DISCONTINUED | OUTPATIENT
Start: 2024-06-11 | End: 2024-06-13 | Stop reason: HOSPADM

## 2024-06-11 RX ORDER — AMOXICILLIN 250 MG
2 CAPSULE ORAL 2 TIMES DAILY PRN
Status: DISCONTINUED | OUTPATIENT
Start: 2024-06-11 | End: 2024-06-13 | Stop reason: HOSPADM

## 2024-06-11 RX ORDER — ACETAMINOPHEN 325 MG/1
650 TABLET ORAL EVERY 4 HOURS PRN
Status: DISCONTINUED | OUTPATIENT
Start: 2024-06-11 | End: 2024-06-13 | Stop reason: HOSPADM

## 2024-06-11 RX ORDER — DEXTROSE MONOHYDRATE 50 MG/ML
500 INJECTION, SOLUTION INTRAVENOUS CONTINUOUS
Status: DISCONTINUED | OUTPATIENT
Start: 2024-06-11 | End: 2024-06-11

## 2024-06-11 RX ORDER — POLYETHYLENE GLYCOL 3350 17 G/17G
17 POWDER, FOR SOLUTION ORAL DAILY PRN
Status: DISCONTINUED | OUTPATIENT
Start: 2024-06-11 | End: 2024-06-13 | Stop reason: HOSPADM

## 2024-06-11 RX ORDER — ACETAMINOPHEN 650 MG/1
650 SUPPOSITORY RECTAL EVERY 4 HOURS PRN
Status: DISCONTINUED | OUTPATIENT
Start: 2024-06-11 | End: 2024-06-13 | Stop reason: HOSPADM

## 2024-06-11 RX ORDER — ATORVASTATIN CALCIUM 20 MG/1
40 TABLET, FILM COATED ORAL DAILY
Status: DISCONTINUED | OUTPATIENT
Start: 2024-06-11 | End: 2024-06-13 | Stop reason: HOSPADM

## 2024-06-11 RX ADMIN — CALCIUM CARBONATE-VITAMIN D TAB 500 MG-200 UNIT 1 TABLET: 500-200 TAB at 22:27

## 2024-06-11 RX ADMIN — APIXABAN 5 MG: 5 TABLET, FILM COATED ORAL at 12:55

## 2024-06-11 RX ADMIN — FAMOTIDINE 20 MG: 20 TABLET, FILM COATED ORAL at 12:54

## 2024-06-11 RX ADMIN — APIXABAN 5 MG: 5 TABLET, FILM COATED ORAL at 22:27

## 2024-06-11 RX ADMIN — SODIUM ZIRCONIUM CYCLOSILICATE 10 G: 10 POWDER, FOR SUSPENSION ORAL at 20:37

## 2024-06-11 RX ADMIN — ACETAMINOPHEN 325MG 650 MG: 325 TABLET ORAL at 23:14

## 2024-06-11 RX ADMIN — ACETAMINOPHEN 325MG 650 MG: 325 TABLET ORAL at 12:54

## 2024-06-11 RX ADMIN — ATORVASTATIN CALCIUM 40 MG: 20 TABLET, FILM COATED ORAL at 12:55

## 2024-06-11 RX ADMIN — OLANZAPINE 5 MG: 5 TABLET, ORALLY DISINTEGRATING ORAL at 22:28

## 2024-06-11 RX ADMIN — DEXTROSE MONOHYDRATE 500 ML: 50 INJECTION, SOLUTION INTRAVENOUS at 05:42

## 2024-06-11 RX ADMIN — Medication 10 ML: at 02:13

## 2024-06-11 RX ADMIN — BENZTROPINE MESYLATE 1 MG: 1 TABLET ORAL at 12:58

## 2024-06-11 RX ADMIN — SODIUM CHLORIDE 1000 ML: 9 INJECTION, SOLUTION INTRAVENOUS at 12:12

## 2024-06-11 RX ADMIN — SODIUM ZIRCONIUM CYCLOSILICATE 10 G: 10 POWDER, FOR SUSPENSION ORAL at 10:19

## 2024-06-11 RX ADMIN — SODIUM CHLORIDE 100 ML/HR: 9 INJECTION, SOLUTION INTRAVENOUS at 19:41

## 2024-06-11 RX ADMIN — FAMOTIDINE 20 MG: 20 TABLET, FILM COATED ORAL at 22:27

## 2024-06-11 RX ADMIN — POLYETHYLENE GLYCOL 3350 17 G: 17 POWDER, FOR SOLUTION ORAL at 12:55

## 2024-06-11 RX ADMIN — SODIUM CHLORIDE 100 ML/HR: 9 INJECTION, SOLUTION INTRAVENOUS at 02:12

## 2024-06-11 RX ADMIN — CALCIUM CARBONATE-VITAMIN D TAB 500 MG-200 UNIT 1 TABLET: 500-200 TAB at 12:55

## 2024-06-11 RX ADMIN — BENZTROPINE MESYLATE 1 MG: 1 TABLET ORAL at 22:28

## 2024-06-11 RX ADMIN — DIVALPROEX SODIUM 500 MG: 500 TABLET, DELAYED RELEASE ORAL at 12:56

## 2024-06-11 RX ADMIN — LEVOTHYROXINE SODIUM 50 MCG: 50 TABLET ORAL at 12:55

## 2024-06-11 NOTE — ED NOTES
Nursing report ED to floor  Helder Abbott  49 y.o.  male    HPI :  HPI (Adult)  Stated Reason for Visit: lower abdominal pain x 2 days    Chief Complaint  Chief Complaint   Patient presents with    Abdominal Pain       Admitting doctor:   Alex Carter MD    Admitting diagnosis:   The primary encounter diagnosis was Hyperkalemia. Diagnoses of Abdominal pain, unspecified abdominal location and Bipolar affective disorder, remission status unspecified were also pertinent to this visit.    Code status:   Current Code Status       Date Active Code Status Order ID Comments User Context       Prior            Allergies:   Clonazepam, Fluoxetine, Grass, Grass pollen(k-o-r-t-swt benigno), Hydroxyzine, Methylphenidate, Quetiapine, and Risperidone    Isolation:   No active isolations    Intake and Output    Intake/Output Summary (Last 24 hours) at 6/11/2024 0040  Last data filed at 6/11/2024 0001  Gross per 24 hour   Intake 1000 ml   Output --   Net 1000 ml       Weight:   There were no vitals filed for this visit.    Most recent vitals:   Vitals:    06/10/24 1957 06/10/24 2018 06/10/24 2131 06/10/24 2201   BP: 104/72  116/91 109/91   Pulse: 65  71 73   Resp: 16      Temp:  96.8 °F (36 °C)     TempSrc:  Rectal     SpO2: 100%  97% 98%       Active LDAs/IV Access:   Lines, Drains & Airways       Active LDAs       Name Placement date Placement time Site Days    Peripheral IV 06/10/24 2026 Left Antecubital 06/10/24  2026  Antecubital  less than 1                    Labs (abnormal labs have a star):   Labs Reviewed   COMPREHENSIVE METABOLIC PANEL - Abnormal; Notable for the following components:       Result Value    BUN 21 (*)     Sodium 130 (*)     Potassium 5.7 (*)     All other components within normal limits    Narrative:     GFR Normal >60  Chronic Kidney Disease <60  Kidney Failure <15     SINGLE HS TROPONIN T - Abnormal; Notable for the following components:    HS Troponin T 23 (*)     All other components within  normal limits    Narrative:     High Sensitive Troponin T Reference Range:  <14.0 ng/L- Negative Female for AMI  <22.0 ng/L- Negative Male for AMI  >=14 - Abnormal Female indicating possible myocardial injury.  >=22 - Abnormal Male indicating possible myocardial injury.   Clinicians would have to utilize clinical acumen, EKG, Troponin, and serial changes to determine if it is an Acute Myocardial Infarction or myocardial injury due to an underlying chronic condition.        CBC WITH AUTO DIFFERENTIAL - Abnormal; Notable for the following components:    Platelets 117 (*)     Immature Grans % 0.6 (*)     All other components within normal limits   POCT GLUCOSE FINGERSTICK - Abnormal; Notable for the following components:    Glucose 62 (*)     All other components within normal limits   POCT GLUCOSE FINGERSTICK - Abnormal; Notable for the following components:    Glucose 161 (*)     All other components within normal limits   LIPASE - Normal   BNP (IN-HOUSE) - Normal    Narrative:     This assay is used as an aid in the diagnosis of individuals suspected of having heart failure. It can be used as an aid in the diagnosis of acute decompensated heart failure (ADHF) in patients presenting with signs and symptoms of ADHF to the emergency department (ED). In addition, NT-proBNP of <300 pg/mL indicates ADHF is not likely.    Age Range Result Interpretation  NT-proBNP Concentration (pg/mL:      <50             Positive            >450                   Gray                 300-450                    Negative             <300    50-75           Positive            >900                  Gray                300-900                  Negative            <300      >75             Positive            >1800                  Gray                300-1800                  Negative            <300   DIGOXIN LEVEL - Normal   VALPROIC ACID LEVEL, TOTAL - Normal    Narrative:     Therapeutic Ranges for Valproic Acid    Epilepsy:        mcg/ml  Bipolar/Enedelia  up to 125 mcg/ml     POCT GLUCOSE FINGERSTICK - Normal   POCT GLUCOSE FINGERSTICK - Normal   URINALYSIS W/ MICROSCOPIC IF INDICATED (NO CULTURE)   CBC AND DIFFERENTIAL    Narrative:     The following orders were created for panel order CBC & Differential.  Procedure                               Abnormality         Status                     ---------                               -----------         ------                     CBC Auto Differential[[id number]]        Abnormal            Final result                 Please view results for these tests on the individual orders.       EKG:   ECG 12 Lead Chest Pain   Preliminary Result   HEART RATE= 62  bpm   RR Interval= 968  ms   AZ Interval=   ms   P Horizontal Axis=   deg   P Front Axis=   deg   QRSD Interval= 87  ms   QT Interval= 363  ms   QTcB= 369  ms   QRS Axis= 20  deg   T Wave Axis= 57  deg   - ABNORMAL ECG -   Atrial fibrillation   Anteroseptal infarct, old   Electronically Signed By:    Date and Time of Study: 2024-06-10 20:47:26          Meds given in ED:   Medications   lactated ringers bolus 1,000 mL (0 mL Intravenous Stopped 6/11/24 0001)   insulin regular (humuLIN R,novoLIN R) injection 5 Units (5 Units Intravenous Given 6/10/24 2215)   dextrose (D50W) (25 g/50 mL) IV injection 25 g (25 g Intravenous Given 6/10/24 2211)   iopamidol (ISOVUE-300) 61 % injection 100 mL (85 mL Intravenous Given by Other 6/10/24 2227)       Imaging results:  CT Abdomen Pelvis With Contrast    Result Date: 6/10/2024   1. Distention of the urinary bladder. Correlation with any symptoms of urinary retention is recommended. 2. There does appear to be some higher density material layering within the gallbladder. Small stones or sludge are not excluded.  Radiation dose reduction techniques were utilized, including automated exposure control and exposure modulation based on body size.   This report was finalized on 6/10/2024 11:11 PM by Dr. Shawna Emmanuel  M.D on Workstation: [id number]       Ambulatory status:   - ax1    Social issues:   Social History     Socioeconomic History    Marital status: Single   Tobacco Use    Smoking status: Never   Vaping Use    Vaping status: Never Used   Substance and Sexual Activity    Alcohol use: Never    Drug use: Never    Sexual activity: Defer       Peripheral Neurovascular  Peripheral Neurovascular (Adult)  Peripheral Neurovascular WDL: WDL    Neuro Cognitive  Neuro Cognitive (Adult)  Cognitive/Neuro/Behavioral WDL: WDL, level of consciousness, mood/behavior, orientation, speech  Level of Consciousness: Alert  Orientation: disoriented to, time  Speech: clear  Mood/Behavior: cooperative, calm    Learning  Learning Assessment (Adult)  Learning Readiness and Ability: cognitive limitation noted    Respiratory  Respiratory WDL  Respiratory WDL: WDL    Abdominal Pain       Pain Assessments  Pain (Adult)  (0-10) Pain Rating: Rest: 10    NIH Stroke Scale       Avis Shirley RN  06/11/24 00:40 EDT    yes

## 2024-06-11 NOTE — PLAN OF CARE
Goal Outcome Evaluation:  Plan of Care Reviewed With: patient, caregiver        Progress: no change     Patient drowsy and confused during most of this shift, awake for lunch and dinner and to take meds, K elevated, one time dose of Lokelma given, Nephrology consulted, low BP, bolus given, continued maintenance IV fluids.

## 2024-06-11 NOTE — CASE MANAGEMENT/SOCIAL WORK
Discharge Planning Assessment  [facility]     Patient Name: Helder Abbott  MRN: [id number]  Today's Date: 6/10/2024    Admit Date: 6/10/2024        Discharge Needs Assessment    No documentation.                  Discharge Plan       Row Name 06/10/24 2015       Plan    Plan Comments Notified by registration, Patricia, that the patient has a legal guardian.  Guardianship papers uploaded, banner in place, and CCP manager, Swati, called and message left per policy.  LEONIDES Lr RN                  Continued Care and Services - Admitted Since 6/10/2024    No active coordination exists for this encounter.          Demographic Summary    No documentation.                  Functional Status    No documentation.                  Psychosocial    No documentation.                  Abuse/Neglect    No documentation.                  Legal    No documentation.                  Substance Abuse    No documentation.                  Patient Forms    No documentation.                     Elma Galaviz, RN

## 2024-06-11 NOTE — SIGNIFICANT NOTE
06/11/24 1615   OTHER   Discipline physical therapist   Rehab Time/Intention   Session Not Performed other (see comments);unable to evaluate, medical status change  (Noted pt's BP has been low all day - most recently 88/64 per RN and pt has been drowsy. PT will hold eval and f/u tomorrow as appropriate.)   Therapy Assessment/Plan (PT)   Criteria for Skilled Interventions Met (PT) yes   Recommendation   PT - Next Appointment 06/12/24

## 2024-06-11 NOTE — SIGNIFICANT NOTE
Pt sleeping upon entry, briefly opens eyes with verbal cues and light touch. Pt unable to maintain alertness despite cueing. Also unable to follow simple oral [facility] commands or answer orientation questions. Not appropriate for PO trials. Pt on a regular diet. Would recommend holding meal trays when lethargic and make NPO with any s/s of aspiration pending SLP eval.

## 2024-06-11 NOTE — ED PROVIDER NOTES
EMERGENCY DEPARTMENT ENCOUNTER    Room Number:  20/20  PCP: José Garcia APRN  History obtained from: Patient, EMS      HPI:  Chief Complaint: Abdominal pain  A complete HPI/ROS/PMH/PSH/SH/FH are unobtainable due to: Mental status  Context: Helder Abbott is a 49 y.o. male who presents to the ED c/o abdominal pain.  EMS reports they were called for abdominal pain, patient denies any abdominal pain currently.  Facility reports that patient has been more sleepy and confused since starting trazodone.  Patient reports he has some central chest pain that started after a fall, unclear exactly when this fall occurred.  No other recent illness, fever, chills.            PAST MEDICAL HISTORY  Active Ambulatory Problems     Diagnosis Date Noted    JOSIAH (acute kidney injury) 04/30/2024    Essential hypertension 04/30/2024    Hypothyroidism (acquired) 04/30/2024    History of CVA (cerebrovascular accident) 04/30/2024    GERD without esophagitis 04/30/2024    HLD (hyperlipidemia) 04/30/2024    Bipolar disorder 04/30/2024    Partial bowel obstruction 04/30/2024    Fecal impaction 04/30/2024    Severe malnutrition 05/02/2024     Resolved Ambulatory Problems     Diagnosis Date Noted    No Resolved Ambulatory Problems     Past Medical History:   Diagnosis Date    Benign essential hypertension     Dyslipidemia     GERD (gastroesophageal reflux disease)     Heart disease     History of stroke     Hypothyroidism          PAST SURGICAL HISTORY  No past surgical history on file.      FAMILY HISTORY  Family History   Problem Relation Age of Onset    Diabetes Other     Hypertension Other     Heart disease Other          SOCIAL HISTORY  Social History     Socioeconomic History    Marital status: Single   Tobacco Use    Smoking status: Never   Vaping Use    Vaping status: Never Used   Substance and Sexual Activity    Alcohol use: Never    Drug use: Never    Sexual activity: Defer         ALLERGIES  Clonazepam, Fluoxetine, Grass, Grass  pollen(k-o-r-t-swt benigno), Hydroxyzine, Methylphenidate, Quetiapine, and Risperidone        REVIEW OF SYSTEMS    As per HPI      PHYSICAL EXAM  ED Triage Vitals   Temp Heart Rate Resp BP SpO2   06/10/24 2018 06/10/24 1957 06/10/24 1957 06/10/24 1957 06/10/24 1957   96.8 °F (36 °C) 65 16 104/72 100 %      Temp src Heart Rate Source Patient Position BP Location FiO2 (%)   06/10/24 2018 -- -- -- --   Rectal           Physical Exam  Constitutional:       General: He is not in acute distress.  HENT:      Head: Normocephalic and atraumatic.   Cardiovascular:      Rate and Rhythm: Normal rate and regular rhythm.   Pulmonary:      Effort: No respiratory distress.   Abdominal:      General: There is no distension.      Tenderness: There is no abdominal tenderness.   Musculoskeletal:         General: No swelling or deformity.   Skin:     General: Skin is warm and dry.   Neurological:      General: No focal deficit present.      Mental Status: He is alert. Mental status is at baseline.           Vital signs and nursing notes reviewed.          LAB RESULTS  Recent Results (from the past 24 hour(s))   Comprehensive Metabolic Panel    Collection Time: 06/10/24  8:26 PM    Specimen: Blood   Result Value Ref Range    Glucose 91 65 - 99 mg/dL    BUN 21 (H) 6 - 20 mg/dL    Creatinine 0.86 0.76 - 1.27 mg/dL    Sodium 130 (L) 136 - 145 mmol/L    Potassium 5.7 (H) 3.5 - 5.2 mmol/L    Chloride 98 98 - 107 mmol/L    CO2 26.0 22.0 - 29.0 mmol/L    Calcium 9.4 8.6 - 10.5 mg/dL    Total Protein 6.8 6.0 - 8.5 g/dL    Albumin 4.3 3.5 - 5.2 g/dL    ALT (SGPT) 9 1 - 41 U/L    AST (SGOT) 13 1 - 40 U/L    Alkaline Phosphatase 84 39 - 117 U/L    Total Bilirubin 0.3 0.0 - 1.2 mg/dL    Globulin 2.5 gm/dL    A/G Ratio 1.7 g/dL    BUN/Creatinine Ratio 24.4 7.0 - 25.0    Anion Gap 6.0 5.0 - 15.0 mmol/L    eGFR 106.1 >60.0 mL/min/1.73   Lipase    Collection Time: 06/10/24  8:26 PM    Specimen: Blood   Result Value Ref Range    Lipase 29 13 - 60 U/L   BNP     Collection Time: 06/10/24  8:26 PM    Specimen: Blood   Result Value Ref Range    proBNP 357.0 0.0 - 450.0 pg/mL   Single High Sensitivity Troponin T    Collection Time: 06/10/24  8:26 PM    Specimen: Blood   Result Value Ref Range    HS Troponin T 23 (H) <22 ng/L   CBC Auto Differential    Collection Time: 06/10/24  8:26 PM    Specimen: Blood   Result Value Ref Range    WBC 5.36 3.40 - 10.80 10*3/mm3    RBC 5.24 4.14 - 5.80 10*6/mm3    Hemoglobin 15.6 13.0 - 17.7 g/dL    Hematocrit 46.9 37.5 - 51.0 %    MCV 89.5 79.0 - 97.0 fL    MCH 29.8 26.6 - 33.0 pg    MCHC 33.3 31.5 - 35.7 g/dL    RDW 13.6 12.3 - 15.4 %    RDW-SD 43.8 37.0 - 54.0 fl    MPV 10.6 6.0 - 12.0 fL    Platelets 117 (L) 140 - 450 10*3/mm3    Neutrophil % 56.7 42.7 - 76.0 %    Lymphocyte % 33.6 19.6 - 45.3 %    Monocyte % 7.8 5.0 - 12.0 %    Eosinophil % 0.7 0.3 - 6.2 %    Basophil % 0.6 0.0 - 1.5 %    Immature Grans % 0.6 (H) 0.0 - 0.5 %    Neutrophils, Absolute 3.04 1.70 - 7.00 10*3/mm3    Lymphocytes, Absolute 1.80 0.70 - 3.10 10*3/mm3    Monocytes, Absolute 0.42 0.10 - 0.90 10*3/mm3    Eosinophils, Absolute 0.04 0.00 - 0.40 10*3/mm3    Basophils, Absolute 0.03 0.00 - 0.20 10*3/mm3    Immature Grans, Absolute 0.03 0.00 - 0.05 10*3/mm3   Digoxin Level    Collection Time: 06/10/24  8:26 PM    Specimen: Blood   Result Value Ref Range    Digoxin 1.00 0.60 - 1.20 ng/mL   Valproic Acid Level, Total    Collection Time: 06/10/24  8:26 PM    Specimen: Blood   Result Value Ref Range    Valproic Acid 63.0 50.0 - 125.0 mcg/mL   ECG 12 Lead Chest Pain    Collection Time: 06/10/24  8:47 PM   Result Value Ref Range    QT Interval 363 ms    QTC Interval 369 ms   POC Glucose Once    Collection Time: 06/10/24  9:59 PM    Specimen: Blood   Result Value Ref Range    Glucose 62 (L) 70 - 130 mg/dL   POC Glucose Once    Collection Time: 06/10/24 10:51 PM    Specimen: Blood   Result Value Ref Range    Glucose 161 (H) 70 - 130 mg/dL   POC Glucose Once    Collection Time:  06/10/24 11:59 PM    Specimen: Blood   Result Value Ref Range    Glucose 108 70 - 130 mg/dL       Ordered the above labs and reviewed the results.        RADIOLOGY  CT Abdomen Pelvis With Contrast    Result Date: 6/10/2024  CT OF THE ABDOMEN PELVIS WITH CONTRAST  HISTORY: Abdominal pain  COMPARISON: April 30, 2024  TECHNIQUE: Axial CT imaging was obtained through the abdomen and pelvis. IV contrast was administered.  FINDINGS: Images through the lung bases are clear. No suspicious hepatic lesions are seen. There is a suggestion of some higher density material within the gallbladder. Sludge or small stones are not excluded. The spleen, duodenum, pancreas, and gallbladder are all normal. There is a small hiatal hernia. Kidneys enhance symmetrically. There is no hydronephrosis. No distal ureteral or bladder stones are seen. Bilateral perinephric stranding appear similar to the prior study. Urinary bladder does appear distended. Prostate gland is enlarged. There is no bowel obstruction. There is no evidence of appendicitis. There are aortoiliac calcifications. No acute osseous abnormalities are seen. There is lumbar scoliosis, with convexity to the left.       1. Distention of the urinary bladder. Correlation with any symptoms of urinary retention is recommended. 2. There does appear to be some higher density material layering within the gallbladder. Small stones or sludge are not excluded.  Radiation dose reduction techniques were utilized, including automated exposure control and exposure modulation based on body size.   This report was finalized on 6/10/2024 11:11 PM by Dr. Shawna Emmanuel M.D on Workstation: [id number]      XR Chest 1 View    Result Date: 6/10/2024  XR CHEST 1 VW-  Clinical: Chest pain  COMPARISON examination 4/30/2024  FINDINGS: The cardiomediastinal silhouette is normal and the lungs are clear.  CONCLUSION: No active disease of the chest  This report was finalized on 6/10/2024 8:53 PM by   Benji Grant M.D on Workstation: [id number]       Ordered the above noted radiological studies. Reviewed by me in PACS.            PROCEDURES  Critical Care    Performed by: David Colon MD  Authorized by: Alex Carter MD    Critical care provider statement:     Critical care time (minutes):  33    Critical care time was exclusive of:  Separately billable procedures and treating other patients and teaching time    Critical care was necessary to treat or prevent imminent or life-threatening deterioration of the following conditions:  Metabolic crisis and dehydration    Critical care was time spent personally by me on the following activities:  Development of treatment plan with patient or surrogate, discussions with consultants, evaluation of patient's response to treatment, examination of patient, review of old charts, re-evaluation of patient's condition, ordering and review of radiographic studies, ordering and review of laboratory studies, ordering and performing treatments and interventions, pulse oximetry and obtaining history from patient or surrogate    Care discussed with: admitting provider            MEDICATIONS GIVEN IN ER  Medications   lactated ringers bolus 1,000 mL (0 mL Intravenous Stopped 6/11/24 0001)   insulin regular (humuLIN R,novoLIN R) injection 5 Units (5 Units Intravenous Given 6/10/24 2215)   dextrose (D50W) (25 g/50 mL) IV injection 25 g (25 g Intravenous Given 6/10/24 2211)   iopamidol (ISOVUE-300) 61 % injection 100 mL (85 mL Intravenous Given by Other 6/10/24 2227)               MEDICAL DECISION MAKING, PROGRESS, and CONSULTS    MDM: Patient presented emergency department with nonspecific abdominal pain, reported fall.  Otherwise well-appearing, vitals otherwise stable.  Labs significant for hypokalemia, hyponatremia.  Patient is on an ACE inhibitor, suspect this may all be related to lisinopril.  Labs and imaging otherwise reassuring.  Possible gallbladder sludge  however patient has no tenderness to palpation of the right upper quadrant.  Discussed with inpatient team, will admit for observation to ensure that his electrolytes resolve and that he is on the correct medications.    All labs have been independently reviewed by me.  All radiology studies have been reviewed by me and I have also reviewed the radiology report.   EKG's independently viewed and interpreted by me.  Discussion below represents my analysis of pertinent findings related to patient's condition, differential diagnosis, treatment plan and final disposition.      Additional sources:  - Discussed/ obtained information from independent historians: EMS, facility    - External (non-ED) record review:     - Chronic or social conditions impacting care: Bipolar disorder    - Shared decision making: Discussed plan for admission, patient agrees.      Orders placed during this visit:  Orders Placed This Encounter   Procedures    XR Chest 1 View    CT Abdomen Pelvis With Contrast    Comprehensive Metabolic Panel    Lipase    Urinalysis With Microscopic If Indicated (No Culture) - Urine, Clean Catch    BNP    Single High Sensitivity Troponin T    CBC Auto Differential    Digoxin Level    Valproic Acid Level, Total    LHA (on-call MD unless specified) Details    POC Glucose Once    POC Glucose Once    POC Glucose Once    ECG 12 Lead Chest Pain    Initiate Observation Status    CBC & Differential         Additional orders considered but not ordered:  Considered CT head however patient has no focal neurologic deficits or evidence of head injury.        Differential diagnosis includes but is not limited to:    Electrolyte abnormality, digoxin toxicity, liver failure, renal failure, dehydration, medication side effect, biliary colic, bowel obstruction, diverticulitis.      Independent interpretation of labs, radiology studies, and discussions with consultants:  ED Course as of 06/11/24 0035   Mon Michael 10, 2024   2460  Potassium(!): 5.7 [FS]   2103 Platelets(!): 117 [FS]   2140 EKG interpreted myself:  2047, A-fib at a rate of 62, no acute ST segment changes or T wave inversions. [FS]   2208 Digoxin: 1.00 [FS]   2227 Valproic Acid: 63.0 [FS]      ED Course User Index  [FS] David Colon MD           DIAGNOSIS  Final diagnoses:   Hyperkalemia   Abdominal pain, unspecified abdominal location   Bipolar affective disorder, remission status unspecified         DISPOSITION  Admitted to Telemetry        Latest Documented Vital Signs:  As of 00:35 EDT  BP- 109/91 HR- 73 Temp- 96.8 °F (36 °C) (Rectal) O2 sat- 98%              --    Please note that portions of this were completed with a voice recognition program.       Note Disclaimer: At [facility], we believe that sharing information builds trust and better relationships. You are receiving this note because you are receiving care at [facility] or recently visited. It is possible you will see health information before a provider has talked with you about it. This kind of information can be easy to misunderstand. To help you fully understand what it means for your health, we urge you to discuss this note with your provider.             David Colon MD  06/11/24 0038

## 2024-06-11 NOTE — H&P
Patient Name:  Helder Abbott  YOB: 1975  MRN:  [id number]  Admit Date:  6/10/2024  Patient Care Team:  José Garcia APRN as PCP - General (Family Medicine)  Gian Marquez MD as Consulting Physician (Hematology and Oncology)  José Garcia APRN as Referring Physician (Family Medicine)  Phoenix Santa MD as Consulting Physician (Hematology and Oncology)      Subjective   History Present Illness     Chief Complaint   Patient presents with    Abdominal Pain         History of Present Illness  Mr. Abbott is a 49 y.o. with chronic atrial fibrillation, hypothyroidism, history of CVA, bipolar disorder presenting from [facility].  Patient is alert to self but is a poor historian,  history obtained by chart review.  Per ER documentation EMS said they were called for abdominal pain but he has not had any abdominal pain since arriving to the hospital.  And apparently he has been more drowsy and confused since recently starting trazodone.  Some possible chest pain after a recent fall.  No focal pain.  Attempted to call guardian with no answer.    Review of Systems   Unable to obtain  Personal History     Past Medical History:   Diagnosis Date    Benign essential hypertension     Bipolar disorder     Dyslipidemia     GERD (gastroesophageal reflux disease)     Heart disease     History of stroke     Hypothyroidism      History reviewed. No pertinent surgical history.  Family History   Problem Relation Age of Onset    Diabetes Other     Hypertension Other     Heart disease Other      Social History     Tobacco Use    Smoking status: Never   Vaping Use    Vaping status: Never Used   Substance Use Topics    Alcohol use: Never    Drug use: Never     No current facility-administered medications on file prior to encounter.     Current Outpatient Medications on File Prior to Encounter   Medication Sig Dispense Refill    Abilify Maintena 400 MG Suspension Reconstituted ER IM injection ER        Acetaminophen Extra Strength 500 MG tablet Take 1 tablet by mouth 4 (Four) Times a Day As Needed.      atorvastatin (LIPITOR) 40 MG tablet       benztropine (COGENTIN) 0.5 MG tablet Take 2 tablets by mouth 2 (Two) Times a Day.      Diclofenac Sodium (VOLTAREN) 1 % gel gel       digoxin (LANOXIN) 125 MCG tablet       divalproex (DEPAKOTE) 500 MG DR tablet       Eliquis 5 MG tablet tablet Take 1 tablet by mouth Every 12 (Twelve) Hours.      famotidine (PEPCID) 20 MG tablet Take 1 tablet by mouth 2 (Two) Times a Day.      ibuprofen (ADVIL,MOTRIN) 600 MG tablet Take 1 tablet by mouth 4 (Four) Times a Day.      levothyroxine (SYNTHROID, LEVOTHROID) 50 MCG tablet       lisinopril (PRINIVIL,ZESTRIL) 40 MG tablet Take 1 tablet by mouth 2 (Two) Times a Day.      melatonin 5 MG tablet tablet Take 2 tablets by mouth Every Night.      metoprolol succinate XL (TOPROL-XL) 25 MG 24 hr tablet       mupirocin (BACTROBAN) 2 % cream Apply 1 Application topically to the appropriate area as directed 2 (Two) Times a Day.      OLANZapine zydis (zyPREXA) 5 MG disintegrating tablet       polyethylene glycol (MIRALAX) 17 GM/SCOOP powder Dissolve 17 g (1 capful) in liquid and drink by mouth Daily. 510 g 1    traZODone (DESYREL) 100 MG tablet Take 1 tablet by mouth Every Night.      Baclofen 5 MG tablet       Calcium Carbonate-Vitamin D (Oyster Shell Calcium/D) 250-3.125 MG-MCG tablet Take 1 tablet by mouth 2 (Two) Times a Day.      mirtazapine (REMERON) 15 MG tablet       nystatin 975606 UNIT/GM powder       triamcinolone (KENALOG) 0.1 % cream        Allergies   Allergen Reactions    Clonazepam Unknown (See Comments)    Fluoxetine Unknown (See Comments)    Grass Unknown - High Severity    Grass Pollen(K-O-R-T-Swt Kennedy) Unknown - Low Severity    Hydroxyzine Unknown (See Comments)    Methylphenidate Unknown (See Comments)    Quetiapine Unknown - Low Severity    Risperidone Unknown (See Comments)       Objective    Objective     Vital  Signs  Temp:  [96.8 °F (36 °C)-97.8 °F (36.6 °C)] 97.8 °F (36.6 °C)  Heart Rate:  [58-73] 58  Resp:  [14-16] 14  BP: ()/(48-91) 89/67  SpO2:  [96 %-100 %] 100 %  on   ;   Device (Oxygen Therapy): room air  There is no height or weight on file to calculate BMI.    Physical Exam  Vitals and nursing note reviewed.   Constitutional:       General: He is not in acute distress.     Appearance: He is ill-appearing (Chronically).   Cardiovascular:      Rate and Rhythm: Normal rate and regular rhythm.   Pulmonary:      Effort: Pulmonary effort is normal. No respiratory distress.   Abdominal:      General: Abdomen is flat. There is no distension.      Tenderness: There is no abdominal tenderness.   Musculoskeletal:         General: No swelling or deformity.   Skin:     General: Skin is warm and dry.   Neurological:      Mental Status: He is alert.      Comments: Alert to self only.  Unsure of baseline.   Psychiatric:      Comments: Patient with hiccups versus a tic         Results Review:  I reviewed the patient's new clinical results.  I reviewed the patient's new imaging results and agree with the interpretation.  I reviewed the patient's other test results and agree with the interpretation  I personally viewed and interpreted the patient's EKG/Telemetry data  Discussed with ED provider.    Lab Results (last 24 hours)       Procedure Component Value Units Date/Time    CBC & Differential [[id number]]  (Abnormal) Collected: 06/10/24 2026    Specimen: Blood Updated: 06/10/24 2049    Narrative:      The following orders were created for panel order CBC & Differential.  Procedure                               Abnormality         Status                     ---------                               -----------         ------                     CBC Auto Differential[[id number]]        Abnormal            Final result                 Please view results for these tests on the individual orders.    Comprehensive Metabolic Panel  [[id number]]  (Abnormal) Collected: 06/10/24 2026    Specimen: Blood Updated: 06/10/24 2101     Glucose 91 mg/dL      BUN 21 mg/dL      Creatinine 0.86 mg/dL      Sodium 130 mmol/L      Potassium 5.7 mmol/L      Chloride 98 mmol/L      CO2 26.0 mmol/L      Calcium 9.4 mg/dL      Total Protein 6.8 g/dL      Albumin 4.3 g/dL      ALT (SGPT) 9 U/L      AST (SGOT) 13 U/L      Alkaline Phosphatase 84 U/L      Total Bilirubin 0.3 mg/dL      Globulin 2.5 gm/dL      A/G Ratio 1.7 g/dL      BUN/Creatinine Ratio 24.4     Anion Gap 6.0 mmol/L      eGFR 106.1 mL/min/1.73     Narrative:      GFR Normal >60  Chronic Kidney Disease <60  Kidney Failure <15      Lipase [[id number]]  (Normal) Collected: 06/10/24 2026    Specimen: Blood Updated: 06/10/24 2101     Lipase 29 U/L     BNP [[id number]]  (Normal) Collected: 06/10/24 2026    Specimen: Blood Updated: 06/10/24 2101     proBNP 357.0 pg/mL     Narrative:      This assay is used as an aid in the diagnosis of individuals suspected of having heart failure. It can be used as an aid in the diagnosis of acute decompensated heart failure (ADHF) in patients presenting with signs and symptoms of ADHF to the emergency department (ED). In addition, NT-proBNP of <300 pg/mL indicates ADHF is not likely.    Age Range Result Interpretation  NT-proBNP Concentration (pg/mL:      <50             Positive            >450                   Gray                 300-450                    Negative             <300    50-75           Positive            >900                  Gray                300-900                  Negative            <300      >75             Positive            >1800                  Gray                300-1800                  Negative            <300    Single High Sensitivity Troponin T [[id number]]  (Abnormal) Collected: 06/10/24 2026    Specimen: Blood Updated: 06/10/24 2101     HS Troponin T 23 ng/L     Narrative:      High Sensitive Troponin T Reference Range:  <14.0 ng/L-  Negative Female for AMI  <22.0 ng/L- Negative Male for AMI  >=14 - Abnormal Female indicating possible myocardial injury.  >=22 - Abnormal Male indicating possible myocardial injury.   Clinicians would have to utilize clinical acumen, EKG, Troponin, and serial changes to determine if it is an Acute Myocardial Infarction or myocardial injury due to an underlying chronic condition.         CBC Auto Differential [[id number]]  (Abnormal) Collected: 06/10/24 2026    Specimen: Blood Updated: 06/10/24 2049     WBC 5.36 10*3/mm3      RBC 5.24 10*6/mm3      Hemoglobin 15.6 g/dL      Hematocrit 46.9 %      MCV 89.5 fL      MCH 29.8 pg      MCHC 33.3 g/dL      RDW 13.6 %      RDW-SD 43.8 fl      MPV 10.6 fL      Platelets 117 10*3/mm3      Neutrophil % 56.7 %      Lymphocyte % 33.6 %      Monocyte % 7.8 %      Eosinophil % 0.7 %      Basophil % 0.6 %      Immature Grans % 0.6 %      Neutrophils, Absolute 3.04 10*3/mm3      Lymphocytes, Absolute 1.80 10*3/mm3      Monocytes, Absolute 0.42 10*3/mm3      Eosinophils, Absolute 0.04 10*3/mm3      Basophils, Absolute 0.03 10*3/mm3      Immature Grans, Absolute 0.03 10*3/mm3     Digoxin Level [[id number]]  (Normal) Collected: 06/10/24 2026    Specimen: Blood Updated: 06/10/24 2156     Digoxin 1.00 ng/mL     Valproic Acid Level, Total [[id number]]  (Normal) Collected: 06/10/24 2026    Specimen: Blood Updated: 06/10/24 2156     Valproic Acid 63.0 mcg/mL     Narrative:      Therapeutic Ranges for Valproic Acid    Epilepsy:       mcg/ml  Bipolar/Enedelia  up to 125 mcg/ml      Magnesium [[id number]]  (Normal) Collected: 06/10/24 2026    Specimen: Blood Updated: 06/11/24 0121     Magnesium 1.9 mg/dL     POC Glucose Once [[id number]]  (Abnormal) Collected: 06/10/24 2159    Specimen: Blood Updated: 06/10/24 2205     Glucose 62 mg/dL     POC Glucose Once [[id number]]  (Abnormal) Collected: 06/10/24 2251    Specimen: Blood Updated: 06/10/24 2253     Glucose 161 mg/dL     POC Glucose Once  [[id number]]  (Normal) Collected: 06/10/24 2359    Specimen: Blood Updated: 06/11/24 0000     Glucose 108 mg/dL     POC Glucose Once [[id number]]  (Normal) Collected: 06/11/24 0034    Specimen: Blood Updated: 06/11/24 0035     Glucose 76 mg/dL     POC Glucose Once [[id number]]  (Abnormal) Collected: 06/11/24 0508    Specimen: Blood Updated: 06/11/24 0510     Glucose 50 mg/dL     CBC & Differential [[id number]]  (Abnormal) Collected: 06/11/24 0615    Specimen: Blood Updated: 06/11/24 0653    Narrative:      The following orders were created for panel order CBC & Differential.  Procedure                               Abnormality         Status                     ---------                               -----------         ------                     CBC Auto Differential[[id number]]        Abnormal            Final result                 Please view results for these tests on the individual orders.    Comprehensive Metabolic Panel [[id number]]  (Abnormal) Collected: 06/11/24 0615    Specimen: Blood Updated: 06/11/24 0706     Glucose 111 mg/dL      BUN 18 mg/dL      Creatinine 1.06 mg/dL      Sodium 129 mmol/L      Potassium 5.7 mmol/L      Chloride 100 mmol/L      CO2 21.0 mmol/L      Calcium 8.6 mg/dL      Total Protein 5.3 g/dL      Albumin 3.3 g/dL      ALT (SGPT) 5 U/L      AST (SGOT) 15 U/L      Alkaline Phosphatase 64 U/L      Total Bilirubin 0.3 mg/dL      Globulin 2.0 gm/dL      A/G Ratio 1.7 g/dL      BUN/Creatinine Ratio 17.0     Anion Gap 8.0 mmol/L      eGFR 86.0 mL/min/1.73     Narrative:      GFR Normal >60  Chronic Kidney Disease <60  Kidney Failure <15      CBC Auto Differential [[id number]]  (Abnormal) Collected: 06/11/24 0615    Specimen: Blood Updated: 06/11/24 0653     WBC 5.32 10*3/mm3      RBC 4.52 10*6/mm3      Hemoglobin 13.5 g/dL      Hematocrit 40.2 %      MCV 88.9 fL      MCH 29.9 pg      MCHC 33.6 g/dL      RDW 13.5 %      RDW-SD 42.9 fl      MPV 10.2 fL      Platelets 95 10*3/mm3      Neutrophil %  60.6 %      Lymphocyte % 31.8 %      Monocyte % 6.0 %      Eosinophil % 0.6 %      Basophil % 0.4 %      Immature Grans % 0.6 %      Neutrophils, Absolute 3.23 10*3/mm3      Lymphocytes, Absolute 1.69 10*3/mm3      Monocytes, Absolute 0.32 10*3/mm3      Eosinophils, Absolute 0.03 10*3/mm3      Basophils, Absolute 0.02 10*3/mm3      Immature Grans, Absolute 0.03 10*3/mm3     TSH [[id number]]  (Normal) Collected: 06/11/24 0615    Specimen: Blood Updated: 06/11/24 0702     TSH 2.980 uIU/mL     POC Glucose Once [[id number]]  (Normal) Collected: 06/11/24 0653    Specimen: Blood Updated: 06/11/24 0654     Glucose 118 mg/dL             Imaging Results (Last 24 Hours)       Procedure Component Value Units Date/Time    CT Abdomen Pelvis With Contrast [[id number]] Collected: 06/10/24 2301     Updated: 06/10/24 2314    Narrative:      CT OF THE ABDOMEN PELVIS WITH CONTRAST     HISTORY: Abdominal pain     COMPARISON: April 30, 2024     TECHNIQUE: Axial CT imaging was obtained through the abdomen and pelvis.  IV contrast was administered.     FINDINGS:  Images through the lung bases are clear. No suspicious hepatic lesions  are seen. There is a suggestion of some higher density material within  the gallbladder. Sludge or small stones are not excluded. The spleen,  duodenum, pancreas, and gallbladder are all normal. There is a small  hiatal hernia. Kidneys enhance symmetrically. There is no  hydronephrosis. No distal ureteral or bladder stones are seen. Bilateral  perinephric stranding appear similar to the prior study. Urinary bladder  does appear distended. Prostate gland is enlarged. There is no bowel  obstruction. There is no evidence of appendicitis. There are aortoiliac  calcifications. No acute osseous abnormalities are seen. There is lumbar  scoliosis, with convexity to the left.       Impression:         1. Distention of the urinary bladder. Correlation with any symptoms of  urinary retention is recommended.  2. There does  appear to be some higher density material layering within  the gallbladder. Small stones or sludge are not excluded.     Radiation dose reduction techniques were utilized, including automated  exposure control and exposure modulation based on body size.        This report was finalized on 6/10/2024 11:11 PM by Dr. Shawna Emmanuel M.D on Workstation: [id number]       XR Chest 1 View [[id number]] Collected: 06/10/24 2052     Updated: 06/10/24 2056    Narrative:      XR CHEST 1 VW-     Clinical: Chest pain     COMPARISON examination 4/30/2024     FINDINGS: The cardiomediastinal silhouette is normal and the lungs are  clear.     CONCLUSION: No active disease of the chest     This report was finalized on 6/10/2024 8:53 PM by Dr. Benji Grant M.D  on Workstation: [id number]                   ECG 12 Lead Chest Pain   Preliminary Result   HEART RATE= 62  bpm   RR Interval= 968  ms   ND Interval=   ms   P Horizontal Axis=   deg   P Front Axis=   deg   QRSD Interval= 87  ms   QT Interval= 363  ms   QTcB= 369  ms   QRS Axis= 20  deg   T Wave Axis= 57  deg   - ABNORMAL ECG -   Atrial fibrillation   Anteroseptal infarct, old   Electronically Signed By:    Date and Time of Study: 2024-06-10 20:47:26      Telemetry Scan   Final Result      Telemetry Scan   Final Result           Assessment/Plan     Active Hospital Problems    Diagnosis  POA    **Hyperkalemia [E87.5]  Yes    Bipolar disorder [F31.9]  Yes    Essential hypertension [I10]  Yes    Hypothyroidism (acquired) [E03.9]  Yes    History of CVA (cerebrovascular accident) [Z86.73]  Not Applicable    HLD (hyperlipidemia) [E78.5]  Yes      Resolved Hospital Problems   No resolved problems to display.     Hyperkalemia  Hyponatremia  -Did not resolve with insulin and patient had hypoglycemia  -Give dose of Lokelma and consult nephrology further assistance  -Hold lisinopril 40 mg twice daily    Abdominal pain, appears is resolved  -CT abdomen with distention of urinary bladder.   Will check bladder scan.  Some high density material layering in the gallbladder.  But LFTs normal and no upper right quadrant pain or tenderness to palpation.    Hypothyroidism  -Continue home Synthroid, TSH within normal limits    Chronic atrial fibrillation  Hypertension  -Continue home metoprolol, digoxin, Eliquis    GERD-continue H2 blocker    Bipolar disorder  -On Abilify IM injection monthly as an outpatient  -Continue Depakote daily and Zyprexa at night    Possible altered mental status versus patient's baseline  -Attempted to get collateral but patient is following very simple commands and knows his name  -TSH within normal limits, B12 mildly elevated Pat.  Will check HIV and RPR.  -Consider CT head pending discussion with guardian      I discussed the patient's findings and my recommendations with patient and nursing staff.    VTE Prophylaxis - Eliquis (home med).  Code Status - Full code.       Mick Mckoy MD  [facility]  06/11/24  10:33 EDT

## 2024-06-11 NOTE — CASE MANAGEMENT/SOCIAL WORK
Continued Stay Note  [facility]     Patient Name: Helder Abbott  MRN: [id number]  Today's Date: 6/11/2024    Admit Date: 6/10/2024        Discharge Plan       Row Name 06/11/24 1557       Plan    Plan Comments Attempted to screen. Called guardian twice. Will try again tomorrow.                   Discharge Codes    No documentation.                 Expected Discharge Date and Time       Expected Discharge Date Expected Discharge Time    Jun 13, 2024               TASIA Garcia

## 2024-06-11 NOTE — CONSULTS
[facility] Consult Note      Patient Name: Helder Abbott  : 1975  MRN: [id number]  Primary Care Physician:  José Garcia APRN  Referring Physician: David Colon MD  Date of admission: 6/10/2024    Subjective     Reason for Consult: hyperkalemia and hyponatremia    HPI:   Helder Abbott is a 49 y.o. male with normal renal function, came to the hospital yesterday from a group home for abdominal pain and increased drowsiness/confusion after recent start of trazodone.      On admission, sodium 130, potassium 5.7, creatinine 0.9; chest x-ray negative for acute findings; CT abdomen/pelvis noted bladder distention and possible gallstones or sludge.  Patient received 1 L LR bolus, insulin with D50, and one dose of Lokelma; placed on IVF at 100 ml/h this morning. He is drowsy on exam, only briefly opens eyes to aggressive verbal and physical stimuli; unable to provide any reliable information or medical history; does not appear to be in any acute distress.    PMH outlined as below; notable for stroke, A-fib, hypothyroidism, bipolar disorder, NSAID use, and hypertension on ACEi    Review of Systems:   Unable to obtain    Personal History     Past Medical History:   Diagnosis Date    Benign essential hypertension     Bipolar disorder     Dyslipidemia     GERD (gastroesophageal reflux disease)     Heart disease     History of stroke     Hypothyroidism        History reviewed. No pertinent surgical history.    Family History: family history includes Diabetes in an other family member; Heart disease in an other family member; Hypertension in an other family member.    Social History:  reports that he has never smoked. He does not have any smokeless tobacco history on file. He reports that he does not drink alcohol and does not use drugs.    Home Medications:  Prior to Admission medications    Medication Sig Start Date End Date Taking? Authorizing Provider   Abilify Maintena 400 MG  Suspension Reconstituted ER IM injection ER  10/11/22  Yes Tobin Mcbride MD   Acetaminophen Extra Strength 500 MG tablet Take 1 tablet by mouth 4 (Four) Times a Day As Needed. 11/15/22  Yes Tobin Mcbride MD   atorvastatin (LIPITOR) 40 MG tablet  11/18/22  Yes Tobin Mcbride MD   benztropine (COGENTIN) 0.5 MG tablet Take 2 tablets by mouth 2 (Two) Times a Day. 10/11/22  Yes Tobin Mcbride MD   Diclofenac Sodium (VOLTAREN) 1 % gel gel  11/29/22  Yes Reed, MD Tobin   digoxin (LANOXIN) 125 MCG tablet  11/18/22  Yes Tobin Mcbride MD   divalproex (DEPAKOTE) 500 MG DR tablet  10/27/22  Yes Tobin Mcbride MD   Eliquis 5 MG tablet tablet Take 1 tablet by mouth Every 12 (Twelve) Hours. 11/21/22  Yes Tobin Mcbride MD   famotidine (PEPCID) 20 MG tablet Take 1 tablet by mouth 2 (Two) Times a Day. 11/18/22  Yes Tobin Mcbride MD   ibuprofen (ADVIL,MOTRIN) 600 MG tablet Take 1 tablet by mouth 4 (Four) Times a Day. 11/15/22  Yes Tobin Mcbride MD   levothyroxine (SYNTHROID, LEVOTHROID) 50 MCG tablet  11/18/22  Yes ProviderTobin MD   lisinopril (PRINIVIL,ZESTRIL) 40 MG tablet Take 1 tablet by mouth 2 (Two) Times a Day. 11/18/22  Yes Tobin Mcbride MD   melatonin 5 MG tablet tablet Take 2 tablets by mouth Every Night.   Yes Provider, MD Tobin   metoprolol succinate XL (TOPROL-XL) 25 MG 24 hr tablet  11/18/22  Yes Provider, MD Tobin   mupirocin (BACTROBAN) 2 % cream Apply 1 Application topically to the appropriate area as directed 2 (Two) Times a Day.   Yes Tobin Mcbride MD   OLANZapine zydis (zyPREXA) 5 MG disintegrating tablet  11/28/22  Yes Tobin Mcbride MD   polyethylene glycol (MIRALAX) 17 GM/SCOOP powder Dissolve 17 g (1 capful) in liquid and drink by mouth Daily. 5/6/24  Yes Ronnie Rojas MD   traZODone (DESYREL) 100 MG tablet Take 1 tablet by mouth Every Night.   Yes Provider, MD Tobin   Calcium  Carbonate-Vitamin D (Oyster Shell Calcium/D) 250-3.125 MG-MCG tablet Take 1 tablet by mouth 2 (Two) Times a Day.    Provider, MD Tobin   Baclofen 5 MG tablet  11/29/22 6/11/24  Provider, MD Tobin   mirtazapine (REMERON) 15 MG tablet  10/11/22 6/11/24  Provider, MD Tobin   nystatin 342945 UNIT/GM powder  11/18/22 6/11/24  Provider, MD Tobin   triamcinolone (KENALOG) 0.1 % cream  10/19/22 6/11/24  Provider, MD Tobin       Allergies:  Allergies   Allergen Reactions    Clonazepam Unknown (See Comments)    Fluoxetine Unknown (See Comments)    Grass Unknown - High Severity    Grass Pollen(K-O-R-T-Swt Kennedy) Unknown - Low Severity    Hydroxyzine Unknown (See Comments)    Methylphenidate Unknown (See Comments)    Quetiapine Unknown - Low Severity    Risperidone Unknown (See Comments)       Objective     Vitals:   Temp:  [96.8 °F (36 °C)-97.9 °F (36.6 °C)] 97.9 °F (36.6 °C)  Heart Rate:  [52-73] 65  Resp:  [14-16] 16  BP: ()/(48-91) 86/63    Intake/Output Summary (Last 24 hours) at 6/11/2024 1235  Last data filed at 6/11/2024 1212  Gross per 24 hour   Intake 2000 ml   Output 200 ml   Net 1800 ml       Physical Exam:   Constitutional: Awake, chronically ill, NAD; minimal, garbled speech  HEENT: Sclera anicteric, no conjunctival injection, oral mucosa dry  Neck: Supple, no JVD  Respiratory: Diminished bilaterally, nonlabored on RA  Cardiovascular: Irregularly irregular, no murmur or rub    GI: Hypoactive BS, soft, ND, +T but no G or R; no focality to exam  : No palpable bladder, external catheter in place  Musculoskeletal: No edema, no clubbing or cyanosis  Psychiatric: Not oriented except to place, garbled speech  Neurologic: Moves upper extremities  Skin: Warm and clammy, pale      Scheduled Meds:     apixaban, 5 mg, Oral, Q12H  atorvastatin, 40 mg, Oral, Daily  benztropine, 1 mg, Oral, BID  calcium 500 mg vitamin D 5 mcg (200 UT), 1 tablet, Oral, BID  digoxin, 125 mcg, Oral,  Daily  divalproex, 500 mg, Oral, Daily  famotidine, 20 mg, Oral, BID  levothyroxine, 50 mcg, Oral, Q AM  melatonin, 10 mg, Oral, Nightly  metoprolol succinate XL, 25 mg, Oral, Q24H  OLANZapine zydis, 5 mg, Oral, Nightly  polyethylene glycol, 17 g, Oral, Daily  sodium chloride, 1,000 mL, Intravenous, Once  sodium chloride, 10 mL, Intravenous, Q12H      IV Meds:   sodium chloride, 100 mL/hr, Last Rate: 100 mL/hr (06/11/24 1017)        Results Reviewed:   I have personally reviewed the results from the time of this admission to 6/11/2024 12:35 EDT     Lab Results   Component Value Date    GLUCOSE 111 (H) 06/11/2024    GLUCOSE 109 (H) 06/11/2024    CALCIUM 8.6 06/11/2024    CALCIUM 8.7 06/11/2024     (L) 06/11/2024     (L) 06/11/2024    K 5.7 (H) 06/11/2024    K 5.8 (H) 06/11/2024    CO2 21.0 (L) 06/11/2024    CO2 17.2 (L) 06/11/2024     06/11/2024     06/11/2024    BUN 18 06/11/2024    BUN 18 06/11/2024    CREATININE 1.06 06/11/2024    CREATININE 1.09 06/11/2024    BCR 17.0 06/11/2024    BCR 16.5 06/11/2024    ANIONGAP 8.0 06/11/2024    ANIONGAP 12.8 06/11/2024      Lab Results   Component Value Date    MG 1.9 06/10/2024    PHOS 4.4 06/11/2024    ALBUMIN 3.3 (L) 06/11/2024    ALBUMIN 3.2 (L) 06/11/2024           Assessment / Plan       Hyperkalemia    Essential hypertension    Hypothyroidism (acquired)    History of CVA (cerebrovascular accident)    HLD (hyperlipidemia)    Bipolar disorder      ASSESSMENT:  Acute hyperkalemia, probably secondary to medication use: NSAID, ACEi, beta-blocker, and recent LR bolus (which has potassium). Likely NAGMA that may be playing role, too.  Received insulin yesterday; 1 dose of Lokelma was given today.  Home lisinopril currently on hold, though ACEi's effect on potassium retention will linger for days  Hyponatremia, with hypovolemia, though suspect also low solute intake these last few days given altered mental status.  Serum osmolality low  Altered mental  status  Abdominal pain, resolved.    Chronic atrial fibrillation, on metoprolol, digoxin, and Eliquis  Hypertension, BP currently low.  Hypothyroidism, on Synthroid, TSH normal  Bipolar disorder.  On Abilify (monthly outpatient), Depakote, and Zyprexa  Thrombocytopenia    PLAN:  ACEi and NSAID on hold  Repeat potassium now  Bolus 1 L normal saline, and then continue as maintenance 100 ml/h  Urine studies, and check serum uric acid  Bladder scan each shift  Surveillance labs    Thank you for involving us in the care of Helder Abbott.  Please feel free to call with any questions.    Alexandre Jimenez MD  06/11/24  12:35 EDT    [facility]  [phone]      Please note that portions of this note were completed with a voice recognition program.

## 2024-06-12 ENCOUNTER — APPOINTMENT (OUTPATIENT)
Dept: CARDIOLOGY | Facility: HOSPITAL | Age: 49
End: 2024-06-12
Payer: MEDICARE

## 2024-06-12 LAB
ALBUMIN SERPL-MCNC: 3.1 G/DL (ref 3.5–5.2)
ANION GAP SERPL CALCULATED.3IONS-SCNC: 4 MMOL/L (ref 5–15)
AORTIC DIMENSIONLESS INDEX: 0.8 (DI)
BASOPHILS # BLD AUTO: 0.03 10*3/MM3 (ref 0–0.2)
BASOPHILS NFR BLD AUTO: 0.6 % (ref 0–1.5)
BH CV ECHO MEAS - ACS: 2.08 CM
BH CV ECHO MEAS - AO MAX PG: 8.3 MMHG
BH CV ECHO MEAS - AO MEAN PG: 5.1 MMHG
BH CV ECHO MEAS - AO ROOT DIAM: 3.2 CM
BH CV ECHO MEAS - AO V2 MAX: 143.9 CM/SEC
BH CV ECHO MEAS - AO V2 VTI: 26 CM
BH CV ECHO MEAS - EDV(CUBED): 162.8 ML
BH CV ECHO MEAS - EDV(MOD-SP2): 37 ML
BH CV ECHO MEAS - EDV(MOD-SP4): 73 ML
BH CV ECHO MEAS - EF(MOD-BP): 64.1 %
BH CV ECHO MEAS - EF(MOD-SP2): 62.2 %
BH CV ECHO MEAS - EF(MOD-SP4): 63 %
BH CV ECHO MEAS - ESV(CUBED): 44.6 ML
BH CV ECHO MEAS - ESV(MOD-SP2): 14 ML
BH CV ECHO MEAS - ESV(MOD-SP4): 27 ML
BH CV ECHO MEAS - FS: 35.1 %
BH CV ECHO MEAS - IVS/LVPW: 1.26 CM
BH CV ECHO MEAS - IVSD: 1.35 CM
BH CV ECHO MEAS - LV DIASTOLIC VOL/BSA (35-75): 37.2 CM2
BH CV ECHO MEAS - LV MASS(C)D: 272.4 GRAMS
BH CV ECHO MEAS - LV MAX PG: 5.1 MMHG
BH CV ECHO MEAS - LV MEAN PG: 2.9 MMHG
BH CV ECHO MEAS - LV SYSTOLIC VOL/BSA (12-30): 13.8 CM2
BH CV ECHO MEAS - LV V1 MAX: 113.2 CM/SEC
BH CV ECHO MEAS - LV V1 VTI: 19.7 CM
BH CV ECHO MEAS - LVIDD: 5.5 CM
BH CV ECHO MEAS - LVIDS: 3.5 CM
BH CV ECHO MEAS - LVPWD: 1.07 CM
BH CV ECHO MEAS - MV DEC SLOPE: 284.4 CM/SEC2
BH CV ECHO MEAS - MV DEC TIME: 258 SEC
BH CV ECHO MEAS - MV E MAX VEL: 72.8 CM/SEC
BH CV ECHO MEAS - MV MAX PG: 2.15 MMHG
BH CV ECHO MEAS - MV MEAN PG: 1 MMHG
BH CV ECHO MEAS - MV P1/2T: 88.1 MSEC
BH CV ECHO MEAS - MV V2 VTI: 14 CM
BH CV ECHO MEAS - MVA(P1/2T): 2.5 CM2
BH CV ECHO MEAS - PA ACC TIME: 0.16 SEC
BH CV ECHO MEAS - PA V2 MAX: 141.2 CM/SEC
BH CV ECHO MEAS - RAP SYSTOLE: 3 MMHG
BH CV ECHO MEAS - RV MAX PG: 1.38 MMHG
BH CV ECHO MEAS - RV V1 MAX: 58.8 CM/SEC
BH CV ECHO MEAS - RV V1 VTI: 11.8 CM
BH CV ECHO MEAS - RVSP: 18 MMHG
BH CV ECHO MEAS - SV(MOD-SP2): 23 ML
BH CV ECHO MEAS - SV(MOD-SP4): 46 ML
BH CV ECHO MEAS - SVI(MOD-SP2): 11.7 ML/M2
BH CV ECHO MEAS - SVI(MOD-SP4): 23.4 ML/M2
BH CV ECHO MEAS - TR MAX PG: 15.1 MMHG
BH CV ECHO MEAS - TR MAX VEL: 194.1 CM/SEC
BUN SERPL-MCNC: 9 MG/DL (ref 6–20)
BUN/CREAT SERPL: 12.5 (ref 7–25)
CALCIUM SPEC-SCNC: 8.4 MG/DL (ref 8.6–10.5)
CHLORIDE SERPL-SCNC: 103 MMOL/L (ref 98–107)
CO2 SERPL-SCNC: 24 MMOL/L (ref 22–29)
CORTIS SERPL-MCNC: 7.25 MCG/DL
CREAT SERPL-MCNC: 0.72 MG/DL (ref 0.76–1.27)
DEPRECATED RDW RBC AUTO: 42.4 FL (ref 37–54)
DIGOXIN SERPL-MCNC: 0.6 NG/ML (ref 0.6–1.2)
EGFRCR SERPLBLD CKD-EPI 2021: 112 ML/MIN/1.73
EOSINOPHIL # BLD AUTO: 0.06 10*3/MM3 (ref 0–0.4)
EOSINOPHIL NFR BLD AUTO: 1.2 % (ref 0.3–6.2)
ERYTHROCYTE [DISTWIDTH] IN BLOOD BY AUTOMATED COUNT: 13.1 % (ref 12.3–15.4)
GLUCOSE SERPL-MCNC: 71 MG/DL (ref 65–99)
HCT VFR BLD AUTO: 38.6 % (ref 37.5–51)
HGB BLD-MCNC: 12.6 G/DL (ref 13–17.7)
HIV 1+2 AB+HIV1 P24 AG SERPL QL IA: NORMAL
IMM GRANULOCYTES # BLD AUTO: 0.03 10*3/MM3 (ref 0–0.05)
IMM GRANULOCYTES NFR BLD AUTO: 0.6 % (ref 0–0.5)
LEFT ATRIUM VOLUME INDEX: 16.5 ML/M2
LYMPHOCYTES # BLD AUTO: 1.78 10*3/MM3 (ref 0.7–3.1)
LYMPHOCYTES NFR BLD AUTO: 36.9 % (ref 19.6–45.3)
MAGNESIUM SERPL-MCNC: 1.6 MG/DL (ref 1.6–2.6)
MCH RBC QN AUTO: 29.1 PG (ref 26.6–33)
MCHC RBC AUTO-ENTMCNC: 32.6 G/DL (ref 31.5–35.7)
MCV RBC AUTO: 89.1 FL (ref 79–97)
MONOCYTES # BLD AUTO: 0.4 10*3/MM3 (ref 0.1–0.9)
MONOCYTES NFR BLD AUTO: 8.3 % (ref 5–12)
NEUTROPHILS NFR BLD AUTO: 2.53 10*3/MM3 (ref 1.7–7)
NEUTROPHILS NFR BLD AUTO: 52.4 % (ref 42.7–76)
PHOSPHATE SERPL-MCNC: 2.9 MG/DL (ref 2.5–4.5)
PLATELET # BLD AUTO: 76 10*3/MM3 (ref 140–450)
PLATELET # BLD AUTO: 80 10*3/MM3 (ref 140–450)
PLATELETS.RETICULATED NFR BLD AUTO: 9 % (ref 0.9–6.5)
PMV BLD AUTO: 10.6 FL (ref 6–12)
POTASSIUM SERPL-SCNC: 4.9 MMOL/L (ref 3.5–5.2)
QT INTERVAL: 363 MS
QTC INTERVAL: 369 MS
RBC # BLD AUTO: 4.33 10*6/MM3 (ref 4.14–5.8)
RPR SER QL: NORMAL
SODIUM SERPL-SCNC: 131 MMOL/L (ref 136–145)
URATE SERPL-MCNC: 6.7 MG/DL (ref 3.4–7)
WBC NRBC COR # BLD AUTO: 4.83 10*3/MM3 (ref 3.4–10.8)

## 2024-06-12 PROCEDURE — 97530 THERAPEUTIC ACTIVITIES: CPT

## 2024-06-12 PROCEDURE — 85055 RETICULATED PLATELET ASSAY: CPT | Performed by: INTERNAL MEDICINE

## 2024-06-12 PROCEDURE — 97162 PT EVAL MOD COMPLEX 30 MIN: CPT

## 2024-06-12 PROCEDURE — 84550 ASSAY OF BLOOD/URIC ACID: CPT | Performed by: INTERNAL MEDICINE

## 2024-06-12 PROCEDURE — 97166 OT EVAL MOD COMPLEX 45 MIN: CPT

## 2024-06-12 PROCEDURE — 80162 ASSAY OF DIGOXIN TOTAL: CPT | Performed by: STUDENT IN AN ORGANIZED HEALTH CARE EDUCATION/TRAINING PROGRAM

## 2024-06-12 PROCEDURE — 82533 TOTAL CORTISOL: CPT | Performed by: INTERNAL MEDICINE

## 2024-06-12 PROCEDURE — 83735 ASSAY OF MAGNESIUM: CPT

## 2024-06-12 PROCEDURE — 25510000001 PERFLUTREN (DEFINITY) 8.476 MG IN SODIUM CHLORIDE (PF) 0.9 % 10 ML INJECTION: Performed by: STUDENT IN AN ORGANIZED HEALTH CARE EDUCATION/TRAINING PROGRAM

## 2024-06-12 PROCEDURE — 85025 COMPLETE CBC W/AUTO DIFF WBC: CPT | Performed by: NURSE PRACTITIONER

## 2024-06-12 PROCEDURE — 80069 RENAL FUNCTION PANEL: CPT

## 2024-06-12 PROCEDURE — 97535 SELF CARE MNGMENT TRAINING: CPT

## 2024-06-12 PROCEDURE — 25810000003 SODIUM CHLORIDE 0.9 % SOLUTION: Performed by: NURSE PRACTITIONER

## 2024-06-12 PROCEDURE — 93306 TTE W/DOPPLER COMPLETE: CPT

## 2024-06-12 PROCEDURE — 92610 EVALUATE SWALLOWING FUNCTION: CPT

## 2024-06-12 PROCEDURE — 86592 SYPHILIS TEST NON-TREP QUAL: CPT | Performed by: STUDENT IN AN ORGANIZED HEALTH CARE EDUCATION/TRAINING PROGRAM

## 2024-06-12 PROCEDURE — 25810000003 SODIUM CHLORIDE 0.9 % SOLUTION: Performed by: INTERNAL MEDICINE

## 2024-06-12 PROCEDURE — 93306 TTE W/DOPPLER COMPLETE: CPT | Performed by: INTERNAL MEDICINE

## 2024-06-12 PROCEDURE — G0432 EIA HIV-1/HIV-2 SCREEN: HCPCS | Performed by: STUDENT IN AN ORGANIZED HEALTH CARE EDUCATION/TRAINING PROGRAM

## 2024-06-12 RX ORDER — PANTOPRAZOLE SODIUM 40 MG/1
40 TABLET, DELAYED RELEASE ORAL
Status: DISCONTINUED | OUTPATIENT
Start: 2024-06-12 | End: 2024-06-13 | Stop reason: HOSPADM

## 2024-06-12 RX ADMIN — SODIUM CHLORIDE 125 ML/HR: 9 INJECTION, SOLUTION INTRAVENOUS at 20:33

## 2024-06-12 RX ADMIN — POLYETHYLENE GLYCOL 3350 17 G: 17 POWDER, FOR SOLUTION ORAL at 09:12

## 2024-06-12 RX ADMIN — DIGOXIN 125 MCG: 125 TABLET ORAL at 09:13

## 2024-06-12 RX ADMIN — ACETAMINOPHEN 650 MG: 160 SOLUTION ORAL at 23:16

## 2024-06-12 RX ADMIN — CALCIUM CARBONATE-VITAMIN D TAB 500 MG-200 UNIT 1 TABLET: 500-200 TAB at 20:33

## 2024-06-12 RX ADMIN — CALCIUM CARBONATE-VITAMIN D TAB 500 MG-200 UNIT 1 TABLET: 500-200 TAB at 09:12

## 2024-06-12 RX ADMIN — ATORVASTATIN CALCIUM 40 MG: 20 TABLET, FILM COATED ORAL at 09:12

## 2024-06-12 RX ADMIN — APIXABAN 5 MG: 5 TABLET, FILM COATED ORAL at 09:12

## 2024-06-12 RX ADMIN — Medication 10 MG: at 20:32

## 2024-06-12 RX ADMIN — DIVALPROEX SODIUM 500 MG: 500 TABLET, DELAYED RELEASE ORAL at 09:12

## 2024-06-12 RX ADMIN — SODIUM CHLORIDE 100 ML/HR: 9 INJECTION, SOLUTION INTRAVENOUS at 05:12

## 2024-06-12 RX ADMIN — APIXABAN 5 MG: 5 TABLET, FILM COATED ORAL at 20:32

## 2024-06-12 RX ADMIN — FAMOTIDINE 20 MG: 20 TABLET, FILM COATED ORAL at 09:12

## 2024-06-12 RX ADMIN — Medication 10 ML: at 09:13

## 2024-06-12 RX ADMIN — BENZTROPINE MESYLATE 1 MG: 1 TABLET ORAL at 20:32

## 2024-06-12 RX ADMIN — PANTOPRAZOLE SODIUM 40 MG: 40 TABLET, DELAYED RELEASE ORAL at 17:46

## 2024-06-12 RX ADMIN — SODIUM CHLORIDE 100 ML/HR: 9 INJECTION, SOLUTION INTRAVENOUS at 13:04

## 2024-06-12 RX ADMIN — Medication 10 ML: at 20:32

## 2024-06-12 RX ADMIN — PERFLUTREN 2 ML: 6.52 INJECTION, SUSPENSION INTRAVENOUS at 14:08

## 2024-06-12 RX ADMIN — BENZTROPINE MESYLATE 1 MG: 1 TABLET ORAL at 09:12

## 2024-06-12 RX ADMIN — OLANZAPINE 5 MG: 5 TABLET, ORALLY DISINTEGRATING ORAL at 20:32

## 2024-06-12 NOTE — PLAN OF CARE
"Frequent hiccups when awake pt c/o chest pain and SOA r/t hiccups. Caregiver indicated hiccups were new within the last two days.    Leonor ALEJANDRE notified ordered incentive spirometer use and instructed nursing staff to give patient a spoonful of jelly to help with hiccups. Interventions somewhat effective. SpO2 98% on room air lung sounds clear, 1L of O2 via NC applied, pt stated \"I always have trouble breathing, this isn't new. I used to smoke.\" Pt states \"my friend Tomas he gives me rolled up cigarettes.\" UA drug screen ordered, negative.     SBP remains in the upper 80s to low 90s this shift, NS at 100ml/hr continues. AM labs pending. Voiding without difficulty.       Goal Outcome Evaluation:                                              "

## 2024-06-12 NOTE — PLAN OF CARE
Goal Outcome Evaluation:              Outcome Evaluation: Clinical swallow eval completed. Patient living with intellectual disability.     Suggest soft/bite-sized textures, given presumed feeding difficulties due to need for assistance opening packages/cartons. Thin liquids.     Meds whole in puree.     Aspiration precautions (fully upright for all oral intake, assist pt to open packages and cartons).     No additional acute SLP needs identified which require intervention at this time. SLP will sign off. Please re-consult SLP if any needs arise. Thank you very much for the courtesy of this consultation!      Anticipated Discharge Disposition (SLP): unknown          SLP Swallowing Diagnosis: swallow WFL/no suspected pharyngeal impairment, other (see comments) (feeding difficulties) (06/12/24 3336)

## 2024-06-12 NOTE — PROGRESS NOTES
Name: Helder Abbott ADMIT: 6/10/2024   : 1975  PCP: José Garcia APRN    MRN: [id number] LOS: 1 days   AGE/SEX: 49 y.o. male  ROOM: [facility]     Subjective   Subjective   No acute events overnight. Continues to have hiccups and low blood pressures.    Review of Systems   Constitutional:  Negative for chills and fever.   Respiratory:  Negative for cough and shortness of breath.    Cardiovascular:  Negative for chest pain and palpitations.   Gastrointestinal:  Negative for abdominal pain, diarrhea, nausea and vomiting.     As above     Objective   Objective   Vital Signs  Temp:  [97.2 °F (36.2 °C)-98.1 °F (36.7 °C)] 97.2 °F (36.2 °C)  Heart Rate:  [53-73] 73  Resp:  [14-18] 18  BP: ()/(46-66) 112/60  SpO2:  [97 %-100 %] 100 %  on  Flow (L/min):  [1] 1;   Device (Oxygen Therapy): nasal cannula  There is no height or weight on file to calculate BMI.  Physical Exam  Vitals and nursing note reviewed.   Constitutional:       General: He is not in acute distress.  Cardiovascular:      Rate and Rhythm: Normal rate and regular rhythm.   Pulmonary:      Effort: Pulmonary effort is normal. No respiratory distress.   Abdominal:      General: Abdomen is flat. There is no distension.      Tenderness: There is no abdominal tenderness.   Musculoskeletal:         General: No swelling or deformity.   Skin:     General: Skin is warm and dry.   Neurological:      Mental Status: He is alert. Mental status is at baseline.      Comments: Alert to self. Difficult to understand         Results Review     I reviewed the patient's new clinical results.  Results from last 7 days   Lab Units 24  0639 24  0615 06/10/24  2026   WBC 10*3/mm3 4.83 5.32 5.36   HEMOGLOBIN g/dL 12.6* 13.5 15.6   PLATELETS 10*3/mm3 76* 95* 117*     Results from last 7 days   Lab Units 24  0639 24  1523 24  0615 06/10/24  2026   SODIUM mmol/L 131*  --  131*  129* 130*   POTASSIUM mmol/L 4.9 5.6* 5.8*  5.7* 5.7*    CHLORIDE mmol/L 103  --  101  100 98   CO2 mmol/L 24.0  --  17.2*  21.0* 26.0   BUN mg/dL 9  --  18  18 21*   CREATININE mg/dL 0.72*  --  1.09  1.06 0.86   GLUCOSE mg/dL 71  --  109*  111* 91   CrCl cannot be calculated (Unknown ideal weight.).  Results from last 7 days   Lab Units 06/12/24  0639 06/11/24  0615 06/10/24  2026   ALBUMIN g/dL 3.1* 3.2*  3.3* 4.3   BILIRUBIN mg/dL  --  0.3 0.3   ALK PHOS U/L  --  64 84   AST (SGOT) U/L  --  15 13   ALT (SGPT) U/L  --  5 9     Results from last 7 days   Lab Units 06/12/24  0639 06/11/24  0615 06/10/24  2026   CALCIUM mg/dL 8.4* 8.7  8.6 9.4   ALBUMIN g/dL 3.1* 3.2*  3.3* 4.3   MAGNESIUM mg/dL 1.6  --  1.9   PHOSPHORUS mg/dL 2.9 4.4  --        COVID19   Date Value Ref Range Status   04/30/2024 Not Detected Not Detected - Ref. Range Final     Glucose   Date/Time Value Ref Range Status   06/11/2024 0653 118 70 - 130 mg/dL Final   06/11/2024 0508 50 (L) 70 - 130 mg/dL Final   06/11/2024 0034 76 70 - 130 mg/dL Final   06/10/2024 2359 108 70 - 130 mg/dL Final   06/10/2024 2251 161 (H) 70 - 130 mg/dL Final   06/10/2024 2159 62 (L) 70 - 130 mg/dL Final       CT Abdomen Pelvis With Contrast  Narrative: CT OF THE ABDOMEN PELVIS WITH CONTRAST     HISTORY: Abdominal pain     COMPARISON: April 30, 2024     TECHNIQUE: Axial CT imaging was obtained through the abdomen and pelvis.  IV contrast was administered.     FINDINGS:  Images through the lung bases are clear. No suspicious hepatic lesions  are seen. There is a suggestion of some higher density material within  the gallbladder. Sludge or small stones are not excluded. The spleen,  duodenum, pancreas, and gallbladder are all normal. There is a small  hiatal hernia. Kidneys enhance symmetrically. There is no  hydronephrosis. No distal ureteral or bladder stones are seen. Bilateral  perinephric stranding appear similar to the prior study. Urinary bladder  does appear distended. Prostate gland is enlarged. There is no  bowel  obstruction. There is no evidence of appendicitis. There are aortoiliac  calcifications. No acute osseous abnormalities are seen. There is lumbar  scoliosis, with convexity to the left.     Impression:    1. Distention of the urinary bladder. Correlation with any symptoms of  urinary retention is recommended.  2. There does appear to be some higher density material layering within  the gallbladder. Small stones or sludge are not excluded.     Radiation dose reduction techniques were utilized, including automated  exposure control and exposure modulation based on body size.        This report was finalized on 6/10/2024 11:11 PM by Dr. Shawna Emmanuel M.D on Workstation: [id number]     XR Chest 1 View  XR CHEST 1 VW-     Clinical: Chest pain     COMPARISON examination 4/30/2024     FINDINGS: The cardiomediastinal silhouette is normal and the lungs are  clear.     CONCLUSION: No active disease of the chest     This report was finalized on 6/10/2024 8:53 PM by Dr. Benji Grant M.D  on Workstation: [id number]       I reviewed the patient's daily medications.  Scheduled Medications  apixaban, 5 mg, Oral, Q12H  atorvastatin, 40 mg, Oral, Daily  benztropine, 1 mg, Oral, BID  calcium 500 mg vitamin D 5 mcg (200 UT), 1 tablet, Oral, BID  digoxin, 125 mcg, Oral, Daily  divalproex, 500 mg, Oral, Daily  famotidine, 20 mg, Oral, BID  levothyroxine, 50 mcg, Oral, Q AM  melatonin, 10 mg, Oral, Nightly  [Held by provider] metoprolol succinate XL, 25 mg, Oral, Q24H  OLANZapine zydis, 5 mg, Oral, Nightly  polyethylene glycol, 17 g, Oral, Daily  sodium chloride, 10 mL, Intravenous, Q12H    Infusions  sodium chloride, 100 mL/hr, Last Rate: 100 mL/hr (06/12/24 0512)    Diet  Diet: Cardiac, Renal; Healthy Heart (2-3 Na+); Low Potassium; Fluid Consistency: Thin (IDDSI 0)         I have personally reviewed:  [x]  Laboratory   []  Microbiology   []  Radiology   []  EKG/Telemetry   []  Cardiology/Vascular   []  Pathology   []   Records     Assessment/Plan     Active Hospital Problems    Diagnosis  POA    **Hyperkalemia [E87.5]  Yes    Bipolar disorder [F31.9]  Yes    Essential hypertension [I10]  Yes    Hypothyroidism (acquired) [E03.9]  Yes    History of CVA (cerebrovascular accident) [Z86.73]  Not Applicable    HLD (hyperlipidemia) [E78.5]  Yes      Resolved Hospital Problems   No resolved problems to display.       49 y.o. male admitted with Hyperkalemia.    Hyperkalemia, resolved  Hyponatremia, stable  Hypotension  -Did not resolve with insulin and patient had hypoglycemia  -Give dose of Lokelma and consult nephrology further assistance  -Hold lisinopril 40 mg twice daily, metoprolol  -IVF    Chest pain  -With his intellectual disability difficult to say if this is related to the hiccups as discussed below  -Troponins have been flat.  EKGs with no signs of acute ischemia.  -Echo pending     Hiccups  -Change Pepcid to pantoprazole twice daily.  -Trial of PPI as first-line for hiccups.  Other medications to treat Pepaxti such as baclofen or gabapentin can have a sedating effect on him and will try to avoid these as he just had significant sedating effects from trazodone that was recently started    Abdominal pain, appears is resolved  -CT abdomen with distention of urinary bladder.  Will check bladder scan.  Some high density material layering in the gallbladder.  But LFTs normal and no upper right quadrant pain or tenderness to palpation.     Hypothyroidism  -Continue home Synthroid, TSH within normal limits     Chronic atrial fibrillation  Hypertension  -Continue digoxin, Eliquis     GERD-continue H2 blocker     Bipolar disorder  -On Abilify IM injection monthly as an outpatient  -Continue Depakote daily and Zyprexa at night     Acute encephalopathy from trazodone  Intellectual disability  -Now at baseline per caregiver  -TSH within normal limits, B12 mildly elevated Pat.  HIV negative.  RPR pending  -UDS negative  -Trazodone  discontinued        Eliquis (home med) for DVT prophylaxis.  Full code.  Discussed with patient and nursing staff.  Anticipate discharge  back to group home  in 1-2 days.    Expected Discharge Date: 6/13/2024; Expected Discharge Time:       Mick Mckoy MD  [facility]  06/12/24  11:03 EDT

## 2024-06-12 NOTE — CASE MANAGEMENT/SOCIAL WORK
Discharge Planning Assessment  [facility]     Patient Name: Helder Abbott  MRN: [id number]  Today's Date: 6/12/2024    Admit Date: 6/10/2024    Plan: Home with Caregiver   Discharge Needs Assessment       Row Name 06/12/24 1118       Living Environment    People in Home other (see comments)    Current Living Arrangements home    Potentially Unsafe Housing Conditions none    Primary Care Provided by other (see comments)  Caregiver    Provides Primary Care For no one    Family Caregiver if Needed none    Quality of Family Relationships unable to assess    Able to Return to Prior Arrangements yes       Resource/Environmental Concerns    Resource/Environmental Concerns none    Transportation Concerns none       Transition Planning    Patient/Family Anticipates Transition to home    Transportation Anticipated health plan transportation;family or friend will provide       Discharge Needs Assessment    Readmission Within the Last 30 Days no previous admission in last 30 days    Equipment Currently Used at Home walker, rolling;cane, straight    Anticipated Changes Related to Illness none    Equipment Needed After Discharge none    Provided Post Acute Provider List? N/A    Provided Post Acute Provider Quality & Resource List? N/A                   Discharge Plan       Row Name 06/12/24 1119       Plan    Plan Home with Caregiver    Patient/Family in Agreement with Plan yes    Plan Comments CCP contacted patients legal guardian Carla Velasco. Introduced self and explained role of CCP. Carla confirmed the information on the patients face sheet is accurate. Patients PCP is José Garcia. Patient is enrolled into [facility]. Patients lives at home with his caregiver Karan. aCrla stated that she would like for patient to return back to the home with Karan. Karan can also transport patient. Patient does not use any DME. Does not have any history of HH or SNF. CCP faxed ([phone]) the IMM to Carla as well as progress notes.  CCP following.                  Continued Care and Services - Admitted Since 6/10/2024    No active coordination exists for this encounter.       Expected Discharge Date and Time       Expected Discharge Date Expected Discharge Time    Jun 13, 2024            Demographic Summary       Row Name 06/12/24 1112       General Information    Admission Type inpatient    Arrived From emergency department    Required Notices Provided Important Message from Medicare    Referral Source admission list    Reason for Consult discharge planning    Preferred Language English                   Functional Status       Row Name 06/12/24 1117       Functional Status    Usual Activity Tolerance fair    Current Activity Tolerance fair       Functional Status, IADL    Medications assistive person    Meal Preparation assistive person    Housekeeping assistive person    Laundry assistive person    Shopping assistive person       Mental Status    General Appearance WDL WDL       Mental Status Summary    Recent Changes in Mental Status/Cognitive Functioning no changes       Employment/    Employment Status disabled                   Psychosocial    No documentation.                  Abuse/Neglect    No documentation.                  Legal    No documentation.                  Substance Abuse    No documentation.                  Patient Forms    No documentation.

## 2024-06-12 NOTE — THERAPY EVALUATION
Patient Name: Helder Abbott  : 1975    MRN: [id number]                              Today's Date: 2024       Admit Date: 6/10/2024    Visit Dx:     ICD-10-CM ICD-9-CM   1. Hyperkalemia  E87.5 276.7   2. Abdominal pain, unspecified abdominal location  R10.9 789.00   3. Bipolar affective disorder, remission status unspecified  F31.9 296.80     Patient Active Problem List   Diagnosis    JOSIAH (acute kidney injury)    Essential hypertension    Hypothyroidism (acquired)    History of CVA (cerebrovascular accident)    GERD without esophagitis    HLD (hyperlipidemia)    Bipolar disorder    Partial bowel obstruction    Fecal impaction    Severe malnutrition    Hyperkalemia     Past Medical History:   Diagnosis Date    Benign essential hypertension     Bipolar disorder     Dyslipidemia     GERD (gastroesophageal reflux disease)     Heart disease     History of stroke     Hypothyroidism      History reviewed. No pertinent surgical history.   General Information       [facility] Name 24 1046          Physical Therapy Time and Intention    Document Type evaluation  -     Mode of Treatment co-treatment;physical therapy  -       Row Name 24 1046          General Information    Patient Profile Reviewed yes  -     Prior Level of Function independent:;gait;transfer;bed mobility  Per RN, caregiver reported pt is independent at  with no AD, ambulates around the house and outside  -     Existing Precautions/Restrictions fall  -     Barriers to Rehab medically complex;previous functional deficit  -       Row Name 24 1046          Living Environment    People in Home other (see comments)  Group Home  -       Row Name 24 1046          Cognition    Orientation Status (Cognition) oriented to;person  -       Row Name 24 1046          Safety Issues, Functional Mobility    Impairments Affecting Function (Mobility) balance;endurance/activity tolerance;strength;cognition  -     Cognitive  Impairments, Mobility Safety/Performance impulsivity;insight into deficits/self-awareness;judgment;problem-solving/reasoning;safety precaution follow-through;safety precaution awareness  -     Comment, Safety Issues/Impairments (Mobility) Co treatment medically appropriate and necessary due to patient acuity level, activity tolerance and safety of patient and staff. Evaluation established to achieve all goals in POC.  -               User Key  (r) = Recorded By, (t) = Taken By, (c) = Cosigned By      Initials Name Provider Type     Camille Santa PT Physical Therapist                   Mobility       Row Name 06/12/24 1048          Bed Mobility    Bed Mobility supine-sit  -     Supine-Sit Struthers (Bed Mobility) contact guard;verbal cues  -     Assistive Device (Bed Mobility) head of bed elevated  -       Row Name 06/12/24 1048          Bed-Chair Transfer    Bed-Chair Struthers (Transfers) contact guard;verbal cues  -     Comment, (Bed-Chair Transfer) Stand-pivot transfer to BSC  -       Row Name 06/12/24 1048          Sit-Stand Transfer    Sit-Stand Struthers (Transfers) contact guard;verbal cues  -     Assistive Device (Sit-Stand Transfers) other (see comments)  No AD  -[facility] Name 06/12/24 1048          Gait/Stairs (Locomotion)    Struthers Level (Gait) verbal cues;contact guard  -     Assistive Device (Gait) other (see comments)  Pushing IV pole  -     Distance in Feet (Gait) 12  -     Deviations/Abnormal Patterns (Gait) antalgic;naresh decreased;gait speed decreased;stride length decreased  -     Bilateral Gait Deviations forward flexed posture  -               User Key  (r) = Recorded By, (t) = Taken By, (c) = Cosigned By      Initials Name Provider Type     Camille Santa PT Physical Therapist                   Obj/Interventions       Row Name 06/12/24 1054          Range of Motion Comprehensive    General Range of Motion bilateral lower extremity ROM  WFL  -BH       Row Name 06/12/24 1054          Strength Comprehensive (MMT)    General Manual Muscle Testing (MMT) Assessment lower extremity strength deficits identified  -     Comment, General Manual Muscle Testing (MMT) Assessment Generalized weakness  -BH       Row Name 06/12/24 1054          Balance    Balance Assessment sitting static balance;sitting dynamic balance;standing static balance;standing dynamic balance  -     Static Sitting Balance supervision  -     Dynamic Sitting Balance standby assist  -     Position, Sitting Balance unsupported;sitting edge of bed  -     Static Standing Balance contact guard;verbal cues  -     Dynamic Standing Balance contact guard;verbal cues  -     Position/Device Used, Standing Balance supported;other (see comments)  Holding on IV pole  -     Balance Interventions sitting;standing;sit to stand;supported;static;dynamic  -BH       Row Name 06/12/24 1054          Sensory Assessment (Somatosensory)    Sensory Assessment (Somatosensory) unable/difficult to assess  -               User Key  (r) = Recorded By, (t) = Taken By, (c) = Cosigned By      Initials Name Provider Type     Camille Santa, PT Physical Therapist                   Goals/Plan       Row Name 06/12/24 1136          Bed Mobility Goal 1 (PT)    Activity/Assistive Device (Bed Mobility Goal 1, PT) bed mobility activities, all  -     [place]berg Level/Cues Needed (Bed Mobility Goal 1, PT) independent  -     Time Frame (Bed Mobility Goal 1, PT) 1 week  -BH       Row Name 06/12/24 1136          Transfer Goal 1 (PT)    Activity/Assistive Device (Transfer Goal 1, PT) transfers, all  -     [place]berg Level/Cues Needed (Transfer Goal 1, PT) independent  -     Time Frame (Transfer Goal 1, PT) 1 week  -BH       Row Name 06/12/24 1136          Gait Training Goal 1 (PT)    Activity/Assistive Device (Gait Training Goal 1, PT) gait (walking locomotion)  -     [place]berg Level (Gait Training  Goal 1, PT) independent  -     Distance (Gait Training Goal 1, PT) 100ft  -     Time Frame (Gait Training Goal 1, PT) 1 week  -       Row Name 06/12/24 1136          Therapy Assessment/Plan (PT)    Planned Therapy Interventions (PT) balance training;bed mobility training;gait training;home exercise program;patient/family education;strengthening;transfer training  -               User Key  (r) = Recorded By, (t) = Taken By, (c) = Cosigned By      Initials Name Provider Type     Camille Santa, PT Physical Therapist                   Clinical Impression       Row Name 06/12/24 1055          Pain    Pretreatment Pain Rating 0/10 - no pain  -     Posttreatment Pain Rating 0/10 - no pain  -       Row Name 06/12/24 1055          Plan of Care Review    Plan of Care Reviewed With patient  -     Outcome Evaluation Pt is a 50 yo M admitted from his group home with abdominal pain and increased drowsiness/confusion. Work-up revealed acute hyperkalemia. Per RN, pt's caregiver reported pt is typically independent with no AD, ambulates inside the home and outside without difficulty. Pt presents to PT with impaired strength, endurance, and cognition limiting overall mobility. Pt transferred to EOB with CGA and stood with CGA. Pivoted to [facility] with CGA and demo'd overall safety with transfer. Nsg aid assisted with toilet hygiene and pt agreeable to ambulating 12ft around bed but declines further gait distance, requesting return to bed. PT will continue to follow, anticipate return to group home at [place].  -       Row Name 06/12/24 1053          Therapy Assessment/Plan (PT)    Patient/Family Therapy Goals Statement (PT) Return to [facility]  -     Rehab Potential (PT) good, to achieve stated therapy goals  -     Criteria for Skilled Interventions Met (PT) yes  -     Therapy Frequency (PT) 5 times/wk  -       Row Name 06/12/24 1055          Vital Signs    O2 Delivery Pre Treatment supplemental O2  -     O2 Delivery  Intra Treatment supplemental O2  -     O2 Delivery Post Treatment supplemental O2  -       Row Name 06/12/24 1055          Positioning and Restraints    Pre-Treatment Position in bed  -     Post Treatment Position bed  -BH     In Bed notified nsg;fowlers;call light within reach;encouraged to call for assist;exit alarm on  -               User Key  (r) = Recorded By, (t) = Taken By, (c) = Cosigned By      Initials Name Provider Type     Camille Santa PT Physical Therapist                   Outcome Measures       Row Name 06/12/24 1137 06/12/24 0830       How much help from another person do you currently need...    Turning from your back to your side while in flat bed without using bedrails? 4  - 4  -LC    Moving from lying on back to sitting on the side of a flat bed without bedrails? 3  - 3  -LC    Moving to and from a bed to a chair (including a wheelchair)? 3  - 3  -LC    Standing up from a chair using your arms (e.g., wheelchair, bedside chair)? 3  - 3  -LC    Climbing 3-5 steps with a railing? 3  - 2  -LC    To walk in hospital room? 3  - 3  -LC    AM-PAC 6 Clicks Score (PT) 19  - 18  -    Highest Level of Mobility Goal 6 --> Walk 10 steps or more  - 6 --> Walk 10 steps or more  -      Row Name 06/12/24 1137          Functional Assessment    Outcome Measure Options AM-PAC 6 Clicks Basic Mobility (PT)  -               User Key  (r) = Recorded By, (t) = Taken By, (c) = Cosigned By      Initials Name Provider Type     Isabel Brown, RN Registered Nurse     Camille Santa PT Physical Therapist                                 Physical Therapy Education       Title: PT OT SLP Therapies (Done)       Topic: Physical Therapy (Done)       Point: Mobility training (Done)       Learning Progress Summary             Patient Acceptance, E,TB,D, VU,NR by  at 6/12/2024 1138                         Point: Home exercise program (Done)       Learning Progress Summary              Patient Acceptance, E,TB,D, VU,NR by  at 6/12/2024 1138                         Point: Body mechanics (Done)       Learning Progress Summary             Patient Acceptance, E,TB,D, VU,NR by  at 6/12/2024 1138                         Point: Precautions (Done)       Learning Progress Summary             Patient Acceptance, E,TB,D, VU,NR by  at 6/12/2024 1138                                         User Key       Initials Effective Dates Name Provider Type Discipline     04/08/22 -  Camille Santa, PT Physical Therapist PT                  PT Recommendation and Plan  Planned Therapy Interventions (PT): balance training, bed mobility training, gait training, home exercise program, patient/family education, strengthening, transfer training  Plan of Care Reviewed With: patient  Outcome Evaluation: Pt is a 50 yo M admitted from his group home with abdominal pain and increased drowsiness/confusion. Work-up revealed acute hyperkalemia. Per RN, pt's caregiver reported pt is typically independent with no AD, ambulates inside the home and outside without difficulty. Pt presents to PT with impaired strength, endurance, and cognition limiting overall mobility. Pt transferred to EOB with CGA and stood with CGA. Pivoted to BSC with CGA and demo'd overall safety with transfer. Nsg aid assisted with toilet hygiene and pt agreeable to ambulating 12ft around bed but declines further gait distance, requesting return to bed. PT will continue to follow, anticipate return to group home at [place].     Time Calculation:   PT Evaluation Complexity  History, PT Evaluation Complexity: 1-2 personal factors and/or comorbidities  Examination of Body Systems (PT Eval Complexity): total of 3 or more elements  Clinical Presentation (PT Evaluation Complexity): evolving  Clinical Decision Making (PT Evaluation Complexity): moderate complexity  Overall Complexity (PT Evaluation Complexity): moderate complexity     PT Charges       Row Name  06/12/24 1139             Time Calculation    Start Time 1012  -      Stop Time 1025  -      Time Calculation (min) 13 min  -      PT Received On 06/12/24  -      PT - Next Appointment 06/13/24  -      PT Goal Re-Cert Due Date 06/19/24  -         Time Calculation- PT    Total Timed Code Minutes- PT 10 minute(s)  -         Timed Charges    78203 - Gait Training Minutes  2  -BH      54927 - PT Therapeutic Activity Minutes 8  -         Total Minutes    Timed Charges Total Minutes 10  -       Total Minutes 10  -                User Key  (r) = Recorded By, (t) = Taken By, (c) = Cosigned By      Initials Name Provider Type     Camille Santa, PT Physical Therapist                  Therapy Charges for Today       Code Description Service Date Service Provider Modifiers Qty    79566982072 HC PT THERAPEUTIC ACT EA 15 MIN 6/12/2024 Camille Santa, PT GP 1    06623942239 HC PT EVAL MOD COMPLEXITY 3 6/12/2024 Camille Santa, PT GP 1            PT G-Codes  Outcome Measure Options: AM-PAC 6 Clicks Basic Mobility (PT)  AM-PAC 6 Clicks Score (PT): 19  PT Discharge Summary  Anticipated Discharge Disposition (PT): other (see comments) (Group Home)    Camille Santa PT  6/12/2024

## 2024-06-12 NOTE — PLAN OF CARE
Goal Outcome Evaluation:  Plan of Care Reviewed With: patient        Progress: improving     No new events during this shift, BP improving, pt awake for most of this shift, hiccups when awake, participated in PT/OT therapy, good appetite.

## 2024-06-12 NOTE — PLAN OF CARE
Goal Outcome Evaluation:  Plan of Care Reviewed With: patient           Outcome Evaluation: Pt is a 49 y.o male is admitted from a group home with abdominal pain, increased drowsiness, chest pain following a recent fall. Pt has hx of hronic atrial fibrillation, hypothyroidism, history of CVA, bipolar disorder. Pt is oriented to self, very pleasant, he reports need to use the bathroom, he quickly gets up and transfers to [facility] for urgency but already had a BM in his brief, he requires max A for toilet hygiene. He is able to perform mobility in his room CGA. He may benefit from continued OT while admitted to address increasing his ADL engagement, OOB activity, generalized weakness.      Anticipated Discharge Disposition (OT): home with 24/7 care

## 2024-06-12 NOTE — THERAPY EVALUATION
Acute Care - Speech Language Pathology   Swallow Initial Evaluation [facility]     Patient Name: Helder Abbott  : 1975  MRN: [id number]  Today's Date: 2024               Admit Date: 6/10/2024    Visit Dx:     ICD-10-CM ICD-9-CM   1. Hyperkalemia  E87.5 276.7   2. Abdominal pain, unspecified abdominal location  R10.9 789.00   3. Bipolar affective disorder, remission status unspecified  F31.9 296.80     Patient Active Problem List   Diagnosis    JOSIAH (acute kidney injury)    Essential hypertension    Hypothyroidism (acquired)    History of CVA (cerebrovascular accident)    GERD without esophagitis    HLD (hyperlipidemia)    Bipolar disorder    Partial bowel obstruction    Fecal impaction    Severe malnutrition    Hyperkalemia     Past Medical History:   Diagnosis Date    Benign essential hypertension     Bipolar disorder     Dyslipidemia     GERD (gastroesophageal reflux disease)     Heart disease     History of stroke     Hypothyroidism      History reviewed. No pertinent surgical history.    SLP Recommendation and Plan  SLP Swallowing Diagnosis: swallow WFL/no suspected pharyngeal impairment, other (see comments) (feeding difficulties) (24 1501)  SLP Diet Recommendation: soft to chew textures, chopped, thin liquids (24 1501)  Recommended Precautions and Strategies: upright posture during/after eating, general aspiration precautions, assist with feeding (24 1501)  SLP Rec. for Method of Medication Administration: meds whole, with puree, as tolerated (24 1501)     Monitor for Signs of Aspiration: notify SLP if any concerns (24 1501)     Swallow Criteria for Skilled Therapeutic Interventions Met: no significant expected improvement in functional status (24 1501)  Anticipated Discharge Disposition (SLP): unknown (24 1501)     Therapy Frequency (Swallow): evaluation only (24 1501)     Oral Care Recommendations: Oral Care BID/PRN, Toothbrush (24 1501)                                         Outcome Evaluation: Clinical swallow eval completed. Patient living with intellectual disability. Suggest soft/bite-sized textures, given presumed feeding difficulties due to need for assistance opening packages/cartons. Thin liquids. Meds whole in puree. Aspiration precautions (fully upright for all oral intake, assist pt to open packages and cartons). No additional acute SLP needs identified which require intervention at this time. SLP will sign off. Please re-consult SLP if any needs arise. Thank you very much for the courtesy of this consultation!      SWALLOW EVALUATION (Last 72 Hours)       SLP Adult Swallow Evaluation       Row Name 06/12/24 1504                   Rehab Evaluation    Document Type evaluation  -BB        Subjective Information no complaints  -BB        Patient Observations alert;cooperative;agree to therapy  -BB        Patient Effort good  -BB        Symptoms Noted During/After Treatment none  -BB           General Information    Patient Profile Reviewed yes  -BB        Pertinent History Of Current Problem 48 yo admitted with hyperkalemia. Pt living with intellectual disability. SLP consulted for swallow eval.  -BB        Current Method of Nutrition regular textures;thin liquids  -BB        Precautions/Limitations, Vision WFL;for purposes of eval  -BB        Precautions/Limitations, Hearing WFL;for purposes of eval  -BB        Prior Level of Function-Communication cognitive-linguistic impairment  -BB        Patient's Goals for Discharge return home  -BB           Pain    Additional Documentation Pain Scale: FACES Pre/Post-Treatment (Group)  -BB           Pain Scale: FACES Pre/Post-Treatment    Pain: FACES Scale, Pretreatment 0-->no hurt  -BB        Posttreatment Pain Rating 0-->no hurt  -BB           Oral Motor Structure and Function    Dentition Assessment natural, present and adequate  -BB        Secretion Management WNL/WFL  -BB        Mucosal Quality  moist, healthy  -BB           Oral Musculature and Cranial Nerve Assessment    Oral Motor General Assessment WFL  -BB           General Eating/Swallowing Observations    Respiratory Support Currently in Use room air  -BB        Eating/Swallowing Skills self-fed;other (see comments)  pt needed assistance opening carton  -BB        Positioning During Eating upright 90 degree  -BB        Utensils Used spoon;cup;straw  -BB        Consistencies Trialed regular textures;soft to chew textures;chopped;mixed consistency;thin liquids  -BB           Clinical Swallow Eval    Clinical Swallow Evaluation Summary Clinical swallow eval completed. No family present. Cognition: A/Ox person. Pt able to state that he is in the hospital. Pt able to follow basic commands. Expressive communication: pt living with intellectual disability; some occasional topic incongruencies. Voice: Vocal quality and loudness appear good. Cough: Not elicited. Affect: Pleasant. Speech: approx 70-80% intelligible. Bulbar mech exam: Integrity of cranial nerves V, VII, IX/X and XII appear grossly intact. Isabel swallow protocol: passed; good predictor of safe oral alimentation so long as the patient remains stable medically / neurologically. Dysphagia symptoms: pt denies. RN reports that pt appears to be tolerating diet wo observable issues. Patient agreeable to PO trials. Dysphagia signs: No overt clinical signs of dysphagia and/or aspiration. Pt demonstrates appropriate self-feeding safety awareness. Pt demonstrates some difficulties opening cartons. Assessment / Plan: Patient living with intellectual disability. Suggest soft/bite-sized textures given presumed feeding difficulties and need for assistance opening packages/cartons. Thin liquids. Meds whole in puree. Aspiration precautions (fully upright for all oral intake, assist pt to open packages and cartons). No additional acute SLP needs identified which require intervention at this time. SLP will sign  off. Please re-consult SLP if any needs arise. Thank you very much for the courtesy of this consultation!  -BB           SLP Evaluation Clinical Impression    SLP Swallowing Diagnosis swallow WFL/no suspected pharyngeal impairment;other (see comments)  feeding difficulties  -BB        Swallow Criteria for Skilled Therapeutic Interventions Met no significant expected improvement in functional status  -BB           Recommendations    Therapy Frequency (Swallow) evaluation only  -BB        SLP Diet Recommendation soft to chew textures;chopped;thin liquids  -BB        Recommended Precautions and Strategies upright posture during/after eating;general aspiration precautions;assist with feeding  -BB        Oral Care Recommendations Oral Care BID/PRN;Toothbrush  -BB        SLP Rec. for Method of Medication Administration meds whole;with puree;as tolerated  -BB        Monitor for Signs of Aspiration notify SLP if any concerns  -BB        Anticipated Discharge Disposition (SLP) unknown  -BB                  User Key  (r) = Recorded By, (t) = Taken By, (c) = Cosigned By      Initials Name Effective Dates    Ulises Dale SLP 02/19/23 -                     EDUCATION  The patient has been educated in the following areas:   Dysphagia (Swallowing Impairment).                Time Calculation:    Time Calculation- SLP       Row Name 06/12/24 1644             Time Calculation- SLP    SLP Start Time 1500  -BB      SLP Stop Time 1600  -BB      SLP Time Calculation (min) 60 min  -BB      SLP Received On 06/12/24  -BB         Untimed Charges    SLP Eval/Re-eval  ST Eval Oral Pharyng Swallow - 62739  -BB      40317-EZ Eval Oral Pharyng Swallow Minutes 60  -BB         Total Minutes    Untimed Charges Total Minutes 60  -BB       Total Minutes 60  -BB                User Key  (r) = Recorded By, (t) = Taken By, (c) = Cosigned By      Initials Name Provider Type    Ulises Dale SLP Speech and Language Pathologist                     Therapy Charges for Today       Code Description Service Date Service Provider Modifiers Qty    59932561142  ST EVAL ORAL PHARYNG SWALLOW 4 6/12/2024 Ulises Jasso, SLP GN 1                 Ulises Jasso, SLP  6/12/2024

## 2024-06-12 NOTE — PLAN OF CARE
Goal Outcome Evaluation:  Plan of Care Reviewed With: patient           Outcome Evaluation: Pt is a 50 yo M admitted from his group home with abdominal pain and increased drowsiness/confusion. Work-up revealed acute hyperkalemia. Per RN, pt's caregiver reported pt is typically independent with no AD, ambulates inside the home and outside without difficulty. Pt presents to PT with impaired strength, endurance, and cognition limiting overall mobility. Pt transferred to EOB with CGA and stood with CGA. Pivoted to BSC with CGA and demo'd overall safety with transfer. Nsg aid assisted with toilet hygiene and pt agreeable to ambulating 12ft around bed but declines further gait distance, requesting return to bed. PT will continue to follow, anticipate return to group home at [place].      Anticipated Discharge Disposition (PT): other (see comments) (Group Home)

## 2024-06-12 NOTE — PROGRESS NOTES
[facility] Progress Note      Patient Name: Helder Abbott  : 1975  MRN: [id number]  Primary Care Physician:  José Garcia APRN  Date of admission: 6/10/2024    Subjective     Interval History:   Follow-up hyperkalemia and hyponatremia.  Hypotensive the past 24 hours.  Complains of hiccoughs.  Just completed echo.  No response in blood pressure to fluid boluses.  Feels hungry.  Feels very cold.  Shivering.  Review of Systems:   As noted above    Objective     Vitals:   Temp:  [97.2 °F (36.2 °C)-98.1 °F (36.7 °C)] 97.9 °F (36.6 °C)  Heart Rate:  [53-73] 73  Resp:  [14-18] 18  BP: ()/(46-83) 99/83  Flow (L/min):  [1] 1    Intake/Output Summary (Last 24 hours) at 2024 1330  Last data filed at 2024 0743  Gross per 24 hour   Intake 462 ml   Output 650 ml   Net -188 ml       Physical Exam:    General Appearance: Very chronically ill.  Pale.  Nasal oxygen in place.  Speech a little dysarthric.  Skin: warm and dry  HEENT: oral mucosa dry.  Nonicteric sclera.  [place] oxygen.  Neck: supple, no JVD  Lungs: Clear to auscultation bilaterally.  Heart: Irregularly irregular.  Abdomen: soft, nontender, nondistended. +bs  : no palpable bladder  Extremities: no edema.  Neuro: normal speech and mental status     Scheduled Meds:     apixaban, 5 mg, Oral, Q12H  atorvastatin, 40 mg, Oral, Daily  benztropine, 1 mg, Oral, BID  calcium 500 mg vitamin D 5 mcg (200 UT), 1 tablet, Oral, BID  digoxin, 125 mcg, Oral, Daily  divalproex, 500 mg, Oral, Daily  levothyroxine, 50 mcg, Oral, Q AM  melatonin, 10 mg, Oral, Nightly  [Held by provider] metoprolol succinate XL, 25 mg, Oral, Q24H  OLANZapine zydis, 5 mg, Oral, Nightly  pantoprazole, 40 mg, Oral, BID AC  polyethylene glycol, 17 g, Oral, Daily  sodium chloride, 10 mL, Intravenous, Q12H      IV Meds:   sodium chloride, 100 mL/hr, Last Rate: 100 mL/hr (24 2267)        Results Reviewed:   I have personally reviewed the results from  the time of this admission to 6/12/2024 13:30 EDT     Results from last 7 days   Lab Units 06/12/24  0639 06/11/24  1523 06/11/24  0615 06/10/24  2026   SODIUM mmol/L 131*  --  131*  129* 130*   POTASSIUM mmol/L 4.9 5.6* 5.8*  5.7* 5.7*   CHLORIDE mmol/L 103  --  101  100 98   CO2 mmol/L 24.0  --  17.2*  21.0* 26.0   BUN mg/dL 9  --  18  18 21*   CREATININE mg/dL 0.72*  --  1.09  1.06 0.86   CALCIUM mg/dL 8.4*  --  8.7  8.6 9.4   BILIRUBIN mg/dL  --   --  0.3 0.3   ALK PHOS U/L  --   --  64 84   ALT (SGPT) U/L  --   --  5 9   AST (SGOT) U/L  --   --  15 13   GLUCOSE mg/dL 71  --  109*  111* 91       CrCl cannot be calculated (Unknown ideal weight.).    Results from last 7 days   Lab Units 06/12/24  0639 06/11/24  0615 06/10/24  2026   MAGNESIUM mg/dL 1.6  --  1.9   PHOSPHORUS mg/dL 2.9 4.4  --        Results from last 7 days   Lab Units 06/12/24  0639 06/11/24 0615   URIC ACID mg/dL 6.7 7.4*       Results from last 7 days   Lab Units 06/12/24  0639 06/11/24  0615 06/10/24  2026   WBC 10*3/mm3 4.83 5.32 5.36   HEMOGLOBIN g/dL 12.6* 13.5 15.6   PLATELETS 10*3/mm3 76* 95* 117*             Assessment / Plan     ASSESSMENT:  Hyperkalemia and hyponatremia.  Hyperkalemia improved.  Patient had been on beta-blocker, ACE inhibitor, and recent nonsteroidal use.  1 dose Lokelma given yesterday.  Non-Anion gap metabolic acidosis likely also contributing as well as LR given for bolus for hypotension.  Hyponatremia.  Urine studies suggest SIADH with urine sodium of 79.  Thyroid function normal.  Hypotensive concerning for hypovolemia but has not really improved with IV fluids.  Cortisol level normal, however, low for episode of acute illness.  May need cosyntropin stimulation test.  Toxin screen negative.  CT did demonstrate urinary bladder distention.  Large bowel movement today.  2.  Bipolar disorder with superimposed encephalopathy.  May have been due to trazodone.  3.  Persistent atrial fibrillation.  Stop metoprolol  due to his hypotension as well as his hyperkalemia.  Remains on digoxin and Eliquis.  May need to rethink the Eliquis given his significant thrombocytopenia.  4.  History of hypertension blood pressure remains low.  Cortisol level normal.  Off antihypertensives at this point.  Echocardiogram in progress.  5.  Thrombocytopenia.  Dates back to May 1, 2024.  6.  Hiccoughs.  CT of the abdomen 6/10 did not demonstrate any subdiaphragmatic fluid collection to suggest irritation.  7.  Hypothyroid on replacement.  PLAN:  Bladder scan and straight cath if over 400 cc  Continue IV fluids  Consider cosyntropin stimulation test  Check immature platelet fraction.    Thank you for involving us in the care of Helder Abbott.  Please feel free to call with any questions.    Birdie Sánchez MD  06/12/24  13:30 EDT    [facility]  [phone]    Please note that portions of this note were completed with a voice recognition program.

## 2024-06-12 NOTE — THERAPY EVALUATION
Patient Name: Helder Abbott  : 1975    MRN: [id number]                              Today's Date: 2024       Admit Date: 6/10/2024    Visit Dx:     ICD-10-CM ICD-9-CM   1. Hyperkalemia  E87.5 276.7   2. Abdominal pain, unspecified abdominal location  R10.9 789.00   3. Bipolar affective disorder, remission status unspecified  F31.9 296.80     Patient Active Problem List   Diagnosis    JOSIAH (acute kidney injury)    Essential hypertension    Hypothyroidism (acquired)    History of CVA (cerebrovascular accident)    GERD without esophagitis    HLD (hyperlipidemia)    Bipolar disorder    Partial bowel obstruction    Fecal impaction    Severe malnutrition    Hyperkalemia     Past Medical History:   Diagnosis Date    Benign essential hypertension     Bipolar disorder     Dyslipidemia     GERD (gastroesophageal reflux disease)     Heart disease     History of stroke     Hypothyroidism      History reviewed. No pertinent surgical history.   General Information       Row Name 24 1427          OT Time and Intention    Document Type evaluation  -     Mode of Treatment occupational therapy  -       Row Name 24 142          General Information    Patient Profile Reviewed yes  -SM     Prior Level of Function independent:;all household mobility  RN reports pt has some ADL assist from caregiver, doesnt use any AD for mobility, walks outside independently  -     Existing Precautions/Restrictions fall  -     Barriers to Rehab previous functional deficit;cognitive status  -       Row Name 24 142          Living Environment    People in Home other (see comments)  group home, caregiver/guardian  -       Row Name 24 142          Cognition    Orientation Status (Cognition) oriented to;person  -       Row Name 24 142          Safety Issues, Functional Mobility    Impairments Affecting Function (Mobility) balance;cognition;endurance/activity tolerance;strength;shortness of breath   -     Comment, Safety Issues/Impairments (Mobility) PT/OT cotreatment medically appropriate and necessary due to patient acuity level, to maximize therapeutic benefit due to impaired act tolerance, and for safety of patient and staff. Treatment focused on progression of care and goals established in POC.  -               User Key  (r) = Recorded By, (t) = Taken By, (c) = Cosigned By      Initials Name Provider Type     Tri Tijerina OT Occupational Therapist                     Mobility/ADL's       Row Name 06/12/24 1428          Bed Mobility    Supine-Sit [place] (Bed Mobility) contact guard;verbal cues  -       Row Name 06/12/24 1428          Transfers    Transfers toilet transfer  -       Row Name 06/12/24 1428          Sit-Stand Transfer    Sit-Stand [place] (Transfers) contact guard;verbal cues  -       Row Name 06/12/24 1428          Toilet Transfer    [place] Level (Toilet Transfer) contact guard;verbal cues  -       Row Name 06/12/24 1428          Functional Mobility    Functional Mobility- Ind. Level contact guard assist  -     Functional Mobility-Distance (Feet) --  12  -     Functional Mobility- Comment holding onto IV pole, no AD, pt encouraged to complete further mobility but pt reports wanting to get back to bed  -       Row Name 06/12/24 1428          Activities of Daily Living    BADL Assessment/Intervention toileting;feeding  -       Row Name 06/12/24 1428          Toileting Assessment/Training    [place] Level (Toileting) maximum assist (25% patient effort);perform perineal hygiene;adjust/manage clothing  -       Row Name 06/12/24 1428          Self-Feeding Assessment/Training    [place] Level (Feeding) set up  -               User Key  (r) = Recorded By, (t) = Taken By, (c) = Cosigned By      Initials Name Provider Type     Tri Tijerina OT Occupational Therapist                   Obj/Interventions       Row Name 06/12/24 1420           Range of Motion Comprehensive    General Range of Motion bilateral upper extremity ROM WNL  -[facility] Name 06/12/24 1429          Strength Comprehensive (MMT)    Comment, General Manual Muscle Testing (MMT) Assessment BUE 4/5  -[facility] Name 06/12/24 1429          Motor Skills    Motor Skills functional endurance  -     Functional Endurance fair  -SM       Row Name 06/12/24 1429          Balance    Static Sitting Balance supervision  -     Position, Sitting Balance sitting edge of bed  -SM     Static Standing Balance contact guard  -SM     Dynamic Standing Balance contact guard  -               User Key  (r) = Recorded By, (t) = Taken By, (c) = Cosigned By      Initials Name Provider Type    SM Tri Tijerina, OT Occupational Therapist                   Goals/Plan       Row Name 06/12/24 1432          Transfer Goal 1 (OT)    Activity/Assistive Device (Transfer Goal 1, OT) toilet  -SM     [place] Level/Cues Needed (Transfer Goal 1, OT) supervision required  -SM     Time Frame (Transfer Goal 1, OT) short term goal (STG);2 weeks  -SM     Progress/Outcome (Transfer Goal 1, OT) goal ongoing  -SM       Row Name 06/12/24 1432          Toileting Goal 1 (OT)    Activity/Device (Toileting Goal 1, OT) toileting skills, all  -SM     [place] Level/Cues Needed (Toileting Goal 1, OT) supervision required  -SM     Time Frame (Toileting Goal 1, OT) short term goal (STG);2 weeks  -SM     Progress/Outcome (Toileting Goal 1, OT) goal ongoing  -SM       Row Name 06/12/24 1432          Grooming Goal 1 (OT)    Activity/Device (Grooming Goal 1, OT) grooming skills, all  -SM     [place] (Grooming Goal 1, OT) supervision required  -SM     Time Frame (Grooming Goal 1, OT) short term goal (STG);2 weeks  -SM     Strategies/Barriers (Grooming Goal 1, OT) standing at sink in bathroom  -SM     Progress/Outcome (Grooming Goal 1, OT) goal ongoing  -SM       Row Name 06/12/24 1432          Therapy Assessment/Plan (OT)     Planned Therapy Interventions (OT) activity tolerance training;functional balance retraining;occupation/activity based interventions;patient/caregiver education/training;transfer/mobility retraining;strengthening exercise  -               User Key  (r) = Recorded By, (t) = Taken By, (c) = Cosigned By      Initials Name Provider Type    Tri Feng, KO Occupational Therapist                   Clinical Impression       Row Name 06/12/24 1430          Pain Assessment    Pretreatment Pain Rating 0/10 - no pain  -SM     Posttreatment Pain Rating 0/10 - no pain  -       Row Name 06/12/24 1430          Plan of Care Review    Plan of Care Reviewed With patient  -     Outcome Evaluation Pt is a 49 y.o male is admitted from a group home with abdominal pain, increased drowsiness, chest pain following a recent fall. Pt has hx of hronic atrial fibrillation, hypothyroidism, history of CVA, bipolar disorder. Pt is oriented to self, very pleasant, he reports need to use the bathroom, he quickly gets up and transfers to [facility] for urgency but already had a BM in his brief, he requires max A for toilet hygiene. He is able to perform mobility in his room CGA. He may benefit from continued OT while admitted to address increasing his ADL engagement, OOB activity, generalized weakness.  -       Row Name 06/12/24 1430          Therapy Assessment/Plan (OT)    Rehab Potential (OT) good, to achieve stated therapy goals  -     Criteria for Skilled Therapeutic Interventions Met (OT) yes;skilled treatment is necessary  -     Therapy Frequency (OT) 3 times/wk  -       Row Name 06/12/24 1430          Therapy Plan Review/Discharge Plan (OT)    Anticipated Discharge Disposition (OT) home with 24/7 care  -       Row Name 06/12/24 1430          Vital Signs    Pre SpO2 (%) --  per RN desating with activity without O2. >90% throughout  -SM     O2 Delivery Pre Treatment supplemental O2  -SM     O2 Delivery Intra Treatment  supplemental O2  -SM     O2 Delivery Post Treatment supplemental O2  -SM       Row Name 06/12/24 1430          Positioning and Restraints    Pre-Treatment Position in bed  -SM     Post Treatment Position bed  -SM     In Bed fowlers;encouraged to call for assist;exit alarm on;notified nsg;call light within reach  -SM               User Key  (r) = Recorded By, (t) = Taken By, (c) = Cosigned By      Initials Name Provider Type     Tri Tijerina OT Occupational Therapist                   Outcome Measures       Row Name 06/12/24 1433          How much help from another is currently needed...    Putting on and taking off regular lower body clothing? 2  -SM     Bathing (including washing, rinsing, and drying) 2  -SM     Toileting (which includes using toilet bed pan or urinal) 2  -SM     Putting on and taking off regular upper body clothing 2  -SM     Taking care of personal grooming (such as brushing teeth) 3  -SM     Eating meals 3  -SM     AM-PAC 6 Clicks Score (OT) 14  -SM       Row Name 06/12/24 1137 06/12/24 0830       How much help from another person do you currently need...    Turning from your back to your side while in flat bed without using bedrails? 4  - 4  -LC    Moving from lying on back to sitting on the side of a flat bed without bedrails? 3  - 3  -LC    Moving to and from a bed to a chair (including a wheelchair)? 3  - 3  -LC    Standing up from a chair using your arms (e.g., wheelchair, bedside chair)? 3  - 3  -LC    Climbing 3-5 steps with a railing? 3  - 2  -LC    To walk in hospital room? 3  -BH 3  -LC    AM-PAC 6 Clicks Score (PT) 19  -BH 18  -LC    Highest Level of Mobility Goal 6 --> Walk 10 steps or more  -BH 6 --> Walk 10 steps or more  -      Row Name 06/12/24 1433 06/12/24 1137       Functional Assessment    Outcome Measure Options AM-PAC 6 Clicks Daily Activity (OT)  - AM-PAC 6 Clicks Basic Mobility (PT)  -              User Key  (r) = Recorded By, (t) = Taken By, (c) =  Cosigned By      Initials Name Provider Type     Tri Tijerina OT Occupational Therapist    Isabel Kulkarni RN Registered Nurse    Camille Pino, PT Physical Therapist                    Occupational Therapy Education       Title: PT OT SLP Therapies (In Progress)       Topic: Occupational Therapy (In Progress)       Point: ADL training (In Progress)       Description:   Instruct learner(s) on proper safety adaptation and remediation techniques during self care or transfers.   Instruct in proper use of assistive devices.                  Learning Progress Summary             Patient Acceptance, E, NR by  at 6/12/2024 2117    Comment: OT goals, POC                         Point: Home exercise program (Not Started)       Description:   Instruct learner(s) on appropriate technique for monitoring, assisting and/or progressing therapeutic exercises/activities.                  Learner Progress:  Not documented in this visit.              Point: Precautions (Not Started)       Description:   Instruct learner(s) on prescribed precautions during self-care and functional transfers.                  Learner Progress:  Not documented in this visit.              Point: Body mechanics (Not Started)       Description:   Instruct learner(s) on proper positioning and spine alignment during self-care, functional mobility activities and/or exercises.                  Learner Progress:  Not documented in this visit.                              User Key       Initials Effective Dates Name Provider Type Discipline     04/02/20 -  Tri Tijerina OT Occupational Therapist OT                  OT Recommendation and Plan  Planned Therapy Interventions (OT): activity tolerance training, functional balance retraining, occupation/activity based interventions, patient/caregiver education/training, transfer/mobility retraining, strengthening exercise  Therapy Frequency (OT): 3 times/wk  Plan of Care Review  Plan of Care  Reviewed With: patient  Outcome Evaluation: Pt is a 49 y.o male is admitted from a group home with abdominal pain, increased drowsiness, chest pain following a recent fall. Pt has hx of hronic atrial fibrillation, hypothyroidism, history of CVA, bipolar disorder. Pt is oriented to self, very pleasant, he reports need to use the bathroom, he quickly gets up and transfers to [facility] for urgency but already had a BM in his brief, he requires max A for toilet hygiene. He is able to perform mobility in his room CGA. He may benefit from continued OT while admitted to address increasing his ADL engagement, OOB activity, generalized weakness.     Time Calculation:   Evaluation Complexity (OT)  Review Occupational Profile/Medical/Therapy History Complexity: expanded/moderate complexity  Assessment, Occupational Performance/Identification of Deficit Complexity: 3-5 performance deficits  Clinical Decision Making Complexity (OT): detailed assessment/moderate complexity  Overall Complexity of Evaluation (OT): moderate complexity     Time Calculation- OT       Row Name 06/12/24 1434 06/12/24 1139          Time Calculation- OT    OT Start Time 1009  -SM --     OT Stop Time 1025  -SM --     OT Time Calculation (min) 16 min  -SM --     Total Timed Code Minutes- OT 8 minute(s)  - --     OT Received On 06/12/24  -SM --     OT - Next Appointment 06/14/24  -SM --     OT Goal Re-Cert Due Date 06/26/24  -SM --        Timed Charges    97998 - Gait Training Minutes  -- 2  -BH     82509 - OT Self Care/Mgmt Minutes 8  -SM --        Untimed Charges    OT Eval/Re-eval Minutes 8  -SM --        Total Minutes    Timed Charges Total Minutes 8  -SM 2  -BH     Untimed Charges Total Minutes 8  -SM --      Total Minutes 16  -SM 2  -BH               User Key  (r) = Recorded By, (t) = Taken By, (c) = Cosigned By      Initials Name Provider Type    Tri Feng OT Occupational Therapist    Camille Pino PT Physical Therapist                   Therapy Charges for Today       Code Description Service Date Service Provider Modifiers Qty    38120087538 HC OT SELF CARE/MGMT/TRAIN EA 15 MIN 6/12/2024 Tri Tijerina OT GO 1    68972870246 HC OT EVAL MOD COMPLEXITY 2 6/12/2024 Tri Tijerina OT GO 1                 Tri Tijerina OT  6/12/2024

## 2024-06-13 VITALS
OXYGEN SATURATION: 100 % | WEIGHT: 115.74 LBS | SYSTOLIC BLOOD PRESSURE: 148 MMHG | BODY MASS INDEX: 15.34 KG/M2 | DIASTOLIC BLOOD PRESSURE: 90 MMHG | TEMPERATURE: 97.3 F | HEIGHT: 73 IN | RESPIRATION RATE: 18 BRPM | HEART RATE: 87 BPM

## 2024-06-13 LAB
ALBUMIN SERPL-MCNC: 3 G/DL (ref 3.5–5.2)
ALBUMIN/GLOB SERPL: 1.8 G/DL
ALP SERPL-CCNC: 72 U/L (ref 39–117)
ALT SERPL W P-5'-P-CCNC: 8 U/L (ref 1–41)
ANION GAP SERPL CALCULATED.3IONS-SCNC: 3 MMOL/L (ref 5–15)
AST SERPL-CCNC: 12 U/L (ref 1–40)
BILIRUB SERPL-MCNC: 0.2 MG/DL (ref 0–1.2)
BUN SERPL-MCNC: 7 MG/DL (ref 6–20)
BUN/CREAT SERPL: 9.9 (ref 7–25)
CALCIUM SPEC-SCNC: 8 MG/DL (ref 8.6–10.5)
CHLORIDE SERPL-SCNC: 105 MMOL/L (ref 98–107)
CO2 SERPL-SCNC: 27 MMOL/L (ref 22–29)
CREAT SERPL-MCNC: 0.71 MG/DL (ref 0.76–1.27)
DEPRECATED RDW RBC AUTO: 44.2 FL (ref 37–54)
EGFRCR SERPLBLD CKD-EPI 2021: 112.5 ML/MIN/1.73
ERYTHROCYTE [DISTWIDTH] IN BLOOD BY AUTOMATED COUNT: 13.6 % (ref 12.3–15.4)
FOLATE SERPL-MCNC: 3.67 NG/ML (ref 4.78–24.2)
GLOBULIN UR ELPH-MCNC: 1.7 GM/DL
GLUCOSE SERPL-MCNC: 78 MG/DL (ref 65–99)
HCT VFR BLD AUTO: 37.7 % (ref 37.5–51)
HGB BLD-MCNC: 12.1 G/DL (ref 13–17.7)
MAGNESIUM SERPL-MCNC: 1.3 MG/DL (ref 1.6–2.6)
MCH RBC QN AUTO: 28.9 PG (ref 26.6–33)
MCHC RBC AUTO-ENTMCNC: 32.1 G/DL (ref 31.5–35.7)
MCV RBC AUTO: 90.2 FL (ref 79–97)
PHOSPHATE SERPL-MCNC: 2.8 MG/DL (ref 2.5–4.5)
PLATELET # BLD AUTO: 82 10*3/MM3 (ref 140–450)
PMV BLD AUTO: 11 FL (ref 6–12)
POTASSIUM SERPL-SCNC: 4.7 MMOL/L (ref 3.5–5.2)
PROT SERPL-MCNC: 4.7 G/DL (ref 6–8.5)
RBC # BLD AUTO: 4.18 10*6/MM3 (ref 4.14–5.8)
SODIUM SERPL-SCNC: 135 MMOL/L (ref 136–145)
VIT B12 BLD-MCNC: 530 PG/ML (ref 211–946)
WBC NRBC COR # BLD AUTO: 4.57 10*3/MM3 (ref 3.4–10.8)

## 2024-06-13 PROCEDURE — 83735 ASSAY OF MAGNESIUM: CPT | Performed by: INTERNAL MEDICINE

## 2024-06-13 PROCEDURE — 25010000002 MAGNESIUM SULFATE 2 GM/50ML SOLUTION: Performed by: STUDENT IN AN ORGANIZED HEALTH CARE EDUCATION/TRAINING PROGRAM

## 2024-06-13 PROCEDURE — 80053 COMPREHEN METABOLIC PANEL: CPT | Performed by: INTERNAL MEDICINE

## 2024-06-13 PROCEDURE — 25810000003 SODIUM CHLORIDE 0.9 % SOLUTION: Performed by: INTERNAL MEDICINE

## 2024-06-13 PROCEDURE — 82746 ASSAY OF FOLIC ACID SERUM: CPT | Performed by: INTERNAL MEDICINE

## 2024-06-13 PROCEDURE — 84100 ASSAY OF PHOSPHORUS: CPT | Performed by: INTERNAL MEDICINE

## 2024-06-13 PROCEDURE — 82607 VITAMIN B-12: CPT | Performed by: STUDENT IN AN ORGANIZED HEALTH CARE EDUCATION/TRAINING PROGRAM

## 2024-06-13 PROCEDURE — 85027 COMPLETE CBC AUTOMATED: CPT | Performed by: INTERNAL MEDICINE

## 2024-06-13 RX ORDER — FOLIC ACID 1 MG/1
1 TABLET ORAL DAILY
Qty: 30 TABLET | Refills: 0 | Status: SHIPPED | OUTPATIENT
Start: 2024-06-14 | End: 2024-07-14

## 2024-06-13 RX ORDER — MAGNESIUM SULFATE HEPTAHYDRATE 40 MG/ML
2 INJECTION, SOLUTION INTRAVENOUS
Status: DISPENSED | OUTPATIENT
Start: 2024-06-13 | End: 2024-06-13

## 2024-06-13 RX ORDER — PANTOPRAZOLE SODIUM 40 MG/1
40 TABLET, DELAYED RELEASE ORAL
Qty: 60 TABLET | Refills: 0 | Status: SHIPPED | OUTPATIENT
Start: 2024-06-13 | End: 2024-07-13

## 2024-06-13 RX ORDER — FOLIC ACID 1 MG/1
1 TABLET ORAL DAILY
Status: DISCONTINUED | OUTPATIENT
Start: 2024-06-13 | End: 2024-06-13 | Stop reason: HOSPADM

## 2024-06-13 RX ADMIN — DIGOXIN 125 MCG: 125 TABLET ORAL at 09:16

## 2024-06-13 RX ADMIN — APIXABAN 5 MG: 5 TABLET, FILM COATED ORAL at 09:16

## 2024-06-13 RX ADMIN — BENZTROPINE MESYLATE 1 MG: 1 TABLET ORAL at 09:16

## 2024-06-13 RX ADMIN — MAGNESIUM SULFATE HEPTAHYDRATE 2 G: 2 INJECTION, SOLUTION INTRAVENOUS at 12:44

## 2024-06-13 RX ADMIN — CALCIUM CARBONATE-VITAMIN D TAB 500 MG-200 UNIT 1 TABLET: 500-200 TAB at 09:16

## 2024-06-13 RX ADMIN — LEVOTHYROXINE SODIUM 50 MCG: 50 TABLET ORAL at 06:39

## 2024-06-13 RX ADMIN — Medication 1 MG: at 09:16

## 2024-06-13 RX ADMIN — PANTOPRAZOLE SODIUM 40 MG: 40 TABLET, DELAYED RELEASE ORAL at 06:39

## 2024-06-13 RX ADMIN — SODIUM CHLORIDE 125 ML/HR: 9 INJECTION, SOLUTION INTRAVENOUS at 04:24

## 2024-06-13 RX ADMIN — DIVALPROEX SODIUM 500 MG: 500 TABLET, DELAYED RELEASE ORAL at 09:16

## 2024-06-13 RX ADMIN — POLYETHYLENE GLYCOL 3350 17 G: 17 POWDER, FOR SOLUTION ORAL at 09:15

## 2024-06-13 RX ADMIN — MAGNESIUM SULFATE HEPTAHYDRATE 2 G: 2 INJECTION, SOLUTION INTRAVENOUS at 09:15

## 2024-06-13 RX ADMIN — ATORVASTATIN CALCIUM 40 MG: 20 TABLET, FILM COATED ORAL at 09:16

## 2024-06-13 NOTE — CASE MANAGEMENT/SOCIAL WORK
Case Management Discharge Note      Final Note: Returning home with Caregiver Karan. CCP called Carla the legal guardian and she is aware of discharge and agreeable for patient to return home.    Provided Post Acute Provider List?: N/A  Provided Post Acute Provider Quality & Resource List?: N/A    Selected Continued Care - Admitted Since 6/10/2024       Destination    No services have been selected for the patient.                Durable Medical Equipment    No services have been selected for the patient.                Dialysis/Infusion    No services have been selected for the patient.                Home Medical Care    No services have been selected for the patient.                Therapy    No services have been selected for the patient.                Community Resources    No services have been selected for the patient.                Community & DME    No services have been selected for the patient.                    Transportation Services  Private: Car    Final Discharge Disposition Code: 01 - home or self-care

## 2024-06-13 NOTE — DISCHARGE SUMMARY
Patient Name: Helder Abbott  : 1975  MRN: [id number]    Date of Admission: 6/10/2024  Date of Discharge:  2024  Primary Care Physician: José Garcia APRN      Chief Complaint:   Abdominal Pain      Discharge Diagnoses     Active Hospital Problems    Diagnosis  POA    **Hyperkalemia [E87.5]  Yes    Bipolar disorder [F31.9]  Yes    Essential hypertension [I10]  Yes    Hypothyroidism (acquired) [E03.9]  Yes    History of CVA (cerebrovascular accident) [Z86.73]  Not Applicable    HLD (hyperlipidemia) [E78.5]  Yes      Resolved Hospital Problems   No resolved problems to display.        Hospital Course     Mr. Abbott is a 49 y.o. male with a history of bipolar disorder receives Abilify IM injections monthly, intellectual disability lives in a group home, hypothyroidism, chronic atrial fibrillation, hypertension, GERD presenting with abdominal pain, chest pain, altered mental status after starting trazodone earlier this week found to have hyperkalemia.  CT abdomen without any acute findings and abdominal pain quickly resolved.  Had some intermittent chest pain, multiple troponins were flat.  EKGs with no sign of ischemia.  Echo with no wall motion abnormalities.    His encephalopathy improved with the discontinuation of trazodone and time.  Patient is less alert of seeing him so far this admission.    Patient has some hyperkalemia that resolved with stopping his lisinopril 40 mg twice daily.  Renal was consulted to help with his hyperkalemia and hypotension.  Also had some hypotension that improved after stopping lisinopril.  Metoprolol was held with his hypotension but now that his blood pressure has improved we will restart at discharge.    Patient also had some hiccups.  Switched his Pepcid to pantoprazole as pantoprazole is first-line for intractable hiccups and his hiccups slowly improved and he did not have any hiccups on the day of discharge during my evaluation.    Patient has had some  thrombocytopenia since May of this year.  Platelets have remained above 80.  HIV negative.  Folate was low so we will send home on folate replacement.    CBC  at follow-up to monitor platelets.  If folate supplementation does not help may need further evaluation of thrombocytopenia        At the time of discharge patient was told to take all medications as prescribed, keep all follow-up appointments, and call their doctor or return to the hospital with any worsening or concerning symptoms.    Day of Discharge     Subjective:  Patient resting comfortably in bed.  Most alert I seen him so far.  No nausea or vomiting.  No further chest pain.        Physical Exam:  Temp:  [97.2 °F (36.2 °C)-98.2 °F (36.8 °C)] 97.3 °F (36.3 °C)  Heart Rate:  [65-87] 87  Resp:  [18] 18  BP: ()/(57-90) 148/90  Body mass index is 15.27 kg/m².  Physical Exam  Physical Exam  Vitals and nursing note reviewed.   Constitutional:       General: He is not in acute distress.  Cardiovascular:      Rate and Rhythm: Normal rate and regular rhythm.   Pulmonary:      Effort: Pulmonary effort is normal. No respiratory distress.   Abdominal:      General: Abdomen is flat. There is no distension.      Tenderness: There is no abdominal tenderness.   Musculoskeletal:         General: No swelling or deformity.   Skin:     General: Skin is warm and dry.   Neurological:      Mental Status: He is alert. Mental status is at baseline.      Comments: Alert to self. Difficult t   Consultants     Consult Orders (all) (From admission, onward)       Start     Ordered    06/11/24 0830  Inpatient Nephrology Consult  Once        Specialty:  Nephrology  Provider:  Jun Lund MD    06/11/24 0830    06/11/24 0133  Inpatient Case Management  Consult  Once        Provider:  (Not yet assigned)    06/11/24 0132    06/10/24 2337  [facility] (on-call MD unless specified) Details  Once        Specialty:  Hospitalist  Provider:  (Not yet assigned)     06/10/24 2336                  Procedures     Imaging Results (All)       Procedure Component Value Units Date/Time    CT Abdomen Pelvis With Contrast [[id number]] Collected: 06/10/24 2301     Updated: 06/10/24 2314    Narrative:      CT OF THE ABDOMEN PELVIS WITH CONTRAST     HISTORY: Abdominal pain     COMPARISON: April 30, 2024     TECHNIQUE: Axial CT imaging was obtained through the abdomen and pelvis.  IV contrast was administered.     FINDINGS:  Images through the lung bases are clear. No suspicious hepatic lesions  are seen. There is a suggestion of some higher density material within  the gallbladder. Sludge or small stones are not excluded. The spleen,  duodenum, pancreas, and gallbladder are all normal. There is a small  hiatal hernia. Kidneys enhance symmetrically. There is no  hydronephrosis. No distal ureteral or bladder stones are seen. Bilateral  perinephric stranding appear similar to the prior study. Urinary bladder  does appear distended. Prostate gland is enlarged. There is no bowel  obstruction. There is no evidence of appendicitis. There are aortoiliac  calcifications. No acute osseous abnormalities are seen. There is lumbar  scoliosis, with convexity to the left.       Impression:         1. Distention of the urinary bladder. Correlation with any symptoms of  urinary retention is recommended.  2. There does appear to be some higher density material layering within  the gallbladder. Small stones or sludge are not excluded.     Radiation dose reduction techniques were utilized, including automated  exposure control and exposure modulation based on body size.        This report was finalized on 6/10/2024 11:11 PM by Dr. Shawna Emmanuel M.D on Workstation: [id number]       XR Chest 1 View [[id number]] Collected: 06/10/24 2052     Updated: 06/10/24 2056    Narrative:      XR CHEST 1 VW-     Clinical: Chest pain     COMPARISON examination 4/30/2024     FINDINGS: The cardiomediastinal silhouette is  normal and the lungs are  clear.     CONCLUSION: No active disease of the chest     This report was finalized on 6/10/2024 8:53 PM by Dr. Benji Grant M.D  on Workstation: [id number]               Pertinent Labs     Results from last 7 days   Lab Units 06/13/24  0312 06/12/24  0639 06/11/24  0615 06/10/24  2026   WBC 10*3/mm3 4.57 4.83 5.32 5.36   HEMOGLOBIN g/dL 12.1* 12.6* 13.5 15.6   PLATELETS 10*3/mm3 82* 80*  76* 95* 117*     Results from last 7 days   Lab Units 06/13/24 0312 06/12/24 0639 06/11/24  1523 06/11/24  0615 06/10/24  2026   SODIUM mmol/L 135* 131*  --  131*  129* 130*   POTASSIUM mmol/L 4.7 4.9 5.6* 5.8*  5.7* 5.7*   CHLORIDE mmol/L 105 103  --  101  100 98   CO2 mmol/L 27.0 24.0  --  17.2*  21.0* 26.0   BUN mg/dL 7 9  --  18  18 21*   CREATININE mg/dL 0.71* 0.72*  --  1.09  1.06 0.86   GLUCOSE mg/dL 78 71  --  109*  111* 91   Estimated Creatinine Clearance: 93.5 mL/min (A) (by C-G formula based on SCr of 0.71 mg/dL (L)).  Results from last 7 days   Lab Units 06/13/24  0312 06/12/24  0639 06/11/24  0615 06/10/24  2026   ALBUMIN g/dL 3.0* 3.1* 3.2*  3.3* 4.3   BILIRUBIN mg/dL 0.2  --  0.3 0.3   ALK PHOS U/L 72  --  64 84   AST (SGOT) U/L 12  --  15 13   ALT (SGPT) U/L 8  --  5 9     Results from last 7 days   Lab Units 06/13/24  0312 06/12/24  0639 06/11/24  0615 06/10/24  2026   CALCIUM mg/dL 8.0* 8.4* 8.7  8.6 9.4   ALBUMIN g/dL 3.0* 3.1* 3.2*  3.3* 4.3   MAGNESIUM mg/dL 1.3* 1.6  --  1.9   PHOSPHORUS mg/dL 2.8 2.9 4.4  --      Results from last 7 days   Lab Units 06/10/24  2026   LIPASE U/L 29     Results from last 7 days   Lab Units 06/12/24  0639 06/11/24  1729 06/11/24  1523 06/10/24  2026   HSTROP T ng/L  --  24* 23* 23*   PROBNP pg/mL  --   --   --  357.0   DIGOXIN LVL ng/mL 0.60  --   --  1.00     Results from last 7 days   Lab Units 06/12/24  0639 06/11/24  1222   SODIUM UR mmol/L  --  79   OSMOLALITY UR mOsm/kg  --  447   URIC ACID mg/dL 6.7  --          Invalid input(s):  "\"LDLCALC\"        Test Results Pending at Discharge       Discharge Details        Discharge Medications        New Medications        Instructions Start Date   folic acid 1 MG tablet  Commonly known as: FOLVITE   1 mg, Oral, Daily   Start Date: June 14, 2024     miconazole 2 % powder  Commonly known as: MICOTIN   Topical, Every 12 Hours Scheduled      pantoprazole 40 MG EC tablet  Commonly known as: PROTONIX   40 mg, Oral, 2 Times Daily Before Meals             Continue These Medications        Instructions Start Date   Abilify Maintena 400 MG Suspension Reconstituted ER IM injection ER  Generic drug: ARIPiprazole ER   No dose, route, or frequency recorded.      Acetaminophen Extra Strength 500 MG tablet   500 mg, Oral, 4 Times Daily PRN      atorvastatin 40 MG tablet  Commonly known as: LIPITOR   No dose, route, or frequency recorded.      benztropine 0.5 MG tablet  Commonly known as: COGENTIN   1 mg, Oral, 2 Times Daily      Diclofenac Sodium 1 % gel gel  Commonly known as: VOLTAREN   No dose, route, or frequency recorded.      digoxin 125 MCG tablet  Commonly known as: LANOXIN   No dose, route, or frequency recorded.      divalproex 500 MG DR tablet  Commonly known as: DEPAKOTE   No dose, route, or frequency recorded.      Eliquis 5 MG tablet tablet  Generic drug: apixaban   5 mg, Oral, Every 12 Hours Scheduled      ibuprofen 600 MG tablet  Commonly known as: ADVIL,MOTRIN   600 mg, Oral, 4 Times Daily      levothyroxine 50 MCG tablet  Commonly known as: SYNTHROID, LEVOTHROID   No dose, route, or frequency recorded.      melatonin 5 MG tablet tablet   10 mg, Oral, Nightly      metoprolol succinate XL 25 MG 24 hr tablet  Commonly known as: TOPROL-XL   No dose, route, or frequency recorded.      mupirocin 2 % cream  Commonly known as: BACTROBAN   1 Application, Topical, 2 Times Daily      OLANZapine zydis 5 MG disintegrating tablet  Commonly known as: zyPREXA   No dose, route, or frequency recorded.      Oyster " Shell Calcium/D 250-3.125 MG-MCG tablet   1 tablet, Oral, 2 Times Daily      polyethylene glycol 17 GM/SCOOP powder  Commonly known as: MIRALAX   Dissolve 17 g (1 capful) in liquid and drink by mouth Daily.             Stop These Medications      famotidine 20 MG tablet  Commonly known as: PEPCID     lisinopril 40 MG tablet  Commonly known as: PRINIVIL,ZESTRIL     traZODone 100 MG tablet  Commonly known as: DESYREL              Allergies   Allergen Reactions    Clonazepam Unknown (See Comments)    Fluoxetine Unknown (See Comments)    Grass Unknown - High Severity    Grass Pollen(K-O-R-T-Swt Kennedy) Unknown - Low Severity    Hydroxyzine Unknown (See Comments)    Methylphenidate Unknown (See Comments)    Quetiapine Unknown - Low Severity    Risperidone Unknown (See Comments)         Discharge Disposition:  Home or Self Care    Discharge Diet:  Diet Order   Procedures    Diet: Renal; Low Potassium; Texture: Soft to Chew (NDD 3); Soft to Chew: Chopped Meat; Fluid Consistency: Thin (IDDSI 0)       Discharge Activity:   As tolerated    CODE STATUS:    Code Status and Medical Interventions:   Ordered at: 06/11/24 0055     Level Of Support Discussed With:    Patient     Code Status (Patient has no pulse and is not breathing):    CPR (Attempt to Resuscitate)     Medical Interventions (Patient has pulse or is breathing):    Full Support       No future appointments.   Follow-up Information       José Garcia, APRN .    Specialty: Family Medicine  Contact information:  [address] [facility] [address] [phone]                             Time Spent on Discharge:  Greater than 30 minutes      Mick Mckoy MD  [facility]  06/13/24  13:29 EDT

## 2024-06-13 NOTE — NURSING NOTE
Cwon consult received.  Patient's chart reviewed and wound photo reviewed.  The photo is consistent with intertriginous dermatitis.I will add antifungal powder to be used to skin around and under scrotum. Please call with any questions.

## 2024-06-13 NOTE — PLAN OF CARE
Problem: Adult Inpatient Plan of Care  Goal: Absence of Hospital-Acquired Illness or Injury  Intervention: Identify and Manage Fall Risk  Recent Flowsheet Documentation  Taken 6/13/2024 0600 by Bronwyn Golden RN  Safety Promotion/Fall Prevention: safety round/check completed  Taken 6/13/2024 0400 by Bronwyn Golden RN  Safety Promotion/Fall Prevention: safety round/check completed  Taken 6/13/2024 0200 by Bronwyn Golden RN  Safety Promotion/Fall Prevention: safety round/check completed  Taken 6/13/2024 0000 by Bronwyn Golden RN  Safety Promotion/Fall Prevention: safety round/check completed  Taken 6/12/2024 2200 by Bronwyn Golden RN  Safety Promotion/Fall Prevention: safety round/check completed  Taken 6/12/2024 2034 by Bronwyn Golden RN  Safety Promotion/Fall Prevention:   activity supervised   assistive device/personal items within reach   clutter free environment maintained   fall prevention program maintained   lighting adjusted   mobility aid in reach   muscle strengthening facilitated   nonskid shoes/slippers when out of bed   room organization consistent   safety round/check completed  Intervention: Prevent Skin Injury  Recent Flowsheet Documentation  Taken 6/13/2024 0600 by Bronwyn Golden RN  Body Position: position changed independently  Taken 6/13/2024 0400 by Bronwyn Golden RN  Body Position: position changed independently  Taken 6/13/2024 0200 by Bronwyn Golden RN  Body Position: position changed independently  Taken 6/13/2024 0000 by Bronwyn Golden RN  Body Position: position changed independently  Taken 6/12/2024 2200 by Bronwyn Golden RN  Body Position: position changed independently  Taken 6/12/2024 2034 by Bronwyn Golden RN  Body Position:   position changed independently   weight shifting  Skin Protection: adhesive use limited  Intervention: Prevent and Manage VTE (Venous Thromboembolism) Risk  Recent Flowsheet  Documentation  Taken 6/12/2024 2034 by Bronwyn Golden RN  Activity Management: activity encouraged  Intervention: Prevent Infection  Recent Flowsheet Documentation  Taken 6/12/2024 2034 by Bronwyn Golden RN  Infection Prevention:   rest/sleep promoted   single patient room provided     Problem: Fall Injury Risk  Goal: Absence of Fall and Fall-Related Injury  Intervention: Identify and Manage Contributors  Recent Flowsheet Documentation  Taken 6/12/2024 2034 by Bronwyn Golden RN  Medication Review/Management: medications reviewed  Intervention: Promote Injury-Free Environment  Recent Flowsheet Documentation  Taken 6/13/2024 0600 by Bronwyn Golden RN  Safety Promotion/Fall Prevention: safety round/check completed  Taken 6/13/2024 0400 by Bronwyn Golden RN  Safety Promotion/Fall Prevention: safety round/check completed  Taken 6/13/2024 0200 by Bronwyn Golden RN  Safety Promotion/Fall Prevention: safety round/check completed  Taken 6/13/2024 0000 by Bronwyn Golden RN  Safety Promotion/Fall Prevention: safety round/check completed  Taken 6/12/2024 2200 by Bronwyn Golden RN  Safety Promotion/Fall Prevention: safety round/check completed  Taken 6/12/2024 2034 by Bronwyn Golden RN  Safety Promotion/Fall Prevention:   activity supervised   assistive device/personal items within reach   clutter free environment maintained   fall prevention program maintained   lighting adjusted   mobility aid in reach   muscle strengthening facilitated   nonskid shoes/slippers when out of bed   room organization consistent   safety round/check completed     Problem: Behavioral Health Comorbidity  Goal: Maintenance of Behavioral Health Symptom Control  Intervention: Maintain Behavioral Health Symptom Control  Recent Flowsheet Documentation  Taken 6/12/2024 2034 by Bronwyn Golden RN  Medication Review/Management: medications reviewed     Problem: Hypertension Comorbidity  Goal: Blood  Pressure in Desired Range  Intervention: Maintain Blood Pressure Management  Recent Flowsheet Documentation  Taken 6/12/2024 2034 by Bronwyn Golden, RN  Medication Review/Management: medications reviewed     Problem: Skin Injury Risk Increased  Goal: Skin Health and Integrity  Intervention: Optimize Skin Protection  Recent Flowsheet Documentation  Taken 6/12/2024 2034 by Bronwyn Golden, RN  Pressure Reduction Techniques:   frequent weight shift encouraged   weight shift assistance provided  Head of Bed (HOB) Positioning: HOB at 20-30 degrees  Pressure Reduction Devices: alternating pressure pump (ADD)  Skin Protection: adhesive use limited   Goal Outcome Evaluation:

## 2024-06-13 NOTE — PROGRESS NOTES
[facility] Progress Note      Patient Name: Helder Abbott  : 1975  MRN: [id number]  Primary Care Physician:  José Garcia APRN  Date of admission: 6/10/2024    Subjective     Interval History:   Follow-up hyperkalemia and hyponatremia.  Looks better today.  More interactive.  Became very animated when Chitra Smith came on TV.  Blood pressure more stable.  Says his hiccups are better.  Urine output not measured.  Review of Systems:   As noted above    Objective     Vitals:   Temp:  [97.2 °F (36.2 °C)-98.2 °F (36.8 °C)] 97.6 °F (36.4 °C)  Heart Rate:  [65-87] 87  Resp:  [18] 18  BP: ()/(57-77) 122/70  Flow (L/min):  [1] 1  No intake or output data in the 24 hours ending 24 1126      Physical Exam:    General Appearance: Very chronically ill.  Pale.  Nasal oxygen in place.  Speech  dysarthric.  More interactive.  Skin: warm and dry  HEENT: oral mucosa dry.  Very poor dentition.  Nonicteric sclera.  Nasal oxygen.  Neck: supple, no JVD  Lungs: Clear to auscultation bilaterally.  Heart: Irregularly irregular.  Abdomen: soft, nontender, nondistended. +bs  : no palpable bladder  Extremities: no edema.  Neuro: Speech difficult to understand.  More interactive.  Moves all 4 extremities.    Scheduled Meds:     apixaban, 5 mg, Oral, Q12H  atorvastatin, 40 mg, Oral, Daily  benztropine, 1 mg, Oral, BID  calcium 500 mg vitamin D 5 mcg (200 UT), 1 tablet, Oral, BID  digoxin, 125 mcg, Oral, Daily  divalproex, 500 mg, Oral, Daily  folic acid, 1 mg, Oral, Daily  levothyroxine, 50 mcg, Oral, Q AM  magnesium sulfate, 2 g, Intravenous, Q2H  melatonin, 10 mg, Oral, Nightly  [Held by provider] metoprolol succinate XL, 25 mg, Oral, Q24H  miconazole, , Topical, Q12H  OLANZapine zydis, 5 mg, Oral, Nightly  pantoprazole, 40 mg, Oral, BID AC  polyethylene glycol, 17 g, Oral, Daily  sodium chloride, 10 mL, Intravenous, Q12H      IV Meds:   sodium chloride, 125 mL/hr, Last Rate: 125  mL/hr (06/13/24 0424)        Results Reviewed:   I have personally reviewed the results from the time of this admission to 6/13/2024 11:26 EDT     Results from last 7 days   Lab Units 06/13/24 0312 06/12/24 0639 06/11/24  1523 06/11/24  0615 06/10/24  2026   SODIUM mmol/L 135* 131*  --  131*  129* 130*   POTASSIUM mmol/L 4.7 4.9 5.6* 5.8*  5.7* 5.7*   CHLORIDE mmol/L 105 103  --  101  100 98   CO2 mmol/L 27.0 24.0  --  17.2*  21.0* 26.0   BUN mg/dL 7 9  --  18  18 21*   CREATININE mg/dL 0.71* 0.72*  --  1.09  1.06 0.86   CALCIUM mg/dL 8.0* 8.4*  --  8.7  8.6 9.4   BILIRUBIN mg/dL 0.2  --   --  0.3 0.3   ALK PHOS U/L 72  --   --  64 84   ALT (SGPT) U/L 8  --   --  5 9   AST (SGOT) U/L 12  --   --  15 13   GLUCOSE mg/dL 78 71  --  109*  111* 91       Estimated Creatinine Clearance: 93.5 mL/min (A) (by C-G formula based on SCr of 0.71 mg/dL (L)).    Results from last 7 days   Lab Units 06/13/24  0312 06/12/24  0639 06/11/24  0615 06/10/24  2026   MAGNESIUM mg/dL 1.3* 1.6  --  1.9   PHOSPHORUS mg/dL 2.8 2.9 4.4  --        Results from last 7 days   Lab Units 06/12/24 0639 06/11/24 0615   URIC ACID mg/dL 6.7 7.4*       Results from last 7 days   Lab Units 06/13/24  0312 06/12/24  0639 06/11/24  0615 06/10/24  2026   WBC 10*3/mm3 4.57 4.83 5.32 5.36   HEMOGLOBIN g/dL 12.1* 12.6* 13.5 15.6   PLATELETS 10*3/mm3 82* 80*  76* 95* 117*             Assessment / Plan     ASSESSMENT:  Hyperkalemia and hyponatremia.  Hyperkalemia improved.  Patient had been on beta-blocker, ACE inhibitor, and recent nonsteroidal use.  Improved today.  Non-Anion gap metabolic acidosis likely also contributing as well as LR given for bolus for hypotension.  Now resolved.  Hyponatremia.  Sodium now normal.    Thyroid function normal.  Hypotension concerning for hypovolemia.  Better today with increase in IV fluid yesterday.  Cortisol level normal, however, low for episode of acute illness.  May need cosyntropin stimulation test.  Toxin  screen negative.  CT did demonstrate urinary bladder distention.  Large bowel movement today.  2.  Bipolar disorder with superimposed encephalopathy.  May have been due to trazodone.  3.  Persistent atrial fibrillation.  Stop metoprolol due to his hypotension as well as his hyperkalemia.  Remains on digoxin and Eliquis.  May need to rethink the Eliquis given his significant thrombocytopenia.  4.  History of hypertension .  Blood pressure remains low.  Cortisol level normal.  Off antihypertensives at this point.  Echocardiogram reviewed.  5.  Thrombocytopenia.  Dates back to May 1, 2024.  Unchanged today.  IPF appropriately elevated.  6.  Hiccoughs.  CT of the abdomen 6/10 did not demonstrate any subdiaphragmatic fluid collection to suggest irritation.  Better today.  7.  Hypothyroid on replacement.  PLAN:  Magnesium being replaced.  Continue IV fluid today.    Thank you for involving us in the care of Helder Abbott.  Please feel free to call with any questions.    Birdie Sánchez MD  06/13/24  11:26 EDT    [facility]  [phone]    Please note that portions of this note were completed with a voice recognition program.

## 2024-06-13 NOTE — PROGRESS NOTES
"Nutrition Services    Patient Name:  Helder Abbott  YOB: 1975  MRN: [id number]  Admit Date:  6/10/2024    Assessment Date:  06/13/24    Summary: Initial Visit for BMI <19  Pt is a 50 y/o male who presented with abdominal pain. Pt has a hx of GERD, heart disease, hx of stroke, HLD, hx of CVA, HTN, JOSIAH.  Pt sitting up in his bed eating breakfast when I visited. Pt not able to provide a detailed hx due to mental status. Pt appeared to have eaten about half of his breakfast and reported he was planning on finishing it. Pt reported his appetite is good. Per EMR, Pt ate 0% of breakfast yesterday and 50% of lunch and dinner yesterday. Per EMR, pt has had a 31.2% weight loss x 2 months (significant). Pt evaluated by SLP yesterday who recs soft to chew chopped meats and thin liquids. Pt has severe muscle wasting and fat loss in multiple areas. RD will initiate Novasource renal oral nutrition supplement to provide added calories and protein.     Patient meets ASPEN/AND criteria for nutrition diagnosis of severe malnutrition of chronic illness based on: weight loss, muscle wasting and fat loss.     NFPE results below    RECS:  Nov[employer] renal BID    CLINICAL NUTRITION ASSESSMENT      Reason for Assessment BMI     Diagnosis/Problem   abdominal pain. Pt has a hx of GERD, heart disease, hx of stroke, HLD, hx of CVA, HTN, JOSIAH.   Medical/Surgical History Past Medical History:   Diagnosis Date    Benign essential hypertension     Bipolar disorder     Dyslipidemia     GERD (gastroesophageal reflux disease)     Heart disease     History of stroke     Hypothyroidism        History reviewed. No pertinent surgical history.     Anthropometrics        Current Height  Current Weight  BMI kg/m2 Height: 185.4 cm (73\")  Weight: 52.5 kg (115 lb 11.9 oz) (06/12/24 1839)  Body mass index is 15.27 kg/m².   Adjusted BMI (if applicable)    BMI Category Underweight (18.4 or below)   Ideal Body Weight (IBW) 176 lbs   Usual Body " Weight (UBW) Unknown   Weight Trend Loss   Weight History Wt Readings from Last 30 Encounters:   06/12/24 1839 52.5 kg (115 lb 11.9 oz)   06/12/24 1407 73 kg (161 lb)   05/06/24 0500 73.1 kg (161 lb 2.5 oz)   05/05/24 0512 72.3 kg (159 lb 6.3 oz)   05/04/24 0600 71.8 kg (158 lb 4.6 oz)   05/04/24 0300 71.8 kg (158 lb 4.6 oz)   05/02/24 0500 76.2 kg (167 lb 15.9 oz)   05/01/24 0534 72.1 kg (158 lb 15.2 oz)   04/30/24 2323 72.2 kg (159 lb 2.8 oz)   11/30/22 1412 86.2 kg (190 lb)        Estimated/Assessed Needs        Energy Requirements    Weight for Calculation 53 kg   Method for Estimation  35-40 kcal/kg   EST Needs (kcal/day) 7081-9303       Protein Requirements    Weight for Calculation 53 kg   EST Protein Needs (g/kg) 1.5 - 2.0 gm/kg   EST Daily Needs (g/day)        Fluid Requirements     Method for Estimation 1 mL/kcal    Estimated Needs (mL/day)        Fluid Deficit    Current Na Level (mEq/L)    Desired Na Level (mEq/L)    Estimated Fluid Deficit (L)       Labs       Pertinent Labs    Results from last 7 days   Lab Units 06/13/24  0312 06/12/24  0639 06/11/24  1523 06/11/24  0615 06/10/24  2026   SODIUM mmol/L 135* 131*  --  131*  129* 130*   POTASSIUM mmol/L 4.7 4.9 5.6* 5.8*  5.7* 5.7*   CHLORIDE mmol/L 105 103  --  101  100 98   CO2 mmol/L 27.0 24.0  --  17.2*  21.0* 26.0   BUN mg/dL 7 9  --  18  18 21*   CREATININE mg/dL 0.71* 0.72*  --  1.09  1.06 0.86   CALCIUM mg/dL 8.0* 8.4*  --  8.7  8.6 9.4   BILIRUBIN mg/dL 0.2  --   --  0.3 0.3   ALK PHOS U/L 72  --   --  64 84   ALT (SGPT) U/L 8  --   --  5 9   AST (SGOT) U/L 12  --   --  15 13   GLUCOSE mg/dL 78 71  --  109*  111* 91     Results from last 7 days   Lab Units 06/13/24  0312 06/12/24  0639 06/11/24  0615 06/10/24  2026   MAGNESIUM mg/dL 1.3* 1.6  --  1.9   PHOSPHORUS mg/dL 2.8 2.9   < >  --    HEMOGLOBIN g/dL 12.1* 12.6*   < > 15.6   HEMATOCRIT % 37.7 38.6   < > 46.9   WBC 10*3/mm3 4.57 4.83   < > 5.36   ALBUMIN g/dL 3.0* 3.1*   < >  "4.3    < > = values in this interval not displayed.     Results from last 7 days   Lab Units 06/13/24  0312 06/12/24  0639 06/11/24  0615 06/10/24  2026   PLATELETS 10*3/mm3 82* 80*  76* 95* 117*     COVID19   Date Value Ref Range Status   04/30/2024 Not Detected Not Detected - Ref. Range Final     No results found for: \"HGBA1C\"       Medications           Scheduled Medications apixaban, 5 mg, Oral, Q12H  atorvastatin, 40 mg, Oral, Daily  benztropine, 1 mg, Oral, BID  calcium 500 mg vitamin D 5 mcg (200 UT), 1 tablet, Oral, BID  digoxin, 125 mcg, Oral, Daily  divalproex, 500 mg, Oral, Daily  folic acid, 1 mg, Oral, Daily  levothyroxine, 50 mcg, Oral, Q AM  magnesium sulfate, 2 g, Intravenous, Q2H  melatonin, 10 mg, Oral, Nightly  [Held by provider] metoprolol succinate XL, 25 mg, Oral, Q24H  OLANZapine zydis, 5 mg, Oral, Nightly  pantoprazole, 40 mg, Oral, BID AC  polyethylene glycol, 17 g, Oral, Daily  sodium chloride, 10 mL, Intravenous, Q12H       Infusions sodium chloride, 125 mL/hr, Last Rate: 125 mL/hr (06/13/24 0424)       PRN Medications   acetaminophen **OR** acetaminophen **OR** acetaminophen    senna-docusate sodium **AND** polyethylene glycol **AND** bisacodyl **AND** bisacodyl    Magnesium Standard Dose Replacement - Follow Nurse / BPA Driven Protocol    nitroglycerin    ondansetron    polyethylene Glycol 400    sodium chloride    sodium chloride     Physical Findings          General Findings alert, confused, frail, generalized wasting, impaired speech, loss of muscle mass, loss of subcutaneous fat, room air   Oral/Mouth Cavity tooth or teeth missing   Edema  not assessed   Gastrointestinal fecal incontinence, last bowel movement: 6/12   Skin  skin tear   Tubes/Drains/Lines none   NFPE See Malnutrition Severity Assessment     Malnutrition Severity Assessment      Patient meets criteria for : Severe Malnutrition  Malnutrition Type (Last 8 Hours)       Malnutrition Severity Assessment       Row Name " 06/13/24 0845       Malnutrition Severity Assessment    Malnutrition Type Chronic Disease - Related Malnutrition      Row Name 06/13/24 0845       Insufficient Energy Intake     Insufficient Energy Intake Findings --  Unsure      Row Name 06/13/24 0845       Unintentional Weight Loss     Unintentional Weight Loss Findings Severe    Unintentional Weight Loss  Weight loss greater than 7.5% in three months      Row Name 06/13/24 0845       Muscle Loss    Loss of Muscle Mass Findings Severe    [place] Region Severe - deep hollowing/scooping, lack of muscle to touch, facial bones well defined    Clavicle Bone Region Severe - protruding prominent bone    Acromion Bone Region Severe - squared shoulders, bones, and acromion process protrusion prominent    Scapular Bone Region Severe - prominent bones, depressions easily visible between ribs, scapula, spine, shoulders    Dorsal Hand Region Severe - prominent depression      Row Name 06/13/24 0845       Fat Loss    Subcutaneous Fat Loss Findings Severe    Orbital Region  Severe - pronounced hollowness/depression, dark circles, loose saggy skin    Upper Arm Region Severe - mostly skin, very little space between folds, fingers touch      Row Name 06/13/24 0845       Criteria Met (Must meet criteria for severity in at least 2 of these categories: M Wasting, Fat Loss, Fluid, Secondary Signs, Wt. Status, Intake)    Patient meets criteria for  Severe Malnutrition                     Current Nutrition Orders & Evaluation of Intake       Oral Nutrition     Food Allergies NKFA   Current PO Diet Diet: Renal; Low Potassium; Texture: Soft to Chew (NDD 3); Soft to Chew: Chopped Meat; Fluid Consistency: Thin (IDDSI 0)   Supplement n/a   PO Evaluation     % PO Intake 0-50% of meals while admitted    Factors Affecting Intake: altered mental status, decreased appetite     PES STATEMENT / NUTRITION DIAGNOSIS      Nutrition Dx Problem  Problem: Unintentional Weight Loss and Malnutrition  (severe)  Etiology: Medical Diagnosis - abdominal pain. Pt has a hx of GERD, heart disease, hx of stroke, HLD, hx of CVA, HTN, JOSIAH.    Signs/Symptoms: NFPE Results and Unintended Weight Change   --  NUTRITION INTERVENTION / PLAN OF CARE      Intervention Goal(s) Maintain nutrition status, Establish goals of care, Meet estimated needs, Tolerate PO , Increase intake, Accepts oral nutrition supplement, Maintain weight, No significant weight loss, Appropriate weight gain, and PO intake goal %: 75%         RD Intervention/Action Encourage intake, Continue to monitor, Care plan reviewed, and Recommend/order: NSR BID         Prescription/Orders:       PO Diet       Supplements NSR BID      Enteral Nutrition       Parenteral Nutrition    New Prescription Ordered? Yes   --      Monitor/Evaluation Per protocol, PO intake, Supplement intake, Weight, Skin status, GI status, Symptoms, POC/GOC, Swallow function   Discharge Plan/Needs No discharge needs identified at this time   --    RD to follow per protocol.      Electronically signed by:  Ventura Shaikh  06/13/24 08:30 EDT

## 2024-06-13 NOTE — PLAN OF CARE
Problem: Malnutrition  Goal: Improved Nutritional Intake  Outcome: Ongoing, Progressing   Goal Outcome Evaluation:      RD to follow

## 2024-06-14 ENCOUNTER — READMISSION MANAGEMENT (OUTPATIENT)
Dept: CALL CENTER | Facility: HOSPITAL | Age: 49
End: 2024-06-14
Payer: MEDICARE

## 2024-06-14 NOTE — OUTREACH NOTE
Prep Survey      Flowsheet Row Responses   [facility] facility patient discharged from? [place]   Is LACE score < 7 ? No   Eligibility Readm Mgmt   Discharge diagnosis *Hyperkalemia (   Does the patient have one of the following disease processes/diagnoses(primary or secondary)? Other   Does the patient have Home health ordered? No   Is there a DME ordered? No   Prep survey completed? Yes            LYNN SORIANO - Registered Nurse

## 2024-06-19 ENCOUNTER — READMISSION MANAGEMENT (OUTPATIENT)
Dept: CALL CENTER | Facility: HOSPITAL | Age: 49
End: 2024-06-19
Payer: MEDICARE

## 2024-06-19 NOTE — OUTREACH NOTE
Medical Week 1 Survey      Flowsheet Row Responses   [facility] patient discharged from? [place]   Does the patient have one of the following disease processes/diagnoses(primary or secondary)? Other   Week 1 attempt successful? No   Unsuccessful attempts Attempt 3            Elsa MONTES - Registered Nurse

## 2024-07-09 ENCOUNTER — APPOINTMENT (OUTPATIENT)
Dept: CT IMAGING | Facility: HOSPITAL | Age: 49
End: 2024-07-09
Payer: MEDICARE

## 2024-07-09 ENCOUNTER — APPOINTMENT (OUTPATIENT)
Dept: GENERAL RADIOLOGY | Facility: HOSPITAL | Age: 49
End: 2024-07-09
Payer: MEDICARE

## 2024-07-09 ENCOUNTER — HOSPITAL ENCOUNTER (EMERGENCY)
Facility: HOSPITAL | Age: 49
Discharge: HOME | End: 2024-07-09
Attending: EMERGENCY MEDICINE
Payer: MEDICARE

## 2024-07-09 VITALS
HEIGHT: 73 IN | RESPIRATION RATE: 18 BRPM | WEIGHT: 115.74 LBS | BODY MASS INDEX: 15.34 KG/M2 | HEART RATE: 67 BPM | TEMPERATURE: 96.5 F | SYSTOLIC BLOOD PRESSURE: 109 MMHG | OXYGEN SATURATION: 92 % | DIASTOLIC BLOOD PRESSURE: 83 MMHG

## 2024-07-09 DIAGNOSIS — R53.1 GENERALIZED WEAKNESS: Primary | ICD-10-CM

## 2024-07-09 LAB
ALBUMIN SERPL-MCNC: 3.5 G/DL (ref 3.5–5.2)
ALBUMIN/GLOB SERPL: 1.4 G/DL
ALP SERPL-CCNC: 66 U/L (ref 39–117)
ALT SERPL W P-5'-P-CCNC: 9 U/L (ref 1–41)
ANION GAP SERPL CALCULATED.3IONS-SCNC: 10 MMOL/L (ref 5–15)
AST SERPL-CCNC: 25 U/L (ref 1–40)
BASOPHILS # BLD AUTO: 0.03 10*3/MM3 (ref 0–0.2)
BASOPHILS NFR BLD AUTO: 0.6 % (ref 0–1.5)
BILIRUB SERPL-MCNC: 0.4 MG/DL (ref 0–1.2)
BILIRUB UR QL STRIP: NEGATIVE
BUN SERPL-MCNC: 30 MG/DL (ref 6–20)
BUN/CREAT SERPL: 31.3 (ref 7–25)
CALCIUM SPEC-SCNC: 9.3 MG/DL (ref 8.6–10.5)
CHLORIDE SERPL-SCNC: 102 MMOL/L (ref 98–107)
CLARITY UR: CLEAR
CO2 SERPL-SCNC: 27 MMOL/L (ref 22–29)
COLOR UR: ABNORMAL
CREAT SERPL-MCNC: 0.96 MG/DL (ref 0.76–1.27)
DEPRECATED RDW RBC AUTO: 44.6 FL (ref 37–54)
DIGOXIN SERPL-MCNC: 0.8 NG/ML (ref 0.6–1.2)
EGFRCR SERPLBLD CKD-EPI 2021: 96.9 ML/MIN/1.73
EOSINOPHIL # BLD AUTO: 0.02 10*3/MM3 (ref 0–0.4)
EOSINOPHIL NFR BLD AUTO: 0.4 % (ref 0.3–6.2)
ERYTHROCYTE [DISTWIDTH] IN BLOOD BY AUTOMATED COUNT: 13.4 % (ref 12.3–15.4)
GLOBULIN UR ELPH-MCNC: 2.5 GM/DL
GLUCOSE SERPL-MCNC: 76 MG/DL (ref 65–99)
GLUCOSE UR STRIP-MCNC: NEGATIVE MG/DL
HCT VFR BLD AUTO: 43.6 % (ref 37.5–51)
HGB BLD-MCNC: 14.2 G/DL (ref 13–17.7)
HGB UR QL STRIP.AUTO: NEGATIVE
HOLD SPECIMEN: NORMAL
HOLD SPECIMEN: NORMAL
IMM GRANULOCYTES # BLD AUTO: 0.03 10*3/MM3 (ref 0–0.05)
IMM GRANULOCYTES NFR BLD AUTO: 0.6 % (ref 0–0.5)
KETONES UR QL STRIP: ABNORMAL
LEUKOCYTE ESTERASE UR QL STRIP.AUTO: NEGATIVE
LYMPHOCYTES # BLD AUTO: 2.62 10*3/MM3 (ref 0.7–3.1)
LYMPHOCYTES NFR BLD AUTO: 50.6 % (ref 19.6–45.3)
MAGNESIUM SERPL-MCNC: 1.7 MG/DL (ref 1.6–2.6)
MCH RBC QN AUTO: 29.3 PG (ref 26.6–33)
MCHC RBC AUTO-ENTMCNC: 32.6 G/DL (ref 31.5–35.7)
MCV RBC AUTO: 89.9 FL (ref 79–97)
MONOCYTES # BLD AUTO: 0.37 10*3/MM3 (ref 0.1–0.9)
MONOCYTES NFR BLD AUTO: 7.1 % (ref 5–12)
NEUTROPHILS NFR BLD AUTO: 2.11 10*3/MM3 (ref 1.7–7)
NEUTROPHILS NFR BLD AUTO: 40.7 % (ref 42.7–76)
NITRITE UR QL STRIP: NEGATIVE
PH UR STRIP.AUTO: 5.5 [PH] (ref 5–8)
PLATELET # BLD AUTO: 86 10*3/MM3 (ref 140–450)
PMV BLD AUTO: 11.1 FL (ref 6–12)
POTASSIUM SERPL-SCNC: 4.4 MMOL/L (ref 3.5–5.2)
PROT SERPL-MCNC: 6 G/DL (ref 6–8.5)
PROT UR QL STRIP: NEGATIVE
QT INTERVAL: 362 MS
QT INTERVAL: 372 MS
QTC INTERVAL: 402 MS
QTC INTERVAL: 547 MS
RBC # BLD AUTO: 4.85 10*6/MM3 (ref 4.14–5.8)
SODIUM SERPL-SCNC: 139 MMOL/L (ref 136–145)
SP GR UR STRIP: 1.03 (ref 1–1.03)
TROPONIN T SERPL HS-MCNC: 23 NG/L
UROBILINOGEN UR QL STRIP: ABNORMAL
VALPROATE SERPL-MCNC: 94 MCG/ML (ref 50–125)
WBC NRBC COR # BLD AUTO: 5.18 10*3/MM3 (ref 3.4–10.8)
WHOLE BLOOD HOLD COAG: NORMAL
WHOLE BLOOD HOLD SPECIMEN: NORMAL

## 2024-07-09 PROCEDURE — 93005 ELECTROCARDIOGRAM TRACING: CPT

## 2024-07-09 PROCEDURE — 99284 EMERGENCY DEPT VISIT MOD MDM: CPT

## 2024-07-09 PROCEDURE — 93005 ELECTROCARDIOGRAM TRACING: CPT | Performed by: EMERGENCY MEDICINE

## 2024-07-09 PROCEDURE — 71045 X-RAY EXAM CHEST 1 VIEW: CPT

## 2024-07-09 PROCEDURE — 85025 COMPLETE CBC W/AUTO DIFF WBC: CPT | Performed by: EMERGENCY MEDICINE

## 2024-07-09 PROCEDURE — 80164 ASSAY DIPROPYLACETIC ACD TOT: CPT | Performed by: EMERGENCY MEDICINE

## 2024-07-09 PROCEDURE — 81003 URINALYSIS AUTO W/O SCOPE: CPT | Performed by: EMERGENCY MEDICINE

## 2024-07-09 PROCEDURE — 70450 CT HEAD/BRAIN W/O DYE: CPT

## 2024-07-09 PROCEDURE — P9612 CATHETERIZE FOR URINE SPEC: HCPCS

## 2024-07-09 PROCEDURE — 83735 ASSAY OF MAGNESIUM: CPT | Performed by: EMERGENCY MEDICINE

## 2024-07-09 PROCEDURE — 93010 ELECTROCARDIOGRAM REPORT: CPT | Performed by: INTERNAL MEDICINE

## 2024-07-09 PROCEDURE — 80162 ASSAY OF DIGOXIN TOTAL: CPT | Performed by: EMERGENCY MEDICINE

## 2024-07-09 PROCEDURE — 80053 COMPREHEN METABOLIC PANEL: CPT | Performed by: EMERGENCY MEDICINE

## 2024-07-09 PROCEDURE — 36415 COLL VENOUS BLD VENIPUNCTURE: CPT

## 2024-07-09 PROCEDURE — 84484 ASSAY OF TROPONIN QUANT: CPT | Performed by: EMERGENCY MEDICINE

## 2024-07-09 RX ORDER — SODIUM CHLORIDE 0.9 % (FLUSH) 0.9 %
10 SYRINGE (ML) INJECTION AS NEEDED
Status: DISCONTINUED | OUTPATIENT
Start: 2024-07-09 | End: 2024-07-09 | Stop reason: HOSPADM

## 2024-07-09 NOTE — ED TRIAGE NOTES
Pt from home via ems, caretaker called for pt being shaky, unsteady on feet, not acting himself--didn't want to get out of bed; ems assisted pt to stretcher, at baseline per caretaker, reported fall yesterday

## 2024-07-09 NOTE — DISCHARGE INSTRUCTIONS
Follow-up with your primary care provider as needed.  Return to the emergency department for fever, chest pain, shortness of breath, abdominal pain, vomiting, or other concern.

## 2024-07-09 NOTE — CASE MANAGEMENT/SOCIAL WORK
Discharge Planning Assessment  [facility]     Patient Name: Helder Abbott  MRN: [id number]  Today's Date: 7/9/2024    Admit Date: 7/9/2024        Discharge Needs Assessment    No documentation.                  Discharge Plan       Row Name 07/09/24 1524       Plan    Plan Comments Received call from registration advisisng of pt w/legal guardian- consent receieved for treatment; banner and paperwork on file- notifying Sanjuana Davies-supervisor per protocol                  Continued Care and Services - Admitted Since 7/9/2024    No active coordination exists for this encounter.          Demographic Summary    No documentation.                  Functional Status    No documentation.                  Psychosocial    No documentation.                  Abuse/Neglect    No documentation.                  Legal    No documentation.                  Substance Abuse    No documentation.                  Patient Forms    No documentation.                     Radha Mauricio RN

## 2024-07-09 NOTE — ED PROVIDER NOTES
" EMERGENCY DEPARTMENT ENCOUNTER    Room Number:  34/34  PCP: José Garcia APRN  Historian: EMS, caretaker    I initially evaluated the patient at 11:40 AM    HPI:  Chief Complaint: Shaky, unsteady, generalized weakness  A complete HPI/ROS/PMH/PSH/SH/FH are unobtainable due to: Neurological disability  Context: Helder Abbott is a 49 y.o. male with a medical history of intellectual disability, hypertension, CVA, bipolar disorder, hypothyroidism, heart disease who presents to the ED from home by EMS with reports of being shaky, unsteady and generally weak for the past few days.  Caretaker told EMS that the patient did not want to get out of bed this morning.  Reported that he had not \"been acting like himself\".  Per EMS, patient was able to ambulate to their stretcher with assistance.  Patient reportedly fell yesterday.  There were no reports of fever, shortness of breath, cough, vomiting, or diarrhea.  Blood sugar was 103.  Patient is on Eliquis.  Patient cannot provide any meaningful history            PAST MEDICAL HISTORY  Active Ambulatory Problems     Diagnosis Date Noted    JOSIAH (acute kidney injury) 04/30/2024    Essential hypertension 04/30/2024    Hypothyroidism (acquired) 04/30/2024    History of CVA (cerebrovascular accident) 04/30/2024    GERD without esophagitis 04/30/2024    HLD (hyperlipidemia) 04/30/2024    Bipolar disorder 04/30/2024    Partial bowel obstruction 04/30/2024    Fecal impaction 04/30/2024    Severe malnutrition 05/02/2024    Hyperkalemia 06/11/2024     Resolved Ambulatory Problems     Diagnosis Date Noted    No Resolved Ambulatory Problems     Past Medical History:   Diagnosis Date    Benign essential hypertension     Dyslipidemia     GERD (gastroesophageal reflux disease)     Heart disease     History of stroke     Hypothyroidism          PAST SURGICAL HISTORY  History reviewed. No pertinent surgical history.      FAMILY HISTORY  Family History   Problem Relation Age of Onset    " Diabetes Other     Hypertension Other     Heart disease Other          SOCIAL HISTORY  Social History     Socioeconomic History    Marital status: Single   Tobacco Use    Smoking status: Never   Vaping Use    Vaping status: Never Used   Substance and Sexual Activity    Alcohol use: Never    Drug use: Never    Sexual activity: Defer         ALLERGIES  Clonazepam, Fluoxetine, Grass, Grass pollen([id number] benigno), Hydroxyzine, Methylphenidate, Quetiapine, and Risperidone    REVIEW OF SYSTEMS  Review of Systems  Unobtainable due to intellectual disability      PHYSICAL EXAM  ED Triage Vitals [07/09/24 1056]   Temp Heart Rate Resp BP SpO2   96.5 °F (35.8 °C) 92 18 108/61 99 %      Temp src Heart Rate Source Patient Position BP Location FiO2 (%)   -- -- -- -- --       Physical Exam      GENERAL: Awake and alert.  Oriented to self.  Nontoxic-appearing male.  Resting comfortably in no acute distress  HENT: NCAT, nares patent  EYES: PERRL, EOMI  CV: regular rhythm, irregularly irregular rate  RESPIRATORY: normal effort, clear to auscultation bilaterally  ABDOMEN: soft, nondistended, nontender  MUSCULOSKELETAL: Extremities are nontender with full range of motion.  There is a scab on the left knee.  C/T/L-spine and chest are nontender.  Pelvis is stable  NEURO: Speech is dysarthric.  Follows commands.  Moves all extremities  PSYCH:  calm, cooperative  SKIN: warm, dry    Vital signs and nursing notes reviewed.          LAB RESULTS  Recent Results (from the past 24 hour(s))   Comprehensive Metabolic Panel    Collection Time: 07/09/24 11:10 AM    Specimen: Blood   Result Value Ref Range    Glucose 76 65 - 99 mg/dL    BUN 30 (H) 6 - 20 mg/dL    Creatinine 0.96 0.76 - 1.27 mg/dL    Sodium 139 136 - 145 mmol/L    Potassium 4.4 3.5 - 5.2 mmol/L    Chloride 102 98 - 107 mmol/L    CO2 27.0 22.0 - 29.0 mmol/L    Calcium 9.3 8.6 - 10.5 mg/dL    Total Protein 6.0 6.0 - 8.5 g/dL    Albumin 3.5 3.5 - 5.2 g/dL    ALT (SGPT) 9 1 - 41 U/L     AST (SGOT) 25 1 - 40 U/L    Alkaline Phosphatase 66 39 - 117 U/L    Total Bilirubin 0.4 0.0 - 1.2 mg/dL    Globulin 2.5 gm/dL    A/G Ratio 1.4 g/dL    BUN/Creatinine Ratio 31.3 (H) 7.0 - 25.0    Anion Gap 10.0 5.0 - 15.0 mmol/L    eGFR 96.9 >60.0 mL/min/1.73   Single High Sensitivity Troponin T    Collection Time: 07/09/24 11:10 AM    Specimen: Blood   Result Value Ref Range    HS Troponin T 23 (H) <22 ng/L   Magnesium    Collection Time: 07/09/24 11:10 AM    Specimen: Blood   Result Value Ref Range    Magnesium 1.7 1.6 - 2.6 mg/dL   Digoxin Level    Collection Time: 07/09/24 11:10 AM    Specimen: Blood   Result Value Ref Range    Digoxin 0.80 0.60 - 1.20 ng/mL   Valproic Acid Level, Total    Collection Time: 07/09/24 11:10 AM    Specimen: Blood   Result Value Ref Range    Valproic Acid 94.0 50.0 - 125.0 mcg/mL   Green Top (Gel)    Collection Time: 07/09/24 11:10 AM   Result Value Ref Range    Extra Tube Hold for add-ons.    Lavender Top    Collection Time: 07/09/24 11:10 AM   Result Value Ref Range    Extra Tube hold for add-on    Gold Top - SST    Collection Time: 07/09/24 11:10 AM   Result Value Ref Range    Extra Tube Hold for add-ons.    Light Blue Top    Collection Time: 07/09/24 11:10 AM   Result Value Ref Range    Extra Tube Hold for add-ons.    CBC Auto Differential    Collection Time: 07/09/24 11:10 AM    Specimen: Blood   Result Value Ref Range    WBC 5.18 3.40 - 10.80 10*3/mm3    RBC 4.85 4.14 - 5.80 10*6/mm3    Hemoglobin 14.2 13.0 - 17.7 g/dL    Hematocrit 43.6 37.5 - 51.0 %    MCV 89.9 79.0 - 97.0 fL    MCH 29.3 26.6 - 33.0 pg    MCHC 32.6 31.5 - 35.7 g/dL    RDW 13.4 12.3 - 15.4 %    RDW-SD 44.6 37.0 - 54.0 fl    MPV 11.1 6.0 - 12.0 fL    Platelets 86 (L) 140 - 450 10*3/mm3    Neutrophil % 40.7 (L) 42.7 - 76.0 %    Lymphocyte % 50.6 (H) 19.6 - 45.3 %    Monocyte % 7.1 5.0 - 12.0 %    Eosinophil % 0.4 0.3 - 6.2 %    Basophil % 0.6 0.0 - 1.5 %    Immature Grans % 0.6 (H) 0.0 - 0.5 %    Neutrophils,  Absolute 2.11 1.70 - 7.00 10*3/mm3    Lymphocytes, Absolute 2.62 0.70 - 3.10 10*3/mm3    Monocytes, Absolute 0.37 0.10 - 0.90 10*3/mm3    Eosinophils, Absolute 0.02 0.00 - 0.40 10*3/mm3    Basophils, Absolute 0.03 0.00 - 0.20 10*3/mm3    Immature Grans, Absolute 0.03 0.00 - 0.05 10*3/mm3   ECG 12 Lead ED Triage Standing Order; Weak / Dizzy / AMS    Collection Time: 07/09/24 11:32 AM   Result Value Ref Range    QT Interval 362 ms    QTC Interval 547 ms   ECG 12 Lead Other; Weakness    Collection Time: 07/09/24 12:11 PM   Result Value Ref Range    QT Interval 372 ms    QTC Interval 402 ms   Urinalysis With Microscopic If Indicated (No Culture) - Straight Cath    Collection Time: 07/09/24 12:37 PM    Specimen: Straight Cath; Urine   Result Value Ref Range    Color, UA Dark Yellow (A) Yellow, Straw    Appearance, UA Clear Clear    pH, UA 5.5 5.0 - 8.0    Specific Gravity, UA 1.026 1.005 - 1.030    Glucose, UA Negative Negative    Ketones, UA 15 mg/dL (1+) (A) Negative    Bilirubin, UA Negative Negative    Blood, UA Negative Negative    Protein, UA Negative Negative    Leuk Esterase, UA Negative Negative    Nitrite, UA Negative Negative    Urobilinogen, UA 1.0 E.U./dL 0.2 - 1.0 E.U./dL       Ordered the above labs and reviewed the results.        RADIOLOGY  CT Head Without Contrast    Result Date: 7/9/2024  CT HEAD WITHOUT CONTRAST  CLINICAL HISTORY: Fall. Confusion. No head trauma.  TECHNIQUE: CT scan of the head was obtained with 3 mm axial soft tissue algorithm images. No intravenous contrast was administered. Sagittal and coronal reconstructions were obtained.  COMPARISON: CT head dated 04/30/2024.  FINDINGS:   There is prominence of the size of the ventricles, sulci, and cisterns for a patient of this age compatible with moderate diffuse parenchymal atrophic change. Otherwise, the gray-white matter differentiation is within normal limits. The basal ganglia and thalami are unremarkable. The posterior fossa structures  are within normal limits.       Moderate diffuse parenchymal atrophy without evidence for acute intracranial pathology. The etiology of the patient's acute confusion is not further elucidated on this examination. If further radiographic assessment is warranted, one could obtain an MRI of the brain for follow-up.   Radiation dose reduction techniques were utilized, including automated exposure control and exposure modulation based on body size.  This report was finalized on 7/9/2024 2:16 PM by Dr. Jorge L Gomez M.D on Workstation: [id number]      XR Chest 1 View    Result Date: 7/9/2024  XR CHEST 1 VW-7/9/2024  HISTORY: Weakness, dizziness.  Heart size is within normal limits. Lungs appear free of acute infiltrates. Bones and soft tissues are unremarkable.      1. No acute process.   This report was finalized on 7/9/2024 11:52 AM by Dr. Sang Lane M.D on Workstation: [id number]       Ordered the above noted radiological studies. Reviewed by me in PACS.            PROCEDURES  Procedures        OUTPATIENT MEDICATION MANAGEMENT:  Current Facility-Administered Medications Ordered in Epic   Medication Dose Route Frequency Provider Last Rate Last Admin    sodium chloride 0.9 % flush 10 mL  10 mL Intravenous PRN Olegario Chris MD         Current Outpatient Medications Ordered in Epic   Medication Sig Dispense Refill    Abilify Maintena 400 MG Suspension Reconstituted ER IM injection ER       Acetaminophen Extra Strength 500 MG tablet Take 1 tablet by mouth 4 (Four) Times a Day As Needed.      atorvastatin (LIPITOR) 40 MG tablet       benztropine (COGENTIN) 0.5 MG tablet Take 2 tablets by mouth 2 (Two) Times a Day.      Calcium Carbonate-Vitamin D (Oyster Shell Calcium/D) 250-3.125 MG-MCG tablet Take 1 tablet by mouth 2 (Two) Times a Day.      Diclofenac Sodium (VOLTAREN) 1 % gel gel       digoxin (LANOXIN) 125 MCG tablet       divalproex (DEPAKOTE) 500 MG DR tablet       Eliquis 5 MG tablet tablet Take 1  tablet by mouth Every 12 (Twelve) Hours.      folic acid (FOLVITE) 1 MG tablet Take 1 tablet by mouth Daily for 30 days. 30 tablet 0    ibuprofen (ADVIL,MOTRIN) 600 MG tablet Take 1 tablet by mouth 4 (Four) Times a Day.      levothyroxine (SYNTHROID, LEVOTHROID) 50 MCG tablet       melatonin 5 MG tablet tablet Take 2 tablets by mouth Every Night.      metoprolol succinate XL (TOPROL-XL) 25 MG 24 hr tablet       mupirocin (BACTROBAN) 2 % cream Apply 1 Application topically to the appropriate area as directed 2 (Two) Times a Day.      OLANZapine zydis (zyPREXA) 5 MG disintegrating tablet       pantoprazole (PROTONIX) 40 MG EC tablet Take 1 tablet by mouth 2 (Two) Times a Day Before Meals for 30 days. 60 tablet 0    polyethylene glycol (MIRALAX) 17 GM/SCOOP powder Dissolve 17 g (1 capful) in liquid and drink by mouth Daily. 510 g 1           MEDICATIONS GIVEN IN ER  Medications   sodium chloride 0.9 % flush 10 mL (has no administration in time range)                   MEDICAL DECISION MAKING, PROGRESS, and CONSULTS    All labs have been independently reviewed by me.  All radiology studies have been reviewed by me and I have also reviewed the radiology report.   EKG's independently viewed and interpreted by me.  Discussion below represents my analysis of pertinent findings related to patient's condition, differential diagnosis, treatment plan and final disposition.      Additional sources:    - Discussed/ obtained information from independent historians: EMS    - External (non-ED) record review: Patient was admitted here last month for abdominal pain, chest pain, and altered mental status.  He was found to have hyperkalemia.  He was taken off of trazodone and lisinopril.  He was seen by nephrology.    -Prescription drug monitoring program review:     N/A    - Chronic or social conditions impacting patient care (Social Determinants of Health): None          Orders placed during this visit:  Orders Placed This Encounter    Procedures    XR Chest 1 View    CT Head Without Contrast    [place] Draw    Comprehensive Metabolic Panel    Single High Sensitivity Troponin T    Magnesium    Urinalysis With Microscopic If Indicated (No Culture) - Urine, Clean Catch    Digoxin Level    Valproic Acid Level, Total    CBC Auto Differential    NPO Diet NPO Type: Strict NPO    Undress & Gown    Continuous Pulse Oximetry    Vital Signs    Oxygen Therapy- Nasal Cannula; Titrate 1-6 LPM Per SpO2; 90 - 95%    ECG 12 Lead ED Triage Standing Order; Weak / Dizzy / AMS    ECG 12 Lead Other; Weakness    Insert Peripheral IV    Fall Precautions    CBC & Differential    Green Top (Gel)    Lavender Top    Gold Top - SST    Light Blue Top         Additional orders considered but not ordered:          Differential diagnosis includes, but is not limited to:    Dehydration, pneumonia, closed head injury, UTI, electrolyte abnormality      Independent interpretation of labs, radiology studies, and discussions with consultants:  ED Course as of 07/09/24 1727 Tue Jul 09, 2024   1131 BP: 108/61 [WH]   1131 Temp: 96.5 °F (35.8 °C) [WH]   1131 Heart Rate: 92 [WH]   1131 Resp: 18 [WH]   1131 SpO2: 99 % [WH]   1131 Device (Oxygen Therapy): room air [WH]   1158 Glucose: 76 [WH]   1159 BUN(!): 30 [WH]   1159 Creatinine: 0.96 [WH]   1159 Magnesium: 1.7 [WH]   1159 Valproic Acid: 94.0 [WH]   1159 Digoxin: 0.80 [WH]   1159 HS Troponin T(!): 23  Unchanged from 4 weeks ago [WH]   1159 Potassium: 4.4 [WH]   1159 Anion Gap: 10.0 [WH]   1159 CO2: 27.0 [WH]   1159 Chest x-ray personally interpreted by me.  My personal interpretation is: Heart size is normal.  Lungs are clear.  No pleural effusion. [WH]   1227 EKG personally interpreted me at 12:14 PM.  My personal interpretation is:          EKG time: 12:11 PM  Rhythm/Rate: Atrial fibrillation, rate 70  P waves and MA: Irregular, varying  QRS, axis: LAD  ST and T waves: Nonspecific ST/T wave changes in the lateral  leads    Interpreted Contemporaneously by me, independently viewed  EKG is not significantly changed compared to prior EKG done on 6/11/2024   [WH]   1312 Ketones, UA(!): 15 mg/dL (1+) [WH]   1313 Valproic Acid: 94.0 [WH]   1313 Digoxin: 0.80 [WH]   1313 Magnesium: 1.7 [WH]   1412 Per the radiologist, head CT is negative acute. [WH]   1415 WBC: 5.18 [WH]   1415 Hemoglobin: 14.2 [WH]   1415 Platelets(!): 86  Stable [WH]   1434 Patient sleeping but easily awakened.  He is in no acute distress.  Test results were discussed with him.  He will be discharged. [WH]      ED Course User Index  [WH] Olegario Chris MD         COMPLEXITY OF CARE        DIAGNOSIS  Final diagnoses:   Generalized weakness         DISPOSITION  DISCHARGE    Patient discharged in stable condition.    Reviewed implications of results, diagnosis, meds, responsibility to follow up, warning signs and symptoms of possible worsening, potential complications and reasons to return to ER    Patient/Family voiced understanding of above instructions.    Discussed plan for discharge, as there is no emergent indication for admission. Patient referred to primary care provider for BP management due to today's BP. Pt/family is agreeable and understands need for follow up and repeat testing.  Pt is aware that discharge does not mean that nothing is wrong but it indicates no emergency is present that requires admission and they must continue care with follow-up as given below or physician of their choice.     FOLLOW-UP  José Garica, APRN  [address] [id number] [phone]               Medication List      No changes were made to your prescriptions during this visit.                   Latest Documented Vital Signs:  AS OF 17:27 EDT VITALS:    BP - 109/83  HR - 67  TEMP - 96.5 °F (35.8 °C)  O2 SATS - 92%            --    Please note that portions of this were completed with a voice recognition program.       Note Disclaimer: At [facility], we believe that sharing information builds trust and better relationships. You are receiving this note because you are receiving care at [facility] or recently visited. It is possible you will see health information before a provider has talked with you about it. This kind of information can be easy to misunderstand. To help you fully understand what it means for your health, we urge you to discuss this note with your provider.             Olegario Chris MD  07/09/24 1822

## 2024-07-10 ENCOUNTER — HOSPITAL ENCOUNTER (INPATIENT)
Facility: HOSPITAL | Age: 49
LOS: 1 days | Discharge: NURSING, REHAB OR LONG-TERM CARE | End: 2024-07-12
Attending: STUDENT IN AN ORGANIZED HEALTH CARE EDUCATION/TRAINING PROGRAM | Admitting: HOSPITALIST
Payer: MEDICARE

## 2024-07-10 ENCOUNTER — APPOINTMENT (OUTPATIENT)
Dept: GENERAL RADIOLOGY | Facility: HOSPITAL | Age: 49
End: 2024-07-10
Payer: MEDICARE

## 2024-07-10 DIAGNOSIS — N17.9 AKI (ACUTE KIDNEY INJURY): ICD-10-CM

## 2024-07-10 DIAGNOSIS — R55 SYNCOPE, UNSPECIFIED SYNCOPE TYPE: Primary | ICD-10-CM

## 2024-07-10 PROBLEM — Z79.01 CHRONIC ANTICOAGULATION: Status: ACTIVE | Noted: 2024-07-10

## 2024-07-10 PROBLEM — E86.0 DEHYDRATION: Status: ACTIVE | Noted: 2024-07-10

## 2024-07-10 PROBLEM — D69.6 THROMBOCYTOPENIA: Status: ACTIVE | Noted: 2024-07-10

## 2024-07-10 PROBLEM — I48.20 ATRIAL FIBRILLATION, CHRONIC: Status: ACTIVE | Noted: 2024-07-10

## 2024-07-10 LAB
ALBUMIN SERPL-MCNC: 3.7 G/DL (ref 3.5–5.2)
ALBUMIN/GLOB SERPL: 1.4 G/DL
ALP SERPL-CCNC: 71 U/L (ref 39–117)
ALT SERPL W P-5'-P-CCNC: 9 U/L (ref 1–41)
ANION GAP SERPL CALCULATED.3IONS-SCNC: 10.9 MMOL/L (ref 5–15)
AST SERPL-CCNC: 25 U/L (ref 1–40)
BASOPHILS # BLD AUTO: 0.02 10*3/MM3 (ref 0–0.2)
BASOPHILS NFR BLD AUTO: 0.3 % (ref 0–1.5)
BILIRUB SERPL-MCNC: 0.4 MG/DL (ref 0–1.2)
BUN SERPL-MCNC: 35 MG/DL (ref 6–20)
BUN/CREAT SERPL: 26.7 (ref 7–25)
CALCIUM SPEC-SCNC: 9.3 MG/DL (ref 8.6–10.5)
CHLORIDE SERPL-SCNC: 99 MMOL/L (ref 98–107)
CO2 SERPL-SCNC: 25.1 MMOL/L (ref 22–29)
CREAT SERPL-MCNC: 1.31 MG/DL (ref 0.76–1.27)
DEPRECATED RDW RBC AUTO: 44.1 FL (ref 37–54)
DIGOXIN SERPL-MCNC: 1 NG/ML (ref 0.6–1.2)
EGFRCR SERPLBLD CKD-EPI 2021: 66.7 ML/MIN/1.73
EOSINOPHIL # BLD AUTO: 0.01 10*3/MM3 (ref 0–0.4)
EOSINOPHIL NFR BLD AUTO: 0.1 % (ref 0.3–6.2)
ERYTHROCYTE [DISTWIDTH] IN BLOOD BY AUTOMATED COUNT: 13.8 % (ref 12.3–15.4)
GEN 5 2HR TROPONIN T REFLEX: 28 NG/L
GLOBULIN UR ELPH-MCNC: 2.6 GM/DL
GLUCOSE SERPL-MCNC: 93 MG/DL (ref 65–99)
HCT VFR BLD AUTO: 41.9 % (ref 37.5–51)
HGB BLD-MCNC: 14.1 G/DL (ref 13–17.7)
IMM GRANULOCYTES # BLD AUTO: 0.05 10*3/MM3 (ref 0–0.05)
IMM GRANULOCYTES NFR BLD AUTO: 0.7 % (ref 0–0.5)
LIPASE SERPL-CCNC: 35 U/L (ref 13–60)
LYMPHOCYTES # BLD AUTO: 1.71 10*3/MM3 (ref 0.7–3.1)
LYMPHOCYTES NFR BLD AUTO: 22.7 % (ref 19.6–45.3)
MCH RBC QN AUTO: 29.7 PG (ref 26.6–33)
MCHC RBC AUTO-ENTMCNC: 33.7 G/DL (ref 31.5–35.7)
MCV RBC AUTO: 88.4 FL (ref 79–97)
MONOCYTES # BLD AUTO: 0.85 10*3/MM3 (ref 0.1–0.9)
MONOCYTES NFR BLD AUTO: 11.3 % (ref 5–12)
NEUTROPHILS NFR BLD AUTO: 4.89 10*3/MM3 (ref 1.7–7)
NEUTROPHILS NFR BLD AUTO: 64.9 % (ref 42.7–76)
NRBC BLD AUTO-RTO: 0 /100 WBC (ref 0–0.2)
PLATELET # BLD AUTO: 92 10*3/MM3 (ref 140–450)
PMV BLD AUTO: 11.4 FL (ref 6–12)
POTASSIUM SERPL-SCNC: 4.5 MMOL/L (ref 3.5–5.2)
PROT SERPL-MCNC: 6.3 G/DL (ref 6–8.5)
QT INTERVAL: 371 MS
QTC INTERVAL: 392 MS
RBC # BLD AUTO: 4.74 10*6/MM3 (ref 4.14–5.8)
SODIUM SERPL-SCNC: 135 MMOL/L (ref 136–145)
TROPONIN T DELTA: 0 NG/L
TROPONIN T SERPL HS-MCNC: 28 NG/L
WBC NRBC COR # BLD AUTO: 7.53 10*3/MM3 (ref 3.4–10.8)

## 2024-07-10 PROCEDURE — 80162 ASSAY OF DIGOXIN TOTAL: CPT | Performed by: PHYSICIAN ASSISTANT

## 2024-07-10 PROCEDURE — 83690 ASSAY OF LIPASE: CPT | Performed by: PHYSICIAN ASSISTANT

## 2024-07-10 PROCEDURE — G0378 HOSPITAL OBSERVATION PER HR: HCPCS

## 2024-07-10 PROCEDURE — 93010 ELECTROCARDIOGRAM REPORT: CPT | Performed by: INTERNAL MEDICINE

## 2024-07-10 PROCEDURE — 85025 COMPLETE CBC W/AUTO DIFF WBC: CPT | Performed by: PHYSICIAN ASSISTANT

## 2024-07-10 PROCEDURE — 93005 ELECTROCARDIOGRAM TRACING: CPT | Performed by: PHYSICIAN ASSISTANT

## 2024-07-10 PROCEDURE — 80053 COMPREHEN METABOLIC PANEL: CPT | Performed by: PHYSICIAN ASSISTANT

## 2024-07-10 PROCEDURE — 84484 ASSAY OF TROPONIN QUANT: CPT | Performed by: PHYSICIAN ASSISTANT

## 2024-07-10 PROCEDURE — 36415 COLL VENOUS BLD VENIPUNCTURE: CPT

## 2024-07-10 PROCEDURE — 71045 X-RAY EXAM CHEST 1 VIEW: CPT

## 2024-07-10 PROCEDURE — 25810000003 SODIUM CHLORIDE 0.9 % SOLUTION

## 2024-07-10 PROCEDURE — 99285 EMERGENCY DEPT VISIT HI MDM: CPT

## 2024-07-10 RX ORDER — SODIUM CHLORIDE 9 MG/ML
100 INJECTION, SOLUTION INTRAVENOUS CONTINUOUS
Status: DISCONTINUED | OUTPATIENT
Start: 2024-07-10 | End: 2024-07-12 | Stop reason: HOSPADM

## 2024-07-10 RX ORDER — NITROGLYCERIN 0.4 MG/1
0.4 TABLET SUBLINGUAL
Status: DISCONTINUED | OUTPATIENT
Start: 2024-07-10 | End: 2024-07-12 | Stop reason: HOSPADM

## 2024-07-10 RX ORDER — BISACODYL 5 MG/1
5 TABLET, DELAYED RELEASE ORAL DAILY PRN
Status: DISCONTINUED | OUTPATIENT
Start: 2024-07-10 | End: 2024-07-12 | Stop reason: HOSPADM

## 2024-07-10 RX ORDER — ACETAMINOPHEN 325 MG/1
650 TABLET ORAL EVERY 4 HOURS PRN
Status: DISCONTINUED | OUTPATIENT
Start: 2024-07-10 | End: 2024-07-12 | Stop reason: HOSPADM

## 2024-07-10 RX ORDER — ONDANSETRON 4 MG/1
4 TABLET, ORALLY DISINTEGRATING ORAL EVERY 6 HOURS PRN
Status: DISCONTINUED | OUTPATIENT
Start: 2024-07-10 | End: 2024-07-12 | Stop reason: HOSPADM

## 2024-07-10 RX ORDER — ONDANSETRON 2 MG/ML
4 INJECTION INTRAMUSCULAR; INTRAVENOUS EVERY 6 HOURS PRN
Status: DISCONTINUED | OUTPATIENT
Start: 2024-07-10 | End: 2024-07-12 | Stop reason: HOSPADM

## 2024-07-10 RX ORDER — ACETAMINOPHEN 160 MG/5ML
650 SOLUTION ORAL EVERY 4 HOURS PRN
Status: DISCONTINUED | OUTPATIENT
Start: 2024-07-10 | End: 2024-07-12 | Stop reason: HOSPADM

## 2024-07-10 RX ORDER — BISACODYL 10 MG
10 SUPPOSITORY, RECTAL RECTAL DAILY PRN
Status: DISCONTINUED | OUTPATIENT
Start: 2024-07-10 | End: 2024-07-12 | Stop reason: HOSPADM

## 2024-07-10 RX ORDER — AMOXICILLIN 250 MG
2 CAPSULE ORAL 2 TIMES DAILY PRN
Status: DISCONTINUED | OUTPATIENT
Start: 2024-07-10 | End: 2024-07-12 | Stop reason: HOSPADM

## 2024-07-10 RX ORDER — MIRTAZAPINE 15 MG/1
15 TABLET, ORALLY DISINTEGRATING ORAL NIGHTLY
COMMUNITY

## 2024-07-10 RX ORDER — POLYETHYLENE GLYCOL 3350 17 G/17G
17 POWDER, FOR SOLUTION ORAL DAILY PRN
Status: DISCONTINUED | OUTPATIENT
Start: 2024-07-10 | End: 2024-07-12 | Stop reason: HOSPADM

## 2024-07-10 RX ORDER — ACETAMINOPHEN 650 MG/1
650 SUPPOSITORY RECTAL EVERY 4 HOURS PRN
Status: DISCONTINUED | OUTPATIENT
Start: 2024-07-10 | End: 2024-07-12 | Stop reason: HOSPADM

## 2024-07-10 RX ADMIN — SODIUM CHLORIDE 100 ML/HR: 9 INJECTION, SOLUTION INTRAVENOUS at 23:07

## 2024-07-10 NOTE — H&P
Patient Name:  Helder Abbott  YOB: 1975  MRN:  [id number]  Admit Date:  7/10/2024  Patient Care Team:  José Garcia APRN as PCP - General (Family Medicine)  Gian Marquez MD as Consulting Physician (Hematology and Oncology)  José Garcia APRN as Referring Physician (Family Medicine)  Phoenix Santa MD as Consulting Physician (Hematology and Oncology)      Subjective   History Present Illness     Chief Complaint   Patient presents with   • Abdominal Pain       History of Present Illness    Mr. Abbott is a 49-year-old male with a past medical history of hypertension, bipolar disorder, hypothyroidism, stroke, A-fib on chronic anticoagulation, and intellectual disability who lives in a group home presents to [facility] ED today after syncopal episode at the doctor's office.  Patient is a poor historian due to intellectual disability.  He does report some dizziness at the doctor's office today.  Patient is alert and oriented to person and place, confused to time.  Patient denies sick contacts, headache, fever, nausea, vomiting, or diarrhea.  He does report some shortness of breath and chest pain.  Describing chest pain he points to his epigastric area.    Review of Systems   Constitutional:  Negative for chills and fever.   Respiratory:  Negative for shortness of breath.    Cardiovascular:  Negative for chest pain.   Gastrointestinal:  Positive for abdominal pain and vomiting. Negative for diarrhea and nausea.   Genitourinary:  Negative for difficulty urinating.   Neurological:  Positive for dizziness. Negative for headaches.        Personal History     Past Medical History:   Diagnosis Date   • Benign essential hypertension    • Bipolar disorder    • Dyslipidemia    • GERD (gastroesophageal reflux disease)    • Heart disease    • History of stroke    • Hypothyroidism      No past surgical history on file.  Family History   Problem Relation Age of Onset   • Diabetes  Other    • Hypertension Other    • Heart disease Other      Social History     Tobacco Use   • Smoking status: Never   • Smokeless tobacco: Never   Vaping Use   • Vaping status: Never Used   Substance Use Topics   • Alcohol use: Never   • Drug use: Never     No current facility-administered medications on file prior to encounter.     Current Outpatient Medications on File Prior to Encounter   Medication Sig Dispense Refill   • Abilify Maintena 400 MG Suspension Reconstituted ER IM injection ER      • Acetaminophen Extra Strength 500 MG tablet Take 1 tablet by mouth 4 (Four) Times a Day As Needed.     • atorvastatin (LIPITOR) 40 MG tablet      • benztropine (COGENTIN) 0.5 MG tablet Take 2 tablets by mouth 2 (Two) Times a Day.     • Calcium Carbonate-Vitamin D (Oyster Shell Calcium/D) 250-3.125 MG-MCG tablet Take 1 tablet by mouth 2 (Two) Times a Day.     • Diclofenac Sodium (VOLTAREN) 1 % gel gel      • digoxin (LANOXIN) 125 MCG tablet      • divalproex (DEPAKOTE) 500 MG DR tablet      • Eliquis 5 MG tablet tablet Take 1 tablet by mouth Every 12 (Twelve) Hours.     • folic acid (FOLVITE) 1 MG tablet Take 1 tablet by mouth Daily for 30 days. 30 tablet 0   • ibuprofen (ADVIL,MOTRIN) 600 MG tablet Take 1 tablet by mouth 4 (Four) Times a Day.     • levothyroxine (SYNTHROID, LEVOTHROID) 50 MCG tablet      • melatonin 5 MG tablet tablet Take 2 tablets by mouth Every Night.     • metoprolol succinate XL (TOPROL-XL) 25 MG 24 hr tablet      • mupirocin (BACTROBAN) 2 % cream Apply 1 Application topically to the appropriate area as directed 2 (Two) Times a Day.     • OLANZapine zydis (zyPREXA) 5 MG disintegrating tablet      • pantoprazole (PROTONIX) 40 MG EC tablet Take 1 tablet by mouth 2 (Two) Times a Day Before Meals for 30 days. 60 tablet 0   • polyethylene glycol (MIRALAX) 17 GM/SCOOP powder Dissolve 17 g (1 capful) in liquid and drink by mouth Daily. 510 g 1     Allergies   Allergen Reactions   • Clonazepam Unknown (See  Comments)   • Fluoxetine Unknown (See Comments)   • Grass Unknown - High Severity   • Grass Pollen(K-O-R-T-Swt Kennedy) Unknown - Low Severity   • Hydroxyzine Unknown (See Comments)   • Methylphenidate Unknown (See Comments)   • Quetiapine Unknown - Low Severity   • Risperidone Unknown (See Comments)       Objective    Objective     Vital Signs  Temp:  [97.3 °F (36.3 °C)-98 °F (36.7 °C)] 98 °F (36.7 °C)  Heart Rate:  [71-82] 73  Resp:  [16] 16  BP: (103-138)/(63-80) 138/78  SpO2:  [94 %-100 %] 94 %  on   ;   Device (Oxygen Therapy): room air  Body mass index is 19.52 kg/m².    Physical Exam  Vitals and nursing note reviewed.   Constitutional:       General: He is not in acute distress.  Eyes:      Extraocular Movements: Extraocular movements intact.      Pupils: Pupils are equal, round, and reactive to light.   Cardiovascular:      Rate and Rhythm: Normal rate. Rhythm irregular.      Pulses: Normal pulses.      Heart sounds: Normal heart sounds.   Pulmonary:      Effort: Pulmonary effort is normal. No respiratory distress.      Breath sounds: Normal breath sounds.   Abdominal:      General: Abdomen is flat. Bowel sounds are normal. There is no distension.      Palpations: Abdomen is soft.      Tenderness: There is no abdominal tenderness.   Musculoskeletal:      Right lower leg: No edema.      Left lower leg: No edema.   Skin:     General: Skin is warm and dry.   Neurological:      Mental Status: He is alert. He is disoriented.   Psychiatric:         Behavior: Behavior is cooperative.         Results Review:  I reviewed the patient's new clinical results.  I reviewed the patient's new imaging results and agree with the interpretation.  I reviewed the patient's other test results and agree with the interpretation  I personally viewed and interpreted the patient's EKG/Telemetry data  Discussed with ED provider.    Lab Results (last 24 hours)       Procedure Component Value Units Date/Time    CBC & Differential  [[id number]]  (Abnormal) Collected: 07/10/24 1553    Specimen: Blood from Arm, Left Updated: 07/10/24 1612    Narrative:      The following orders were created for panel order CBC & Differential.  Procedure                               Abnormality         Status                     ---------                               -----------         ------                     CBC Auto Differential[[id number]]        Abnormal            Final result                 Please view results for these tests on the individual orders.    Comprehensive Metabolic Panel [[id number]]  (Abnormal) Collected: 07/10/24 1553    Specimen: Blood from Arm, Left Updated: 07/10/24 1627     Glucose 93 mg/dL      BUN 35 mg/dL      Creatinine 1.31 mg/dL      Sodium 135 mmol/L      Potassium 4.5 mmol/L      Chloride 99 mmol/L      CO2 25.1 mmol/L      Calcium 9.3 mg/dL      Total Protein 6.3 g/dL      Albumin 3.7 g/dL      ALT (SGPT) 9 U/L      AST (SGOT) 25 U/L      Alkaline Phosphatase 71 U/L      Total Bilirubin 0.4 mg/dL      Globulin 2.6 gm/dL      A/G Ratio 1.4 g/dL      BUN/Creatinine Ratio 26.7     Anion Gap 10.9 mmol/L      eGFR 66.7 mL/min/1.73     Narrative:      GFR Normal >60  Chronic Kidney Disease <60  Kidney Failure <15      High Sensitivity Troponin T [[id number]]  (Abnormal) Collected: 07/10/24 1553    Specimen: Blood from Arm, Left Updated: 07/10/24 1627     HS Troponin T 28 ng/L     Narrative:      High Sensitive Troponin T Reference Range:  <14.0 ng/L- Negative Female for AMI  <22.0 ng/L- Negative Male for AMI  >=14 - Abnormal Female indicating possible myocardial injury.  >=22 - Abnormal Male indicating possible myocardial injury.   Clinicians would have to utilize clinical acumen, EKG, Troponin, and serial changes to determine if it is an Acute Myocardial Infarction or myocardial injury due to an underlying chronic condition.         CBC Auto Differential [[id number]]  (Abnormal) Collected: 07/10/24 1553    Specimen: Blood from Arm,  Left Updated: 07/10/24 1612     WBC 7.53 10*3/mm3      RBC 4.74 10*6/mm3      Hemoglobin 14.1 g/dL      Hematocrit 41.9 %      MCV 88.4 fL      MCH 29.7 pg      MCHC 33.7 g/dL      RDW 13.8 %      RDW-SD 44.1 fl      MPV 11.4 fL      Platelets 92 10*3/mm3      Neutrophil % 64.9 %      Lymphocyte % 22.7 %      Monocyte % 11.3 %      Eosinophil % 0.1 %      Basophil % 0.3 %      Immature Grans % 0.7 %      Neutrophils, Absolute 4.89 10*3/mm3      Lymphocytes, Absolute 1.71 10*3/mm3      Monocytes, Absolute 0.85 10*3/mm3      Eosinophils, Absolute 0.01 10*3/mm3      Basophils, Absolute 0.02 10*3/mm3      Immature Grans, Absolute 0.05 10*3/mm3      nRBC 0.0 /100 WBC     Lipase [[id number]]  (Normal) Collected: 07/10/24 1553    Specimen: Blood from Arm, Left Updated: 07/10/24 1627     Lipase 35 U/L     High Sensitivity Troponin T 2Hr [[id number]]  (Abnormal) Collected: 07/10/24 1801    Specimen: Blood from Arm, Left Updated: 07/10/24 1829     HS Troponin T 28 ng/L      Troponin T Delta 0 ng/L     Narrative:      High Sensitive Troponin T Reference Range:  <14.0 ng/L- Negative Female for AMI  <22.0 ng/L- Negative Male for AMI  >=14 - Abnormal Female indicating possible myocardial injury.  >=22 - Abnormal Male indicating possible myocardial injury.   Clinicians would have to utilize clinical acumen, EKG, Troponin, and serial changes to determine if it is an Acute Myocardial Infarction or myocardial injury due to an underlying chronic condition.         Digoxin Level [[id number]]  (Normal) Collected: 07/10/24 1801    Specimen: Blood from Arm, Left Updated: 07/10/24 1844     Digoxin 1.00 ng/mL             Imaging Results (Last 24 Hours)       Procedure Component Value Units Date/Time    XR Chest 1 View [[id number]] Collected: 07/10/24 1619     Updated: 07/10/24 1623    Narrative:      PORTABLE CHEST     HISTORY: Syncope.     COMPARISON: Chest x-ray 7/9/2024.     FINDINGS: A single portable view of the chest demonstrates the  heart to  be within normal limits in size. There is no evidence of infiltrate,  effusion or of congestive failure.     This report was finalized on 7/10/2024 4:19 PM by Dr. Ronnie Orozco M.D  on Workstation: [id number]               Results for orders placed during the hospital encounter of 06/10/24    Adult Transthoracic Echo Complete W/ Cont if Necessary Per Protocol    Interpretation Summary  •  Left ventricular systolic function is hyperdynamic (EF > 70%).  •  Left ventricular wall thickness is consistent with borderline concentric hypertrophy.  •  Left ventricular diastolic function was indeterminate.  •  Normal right ventricular cavity size noted. Hyperdynamic right ventricular systolic function noted.  •  Calculated right ventricular systolic pressure from tricuspid regurgitation is 18 mmHg  •  There is a small (<1cm) pericardial effusion adjacent to the left ventricle. There is no evidence of cardiac tamponade.    ECG 12 Lead Syncope   Final Result   HEART RATE=67  bpm   RR Mhpnsstt=882  ms   FL Interval=  ms   P Horizontal Axis=  deg   P Front Axis=  deg   QRSD Interval=91  ms   QT Mbtffgry=528  ms   VLkQ=996  ms   QRS Axis=-14  deg   T Wave Axis=71  deg   - ABNORMAL ECG -   Atrial fibrillation   No change from previous tracing   Electronically Signed By: Destiny Galan ([facility]) 2024-07-10 16:20:23   Date and Time of Study:2024-07-10 16:00:12      Telemetry Scan   Final Result        Assessment/Plan   Assessment & Plan   Active Hospital Problems    Diagnosis  POA   • **Syncope [R55]  Yes   • Atrial fibrillation, chronic [I48.20]  Yes   • Dehydration [E86.0]  Yes   • Chronic anticoagulation [Z79.01]  Not Applicable   • Thrombocytopenia [D69.6]  Yes   • JOSIAH (acute kidney injury) [N17.9]  Yes   • Essential hypertension [I10]  Yes   • Hypothyroidism (acquired) [E03.9]  Yes   • History of CVA (cerebrovascular accident) [Z86.73]  Not Applicable   • GERD without esophagitis [K21.9]  Yes   • Bipolar disorder  [F31.9]  Yes   • HLD (hyperlipidemia) [E78.5]  Yes      Resolved Hospital Problems   No resolved problems to display.       49 y.o. male admitted with Syncope.    Syncope  -EKG- A-fib  -Echo from June 2024-left ventricular systolic function is hyperdynamic, left ventricular wall thickening consistent with borderline concentric hypertrophy, ventricular diastolic function indeterminate, hyperdynamic right ventricular systolic function, small pericardial effusion adjacent to the left ventricle  -CT head-diffuse atrophy without evidence for acute intracranial pathology  -Will check orthostatics  -Will recheck BMP and CBC in the morning  - Telemetry monitoring  -Neurochecks every 4 hours  -Uncertain of etiology of syncope will consult cardiology due to cardiac history and consult neurology    Acute kidney injury  - Creatinine on admission 1.31, increased from 0.96 from yesterday.   -IV fluids  -Check BMP in the morning  -Strict JONI  -Daily weights  -Avoid nephrotoxic medications       Atrial fibrillation on long term anticoagulation  -Heart rate 83 and A-fib on telemetry  -Patient is home med list notes Eliquis and metoprolol  -Will review and continue home medications once home med rec complete  -Telemetry    Hypertension   - BP stable at 119/76   -Will review and continue home medications once home med rec complete  -Monitor blood pressure        SCDs for DVT prophylaxis.  Full code.  Discussed with patient.      PILI De León  [facility]  07/10/24  21:17 EDT

## 2024-07-10 NOTE — ED NOTES
.Nursing report ED to floor  Helder Abbott  49 y.o.  male    HPI :  HPI (Adult)  Stated Reason for Visit: Pt's caregiver stated, Pts stomach has been in pain for about a day, no appetite for 2-3 days as diarhhea for few days.  History Obtained From: other (see comments) (caregiver)  Onset of Symptoms: constant  Duration (Days): 1  Associated Signs/Symptoms: diarrhea, confusion, appetite, decrease/loss, abdominal pain, dizziness, weight loss, unintentional, restlessness, level of consciousness, altered, visual disturbances, weakness, irritability    Chief Complaint  Chief Complaint   Patient presents with    Abdominal Pain       Admitting doctor:   Jimbo Pool MD    Admitting diagnosis:   There were no encounter diagnoses.    Code status:   Current Code Status       Date Active Code Status Order ID Comments User Context       Prior            Allergies:   Clonazepam, Fluoxetine, Grass, Grass pollen(k-o-r-t-swt benigno), Hydroxyzine, Methylphenidate, Quetiapine, and Risperidone    Isolation:   No active isolations    Intake and Output  No intake or output data in the 24 hours ending 07/10/24 1913    Weight:       07/10/24  1554   Weight: 49.9 kg (110 lb)       Most recent vitals:   Vitals:    07/10/24 1802 07/10/24 1811 07/10/24 1841 07/10/24 1843   BP:  116/76 122/80    BP Location:       Patient Position:       Pulse:  73  78   Resp:       Temp: 97.8 °F (36.6 °C)      TempSrc: Oral      SpO2:  95%     Weight:       Height:           Active LDAs/IV Access:   Lines, Drains & Airways       Active LDAs       Name Placement date Placement time Site Days    Peripheral IV 07/10/24 1554 Left Antecubital 07/10/24  1554  Antecubital  less than 1                    Labs (abnormal labs have a star):   Labs Reviewed   COMPREHENSIVE METABOLIC PANEL - Abnormal; Notable for the following components:       Result Value    BUN 35 (*)     Creatinine 1.31 (*)     Sodium 135 (*)     BUN/Creatinine Ratio 26.7 (*)     All other  components within normal limits    Narrative:     GFR Normal >60  Chronic Kidney Disease <60  Kidney Failure <15     TROPONIN - Abnormal; Notable for the following components:    HS Troponin T 28 (*)     All other components within normal limits    Narrative:     High Sensitive Troponin T Reference Range:  <14.0 ng/L- Negative Female for AMI  <22.0 ng/L- Negative Male for AMI  >=14 - Abnormal Female indicating possible myocardial injury.  >=22 - Abnormal Male indicating possible myocardial injury.   Clinicians would have to utilize clinical acumen, EKG, Troponin, and serial changes to determine if it is an Acute Myocardial Infarction or myocardial injury due to an underlying chronic condition.        CBC WITH AUTO DIFFERENTIAL - Abnormal; Notable for the following components:    Platelets 92 (*)     Eosinophil % 0.1 (*)     Immature Grans % 0.7 (*)     All other components within normal limits   HIGH SENSITIVITIY TROPONIN T 2HR - Abnormal; Notable for the following components:    HS Troponin T 28 (*)     All other components within normal limits    Narrative:     High Sensitive Troponin T Reference Range:  <14.0 ng/L- Negative Female for AMI  <22.0 ng/L- Negative Male for AMI  >=14 - Abnormal Female indicating possible myocardial injury.  >=22 - Abnormal Male indicating possible myocardial injury.   Clinicians would have to utilize clinical acumen, EKG, Troponin, and serial changes to determine if it is an Acute Myocardial Infarction or myocardial injury due to an underlying chronic condition.        LIPASE - Normal   DIGOXIN LEVEL - Normal   CBC AND DIFFERENTIAL    Narrative:     The following orders were created for panel order CBC & Differential.  Procedure                               Abnormality         Status                     ---------                               -----------         ------                     CBC Auto Differential[[id number]]        Abnormal            Final result                 Please  view results for these tests on the individual orders.       EKG:   ECG 12 Lead Syncope   Final Result   HEART RATE=67  bpm   RR Wcvygloz=353  ms   NC Interval=  ms   P Horizontal Axis=  deg   P Front Axis=  deg   QRSD Interval=91  ms   QT Qmqiorbc=703  ms   ZAtR=121  ms   QRS Axis=-14  deg   T Wave Axis=71  deg   - ABNORMAL ECG -   Atrial fibrillation   No change from previous tracing   Electronically Signed By: Destiny Galan ([facility]) 2024-07-10 16:20:23   Date and Time of Study:2024-07-10 16:00:12          Meds given in ED:   Medications - No data to display    Imaging results:  CT Head Without Contrast    Result Date: 7/9/2024   Moderate diffuse parenchymal atrophy without evidence for acute intracranial pathology. The etiology of the patient's acute confusion is not further elucidated on this examination. If further radiographic assessment is warranted, one could obtain an MRI of the brain for follow-up.   Radiation dose reduction techniques were utilized, including automated exposure control and exposure modulation based on body size.  This report was finalized on 7/9/2024 2:16 PM by Dr. Jorge L Gomez M.D on Workstation: [id number]      XR Chest 1 View    Result Date: 7/9/2024  1. No acute process.   This report was finalized on 7/9/2024 11:52 AM by Dr. Sang Lane M.D on Workstation: [id number]       Ambulatory status:   -     Social issues:   Social History     Socioeconomic History    Marital status: Single   Tobacco Use    Smoking status: Never   Vaping Use    Vaping status: Never Used   Substance and Sexual Activity    Alcohol use: Never    Drug use: Never    Sexual activity: Defer       Peripheral Neurovascular  Peripheral Neurovascular (Adult)  Peripheral Neurovascular WDL: WDL    Neuro Cognitive  Neuro Cognitive (Adult)  Cognitive/Neuro/Behavioral WDL: .WDL except, orientation, speech  Orientation: disoriented to, time  Speech: slurred    Learning  Learning Assessment (Adult)  Learning Readiness  and Ability: cognitive limitation noted    Respiratory  Respiratory WDL  Respiratory WDL: WDL    Abdominal Pain       Pain Assessments  Pain (Adult)  (0-10) Pain Rating: Rest: 0  (0-10) Pain Rating: Activity: 0  Preferred Pain Scale: FACES (Orellana-Baker FACES Pain Rating Scale)  Patient requested Medication Prescribed for Lower Pain Scale: No  FACES Pain Rating: Rest: 2-->hurts little bit  FACES Pain Rating: Activity: 2-->hurts little bit  Pain Location: abdomen  Pain Side/Orientation: other (see comments)  Pain Description: aching  Pain Radiation to: other (see comments) (pt unable to verbalize)           Neha Mendez RN  07/10/24 19:13 EDT

## 2024-07-10 NOTE — ED PROVIDER NOTES
EMERGENCY DEPARTMENT ENCOUNTER  Room Number:  05/05  PCP: José Garcia APRN  Independent Historians: EMS and caregiver      HPI:  Chief Complaint: had concerns including Abdominal Pain.     A complete HPI/ROS/PMH/PSH/SH/FH are unobtainable due to: Poor historian- - intellectual disability    Chronic or social conditions impacting patient care (Social Determinants of Health): None      Context: Helder Abbott is a 49 y.o. male with a medical history of bipolar disorder, intellectual disability lives in a group home, hypothyroidism, chronic A-fib on anticoagulation with Eliquis, hypertension, GERD who presents to the ED c/o acute syncopal episode while at the doctor's office today.  Patient had a recent admission to the hospital, was having a hospital follow-up today, was also following up on poor appetite and intake for GI referral.  Caregiver had dropped him off, he was called by the office and told that the patient had had a 10-second unresponsive episode and that they were going to transport him via an ambulance to the ER.  Due to patient's intellectual disability, he is unable to provide any meaningful history.      Review of prior external notes (non-ED) -and- Review of prior external test results outside of this encounter: Patient admitted 6/10/2024 to 6/13/2024 for chest pain, abdominal pain, altered mental status after starting trazodone earlier in the week, found to have hyperkalemia.  Patient also complained of abdominal pain, abdominal workup unremarkable, intermittent chest pains at that time, troponins were flat and EKGs showed no ischemia, echo with no wall motion abnormalities.  Hyperkalemia resolved with discontinuation of lisinopril.  Also had some hypotension that improved with stopping it, metoprolol also temporarily discontinued but was resumed at discharge.  Hiccups treated with antacids and PPI.    Patient also evaluated in this ER yesterday feeling shaky and unsteady and generally weak  for a few days.  EKG urinalysis labs and CT head are unremarkable for acute findings, chest x-ray showed no acute process, digoxin and valproic acid levels were within normal range, he was discharged.    PAST MEDICAL HISTORY  Active Ambulatory Problems     Diagnosis Date Noted    JOSIAH (acute kidney injury) 04/30/2024    Essential hypertension 04/30/2024    Hypothyroidism (acquired) 04/30/2024    History of CVA (cerebrovascular accident) 04/30/2024    GERD without esophagitis 04/30/2024    HLD (hyperlipidemia) 04/30/2024    Bipolar disorder 04/30/2024    Partial bowel obstruction 04/30/2024    Fecal impaction 04/30/2024    Severe malnutrition 05/02/2024    Hyperkalemia 06/11/2024     Resolved Ambulatory Problems     Diagnosis Date Noted    No Resolved Ambulatory Problems     Past Medical History:   Diagnosis Date    Benign essential hypertension     Dyslipidemia     GERD (gastroesophageal reflux disease)     Heart disease     History of stroke     Hypothyroidism          PAST SURGICAL HISTORY  No past surgical history on file.      FAMILY HISTORY  Family History   Problem Relation Age of Onset    Diabetes Other     Hypertension Other     Heart disease Other          SOCIAL HISTORY  Social History     Socioeconomic History    Marital status: Single   Tobacco Use    Smoking status: Never   Vaping Use    Vaping status: Never Used   Substance and Sexual Activity    Alcohol use: Never    Drug use: Never    Sexual activity: Defer         ALLERGIES  Clonazepam, Fluoxetine, Grass, Grass pollen([id number]-swt benigno), Hydroxyzine, Methylphenidate, Quetiapine, and Risperidone      REVIEW OF SYSTEMS  Review of Systems  Included in HPI  All systems reviewed and negative except for those discussed in HPI.      PHYSICAL EXAM    I have reviewed the triage vital signs and nursing notes.    ED Triage Vitals   Temp Heart Rate Resp BP SpO2   07/10/24 1520 07/10/24 1521 07/10/24 1520 07/10/24 1521 07/10/24 1521   97.3 °F (36.3 °C) 72 16  115/68 96 %      Temp src Heart Rate Source Patient Position BP Location FiO2 (%)   -- -- -- -- --              Physical Exam  GENERAL: alert, no acute distress  SKIN: Warm, dry  HENT: Normocephalic, atraumatic  EYES: no scleral icterus  CV: regular rhythm, regular rate  RESPIRATORY: normal effort, lungs clear  ABDOMEN: nondistended soft nontender normal bowel sounds no guarding or rigidity  MUSCULOSKELETAL: no deformity  NEURO: alert, moves all extremities, follows commands            LAB RESULTS  Recent Results (from the past 24 hour(s))   Comprehensive Metabolic Panel    Collection Time: 07/10/24  3:53 PM    Specimen: Arm, Left; Blood   Result Value Ref Range    Glucose 93 65 - 99 mg/dL    BUN 35 (H) 6 - 20 mg/dL    Creatinine 1.31 (H) 0.76 - 1.27 mg/dL    Sodium 135 (L) 136 - 145 mmol/L    Potassium 4.5 3.5 - 5.2 mmol/L    Chloride 99 98 - 107 mmol/L    CO2 25.1 22.0 - 29.0 mmol/L    Calcium 9.3 8.6 - 10.5 mg/dL    Total Protein 6.3 6.0 - 8.5 g/dL    Albumin 3.7 3.5 - 5.2 g/dL    ALT (SGPT) 9 1 - 41 U/L    AST (SGOT) 25 1 - 40 U/L    Alkaline Phosphatase 71 39 - 117 U/L    Total Bilirubin 0.4 0.0 - 1.2 mg/dL    Globulin 2.6 gm/dL    A/G Ratio 1.4 g/dL    BUN/Creatinine Ratio 26.7 (H) 7.0 - 25.0    Anion Gap 10.9 5.0 - 15.0 mmol/L    eGFR 66.7 >60.0 mL/min/1.73   High Sensitivity Troponin T    Collection Time: 07/10/24  3:53 PM    Specimen: Arm, Left; Blood   Result Value Ref Range    HS Troponin T 28 (H) <22 ng/L   CBC Auto Differential    Collection Time: 07/10/24  3:53 PM    Specimen: Arm, Left; Blood   Result Value Ref Range    WBC 7.53 3.40 - 10.80 10*3/mm3    RBC 4.74 4.14 - 5.80 10*6/mm3    Hemoglobin 14.1 13.0 - 17.7 g/dL    Hematocrit 41.9 37.5 - 51.0 %    MCV 88.4 79.0 - 97.0 fL    MCH 29.7 26.6 - 33.0 pg    MCHC 33.7 31.5 - 35.7 g/dL    RDW 13.8 12.3 - 15.4 %    RDW-SD 44.1 37.0 - 54.0 fl    MPV 11.4 6.0 - 12.0 fL    Platelets 92 (L) 140 - 450 10*3/mm3    Neutrophil % 64.9 42.7 - 76.0 %    Lymphocyte %  22.7 19.6 - 45.3 %    Monocyte % 11.3 5.0 - 12.0 %    Eosinophil % 0.1 (L) 0.3 - 6.2 %    Basophil % 0.3 0.0 - 1.5 %    Immature Grans % 0.7 (H) 0.0 - 0.5 %    Neutrophils, Absolute 4.89 1.70 - 7.00 10*3/mm3    Lymphocytes, Absolute 1.71 0.70 - 3.10 10*3/mm3    Monocytes, Absolute 0.85 0.10 - 0.90 10*3/mm3    Eosinophils, Absolute 0.01 0.00 - 0.40 10*3/mm3    Basophils, Absolute 0.02 0.00 - 0.20 10*3/mm3    Immature Grans, Absolute 0.05 0.00 - 0.05 10*3/mm3    nRBC 0.0 0.0 - 0.2 /100 WBC   Lipase    Collection Time: 07/10/24  3:53 PM    Specimen: Arm, Left; Blood   Result Value Ref Range    Lipase 35 13 - 60 U/L   ECG 12 Lead Syncope    Collection Time: 07/10/24  4:00 PM   Result Value Ref Range    QT Interval 371 ms    QTC Interval 392 ms   High Sensitivity Troponin T 2Hr    Collection Time: 07/10/24  6:01 PM    Specimen: Arm, Left; Blood   Result Value Ref Range    HS Troponin T 28 (H) <22 ng/L    Troponin T Delta 0 >=-4 - <+4 ng/L   Digoxin Level    Collection Time: 07/10/24  6:01 PM    Specimen: Arm, Left; Blood   Result Value Ref Range    Digoxin 1.00 0.60 - 1.20 ng/mL         RADIOLOGY  XR Chest 1 View    Result Date: 7/10/2024  PORTABLE CHEST  HISTORY: Syncope.  COMPARISON: Chest x-ray 7/9/2024.  FINDINGS: A single portable view of the chest demonstrates the heart to be within normal limits in size. There is no evidence of infiltrate, effusion or of congestive failure.  This report was finalized on 7/10/2024 4:19 PM by Dr. Ronnie Orozco M.D on Workstation: [id number]         MEDICATIONS GIVEN IN ER  Medications - No data to display      ORDERS PLACED DURING THIS VISIT:  Orders Placed This Encounter   Procedures    XR Chest 1 View    Comprehensive Metabolic Panel    High Sensitivity Troponin T    CBC Auto Differential    Lipase    High Sensitivity Troponin T 2Hr    Digoxin Level    Monitor Blood Pressure    Continuous Pulse Oximetry    LHA (on-call MD unless specified) Details    ECG 12 Lead Syncope     Initiate Observation Status    CBC & Differential         OUTPATIENT MEDICATION MANAGEMENT:  No current Epic-ordered facility-administered medications on file.     Current Outpatient Medications Ordered in Epic   Medication Sig Dispense Refill    Abilify Maintena 400 MG Suspension Reconstituted ER IM injection ER       Acetaminophen Extra Strength 500 MG tablet Take 1 tablet by mouth 4 (Four) Times a Day As Needed.      atorvastatin (LIPITOR) 40 MG tablet       benztropine (COGENTIN) 0.5 MG tablet Take 2 tablets by mouth 2 (Two) Times a Day.      Calcium Carbonate-Vitamin D (Oyster Shell Calcium/D) 250-3.125 MG-MCG tablet Take 1 tablet by mouth 2 (Two) Times a Day.      Diclofenac Sodium (VOLTAREN) 1 % gel gel       digoxin (LANOXIN) 125 MCG tablet       divalproex (DEPAKOTE) 500 MG DR tablet       Eliquis 5 MG tablet tablet Take 1 tablet by mouth Every 12 (Twelve) Hours.      folic acid (FOLVITE) 1 MG tablet Take 1 tablet by mouth Daily for 30 days. 30 tablet 0    ibuprofen (ADVIL,MOTRIN) 600 MG tablet Take 1 tablet by mouth 4 (Four) Times a Day.      levothyroxine (SYNTHROID, LEVOTHROID) 50 MCG tablet       melatonin 5 MG tablet tablet Take 2 tablets by mouth Every Night.      metoprolol succinate XL (TOPROL-XL) 25 MG 24 hr tablet       mupirocin (BACTROBAN) 2 % cream Apply 1 Application topically to the appropriate area as directed 2 (Two) Times a Day.      OLANZapine zydis (zyPREXA) 5 MG disintegrating tablet       pantoprazole (PROTONIX) 40 MG EC tablet Take 1 tablet by mouth 2 (Two) Times a Day Before Meals for 30 days. 60 tablet 0    polyethylene glycol (MIRALAX) 17 GM/SCOOP powder Dissolve 17 g (1 capful) in liquid and drink by mouth Daily. 510 g 1         PROCEDURES  Procedures            PROGRESS, DATA ANALYSIS, CONSULTS, AND MEDICAL DECISION MAKING  All labs have been independently interpreted by me.  All radiology studies have been reviewed by me. All EKG's have been independently viewed and interpreted  by me.  Discussion below represents my analysis of pertinent findings related to patient's condition, differential diagnosis, treatment plan and final disposition.    DIFFERENTIAL    My differential diagnosis includes but is not limited to vasovagal syncope, arrhythmia, anemia, dehydration, electrolyte abnormality    Clinical Scores:                  ED Course as of 07/10/24 1926   Wed Jul 10, 2024   1626 WBC: 7.53 [KA]   1626 Hemoglobin: 14.1 [KA]   1632 EKG interpreted by me demonstrates atrial fibrillation, rate of 67, no QT prolongation, no ST elevation [MW]   1717 WBC: 7.53 [KA]   1717 Hemoglobin: 14.1 [KA]   1717 HS Troponin T(!): 28 [KA]   1717 Glucose: 93 [KA]   1717 Creatinine(!): 1.31 [KA]   1718 My independent interpretation the chest x-ray is no cardiomegaly or acute infiltrate. [KA]   1719 My Independent interpretation of the EKG performed at 1600 is atrial fibrillation, rate 67, normal axis, normal intervals, no ST elevation. [KA]   1843 Discussed the patient with PILI Parker from [facility] including history presentation workup and she agrees to admit on behalf of Dr. Pool. [KA]      ED Course User Index  [KA] Nathalia De Luna PA-C  [MW] Salvatore Abbott MD             AS OF 19:26 EDT VITALS:    BP - 119/76  HR - 77  TEMP - 97.8 °F (36.6 °C) (Oral)  O2 SATS - 95%    COMPLEXITY OF CARE  The patient requires admission.      DIAGNOSIS  Final diagnoses:   Syncope, unspecified syncope type   JOSIAH (acute kidney injury)         DISPOSITION  ED Disposition       ED Disposition   Decision to Admit    Condition   --    Comment   Level of Care: Telemetry [5]   Diagnosis: Syncope [206001]   Admitting Physician: INDIGO POOL [[id number]]   Attending Physician: INDIGO POOL [[id number]]                    FOLLOW UP  No follow-up provider specified.      Prescribed Medications     Medication List      No changes were made to your prescriptions during this visit.                   Please note that portions of this  document were completed with a voice recognition program.    Note Disclaimer: At [facility], we believe that sharing information builds trust and better relationships. You are receiving this note because you recently visited [facility]. It is possible you will see health information before a provider has talked with you about it. This kind of information can be easy to misunderstand. To help you fully understand what it means for your health, we urge you to discuss this note with your provider.         Nathalia De Luna PA-C  07/10/24 1926       Nathalia De Luna PA-C  07/10/24 2002

## 2024-07-10 NOTE — CASE MANAGEMENT/SOCIAL WORK
Discharge Planning Assessment  [facility]     Patient Name: Helder Abbott  MRN: [id number]  Today's Date: 7/10/2024    Admit Date: 7/10/2024        Discharge Needs Assessment    No documentation.                  Discharge Plan       Row Name 07/10/24 1648       Plan    Plan Comments Call received from registration- patient has a legal guardian- consent received for treatment- banner and paperwork on file; updated Sanjuana Davies-supervisor per protocol                  Continued Care and Services - Admitted Since 7/10/2024    No active coordination exists for this encounter.          Demographic Summary    No documentation.                  Functional Status    No documentation.                  Psychosocial    No documentation.                  Abuse/Neglect    No documentation.                  Legal    No documentation.                  Substance Abuse    No documentation.                  Patient Forms    No documentation.                     Radha Mauricio RN

## 2024-07-10 NOTE — ED NOTES
Patient to ER via ems from [facility] for check up post hospital visit for fatigue  During visit patient went unresponsive for 10 seconds  Patient is alert at time of triage     Patient was hypotensive with EMS  but pressure has came up since starting transport    Patient complains that his belly hurts

## 2024-07-10 NOTE — ED PROVIDER NOTES
EMERGENCY DEPARTMENT MD ATTESTATION NOTE    Room Number:  05/05  PCP: José Garcia APRN  Independent Historians: Friend and EMS    HPI:    Context: Helder Abbott is a 49 y.o. male with a medical history of bipolar, intellectual disability Eliquis, HTN who presents to the ED c/o acute syncopal episode.  Patient was at the doctor's office today when he had an unresponsive episode lasted approximately 10 seconds.  Patient's caregiver is present states patient has had a very poor appetite since he has been taking care of him.        Review of prior external notes (non-ED) -and- Review of prior external test results outside of this encounter: Discharge summary from 6/13/2024 reviewed and notable for admission secondary to abdominal pain patient treated on inpatient basis and eventually will be discharged.    Prescription drug monitoring program review:           PHYSICAL EXAM    I have reviewed the triage vital signs and nursing notes.    ED Triage Vitals   Temp Heart Rate Resp BP SpO2   07/10/24 1520 07/10/24 1521 07/10/24 1520 07/10/24 1521 07/10/24 1521   97.3 °F (36.3 °C) 72 16 115/68 96 %      Temp src Heart Rate Source Patient Position BP Location FiO2 (%)   -- 07/10/24 1554 07/10/24 1554 07/10/24 1554 --    Monitor Lying Left arm        Physical Exam  GENERAL: alert, no acute distress  SKIN: Warm, dry  HENT: Normocephalic, atraumatic  EYES: no scleral icterus  CV: regular rhythm, regular rate  RESPIRATORY: normal effort, lungs clear  ABDOMEN: soft, nontender, nondistended  MUSCULOSKELETAL: no deformity  NEURO: alert, moves all extremities, follows commands            MEDICATIONS GIVEN IN ER  Medications - No data to display      ORDERS PLACED DURING THIS VISIT:  Orders Placed This Encounter   Procedures    XR Chest 1 View    Comprehensive Metabolic Panel    High Sensitivity Troponin T    CBC Auto Differential    Lipase    High Sensitivity Troponin T 2Hr    Monitor Blood Pressure    Continuous Pulse  Oximetry    ECG 12 Lead Syncope    CBC & Differential         PROCEDURES  Procedures            PROGRESS, DATA ANALYSIS, CONSULTS, AND MEDICAL DECISION MAKING  All labs have been independently interpreted by me.  All radiology studies have been reviewed by me. All EKG's have been independently viewed and interpreted by me.  Discussion below represents my analysis of pertinent findings related to patient's condition, differential diagnosis, treatment plan and final disposition.    Differential diagnosis includes but is not limited to syncope, electrolyte abnormality, dehydration.    Clinical Scores:                   ED Course as of 07/10/24 1847   Wed Jul 10, 2024   1626 WBC: 7.53 [KA]   1626 Hemoglobin: 14.1 [KA]   1632 EKG interpreted by me demonstrates atrial fibrillation, rate of 67, no QT prolongation, no ST elevation [MW]   1717 WBC: 7.53 [KA]   1717 Hemoglobin: 14.1 [KA]   1717 HS Troponin T(!): 28 [KA]   1717 Glucose: 93 [KA]   1717 Creatinine(!): 1.31 [KA]   1718 My independent interpretation the chest x-ray is no cardiomegaly or acute infiltrate. [KA]   1719 My Independent interpretation of the EKG performed at 1600 is atrial fibrillation, rate 67, normal axis, normal intervals, no ST elevation. [KA]   1843 Discussed the patient with PILI Parker from [facility] including history presentation workup and she agrees to admit on behalf of Dr. Pool. [KA]      ED Course User Index  [KA] Nathalia De Luna PA-C  [MW] Salvatore Abbott MD       COMPLEXITY OF CARE  The patient requires admission.    Please note that portions of this document were completed with a voice recognition program.    Note Disclaimer: At [facility], we believe that sharing information builds trust and better relationships. You are receiving this note because you recently visited [facility]. It is possible you will see health information before a provider has talked with you about it. This kind of information can be easy to  misunderstand. To help you fully understand what it means for your health, we urge you to discuss this note with your provider.         Salvatore Abbott MD  07/10/24 6015

## 2024-07-11 ENCOUNTER — APPOINTMENT (OUTPATIENT)
Dept: MRI IMAGING | Facility: HOSPITAL | Age: 49
End: 2024-07-11
Payer: MEDICARE

## 2024-07-11 LAB
AMMONIA BLD-SCNC: 21 UMOL/L (ref 16–60)
ANION GAP SERPL CALCULATED.3IONS-SCNC: 9.7 MMOL/L (ref 5–15)
BUN SERPL-MCNC: 28 MG/DL (ref 6–20)
BUN/CREAT SERPL: 29.2 (ref 7–25)
CALCIUM SPEC-SCNC: 8.8 MG/DL (ref 8.6–10.5)
CHLORIDE SERPL-SCNC: 100 MMOL/L (ref 98–107)
CO2 SERPL-SCNC: 25.3 MMOL/L (ref 22–29)
CREAT SERPL-MCNC: 0.96 MG/DL (ref 0.76–1.27)
DEPRECATED RDW RBC AUTO: 44.1 FL (ref 37–54)
EGFRCR SERPLBLD CKD-EPI 2021: 96.9 ML/MIN/1.73
ERYTHROCYTE [DISTWIDTH] IN BLOOD BY AUTOMATED COUNT: 13.8 % (ref 12.3–15.4)
FOLATE SERPL-MCNC: >20 NG/ML (ref 4.78–24.2)
GLUCOSE SERPL-MCNC: 99 MG/DL (ref 65–99)
HCT VFR BLD AUTO: 38.1 % (ref 37.5–51)
HGB BLD-MCNC: 12.9 G/DL (ref 13–17.7)
MCH RBC QN AUTO: 30 PG (ref 26.6–33)
MCHC RBC AUTO-ENTMCNC: 33.9 G/DL (ref 31.5–35.7)
MCV RBC AUTO: 88.6 FL (ref 79–97)
PLATELET # BLD AUTO: 79 10*3/MM3 (ref 140–450)
PMV BLD AUTO: 11.7 FL (ref 6–12)
POTASSIUM SERPL-SCNC: 4.1 MMOL/L (ref 3.5–5.2)
RBC # BLD AUTO: 4.3 10*6/MM3 (ref 4.14–5.8)
SODIUM SERPL-SCNC: 135 MMOL/L (ref 136–145)
TSH SERPL DL<=0.05 MIU/L-ACNC: 4.93 UIU/ML (ref 0.27–4.2)
VIT B12 BLD-MCNC: 847 PG/ML (ref 211–946)
WBC NRBC COR # BLD AUTO: 5.32 10*3/MM3 (ref 3.4–10.8)

## 2024-07-11 PROCEDURE — 99222 1ST HOSP IP/OBS MODERATE 55: CPT | Performed by: INTERNAL MEDICINE

## 2024-07-11 PROCEDURE — 99221 1ST HOSP IP/OBS SF/LOW 40: CPT

## 2024-07-11 PROCEDURE — 85027 COMPLETE CBC AUTOMATED: CPT

## 2024-07-11 PROCEDURE — 97161 PT EVAL LOW COMPLEX 20 MIN: CPT

## 2024-07-11 PROCEDURE — 80048 BASIC METABOLIC PNL TOTAL CA: CPT

## 2024-07-11 PROCEDURE — 97530 THERAPEUTIC ACTIVITIES: CPT

## 2024-07-11 PROCEDURE — 99221 1ST HOSP IP/OBS SF/LOW 40: CPT | Performed by: PSYCHIATRY & NEUROLOGY

## 2024-07-11 PROCEDURE — 82746 ASSAY OF FOLIC ACID SERUM: CPT | Performed by: PSYCHIATRY & NEUROLOGY

## 2024-07-11 PROCEDURE — 82607 VITAMIN B-12: CPT | Performed by: PSYCHIATRY & NEUROLOGY

## 2024-07-11 PROCEDURE — 70551 MRI BRAIN STEM W/O DYE: CPT

## 2024-07-11 PROCEDURE — 84443 ASSAY THYROID STIM HORMONE: CPT | Performed by: PSYCHIATRY & NEUROLOGY

## 2024-07-11 PROCEDURE — 82140 ASSAY OF AMMONIA: CPT | Performed by: PSYCHIATRY & NEUROLOGY

## 2024-07-11 RX ORDER — MIRTAZAPINE 15 MG/1
15 TABLET, ORALLY DISINTEGRATING ORAL NIGHTLY
Status: DISCONTINUED | OUTPATIENT
Start: 2024-07-11 | End: 2024-07-12 | Stop reason: HOSPADM

## 2024-07-11 RX ORDER — UREA 10 %
10 LOTION (ML) TOPICAL NIGHTLY
Status: DISCONTINUED | OUTPATIENT
Start: 2024-07-11 | End: 2024-07-12 | Stop reason: HOSPADM

## 2024-07-11 RX ORDER — FOLIC ACID 1 MG/1
1 TABLET ORAL DAILY
Status: DISCONTINUED | OUTPATIENT
Start: 2024-07-11 | End: 2024-07-12 | Stop reason: HOSPADM

## 2024-07-11 RX ORDER — BENZTROPINE MESYLATE 1 MG/1
1 TABLET ORAL 2 TIMES DAILY
Status: DISCONTINUED | OUTPATIENT
Start: 2024-07-11 | End: 2024-07-12 | Stop reason: HOSPADM

## 2024-07-11 RX ORDER — PANTOPRAZOLE SODIUM 40 MG/1
40 TABLET, DELAYED RELEASE ORAL
Status: DISCONTINUED | OUTPATIENT
Start: 2024-07-11 | End: 2024-07-12 | Stop reason: HOSPADM

## 2024-07-11 RX ORDER — POLYETHYLENE GLYCOL 3350 17 G/17G
1 POWDER, FOR SOLUTION ORAL DAILY
Status: DISCONTINUED | OUTPATIENT
Start: 2024-07-11 | End: 2024-07-12 | Stop reason: HOSPADM

## 2024-07-11 RX ORDER — ATORVASTATIN CALCIUM 20 MG/1
40 TABLET, FILM COATED ORAL DAILY
Status: DISCONTINUED | OUTPATIENT
Start: 2024-07-11 | End: 2024-07-12 | Stop reason: HOSPADM

## 2024-07-11 RX ORDER — SUCRALFATE 1 G/1
1 TABLET ORAL
Status: DISCONTINUED | OUTPATIENT
Start: 2024-07-11 | End: 2024-07-12 | Stop reason: HOSPADM

## 2024-07-11 RX ORDER — DIGOXIN 125 MCG
125 TABLET ORAL DAILY
Status: DISCONTINUED | OUTPATIENT
Start: 2024-07-11 | End: 2024-07-12 | Stop reason: HOSPADM

## 2024-07-11 RX ORDER — METOPROLOL SUCCINATE 25 MG/1
25 TABLET, EXTENDED RELEASE ORAL
Status: DISCONTINUED | OUTPATIENT
Start: 2024-07-11 | End: 2024-07-12

## 2024-07-11 RX ORDER — LEVOTHYROXINE SODIUM 0.07 MG/1
75 TABLET ORAL DAILY
Status: DISCONTINUED | OUTPATIENT
Start: 2024-07-11 | End: 2024-07-12 | Stop reason: HOSPADM

## 2024-07-11 RX ADMIN — Medication 10 MG: at 21:15

## 2024-07-11 RX ADMIN — SUCRALFATE 1 G: 1 TABLET ORAL at 17:48

## 2024-07-11 RX ADMIN — APIXABAN 5 MG: 5 TABLET, FILM COATED ORAL at 21:15

## 2024-07-11 RX ADMIN — DIGOXIN 125 MCG: 125 TABLET ORAL at 08:44

## 2024-07-11 RX ADMIN — ATORVASTATIN CALCIUM 40 MG: 20 TABLET, FILM COATED ORAL at 08:44

## 2024-07-11 RX ADMIN — APIXABAN 5 MG: 5 TABLET, FILM COATED ORAL at 02:27

## 2024-07-11 RX ADMIN — BENZTROPINE MESYLATE 1 MG: 1 TABLET ORAL at 21:15

## 2024-07-11 RX ADMIN — FOLIC ACID 1 MG: 1 TABLET ORAL at 08:44

## 2024-07-11 RX ADMIN — PANTOPRAZOLE SODIUM 40 MG: 40 TABLET, DELAYED RELEASE ORAL at 17:48

## 2024-07-11 RX ADMIN — METOPROLOL SUCCINATE 25 MG: 25 TABLET, EXTENDED RELEASE ORAL at 08:44

## 2024-07-11 RX ADMIN — Medication 10 MG: at 02:27

## 2024-07-11 RX ADMIN — PANTOPRAZOLE SODIUM 40 MG: 40 TABLET, DELAYED RELEASE ORAL at 08:44

## 2024-07-11 RX ADMIN — LEVOTHYROXINE SODIUM 75 MCG: 75 TABLET ORAL at 08:44

## 2024-07-11 NOTE — THERAPY EVALUATION
Patient Name: Helder Abbott  : 1975    MRN: [id number]                              Today's Date: 2024       Admit Date: 7/10/2024    Visit Dx:     ICD-10-CM ICD-9-CM   1. Syncope, unspecified syncope type  R55 780.2   2. JOSIAH (acute kidney injury)  N17.9 584.9     Patient Active Problem List   Diagnosis    JOSIAH (acute kidney injury)    Essential hypertension    Hypothyroidism (acquired)    History of CVA (cerebrovascular accident)    GERD without esophagitis    HLD (hyperlipidemia)    Bipolar disorder    Partial bowel obstruction    Fecal impaction    Severe malnutrition    Hyperkalemia    Syncope    Atrial fibrillation, chronic    Dehydration    Chronic anticoagulation    Thrombocytopenia     Past Medical History:   Diagnosis Date    Benign essential hypertension     Bipolar disorder     Dyslipidemia     GERD (gastroesophageal reflux disease)     Heart disease     History of stroke     Hypothyroidism      History reviewed. No pertinent surgical history.   General Information       Row Name 24 1519          Physical Therapy Time and Intention    Document Type evaluation  -     Mode of Treatment individual therapy;physical therapy  -       Row Name 24 1519          General Information    Patient Profile Reviewed yes  -SM     Prior Level of Function independent:  -     Existing Precautions/Restrictions fall  -     Barriers to Rehab cognitive status  -       Row Name 24 1519          Living Environment    People in Home other (see comments)  Group home  -       Row Name 24 1519          Cognition    Orientation Status (Cognition) unable/difficult to assess  -       Row Name 24 1519          Safety Issues, Functional Mobility    Impairments Affecting Function (Mobility) endurance/activity tolerance;strength;balance  -               User Key  (r) = Recorded By, (t) = Taken By, (c) = Cosigned By      Initials Name Provider Type     Shante Shannon PT Physical  Therapist                   Mobility       [facility] Name 07/11/24 1519          Bed Mobility    Bed Mobility supine-sit;sit-supine  -     Supine-Sit [place] (Bed Mobility) contact guard;minimum assist (75% patient effort)  -     Sit-Supine [place] (Bed Mobility) contact guard  -     Assistive Device (Bed Mobility) head of bed elevated;bed rails  -[facility] Name 07/11/24 1519          Sit-Stand Transfer    Sit-Stand [place] (Transfers) minimum assist (75% patient effort)  -     Comment, (Sit-Stand Transfer) 2x from EOB, HHA  -               User Key  (r) = Recorded By, (t) = Taken By, (c) = Cosigned By      Initials Name Provider Type     Shante Shannon PT Physical Therapist                   Obj/Interventions       [facility] Name 07/11/24 1519          Range of Motion Comprehensive    General Range of Motion bilateral lower extremity ROM WFL  -SM       Row Name 07/11/24 1519          Strength Comprehensive (MMT)    General Manual Muscle Testing (MMT) Assessment lower extremity strength deficits identified  -     Comment, General Manual Muscle Testing (MMT) Assessment Generalized B LE weakness  -SM       Row Name 07/11/24 1519          Balance    Balance Assessment sitting static balance;sitting dynamic balance;sit to stand dynamic balance;standing static balance  -     Static Sitting Balance supervision  -     Dynamic Sitting Balance standby assist  -     Position, Sitting Balance sitting edge of bed  -     Sit to Stand Dynamic Balance minimal assist;verbal cues  -     Static Standing Balance contact guard  -     Position/Device Used, Standing Balance supported  -     Balance Interventions standing;sitting;sit to stand;supported;static;dynamic  -               User Key  (r) = Recorded By, (t) = Taken By, (c) = Cosigned By      Initials Name Provider Type     Shante Shannon PT Physical Therapist                   Goals/Plan       Row Name 07/11/24 1526          Bed  Mobility Goal 1 (PT)    Activity/Assistive Device (Bed Mobility Goal 1, PT) bed mobility activities, all  -SM     [place] Level/Cues Needed (Bed Mobility Goal 1, PT) modified independence  -SM     Time Frame (Bed Mobility Goal 1, PT) 1 week  -SM       Row Name 07/11/24 1526          Transfer Goal 1 (PT)    Activity/Assistive Device (Transfer Goal 1, PT) sit-to-stand/stand-to-sit;bed-to-chair/chair-to-bed  -SM     [place] Level/Cues Needed (Transfer Goal 1, PT) modified independence  -SM     Time Frame (Transfer Goal 1, PT) 1 week  -SM       Row Name 07/11/24 1526          Gait Training Goal 1 (PT)    Activity/Assistive Device (Gait Training Goal 1, PT) gait (walking locomotion)  -SM     [place] Level (Gait Training Goal 1, PT) standby assist  -SM     Distance (Gait Training Goal 1, PT) 100ft  -SM     Time Frame (Gait Training Goal 1, PT) 1 week  -SM               User Key  (r) = Recorded By, (t) = Taken By, (c) = Cosigned By      Initials Name Provider Type    SM Shante Shannon, PT Physical Therapist                   Clinical Impression       Row Name 07/11/24 1520          Pain    Pretreatment Pain Rating 0/10 - no pain  -SM     Posttreatment Pain Rating 0/10 - no pain  -SM       Row Name 07/11/24 1520          Plan of Care Review    Plan of Care Reviewed With patient  -SM     Outcome Evaluation Nathaniel is a 49 y.o male who presented to [facility] with syncope. Patient with hx of HTN, bipolar, and intelectual disability. Patient unable to provide PLOF this date. Per chart patient lives in a group home and is independent at baseline with no AD. Orthostatics assessed during mobility this date. Patient with BP of 90/53 in supine. BP 77/67 while seated at EOB. Patient performed STS from EOB with Suzanna. and HHA. Patient fidgiting and standing requiring BP reading to continuously restart with final reading of 164/136 (145). Patient unable to tolerate standing long enough to perform another reading. Patient  reported lightheadedness throughout session. Patient returned to supine in bed at end of session. Patient would continue to benefit from skilled PT intervention while in the acute care setting to address deficits in functional mobility and maximize safety and independence. Anticipate return to group home at d/c pending medical course. Acute PT will continue to monitor.  -       Row Name 07/11/24 1520          Therapy Assessment/Plan (PT)    Rehab Potential (PT) good, to achieve stated therapy goals  -     Criteria for Skilled Interventions Met (PT) yes  -SM     Therapy Frequency (PT) 5 times/wk  -       Row Name 07/11/24 1520          Vital Signs    Pre Patient Position Supine  -SM     Intra Patient Position Standing  -SM     Post Patient Position Supine  -SM       Row Name 07/11/24 1520          Positioning and Restraints    Pre-Treatment Position in bed  -SM     Post Treatment Position bed  -SM     In Bed notified nsg;call light within reach;encouraged to call for assist;exit alarm on;fowlers;with nsg  -SM               User Key  (r) = Recorded By, (t) = Taken By, (c) = Cosigned By      Initials Name Provider Type     Shante Shannon, PT Physical Therapist                   Outcome Measures       Row Name 07/11/24 1526 07/11/24 0844       How much help from another person do you currently need...    Turning from your back to your side while in flat bed without using bedrails? 4  -SM 4  -LW    Moving from lying on back to sitting on the side of a flat bed without bedrails? 4  -SM 4  -LW    Moving to and from a bed to a chair (including a wheelchair)? 3  -SM 4  -LW    Standing up from a chair using your arms (e.g., wheelchair, bedside chair)? 3  -SM 3  -LW    Climbing 3-5 steps with a railing? 2  -SM 3  -LW    To walk in hospital room? 2  -SM 3  -LW    AM-PAC 6 Clicks Score (PT) 18  -SM 21  -LW    Highest Level of Mobility Goal 6 --> Walk 10 steps or more  - 6 --> Walk 10 steps or more  -      Row Name  07/11/24 1526          Functional Assessment    Outcome Measure Options AM-PAC 6 Clicks Basic Mobility (PT)  -               User Key  (r) = Recorded By, (t) = Taken By, (c) = Cosigned By      Initials Name Provider Type    Joann Aguirre, RN Registered Nurse     Shante Shannon PT Physical Therapist                                 Physical Therapy Education       Title: PT OT SLP Therapies (In Progress)       Topic: Physical Therapy (Done)       Point: Mobility training (Done)       Learning Progress Summary             Patient Acceptance, E, VU,NR by  at 7/11/2024 1526                         Point: Home exercise program (Done)       Learning Progress Summary             Patient Acceptance, E, VU,NR by  at 7/11/2024 1526                         Point: Body mechanics (Done)       Learning Progress Summary             Patient Acceptance, E, VU,NR by  at 7/11/2024 1526                         Point: Precautions (Done)       Learning Progress Summary             Patient Acceptance, E, VU,NR by  at 7/11/2024 1526                                         User Key       Initials Effective Dates Name Provider Type Discipline     05/02/22 -  Shante Shannon PT Physical Therapist PT                  PT Recommendation and Plan     Plan of Care Reviewed With: patient  Outcome Evaluation: Nathaniel is a 49 y.o male who presented to [facility] with syncope. Patient with hx of HTN, bipolar, and intelectual disability. Patient unable to provide PLOF this date. Per chart patient lives in a group home and is independent at baseline with no AD. Orthostatics assessed during mobility this date. Patient with BP of 90/53 in supine. BP 77/67 while seated at EOB. Patient performed STS from EOB with Suzanna. and HHA. Patient fidgiting and standing requiring BP reading to continuously restart with final reading of 164/136 (145). Patient unable to tolerate standing long enough to perform another reading. Patient reported  lightheadedness throughout session. Patient returned to supine in bed at end of session. Patient would continue to benefit from skilled PT intervention while in the acute care setting to address deficits in functional mobility and maximize safety and independence. Anticipate return to group home at d/c pending medical course. Acute PT will continue to monitor.     Time Calculation:         PT Charges       Row Name 07/11/24 1527             Time Calculation    Start Time 1424  -SM      Stop Time 1442  -SM      Time Calculation (min) 18 min  -SM      PT Received On 07/11/24  -      PT - Next Appointment 07/12/24  -      PT Goal Re-Cert Due Date 07/18/24  -         Time Calculation- PT    Total Timed Code Minutes- PT 10 minute(s)  -SM         Timed Charges    36915 - PT Therapeutic Activity Minutes 10  -SM         Total Minutes    Timed Charges Total Minutes 10  -SM       Total Minutes 10  -SM                User Key  (r) = Recorded By, (t) = Taken By, (c) = Cosigned By      Initials Name Provider Type     Shante Shannon, JIMMIE Physical Therapist                  Therapy Charges for Today       Code Description Service Date Service Provider Modifiers Qty    05942986011  PT THERAPEUTIC ACT EA 15 MIN 7/11/2024 Shante Shannon, PT GP 1    59963353829 HC PT EVAL LOW COMPLEXITY 3 7/11/2024 Shante Shannon, PT GP 1            PT G-Codes  Outcome Measure Options: AM-PAC 6 Clicks Basic Mobility (PT)  AM-PAC 6 Clicks Score (PT): 18  PT Discharge Summary  Anticipated Discharge Disposition (PT): home with assist, adult foster care/group home    Shante Shannon PT  7/11/2024

## 2024-07-11 NOTE — CONSULTS
Patient Name: Helder Abbott  :1975  49 y.o.    Date of Admission: 7/10/2024  Date of Consultation:  24  Encounter Provider: Tomasz Cárdenas III, MD  Place of Service: [facility] CARDIOLOGY  Referring Provider: Salvatore Abbott MD  Patient Care Team:  José Garcia APRN as PCP - General (Family Medicine)  iGan Marquez MD as Consulting Physician (Hematology and Oncology)  José Garcia APRN as Referring Physician (Family Medicine)  Phoenix Santa MD as Consulting Physician (Hematology and Oncology)      Chief complaint: syncope    History of Present Illness:     Helder Abbott is a 49 year old pt with a history of GERD,bipolar, hypothyroid, stroke, Afib on metoprolol, apixaban and digoxin, mental disability and [place] of state.     Pt is followed by Dr. Baldwin at [facility] clinic. He was initially seen in  to establish care. When he was first seen, him or his caregiver was not able to give a good past medical history. It was noted that his previous stroke was likely related  from his atrial fibrillation. Patient was on anticoagulation and rate controlled and was previously followed by cardiology. He then moved here to [place] and established care with Dr. Baldwin. When he was seen , he denied any complaints and had just been taken off metoprolol, atorvastatin and lisinopril. He was restarted on metoprolol and ws to follow up with his PCP.      Pt was seen on 5/15/24 by Dr. Baldwin for follow up.  Cardiac status was felt to be appropriate at that time.  No complaints of any palpitations or tachycardia.    Patient presented the emergency room E[facility] 10 with GERD abdominal pain chest pain AMS after starting trazodone.  He was found to be hyperkalemic.  CT of the abdomen showed nothing acute.  He had intermittent chest discomfort with multiple problems that showed no significant abnormality no EKG evidence of any issue.  An echocardiogram, reviewed in detail  below that showed no abnormality.  He had some issues with hypotension initially with that was treated with IV fluid.  Mental status was felt to improve after they discontinue the trazodone.    Patient presented to emergency department on July 10 after episode of apparent syncope apparently when home health came to check on the patient.  It is reported the patient was hypotensive with EMS although I do not have any EMS report yet scanned into epic to review.  Patient reported feeling shaky unsteady and weak for the last several days.  EKG was unremarkable.  Dig levels normal.    We are asked to see the patient for apparent episode of syncope.    ECHO 6/12/24      Left ventricular systolic function is hyperdynamic (EF > 70%).    Left ventricular wall thickness is consistent with borderline concentric hypertrophy.    Left ventricular diastolic function was indeterminate.    Normal right ventricular cavity size noted. Hyperdynamic right ventricular systolic function noted.    Calculated right ventricular systolic pressure from tricuspid regurgitation is 18 mmHg    There is a small (<1cm) pericardial effusion adjacent to the left ventricle. There is no evidence of cardiac tamponade.  Past Medical History:   Diagnosis Date    Benign essential hypertension     Bipolar disorder     Dyslipidemia     GERD (gastroesophageal reflux disease)     Heart disease     History of stroke     Hypothyroidism        History reviewed. No pertinent surgical history.      Prior to Admission medications    Medication Sig Start Date End Date Taking? Authorizing Provider   Acetaminophen Extra Strength 500 MG tablet Take 1 tablet by mouth 4 (Four) Times a Day As Needed. 11/15/22  Yes Provider, MD Tobin   atorvastatin (LIPITOR) 40 MG tablet  11/18/22  Yes Provider, MD Tobin   benztropine (COGENTIN) 0.5 MG tablet Take 2 tablets by mouth 2 (Two) Times a Day. 10/11/22  Yes Provider, MD Tobin   Diclofenac Sodium (VOLTAREN) 1 % gel  gel  11/29/22  Yes Tobin Mcbride MD   digoxin (LANOXIN) 125 MCG tablet  11/18/22  Yes Tobin Mcbride MD   divalproex (DEPAKOTE) 500 MG DR tablet  10/27/22  Yes Tobin Mcbride MD   Eliquis 5 MG tablet tablet Take 1 tablet by mouth Every 12 (Twelve) Hours. 11/21/22  Yes Tobin Mcbride MD   folic acid (FOLVITE) 1 MG tablet Take 1 tablet by mouth Daily for 30 days. 6/14/24 7/14/24 Yes Mick Mckoy MD   levothyroxine (SYNTHROID, LEVOTHROID) 50 MCG tablet Take 1.5 tablets by mouth Daily. 11/18/22  Yes Tobin Mcbride MD   melatonin 5 MG tablet tablet Take 2 tablets by mouth Every Night.   Yes Tobin Mcbride MD   metoprolol succinate XL (TOPROL-XL) 25 MG 24 hr tablet  11/18/22  Yes Tobin Mcbride MD   mirtazapine (REMERON SOL-TAB) 15 MG disintegrating tablet Place 1 tablet on the tongue Every Night.   Yes Tobin Mcbride MD   OLANZapine zydis (zyPREXA) 5 MG disintegrating tablet  11/28/22  Yes Tobin Mcbride MD   pantoprazole (PROTONIX) 40 MG EC tablet Take 1 tablet by mouth 2 (Two) Times a Day Before Meals for 30 days. 6/13/24 7/13/24 Yes Mick Mckoy MD   Abilify Maintena 400 MG Suspension Reconstituted ER IM injection ER  10/11/22   Tobin Mcbride MD   Calcium Carbonate-Vitamin D (Oyster Shell Calcium/D) 250-3.125 MG-MCG tablet Take 1 tablet by mouth 2 (Two) Times a Day.    Tobin Mcbride MD   ibuprofen (ADVIL,MOTRIN) 600 MG tablet Take 1 tablet by mouth 4 (Four) Times a Day. 11/15/22   Tobin Mcbride MD   mupirocin (BACTROBAN) 2 % cream Apply 1 Application topically to the appropriate area as directed 2 (Two) Times a Day.    Tobin Mcbride MD   polyethylene glycol (MIRALAX) 17 GM/SCOOP powder Dissolve 17 g (1 capful) in liquid and drink by mouth Daily. 5/6/24   Ronnie Rojas MD       Allergies   Allergen Reactions    Clonazepam Unknown (See Comments)    Fluoxetine Unknown (See Comments)    Grass Unknown - High Severity     Grass Pollen(K-O-R-T-Swt Kennedy) Unknown - Low Severity    Hydroxyzine Unknown (See Comments)    Methylphenidate Unknown (See Comments)    Quetiapine Unknown - Low Severity    Risperidone Unknown (See Comments)       Social History     Socioeconomic History    Marital status: Single   Tobacco Use    Smoking status: Never    Smokeless tobacco: Never   Vaping Use    Vaping status: Never Used   Substance and Sexual Activity    Alcohol use: Never    Drug use: Never    Sexual activity: Defer       Family History   Problem Relation Age of Onset    Diabetes Other     Hypertension Other     Heart disease Other        REVIEW OF SYSTEMS:   All systems reviewed.  Pertinent positives identified in HPI.  All other systems are negative.      Objective:     Vitals:    07/11/24 0533 07/11/24 0710 07/11/24 1020 07/11/24 1025   BP:  103/69 (!) 89/62 (!) 151/127   BP Location:  Right arm Right arm Right arm   Patient Position:  Lying Lying Sitting   Pulse:  70 66 (!) 139   Resp:  18     Temp:       TempSrc:       SpO2:  97% 98%    Weight: 67.7 kg (149 lb 4 oz)      Height:         Body mass index is 20.24 kg/m².    Physical Exam:  General Appearance:    comfortable   Head:    Normocephalic, without obvious abnormality   Eyes:            Lids and lashes normal, conjunctivae and sclerae normal, no icterus, no pallor, corneas clear   Ears:    Ears appear intact with no abnormalities noted   Throat:   No oral lesions, oral mucosa moist   Neck:   No adenopathy, supple, trachea midline, no thyromegaly, no carotid bruit, no JVD   Back:     no erythema or scars, no tenderness to palpation    Lungs:     Clear to auscultation,respirations regular, even and unlabored    Heart:    Regular rhythm and normal rate, normal S1 and S2, no murmur, no gallop, no rub, no click   Chest Wall:    No abnormalities observed   Abdomen:     Normal bowel sounds, no masses, no organomegaly, soft        non-tender, non-distended, no guarding   Extremities:   Moves  all extremities well, no edema, no cyanosis, no redness   Pulses:  Bilateral carotids brisk             Lab Review:     Results from last 7 days   Lab Units 07/11/24  0543 07/10/24  1553   SODIUM mmol/L 135* 135*   POTASSIUM mmol/L 4.1 4.5   CHLORIDE mmol/L 100 99   CO2 mmol/L 25.3 25.1   BUN mg/dL 28* 35*   CREATININE mg/dL 0.96 1.31*   CALCIUM mg/dL 8.8 9.3   BILIRUBIN mg/dL  --  0.4   ALK PHOS U/L  --  71   ALT (SGPT) U/L  --  9   AST (SGOT) U/L  --  25   GLUCOSE mg/dL 99 93     Results from last 7 days   Lab Units 07/10/24  1801 07/10/24  1553 07/09/24  1110   HSTROP T ng/L 28* 28* 23*     Results from last 7 days   Lab Units 07/11/24  0543   WBC 10*3/mm3 5.32   HEMOGLOBIN g/dL 12.9*   HEMATOCRIT % 38.1   PLATELETS 10*3/mm3 79*         Results from last 7 days   Lab Units 07/09/24  1110   MAGNESIUM mg/dL 1.7                                       I personally viewed and interpreted the patient's EKG/Telemetry data.      Current Facility-Administered Medications:     acetaminophen (TYLENOL) tablet 650 mg, 650 mg, Oral, Q4H PRN **OR** acetaminophen (TYLENOL) 160 MG/5ML oral solution 650 mg, 650 mg, Oral, Q4H PRN **OR** acetaminophen (TYLENOL) suppository 650 mg, 650 mg, Rectal, Q4H PRN, Leonor Marcelino APRN    apixaban (ELIQUIS) tablet 5 mg, 5 mg, Oral, Q12H, Teodora Franklin APRN, 5 mg at 07/11/24 0227    atorvastatin (LIPITOR) tablet 40 mg, 40 mg, Oral, Daily, Teodora Franklin APRN, 40 mg at 07/11/24 0844    benztropine (COGENTIN) tablet 1 mg, 1 mg, Oral, BID, Teodora Franklin APRN    sennosides-docusate (PERICOLACE) 8.6-50 MG per tablet 2 tablet, 2 tablet, Oral, BID PRN **AND** polyethylene glycol (MIRALAX) packet 17 g, 17 g, Oral, Daily PRN **AND** bisacodyl (DULCOLAX) EC tablet 5 mg, 5 mg, Oral, Daily PRN **AND** bisacodyl (DULCOLAX) suppository 10 mg, 10 mg, Rectal, Daily PRN, Leonor Marcelino, APRN    digoxin (LANOXIN) tablet 125 mcg, 125 mcg, Oral, Daily, Teodora Franklin, APRN, 125 mcg  at 07/11/24 0844    folic acid (FOLVITE) tablet 1 mg, 1 mg, Oral, Daily, Teodora Franklin, APRN, 1 mg at 07/11/24 0844    levothyroxine (SYNTHROID, LEVOTHROID) tablet 75 mcg, 75 mcg, Oral, Daily, Teodora Franklin, APRN, 75 mcg at 07/11/24 0844    melatonin tablet 10 mg, 10 mg, Oral, Nightly, Teodora Franklin, APRN, 10 mg at 07/11/24 0227    metoprolol succinate XL (TOPROL-XL) 24 hr tablet 25 mg, 25 mg, Oral, Q24H, Teodora Franklin, APRN, 25 mg at 07/11/24 0844    mirtazapine (REMERON SOL-TAB) disintegrating tablet 15 mg, 15 mg, Oral, Nightly, Teodora Franklin, APRN    nitroglycerin (NITROSTAT) SL tablet 0.4 mg, 0.4 mg, Sublingual, Q5 Min PRN, Leonor Marcelino APRN    ondansetron ODT (ZOFRAN-ODT) disintegrating tablet 4 mg, 4 mg, Oral, Q6H PRN **OR** ondansetron (ZOFRAN) injection 4 mg, 4 mg, Intravenous, Q6H PRN, Leonor Marcelino APRN    pantoprazole (PROTONIX) EC tablet 40 mg, 40 mg, Oral, BID AC, Teodora Franklin, APRN, 40 mg at 07/11/24 0844    polyethylene glycol (MIRALAX) powder 1 bottle, 1 bottle, Oral, Daily, Teodora Franklin, APRN    sodium chloride 0.9 % infusion, 100 mL/hr, Intravenous, Continuous, Leonor Marcelino APRN, Last Rate: 100 mL/hr at 07/10/24 2307, 100 mL/hr at 07/10/24 2307    Assessment and Plan:       Active Hospital Problems    Diagnosis  POA    **Syncope [R55]  Yes    Atrial fibrillation, chronic [I48.20]  Yes    Dehydration [E86.0]  Yes    Chronic anticoagulation [Z79.01]  Not Applicable    Thrombocytopenia [D69.6]  Yes    JOSIAH (acute kidney injury) [N17.9]  Yes    Essential hypertension [I10]  Yes    Hypothyroidism (acquired) [E03.9]  Yes    History of CVA (cerebrovascular accident) [Z86.73]  Not Applicable    GERD without esophagitis [K21.9]  Yes    Bipolar disorder [F31.9]  Yes    HLD (hyperlipidemia) [E78.5]  Yes      Resolved Hospital Problems   No resolved problems to display.     1.  Apparent syncope wound being evaluated by home health-reportedly  patient was hypotensive when EMS arrived although I cannot find an EMS report.  Evidence of dehydration on arrival, blood pressure and heart rate have been appropriate ever since.  I would follow his overall clinical status now that he has been rehydrated.  We should also check orthostatic sugars, and then we will follow along and see if we need to make any titration of his medical therapy.  2.  Chronic atrial fibrillation-follows with Dr. Baldwin on metoprolol and digoxin, digoxin level normal, rate is well-controlled, continue to follow  3.  Acute renal failure-appears to be secondary or exacerbated by dehydration, improved this morning  4.  Bipolar disorder  5.  History of CVA    Tomasz Cárdenas III, MD  07/11/24  11:15 EDT

## 2024-07-11 NOTE — PROGRESS NOTES
"    Name: Helder Abbott ADMIT: 7/10/2024   : 1975  PCP: José Garcia APRN    MRN: [id number] LOS: 0 days   AGE/SEX: 49 y.o. male  ROOM: [facility]     Subjective   Subjective   Complaining of some abdominal pain. No other issues.    Objective   Objective   Vital Signs  Temp:  [97.3 °F (36.3 °C)-98 °F (36.7 °C)] 97.3 °F (36.3 °C)  Heart Rate:  [] 69  Resp:  [16-18] 16  BP: ()/() 79/59  SpO2:  [94 %-100 %] 99 %  on   ;   Device (Oxygen Therapy): room air  Body mass index is 20.24 kg/m².  Physical Exam  Constitutional:       Appearance: He is ill-appearing.   Pulmonary:      Effort: Pulmonary effort is normal. No respiratory distress.      Breath sounds: No stridor.   Abdominal:      General: There is no distension.      Tenderness: There is no abdominal tenderness. There is no guarding.   Skin:     Coloration: Skin is not pale.   Neurological:      Mental Status: He is alert. Mental status is at baseline.         Results Review     I reviewed the patient's new clinical results.  Results from last 7 days   Lab Units 24  0543 07/10/24  1553 24  1110   WBC 10*3/mm3 5.32 7.53 5.18   HEMOGLOBIN g/dL 12.9* 14.1 14.2   PLATELETS 10*3/mm3 79* 92* 86*     Results from last 7 days   Lab Units 24  0543 07/10/24  1553 24  1110   SODIUM mmol/L 135* 135* 139   POTASSIUM mmol/L 4.1 4.5 4.4   CHLORIDE mmol/L 100 99 102   CO2 mmol/L 25.3 25.1 27.0   BUN mg/dL 28* 35* 30*   CREATININE mg/dL 0.96 1.31* 0.96   GLUCOSE mg/dL 99 93 76   EGFR mL/min/1.73 96.9 66.7 96.9     Results from last 7 days   Lab Units 07/10/24  1553 24  1110   ALBUMIN g/dL 3.7 3.5   BILIRUBIN mg/dL 0.4 0.4   ALK PHOS U/L 71 66   AST (SGOT) U/L 25 25   ALT (SGPT) U/L 9 9     Results from last 7 days   Lab Units 24  0543 07/10/24  1553 24  1110   CALCIUM mg/dL 8.8 9.3 9.3   ALBUMIN g/dL  --  3.7 3.5   MAGNESIUM mg/dL  --   --  1.7       No results found for: \"HGBA1C\", \"POCGLU\"    MRI Brain Without " Contrast    Result Date: 7/11/2024  There is no evidence of acute infarction.   This report was finalized on 7/11/2024 5:32 PM by Dr. Ronnie Orozco M.D on Workstation: [id number]     Scheduled Medications  apixaban, 5 mg, Oral, Q12H  atorvastatin, 40 mg, Oral, Daily  benztropine, 1 mg, Oral, BID  digoxin, 125 mcg, Oral, Daily  folic acid, 1 mg, Oral, Daily  levothyroxine, 75 mcg, Oral, Daily  melatonin, 10 mg, Oral, Nightly  metoprolol succinate XL, 25 mg, Oral, Q24H  mirtazapine, 15 mg, Oral, Nightly  pantoprazole, 40 mg, Oral, BID AC  polyethylene glycol, 1 bottle, Oral, Daily  sucralfate, 1 g, Oral, TID AC    Infusions  sodium chloride, 100 mL/hr, Last Rate: 100 mL/hr (07/10/24 2307)    Diet  Diet: Cardiac; Healthy Heart (2-3 Na+); Fluid Consistency: Thin (IDDSI 0)       Assessment/Plan     Active Hospital Problems    Diagnosis  POA    **Syncope [R55]  Yes    Atrial fibrillation, chronic [I48.20]  Yes    Dehydration [E86.0]  Yes    Chronic anticoagulation [Z79.01]  Not Applicable    Thrombocytopenia [D69.6]  Yes    JOSIAH (acute kidney injury) [N17.9]  Yes    Essential hypertension [I10]  Yes    Hypothyroidism (acquired) [E03.9]  Yes    History of CVA (cerebrovascular accident) [Z86.73]  Not Applicable    GERD without esophagitis [K21.9]  Yes    Bipolar disorder [F31.9]  Yes    HLD (hyperlipidemia) [E78.5]  Yes      Resolved Hospital Problems   No resolved problems to display.       49 y.o. male admitted with Syncope.      07/11/24  MRI brain pending. Appreicate specialist input.    Syncope  -seems most c/w orthostatic  -Cardiology, Neurology following  -MRI brain pending    Chest pain  Elevated troponin  -likely GI source as below  -trop flat; EKG w/out acute ischemia  -recent TTE unremarkable  -no further ischemic w/u at this time    Afib  -RC: metoprolol, digoxin  -AC: Eliquis    JOSIAH  -Crt 1.31 OA > 0.96  -likely dehydration component    Dyspepsia  -GI consulted  -PPI BID, sucralfate  -plan for OP EGD once  able to hold AC    Hypothyroid  -Synthroid    BPD  -benztropine, Remeron         DVT prophylaxis: Eliquis (home med)  Discussed with patient and care team on multidisciplinary rounds.  Anticipated discharge TBD, once arrangements made            Jeremy Barboza MD  [facility]  07/11/24  19:39 EDT

## 2024-07-11 NOTE — CONSULTS
Gastroenterology   Initial Inpatient Consult Note    Referring Provider: Dr. Barboza    Reason for Consultation: Abdominal pain, dyspepsia    Subjective     History of present illness:      49 y.o. male who we are asked to see for abdominal pain and dyspepsia.  He has a significant past medical history of GERD, hypertension, bipolar, hypothyroidism, stroke, A-fib on chronic AC Eliquis who presented to the ER on 7/10 after experiencing episode his doctor's office.    He is currently on pantoprazole 40 mg twice daily prior to first and last meal of the day.  States that he has been experiencing ongoing left sided upper abdominal pain described as a burning sensation without known exacerbating factors however does find some improvement with meal consumption.  Denies nausea, vomiting, or dysphagia.    Bowel habits are described as occurring regularly with last bowel movement this morning without visible melena or hematochezia.    Hemoglobin 12.9, HCT 30.1, platelets 79, MCV 79, creatinine 1.31, BUN 35     CT Abdomen Pelvis With Contrast (06/10/2024 22:27) :  Suggestion of some higher density material within the gallbladder, otherwise unremarkable from GI standpoint.      Past Medical History:  Past Medical History:   Diagnosis Date    Benign essential hypertension     Bipolar disorder     Dyslipidemia     GERD (gastroesophageal reflux disease)     Heart disease     History of stroke     Hypothyroidism      Past Surgical History:  History reviewed. No pertinent surgical history.   Social History:   Social History     Tobacco Use    Smoking status: Never    Smokeless tobacco: Never   Substance Use Topics    Alcohol use: Never      Family History:  Family History   Problem Relation Age of Onset    Diabetes Other     Hypertension Other     Heart disease Other        Home Meds:  Medications Prior to Admission   Medication Sig Dispense Refill Last Dose    Acetaminophen Extra Strength 500 MG tablet Take 1 tablet by mouth 4  (Four) Times a Day As Needed.   Past Week    atorvastatin (LIPITOR) 40 MG tablet    7/9/2024    benztropine (COGENTIN) 0.5 MG tablet Take 2 tablets by mouth 2 (Two) Times a Day.   7/10/2024    Diclofenac Sodium (VOLTAREN) 1 % gel gel    7/9/2024    digoxin (LANOXIN) 125 MCG tablet    7/9/2024    divalproex (DEPAKOTE) 500 MG DR tablet    7/9/2024    Eliquis 5 MG tablet tablet Take 1 tablet by mouth Every 12 (Twelve) Hours.   7/10/2024    folic acid (FOLVITE) 1 MG tablet Take 1 tablet by mouth Daily for 30 days. 30 tablet 0 7/10/2024    levothyroxine (SYNTHROID, LEVOTHROID) 50 MCG tablet Take 1.5 tablets by mouth Daily.   7/9/2024    melatonin 5 MG tablet tablet Take 2 tablets by mouth Every Night.   7/9/2024    metoprolol succinate XL (TOPROL-XL) 25 MG 24 hr tablet    7/10/2024    mirtazapine (REMERON SOL-TAB) 15 MG disintegrating tablet Place 1 tablet on the tongue Every Night.   7/9/2024    OLANZapine zydis (zyPREXA) 5 MG disintegrating tablet    7/9/2024    pantoprazole (PROTONIX) 40 MG EC tablet Take 1 tablet by mouth 2 (Two) Times a Day Before Meals for 30 days. 60 tablet 0 7/9/2024    Abilify Maintena 400 MG Suspension Reconstituted ER IM injection ER        Calcium Carbonate-Vitamin D (Oyster Shell Calcium/D) 250-3.125 MG-MCG tablet Take 1 tablet by mouth 2 (Two) Times a Day.       ibuprofen (ADVIL,MOTRIN) 600 MG tablet Take 1 tablet by mouth 4 (Four) Times a Day.       mupirocin (BACTROBAN) 2 % cream Apply 1 Application topically to the appropriate area as directed 2 (Two) Times a Day.       polyethylene glycol (MIRALAX) 17 GM/SCOOP powder Dissolve 17 g (1 capful) in liquid and drink by mouth Daily. 510 g 1      Current Meds:   apixaban, 5 mg, Oral, Q12H  atorvastatin, 40 mg, Oral, Daily  benztropine, 1 mg, Oral, BID  digoxin, 125 mcg, Oral, Daily  folic acid, 1 mg, Oral, Daily  levothyroxine, 75 mcg, Oral, Daily  melatonin, 10 mg, Oral, Nightly  metoprolol succinate XL, 25 mg, Oral, Q24H  mirtazapine, 15 mg,  Oral, Nightly  pantoprazole, 40 mg, Oral, BID AC  polyethylene glycol, 1 bottle, Oral, Daily  sucralfate, 1 g, Oral, TID AC      Allergies:  Allergies   Allergen Reactions    Clonazepam Unknown (See Comments)    Fluoxetine Unknown (See Comments)    Grass Unknown - High Severity    Grass Pollen(K-O-[id number]-T-Swt Kennedy) Unknown - Low Severity    Hydroxyzine Unknown (See Comments)    Methylphenidate Unknown (See Comments)    Quetiapine Unknown - Low Severity    Risperidone Unknown (See Comments)     Review of Systems  Pertinent items are noted in HPI, all other systems reviewed and negative  Patient is alert to self and place however disoriented to time.    Objective     Vital Signs  Temp:  [97.3 °F (36.3 °C)-98 °F (36.7 °C)] 97.8 °F (36.6 °C)  Heart Rate:  [] 78  Resp:  [16-18] 18  BP: ()/() 151/127    Physical Exam:  CONSTITUTIONAL:  today's vital signs reviewed  EYES: no scleral icterus  GASTROINTESTINAL: abdomen is soft, left upper quadrant abdominal tenderness to palpation, nondistended with normal active bowel sounds, no masses are appreciated  PSYCHIATRIC: appropriate mood and affect  RESPIRATORY: normal inspiratory effort with no increased work of breathing  NEUROLOGIC: patient is awake and alert  DERMATOLOGIC: skin is warm with no cyanosis  LYMPHATIC: no periumbilical lymphadenopathy     Results Review:              I reviewed the patient's new clinical results.    Results from last 7 days   Lab Units 07/11/24  0543 07/10/24  1553 07/09/24  1110   WBC 10*3/mm3 5.32 7.53 5.18   HEMOGLOBIN g/dL 12.9* 14.1 14.2   HEMATOCRIT % 38.1 41.9 43.6   PLATELETS 10*3/mm3 79* 92* 86*     Results from last 7 days   Lab Units 07/11/24  0543 07/10/24  1553 07/09/24  1110   SODIUM mmol/L 135* 135* 139   POTASSIUM mmol/L 4.1 4.5 4.4   CHLORIDE mmol/L 100 99 102   CO2 mmol/L 25.3 25.1 27.0   BUN mg/dL 28* 35* 30*   CREATININE mg/dL 0.96 1.31* 0.96   CALCIUM mg/dL 8.8 9.3 9.3   BILIRUBIN mg/dL  --  0.4 0.4   ALK PHOS  U/L  --  71 66   ALT (SGPT) U/L  --  9 9   AST (SGOT) U/L  --  25 25   GLUCOSE mg/dL 99 93 76         Lab Results   Lab Value Date/Time    LIPASE 35 07/10/2024 1553    LIPASE 29 06/10/2024 2026    LIPASE 18 04/30/2024 1732       Radiology:  XR Chest 1 View   Final Result      MRI Brain Without Contrast    (Results Pending)       Assessment & Plan   Active Hospital Problems    Diagnosis     **Syncope     Atrial fibrillation, chronic     Dehydration     Chronic anticoagulation     Thrombocytopenia     JOSIAH (acute kidney injury)     Essential hypertension     Hypothyroidism (acquired)     History of CVA (cerebrovascular accident)     GERD without esophagitis     Bipolar disorder     HLD (hyperlipidemia)        Assessment:  Syncope  Dehydration  JOSIAH  GERD   atrial fibrillation-chronic anticoagulation with Eliquis  Bipolar    All problems are new to me     Plan:  Continue supportive care with IV fluids and antiemetics per hospitalist team  Continue pantoprazole 40 mg twice daily and add carafate 1 g 3 times daily prior to meals   Can ADAT from GI standpoint  Patient will ultimately need to undergo EGD evaluation however this would be ideally performed off of anticoagulation for at least 72 hours to allow for biopsy assessment during EGD.  This can be considered on an outpatient basis pending clinical course.  If EGD not performed during inpatient stay we will have him follow-up in 2-4 weeks after discharge to determine timing of future EGD evaluation.    Thank you for allowing us to participate in the care of this patient.   Please call us with questions or if we can be of further assistance.     I discussed the patients findings and my recommendations with patient.         PILI Cifuentes  [facility] [address] [address]

## 2024-07-11 NOTE — CONSULTS
"Nutrition Services    Patient Name:  Helder Abbott  YOB: 1975  MRN: [id number]  Admit Date:  7/10/2024    Assessment Date:  07/11/24    Summary: Chronic poor intake    49yoM admitted for syncope and JOSIAH. Hx of GERD, CVA, hypothyroidism, intellectual disability. Pt in the room asleep during visit. Unable to obtain much nutrition hx r/t slurred speech. Noted severe muscle and fat wasting per NFPE. Had % PO intake today per chart review. Predicted inadequate oral intake PTA. Noted some wt loss. Wt charted at 110 vs 140ish#. Recommend getting new standing scale wt to assess wt loss and nutrition status. Will order nutrition supplement to support PO intake and wt maintenance.     Plan/recs:  Boost plus BID  Recommend getting new standing scale wt   Encourage intakes    RD to follow    CLINICAL NUTRITION ASSESSMENT      Reason for Assessment Chronic Poor Intake     Diagnosis/Problem   Syncope   Medical/Surgical History Past Medical History:   Diagnosis Date    Benign essential hypertension     Bipolar disorder     Dyslipidemia     GERD (gastroesophageal reflux disease)     Heart disease     History of stroke     Hypothyroidism        History reviewed. No pertinent surgical history.     Anthropometrics        Current Height  Current Weight  BMI kg/m2 Height: 182.9 cm (72\")  Weight: 67.7 kg (149 lb 4 oz) (07/11/24 0533)  Body mass index is 20.24 kg/m².   Adjusted BMI (if applicable)    BMI Category Normal/Healthy (18.4 - 24.9)   Ideal Body Weight (IBW) 178#   Usual Body Weight (UBW) See below   Weight Trend Unknown   Weight History Wt Readings from Last 30 Encounters:   07/11/24 0533 67.7 kg (149 lb 4 oz)   07/10/24 2022 65.3 kg (143 lb 15.4 oz)   07/10/24 1554 49.9 kg (110 lb)   07/09/24 1056 52.5 kg (115 lb 11.9 oz)   06/12/24 1839 52.5 kg (115 lb 11.9 oz)   06/12/24 1407 73 kg (161 lb)   05/06/24 0500 73.1 kg (161 lb 2.5 oz)   05/05/24 0512 72.3 kg (159 lb 6.3 oz)   05/04/24 0600 71.8 kg (158 lb " "4.6 oz)   05/04/24 0300 71.8 kg (158 lb 4.6 oz)   05/02/24 0500 76.2 kg (167 lb 15.9 oz)   05/01/24 0534 72.1 kg (158 lb 15.2 oz)   04/30/24 2323 72.2 kg (159 lb 2.8 oz)   11/30/22 1412 86.2 kg (190 lb)        Estimated Requirements         Weight used  67.7 kg    Calories  1693 kcal (25 kcal/kg)    Protein  68 - 81 g (1.0 - 1.2 gm/kg)    Fluid   (1 mL/kcal)     Labs       Pertinent Labs    Results from last 7 days   Lab Units 07/11/24  0543 07/10/24  1553 07/09/24  1110   SODIUM mmol/L 135* 135* 139   POTASSIUM mmol/L 4.1 4.5 4.4   CHLORIDE mmol/L 100 99 102   CO2 mmol/L 25.3 25.1 27.0   BUN mg/dL 28* 35* 30*   CREATININE mg/dL 0.96 1.31* 0.96   CALCIUM mg/dL 8.8 9.3 9.3   BILIRUBIN mg/dL  --  0.4 0.4   ALK PHOS U/L  --  71 66   ALT (SGPT) U/L  --  9 9   AST (SGOT) U/L  --  25 25   GLUCOSE mg/dL 99 93 76     Results from last 7 days   Lab Units 07/11/24  0543 07/10/24  1553 07/09/24  1110   MAGNESIUM mg/dL  --   --  1.7   HEMOGLOBIN g/dL 12.9* 14.1 14.2   HEMATOCRIT % 38.1 41.9 43.6   WBC 10*3/mm3 5.32 7.53 5.18   ALBUMIN g/dL  --  3.7 3.5     Results from last 7 days   Lab Units 07/11/24  0543 07/10/24  1553 07/09/24  1110   PLATELETS 10*3/mm3 79* 92* 86*     COVID19   Date Value Ref Range Status   04/30/2024 Not Detected Not Detected - Ref. Range Final     No results found for: \"HGBA1C\"       Medications           Scheduled Medications apixaban, 5 mg, Oral, Q12H  atorvastatin, 40 mg, Oral, Daily  benztropine, 1 mg, Oral, BID  digoxin, 125 mcg, Oral, Daily  folic acid, 1 mg, Oral, Daily  levothyroxine, 75 mcg, Oral, Daily  melatonin, 10 mg, Oral, Nightly  metoprolol succinate XL, 25 mg, Oral, Q24H  mirtazapine, 15 mg, Oral, Nightly  pantoprazole, 40 mg, Oral, BID AC  polyethylene glycol, 1 bottle, Oral, Daily  sucralfate, 1 g, Oral, TID AC       Infusions sodium chloride, 100 mL/hr, Last Rate: 100 mL/hr (07/10/24 2118)       PRN Medications   acetaminophen **OR** acetaminophen **OR** acetaminophen    " senna-docusate sodium **AND** polyethylene glycol **AND** bisacodyl **AND** bisacodyl    nitroglycerin    ondansetron ODT **OR** ondansetron     Physical Findings          General Findings alert, oriented   Oral/Mouth Cavity tooth or teeth missing   Edema  not assessed   Gastrointestinal WDL   Skin  skin intact   Tubes/Drains/Lines none   NFPE Date Completed: 7/11 GR  With generalized severe muscle wasting and fat loss    --  Current Nutrition Orders & Evaluation of Intake       Oral Nutrition     Food Allergies NKFA   Current PO Diet Diet: Cardiac; Healthy Heart (2-3 Na+); Fluid Consistency: Thin (IDDSI 0)   Supplement n/a   PO Evaluation     % PO Intake     Factors Affecting Intake: impaired cognition   --  PES STATEMENT / NUTRITION DIAGNOSIS      Nutrition Dx Problem  Problem: Inadequate Oral Intake  Etiology: Medical Diagnosis - intellectual disability    Signs/Symptoms: NFPE Results     NUTRITION INTERVENTION / PLAN OF CARE      Intervention Goal(s) Reduce/improve symptoms, Meet estimated needs, Disease management/therapy, Increase intake, Accepts oral nutrition supplement, Maintain weight, No significant weight loss, and PO intake goal %:          RD Intervention/Action Encourage intake, Continue to monitor, Care plan reviewed, and Recommend/order: boost plus   --      Prescription/Orders:       PO Diet       Supplements Boost plus BID      Enteral Nutrition       Parenteral Nutrition    New Prescription Ordered? Yes   --      Monitor/Evaluation Per protocol   Discharge Plan/Needs Pending clinical course   --    RD to follow per protocol.      Electronically signed by:  Ester Palacios RD  07/11/24 15:05 EDT

## 2024-07-11 NOTE — PLAN OF CARE
Problem: Adult Inpatient Plan of Care  Goal: Plan of Care Review  Outcome: Ongoing, Progressing  Goal: Patient-Specific Goal (Individualized)  Outcome: Ongoing, Progressing  Goal: Absence of Hospital-Acquired Illness or Injury  Outcome: Ongoing, Progressing  Intervention: Identify and Manage Fall Risk  Recent Flowsheet Documentation  Taken 7/11/2024 0220 by Katerine Storm RN  Safety Promotion/Fall Prevention:   nonskid shoes/slippers when out of bed   safety round/check completed  Taken 7/11/2024 0036 by Katerine Storm RN  Safety Promotion/Fall Prevention:   clutter free environment maintained   nonskid shoes/slippers when out of bed   safety round/check completed  Taken 7/10/2024 2214 by Katerine Storm RN  Safety Promotion/Fall Prevention:   clutter free environment maintained   safety round/check completed  Taken 7/10/2024 2055 by Katerine Storm RN  Safety Promotion/Fall Prevention:   clutter free environment maintained   fall prevention program maintained   safety round/check completed  Intervention: Prevent Skin Injury  Recent Flowsheet Documentation  Taken 7/11/2024 0220 by Katerine Storm RN  Body Position: position changed independently  Taken 7/11/2024 0036 by Katerine Storm RN  Body Position: position changed independently  Taken 7/10/2024 2214 by Katerine Storm RN  Body Position: position changed independently  Taken 7/10/2024 2055 by Katerine Storm RN  Body Position: position changed independently  Intervention: Prevent and Manage VTE (Venous Thromboembolism) Risk  Recent Flowsheet Documentation  Taken 7/11/2024 0220 by Katerine Storm RN  Activity Management: bedrest  Taken 7/10/2024 2214 by Katerine Storm RN  Activity Management: bedrest  Taken 7/10/2024 2055 by Katerine Storm RN  Activity Management: bedrest  Taken 7/10/2024 2022 by Katerine Storm RN  Range of Motion: active ROM (range of motion) encouraged  Intervention: Prevent Infection  Recent Flowsheet Documentation  Taken 7/11/2024  0220 by Katerine Storm, RN  Infection Prevention:   rest/sleep promoted   hand hygiene promoted  Taken 7/11/2024 0036 by Katerine Storm, RN  Infection Prevention:   rest/sleep promoted   single patient room provided  Goal: Optimal Comfort and Wellbeing  Outcome: Ongoing, Progressing  Intervention: Provide Person-Centered Care  Recent Flowsheet Documentation  Taken 7/10/2024 2022 by Katerine tSorm, RN  Trust Relationship/Rapport:   care explained   choices provided  Goal: Readiness for Transition of Care  Outcome: Ongoing, Progressing  Intervention: Mutually Develop Transition Plan  Recent Flowsheet Documentation  Taken 7/10/2024 2240 by Katerine Storm, RN  Equipment Currently Used at Home: none  Taken 7/10/2024 2200 by Katerine Storm, RN  Transportation Anticipated: agency  Transportation Concerns: none  Patient/Family Anticipated Services at Transition: none  Patient/Family Anticipates Transition to: long-term care facility   Goal Outcome Evaluation:

## 2024-07-11 NOTE — PLAN OF CARE
Goal Outcome Evaluation:  Plan of Care Reviewed With: patient           Outcome Evaluation: Nathaniel is a 49 y.o male who presented to [facility] with syncope. Patient with hx of HTN, bipolar, and intelectual disability. Patient unable to provide PLOF this date. Per chart patient lives in a group home and is independent at baseline with no AD. Orthostatics assessed during mobility this date. Patient with BP of 90/53 in supine. BP 77/67 while seated at EOB. Patient performed STS from EOB with Suzanna. and HHA. Patient fidgiting and standing requiring BP reading to continuously restart with final reading of 164/136 (145). Patient unable to tolerate standing long enough to perform another reading. Patient reported lightheadedness throughout session. Patient returned to supine in bed at end of session. Patient would continue to benefit from skilled PT intervention while in the acute care setting to address deficits in functional mobility and maximize safety and independence. Anticipate return to group home at d/c pending medical course. Acute PT will continue to monitor.      Anticipated Discharge Disposition (PT): home with assist, adult foster care/group home

## 2024-07-11 NOTE — CONSULTS
Neurology Consult Note    Referring Provider: Dr. Barboza  Reason for Consultation: Syncope      History of present illness:      49-year-old man with past medical history of bipolar disorder, intellectual disability, hypothyroidism, chronic A-fib on anticoagulation with Eliquis, hypertension.  There is a reported history of stroke but I am not finding any further information about this in the medical record.  Maintained on multiple psychiatric medications including Depakote, Cogentin, Remeron, Zyprexa, Abilify.  Also on digoxin, metoprolol.  Recently admitted 6/2024 for altered mental status with hyperkalemia after recently starting trazodone.  Had a cardiac workup which was unrevealing.  Also with some hypotension which improved with stopping metoprolol but this was resumed at discharge.  Presents yesterday after a syncopal episode while at his doctor's office.  Per the notes he was unresponsive for 10 seconds.  Has been feeling shaky, unsteady and weak for several days.  No report of fevers.  No report of witnessed seizure or seizure-like activity.  Overall concern is for hypotensive episodes was apparently hypotensive according to EMS.  This a.m. BP 89/62 recorded as while lying, 151/127 while sitting.  He is currently quite sleepy, arouses to my conversation, able to participate but having difficulty relating details of his history.  Note is made of significant dysarthria which I am not sure if this is his baseline.      Seen by neurology 5/2024 for altered mental status, lethargy which was felt to be related to hypotension and other medical issues.      Past Medical History:   Diagnosis Date    Benign essential hypertension     Bipolar disorder     Dyslipidemia     GERD (gastroesophageal reflux disease)     Heart disease     History of stroke     Hypothyroidism        Allergies   Allergen Reactions    Clonazepam Unknown (See Comments)    Fluoxetine Unknown (See Comments)    Grass Unknown - High Severity     Grass Pollen(K-O-[id number]-T-Swt Kennedy) Unknown - Low Severity    Hydroxyzine Unknown (See Comments)    Methylphenidate Unknown (See Comments)    Quetiapine Unknown - Low Severity    Risperidone Unknown (See Comments)     No current facility-administered medications on file prior to encounter.     Current Outpatient Medications on File Prior to Encounter   Medication Sig    Acetaminophen Extra Strength 500 MG tablet Take 1 tablet by mouth 4 (Four) Times a Day As Needed.    atorvastatin (LIPITOR) 40 MG tablet     benztropine (COGENTIN) 0.5 MG tablet Take 2 tablets by mouth 2 (Two) Times a Day.    Diclofenac Sodium (VOLTAREN) 1 % gel gel     digoxin (LANOXIN) 125 MCG tablet     divalproex (DEPAKOTE) 500 MG DR tablet     Eliquis 5 MG tablet tablet Take 1 tablet by mouth Every 12 (Twelve) Hours.    folic acid (FOLVITE) 1 MG tablet Take 1 tablet by mouth Daily for 30 days.    levothyroxine (SYNTHROID, LEVOTHROID) 50 MCG tablet Take 1.5 tablets by mouth Daily.    melatonin 5 MG tablet tablet Take 2 tablets by mouth Every Night.    metoprolol succinate XL (TOPROL-XL) 25 MG 24 hr tablet     mirtazapine (REMERON SOL-TAB) 15 MG disintegrating tablet Place 1 tablet on the tongue Every Night.    OLANZapine zydis (zyPREXA) 5 MG disintegrating tablet     pantoprazole (PROTONIX) 40 MG EC tablet Take 1 tablet by mouth 2 (Two) Times a Day Before Meals for 30 days.    Abilify Maintena 400 MG Suspension Reconstituted ER IM injection ER     Calcium Carbonate-Vitamin D (Oyster Shell Calcium/D) 250-3.125 MG-MCG tablet Take 1 tablet by mouth 2 (Two) Times a Day.    ibuprofen (ADVIL,MOTRIN) 600 MG tablet Take 1 tablet by mouth 4 (Four) Times a Day.    mupirocin (BACTROBAN) 2 % cream Apply 1 Application topically to the appropriate area as directed 2 (Two) Times a Day.    polyethylene glycol (MIRALAX) 17 GM/SCOOP powder Dissolve 17 g (1 capful) in liquid and drink by mouth Daily.       Social History     Socioeconomic History    Marital status:  Single   Tobacco Use    Smoking status: Never    Smokeless tobacco: Never   Vaping Use    Vaping status: Never Used   Substance and Sexual Activity    Alcohol use: Never    Drug use: Never    Sexual activity: Defer     Family History   Problem Relation Age of Onset    Diabetes Other     Hypertension Other     Heart disease Other        Review of Systems  Unable to obtain due to patient factors    Vital Signs   Temp:  [97.3 °F (36.3 °C)-98 °F (36.7 °C)] 97.8 °F (36.6 °C)  Heart Rate:  [] 139  Resp:  [16-18] 18  BP: ()/() 151/127    General Exam:  Head:  Normocephalic, atraumatic  HEENT:  Neck supple  CVS:  Regular rate and rhythm.  No murmurs  Carotid Examination:  No bruits  Lungs:  Clear to auscultation  Abdomen:  Nontender, nondistended  Extremities:  No signs of peripheral edema  Skin:  No rashes    Neurologic Exam:    Mental Status: Awake, alert, oriented to hospital, July, interactive, significant dysarthria, able to name objects and follow commands, no neglect  Cranial nerves: Hypomimia, pupils equal and reactive, extraocular movements intact without nystagmus, no diplopia, face symmetric, tongue midline, symmetric smile, significant dysarthria  Motor: normal strength and tone throughout  Sensory: intact to LT and pinprick throughout  Reflexes: 2+ throughout, plantar reflexes flexor bilaterally  Cerebellar: Finger-to-nose and heel-to-shin testing within normal limits, no ataxia slow fine finger movements and toe taps bilaterally  Gait: deferred for patient safety    Results Review:  Lab Results (last 24 hours)       Procedure Component Value Units Date/Time    Basic Metabolic Panel [[id number]]  (Abnormal) Collected: 07/11/24 0543    Specimen: Blood Updated: 07/11/24 0632     Glucose 99 mg/dL      BUN 28 mg/dL      Creatinine 0.96 mg/dL      Sodium 135 mmol/L      Potassium 4.1 mmol/L      Chloride 100 mmol/L      CO2 25.3 mmol/L      Calcium 8.8 mg/dL      BUN/Creatinine Ratio 29.2     Anion  Gap 9.7 mmol/L      eGFR 96.9 mL/min/1.73     Narrative:      GFR Normal >60  Chronic Kidney Disease <60  Kidney Failure <15      CBC (No Diff) [[id number]]  (Abnormal) Collected: 07/11/24 0543    Specimen: Blood Updated: 07/11/24 0623     WBC 5.32 10*3/mm3      RBC 4.30 10*6/mm3      Hemoglobin 12.9 g/dL      Hematocrit 38.1 %      MCV 88.6 fL      MCH 30.0 pg      MCHC 33.9 g/dL      RDW 13.8 %      RDW-SD 44.1 fl      MPV 11.7 fL      Platelets 79 10*3/mm3     Digoxin Level [[id number]]  (Normal) Collected: 07/10/24 1801    Specimen: Blood from Arm, Left Updated: 07/10/24 1844     Digoxin 1.00 ng/mL     High Sensitivity Troponin T 2Hr [[id number]]  (Abnormal) Collected: 07/10/24 1801    Specimen: Blood from Arm, Left Updated: 07/10/24 1829     HS Troponin T 28 ng/L      Troponin T Delta 0 ng/L     Narrative:      High Sensitive Troponin T Reference Range:  <14.0 ng/L- Negative Female for AMI  <22.0 ng/L- Negative Male for AMI  >=14 - Abnormal Female indicating possible myocardial injury.  >=22 - Abnormal Male indicating possible myocardial injury.   Clinicians would have to utilize clinical acumen, EKG, Troponin, and serial changes to determine if it is an Acute Myocardial Infarction or myocardial injury due to an underlying chronic condition.         Lipase [[id number]]  (Normal) Collected: 07/10/24 1553    Specimen: Blood from Arm, Left Updated: 07/10/24 1627     Lipase 35 U/L     Comprehensive Metabolic Panel [[id number]]  (Abnormal) Collected: 07/10/24 1553    Specimen: Blood from Arm, Left Updated: 07/10/24 1627     Glucose 93 mg/dL      BUN 35 mg/dL      Creatinine 1.31 mg/dL      Sodium 135 mmol/L      Potassium 4.5 mmol/L      Chloride 99 mmol/L      CO2 25.1 mmol/L      Calcium 9.3 mg/dL      Total Protein 6.3 g/dL      Albumin 3.7 g/dL      ALT (SGPT) 9 U/L      AST (SGOT) 25 U/L      Alkaline Phosphatase 71 U/L      Total Bilirubin 0.4 mg/dL      Globulin 2.6 gm/dL      A/G Ratio 1.4 g/dL       BUN/Creatinine Ratio 26.7     Anion Gap 10.9 mmol/L      eGFR 66.7 mL/min/1.73     Narrative:      GFR Normal >60  Chronic Kidney Disease <60  Kidney Failure <15      High Sensitivity Troponin T [[id number]]  (Abnormal) Collected: 07/10/24 1553    Specimen: Blood from Arm, Left Updated: 07/10/24 1627     HS Troponin T 28 ng/L     Narrative:      High Sensitive Troponin T Reference Range:  <14.0 ng/L- Negative Female for AMI  <22.0 ng/L- Negative Male for AMI  >=14 - Abnormal Female indicating possible myocardial injury.  >=22 - Abnormal Male indicating possible myocardial injury.   Clinicians would have to utilize clinical acumen, EKG, Troponin, and serial changes to determine if it is an Acute Myocardial Infarction or myocardial injury due to an underlying chronic condition.         CBC & Differential [[id number]]  (Abnormal) Collected: 07/10/24 1553    Specimen: Blood from Arm, Left Updated: 07/10/24 1612    Narrative:      The following orders were created for panel order CBC & Differential.  Procedure                               Abnormality         Status                     ---------                               -----------         ------                     CBC Auto Differential[[id number]]        Abnormal            Final result                 Please view results for these tests on the individual orders.    CBC Auto Differential [[id number]]  (Abnormal) Collected: 07/10/24 1553    Specimen: Blood from Arm, Left Updated: 07/10/24 1612     WBC 7.53 10*3/mm3      RBC 4.74 10*6/mm3      Hemoglobin 14.1 g/dL      Hematocrit 41.9 %      MCV 88.4 fL      MCH 29.7 pg      MCHC 33.7 g/dL      RDW 13.8 %      RDW-SD 44.1 fl      MPV 11.4 fL      Platelets 92 10*3/mm3      Neutrophil % 64.9 %      Lymphocyte % 22.7 %      Monocyte % 11.3 %      Eosinophil % 0.1 %      Basophil % 0.3 %      Immature Grans % 0.7 %      Neutrophils, Absolute 4.89 10*3/mm3      Lymphocytes, Absolute 1.71 10*3/mm3      Monocytes, Absolute  0.85 10*3/mm3      Eosinophils, Absolute 0.01 10*3/mm3      Basophils, Absolute 0.02 10*3/mm3      Immature Grans, Absolute 0.05 10*3/mm3      nRBC 0.0 /100 WBC           Valproic acid level 94   digoxin level 1.0    Imaging Results (Last 24 Hours)       Procedure Component Value Units Date/Time    XR Chest 1 View [[id number]] Collected: 07/10/24 1619     Updated: 07/10/24 1623    Narrative:      PORTABLE CHEST     HISTORY: Syncope.     COMPARISON: Chest x-ray 7/9/2024.     FINDINGS: A single portable view of the chest demonstrates the heart to  be within normal limits in size. There is no evidence of infiltrate,  effusion or of congestive failure.     This report was finalized on 7/10/2024 4:19 PM by Dr. Ronnie Orozco M.D  on Workstation: [id number]             Head CT 7/9 -moderate diffuse parenchymal atrophy without evidence for acute intracranial pathology.    Impression    49-year-old man with past medical history as noted, history of atrial fibrillation on anticoagulation, history of bipolar disorder and intellectual disability, maintained on multiple psychiatric medications, reported history of stroke.  Presents with syncope with concern for hypotensive episodes.  Overall concern is for hypotensive episodes which appear to be most likely medication related.   I am also concerned that the combination of psychiatric medications may be causing side effects, likely hypotension and mild EPS.  Cardiac syncope not excluded.  Acute cerebral ischemia not excluded but appears less likely.  No report of seizure or seizure-like activity and he is on a therapeutic level of Depakote which would make seizure highly unlikely.  Also had some folic acid deficiency.    Plan    MRI brain rule out acute ischemia  Behavioral health consult for evaluation and potential streamlining of medications  Repeat orthostatic vital signs, recommend streamlining medications that could cause hypotension.    Will check B12, folate, TSH,  ammonia  Continue treatment with folic acid   Cardiology evaluation rule out cardiac causes of syncope    I discussed the patient's findings and my recommendations with patient and consulting provider    Ce Elmore MD  07/11/24  11:29 EDT

## 2024-07-12 VITALS
WEIGHT: 149.25 LBS | BODY MASS INDEX: 20.22 KG/M2 | HEIGHT: 72 IN | HEART RATE: 67 BPM | TEMPERATURE: 97.3 F | OXYGEN SATURATION: 91 % | SYSTOLIC BLOOD PRESSURE: 78 MMHG | RESPIRATION RATE: 18 BRPM | DIASTOLIC BLOOD PRESSURE: 56 MMHG

## 2024-07-12 PROBLEM — N17.9 AKI (ACUTE KIDNEY INJURY): Status: RESOLVED | Noted: 2024-04-30 | Resolved: 2024-07-12

## 2024-07-12 LAB
ANION GAP SERPL CALCULATED.3IONS-SCNC: 7.7 MMOL/L (ref 5–15)
BUN SERPL-MCNC: 18 MG/DL (ref 6–20)
BUN/CREAT SERPL: 24.7 (ref 7–25)
CALCIUM SPEC-SCNC: 8.9 MG/DL (ref 8.6–10.5)
CHLORIDE SERPL-SCNC: 107 MMOL/L (ref 98–107)
CO2 SERPL-SCNC: 23.3 MMOL/L (ref 22–29)
CREAT SERPL-MCNC: 0.73 MG/DL (ref 0.76–1.27)
DEPRECATED RDW RBC AUTO: 44.5 FL (ref 37–54)
EGFRCR SERPLBLD CKD-EPI 2021: 111.5 ML/MIN/1.73
ERYTHROCYTE [DISTWIDTH] IN BLOOD BY AUTOMATED COUNT: 13.6 % (ref 12.3–15.4)
GLUCOSE SERPL-MCNC: 104 MG/DL (ref 65–99)
HCT VFR BLD AUTO: 39.8 % (ref 37.5–51)
HGB BLD-MCNC: 13 G/DL (ref 13–17.7)
MAGNESIUM SERPL-MCNC: 1.6 MG/DL (ref 1.6–2.6)
MCH RBC QN AUTO: 29.1 PG (ref 26.6–33)
MCHC RBC AUTO-ENTMCNC: 32.7 G/DL (ref 31.5–35.7)
MCV RBC AUTO: 89 FL (ref 79–97)
PHOSPHATE SERPL-MCNC: 2.5 MG/DL (ref 2.5–4.5)
PLATELET # BLD AUTO: 85 10*3/MM3 (ref 140–450)
PMV BLD AUTO: 11.6 FL (ref 6–12)
POTASSIUM SERPL-SCNC: 4.3 MMOL/L (ref 3.5–5.2)
RBC # BLD AUTO: 4.47 10*6/MM3 (ref 4.14–5.8)
SODIUM SERPL-SCNC: 138 MMOL/L (ref 136–145)
WBC NRBC COR # BLD AUTO: 6.31 10*3/MM3 (ref 3.4–10.8)

## 2024-07-12 PROCEDURE — 97530 THERAPEUTIC ACTIVITIES: CPT

## 2024-07-12 PROCEDURE — 99232 SBSQ HOSP IP/OBS MODERATE 35: CPT | Performed by: NURSE PRACTITIONER

## 2024-07-12 PROCEDURE — 84100 ASSAY OF PHOSPHORUS: CPT | Performed by: STUDENT IN AN ORGANIZED HEALTH CARE EDUCATION/TRAINING PROGRAM

## 2024-07-12 PROCEDURE — 80048 BASIC METABOLIC PNL TOTAL CA: CPT | Performed by: STUDENT IN AN ORGANIZED HEALTH CARE EDUCATION/TRAINING PROGRAM

## 2024-07-12 PROCEDURE — 99232 SBSQ HOSP IP/OBS MODERATE 35: CPT

## 2024-07-12 PROCEDURE — 99231 SBSQ HOSP IP/OBS SF/LOW 25: CPT | Performed by: PHYSICIAN ASSISTANT

## 2024-07-12 PROCEDURE — 83735 ASSAY OF MAGNESIUM: CPT | Performed by: STUDENT IN AN ORGANIZED HEALTH CARE EDUCATION/TRAINING PROGRAM

## 2024-07-12 PROCEDURE — 85027 COMPLETE CBC AUTOMATED: CPT | Performed by: STUDENT IN AN ORGANIZED HEALTH CARE EDUCATION/TRAINING PROGRAM

## 2024-07-12 RX ORDER — SUCRALFATE 1 G/1
1 TABLET ORAL
Start: 2024-07-12

## 2024-07-12 RX ADMIN — BENZTROPINE MESYLATE 1 MG: 1 TABLET ORAL at 08:28

## 2024-07-12 RX ADMIN — FOLIC ACID 1 MG: 1 TABLET ORAL at 08:28

## 2024-07-12 RX ADMIN — DIGOXIN 125 MCG: 125 TABLET ORAL at 08:28

## 2024-07-12 RX ADMIN — LEVOTHYROXINE SODIUM 75 MCG: 75 TABLET ORAL at 08:28

## 2024-07-12 RX ADMIN — PANTOPRAZOLE SODIUM 40 MG: 40 TABLET, DELAYED RELEASE ORAL at 08:28

## 2024-07-12 RX ADMIN — PANTOPRAZOLE SODIUM 40 MG: 40 TABLET, DELAYED RELEASE ORAL at 16:43

## 2024-07-12 RX ADMIN — SUCRALFATE 1 G: 1 TABLET ORAL at 08:28

## 2024-07-12 RX ADMIN — ATORVASTATIN CALCIUM 40 MG: 20 TABLET, FILM COATED ORAL at 08:28

## 2024-07-12 RX ADMIN — SUCRALFATE 1 G: 1 TABLET ORAL at 11:23

## 2024-07-12 RX ADMIN — APIXABAN 5 MG: 5 TABLET, FILM COATED ORAL at 08:28

## 2024-07-12 RX ADMIN — SUCRALFATE 1 G: 1 TABLET ORAL at 16:43

## 2024-07-12 NOTE — PLAN OF CARE
Goal Outcome Evaluation:  Plan of Care Reviewed With: patient           Outcome Evaluation: Patient seen for PT session this AM. Patient supine in bed upon arrival. Patient continues to endorse dizziness but reports he feels it all the time. Patient completed all bed mobility with SV this date. Patient performed B LE exercises while seated at EOB. Patient performed STS 2x from EOB with CGA. Patient ambulated 3ft from EOB to chair with no AD and CGA. Patient reclined in chair with exit alarm on and all needs met at end of session. Patient would continue to benefit from skilled PT intervention to address deficits in functional mobility. PT will continue to monitor.      Anticipated Discharge Disposition (PT): home with assist, adult foster care/group home

## 2024-07-12 NOTE — THERAPY TREATMENT NOTE
Patient Name: Helder Abbott  : 1975    MRN: [id number]                              Today's Date: 2024       Admit Date: 7/10/2024    Visit Dx:     ICD-10-CM ICD-9-CM   1. Syncope, unspecified syncope type  R55 780.2   2. JOSIAH (acute kidney injury)  N17.9 584.9     Patient Active Problem List   Diagnosis    JOSIAH (acute kidney injury)    Essential hypertension    Hypothyroidism (acquired)    History of CVA (cerebrovascular accident)    GERD without esophagitis    HLD (hyperlipidemia)    Bipolar disorder    Partial bowel obstruction    Fecal impaction    Severe malnutrition    Hyperkalemia    Syncope    Atrial fibrillation, chronic    Dehydration    Chronic anticoagulation    Thrombocytopenia     Past Medical History:   Diagnosis Date    Benign essential hypertension     Bipolar disorder     Dyslipidemia     GERD (gastroesophageal reflux disease)     Heart disease     History of stroke     Hypothyroidism      History reviewed. No pertinent surgical history.   General Information       [facility] Name 24 1006          Physical Therapy Time and Intention    Document Type therapy note (daily note)  -     Mode of Treatment individual therapy;physical therapy  -       Row Name 24 1006          General Information    Patient Profile Reviewed yes  -     Existing Precautions/Restrictions fall  -       Row Name 24 1006          Cognition    Orientation Status (Cognition) oriented to;person;place  -       Row Name 24 1006          Safety Issues, Functional Mobility    Impairments Affecting Function (Mobility) endurance/activity tolerance;strength;balance  -               User Key  (r) = Recorded By, (t) = Taken By, (c) = Cosigned By      Initials Name Provider Type     Shante Shannon PT Physical Therapist                   Mobility       Row Name 24 1007          Bed Mobility    Bed Mobility supine-sit;sit-supine  -     Supine-Sit [place] (Bed Mobility) supervision   -SM     Sit-Supine [place] (Bed Mobility) supervision  -     Assistive Device (Bed Mobility) head of bed elevated;bed rails  -       Row Name 07/12/24 1007          Sit-Stand Transfer    Sit-Stand [place] (Transfers) contact guard  -       Row Name 07/12/24 1007          Gait/Stairs (Locomotion)    [place] Level (Gait) contact guard  -SM     Distance in Feet (Gait) 3  -SM     Comment, (Gait/Stairs) 3 small steps from bed to chair  -               User Key  (r) = Recorded By, (t) = Taken By, (c) = Cosigned By      Initials Name Provider Type    Shante Velez PT Physical Therapist                   Obj/Interventions       Row Name 07/12/24 1007          Motor Skills    Therapeutic Exercise other (see comments)  LAQs B LEs  -[facility] Name 07/12/24 1007          Balance    Balance Assessment sitting static balance;sitting dynamic balance;sit to stand dynamic balance;standing static balance;standing dynamic balance  -     Static Sitting Balance supervision  -     Dynamic Sitting Balance standby assist  -SM     Position, Sitting Balance sitting edge of bed  -SM     Sit to Stand Dynamic Balance contact guard  -SM     Static Standing Balance contact guard  -SM     Dynamic Standing Balance contact guard  -SM     Position/Device Used, Standing Balance unsupported  -     Balance Interventions sitting;standing;sit to stand;static;dynamic  -               User Key  (r) = Recorded By, (t) = Taken By, (c) = Cosigned By      Initials Name Provider Type    Shante Velez PT Physical Therapist                   Goals/Plan    No documentation.                  Clinical Impression       [facility] Name 07/12/24 1008          Pain    Pretreatment Pain Rating 0/10 - no pain  -     Posttreatment Pain Rating 0/10 - no pain  -SM       Row Name 07/12/24 1008          Plan of Care Review    Plan of Care Reviewed With patient  -     Outcome Evaluation Patient seen for PT session this AM. Patient  supine in bed upon arrival. Patient continues to endorse dizziness but reports he feels it all the time. Patient completed all bed mobility with SV this date. Patient performed B LE exercises while seated at EOB. Patient performed STS 2x from EOB with CGA. Patient ambulated 3ft from EOB to chair with no AD and CGA. Patient reclined in chair with exit alarm on and all needs met at end of session. Patient would continue to benefit from skilled PT intervention to address deficits in functional mobility. PT will continue to monitor.  -       Row Name 07/12/24 1008          Vital Signs    Pre Patient Position Supine  -     Intra Patient Position Standing  -SM     Post Patient Position Sitting  -SM       Row Name 07/12/24 1008          Positioning and Restraints    Pre-Treatment Position in bed  -SM     Post Treatment Position chair  -SM     In Chair notified nsg;reclined;call light within reach;encouraged to call for assist;exit alarm on  -               User Key  (r) = Recorded By, (t) = Taken By, (c) = Cosigned By      Initials Name Provider Type    Shante Velez, PT Physical Therapist                   Outcome Measures       Row Name 07/12/24 1010 07/12/24 0825       How much help from another person do you currently need...    Turning from your back to your side while in flat bed without using bedrails? 4  -SM 4  -LW    Moving from lying on back to sitting on the side of a flat bed without bedrails? 4  -SM 4  -LW    Moving to and from a bed to a chair (including a wheelchair)? 3  -SM 3  -LW    Standing up from a chair using your arms (e.g., wheelchair, bedside chair)? 3  -SM 3  -LW    Climbing 3-5 steps with a railing? 2  -SM 2  -LW    To walk in hospital room? 2  -SM 2  -LW    AM-PAC 6 Clicks Score (PT) 18  -SM 18  -LW    Highest Level of Mobility Goal 6 --> Walk 10 steps or more  - 6 --> Walk 10 steps or more  -      Row Name 07/12/24 1010          Functional Assessment    Outcome Measure Options  AM-PAC 6 Clicks Basic Mobility (PT)  -               User Key  (r) = Recorded By, (t) = Taken By, (c) = Cosigned By      Initials Name Provider Type    Joann Aguirre, RN Registered Nurse     Shante Shannon PT Physical Therapist                                 Physical Therapy Education       Title: PT OT SLP Therapies (In Progress)       Topic: Physical Therapy (Done)       Point: Mobility training (Done)       Learning Progress Summary             Patient Acceptance, E, VU,NR by  at 7/12/2024 1010    Acceptance, E, VU,NR by  at 7/11/2024 1526                         Point: Home exercise program (Done)       Learning Progress Summary             Patient Acceptance, E, VU,NR by  at 7/12/2024 1010    Acceptance, E, VU,NR by  at 7/11/2024 1526                         Point: Body mechanics (Done)       Learning Progress Summary             Patient Acceptance, E, VU,NR by  at 7/12/2024 1010    Acceptance, E, VU,NR by  at 7/11/2024 1526                         Point: Precautions (Done)       Learning Progress Summary             Patient Acceptance, E, VU,NR by  at 7/12/2024 1010    Acceptance, E, VU,NR by  at 7/11/2024 1526                                         User Key       Initials Effective Dates Name Provider Type Discipline     05/02/22 -  Shante Shannon PT Physical Therapist PT                  PT Recommendation and Plan     Plan of Care Reviewed With: patient  Outcome Evaluation: Patient seen for PT session this AM. Patient supine in bed upon arrival. Patient continues to endorse dizziness but reports he feels it all the time. Patient completed all bed mobility with SV this date. Patient performed B LE exercises while seated at EOB. Patient performed STS 2x from EOB with CGA. Patient ambulated 3ft from EOB to chair with no AD and CGA. Patient reclined in chair with exit alarm on and all needs met at end of session. Patient would continue to benefit from skilled PT intervention to  address deficits in functional mobility. PT will continue to monitor.     Time Calculation:         PT Charges       Row Name 07/12/24 1010             Time Calculation    Start Time 0923  -SM      Stop Time 0942  -SM      Time Calculation (min) 19 min  -SM      PT Received On 07/12/24  -      PT - Next Appointment 07/15/24  -         Time Calculation- PT    Total Timed Code Minutes- PT 19 minute(s)  -SM         Timed Charges    17130 - PT Therapeutic Exercise Minutes 7  -SM      41961 - PT Therapeutic Activity Minutes 12  -SM         Total Minutes    Timed Charges Total Minutes 19  -SM       Total Minutes 19  -SM                User Key  (r) = Recorded By, (t) = Taken By, (c) = Cosigned By      Initials Name Provider Type     Shante Shannon, JIMMIE Physical Therapist                  Therapy Charges for Today       Code Description Service Date Service Provider Modifiers Qty    87511168312 HC PT THERAPEUTIC ACT EA 15 MIN 7/11/2024 Shante Shannon, PT GP 1    92016313486 HC PT EVAL LOW COMPLEXITY 3 7/11/2024 Shante Shannon, PT GP 1    85088519535 HC PT THERAPEUTIC ACT EA 15 MIN 7/12/2024 Shante Shannon, PT GP 1            PT G-Codes  Outcome Measure Options: AM-PAC 6 Clicks Basic Mobility (PT)  AM-PAC 6 Clicks Score (PT): 18  PT Discharge Summary  Anticipated Discharge Disposition (PT): home with assist, adult foster care/group home    Shante Shannon PT  7/12/2024

## 2024-07-12 NOTE — PROGRESS NOTES
Gastroenterology   Inpatient Progress Note    Reason for Follow Up:  Abdominal pain, dyspepsia     Subjective  Interval History:   No overnight events.  Abdominal pain has resolved.  Tolerating oral intake without issue.    Current Facility-Administered Medications:     acetaminophen (TYLENOL) tablet 650 mg, 650 mg, Oral, Q4H PRN **OR** acetaminophen (TYLENOL) 160 MG/5ML oral solution 650 mg, 650 mg, Oral, Q4H PRN **OR** acetaminophen (TYLENOL) suppository 650 mg, 650 mg, Rectal, Q4H PRN, Leonor Marcelino, APRN    apixaban (ELIQUIS) tablet 5 mg, 5 mg, Oral, Q12H, Teodora Franklin, APRN, 5 mg at 07/12/24 0828    atorvastatin (LIPITOR) tablet 40 mg, 40 mg, Oral, Daily, Teodora Franklin, APRN, 40 mg at 07/12/24 0828    benztropine (COGENTIN) tablet 1 mg, 1 mg, Oral, BID, Teodora Franklin, APRN, 1 mg at 07/12/24 0828    sennosides-docusate (PERICOLACE) 8.6-50 MG per tablet 2 tablet, 2 tablet, Oral, BID PRN **AND** polyethylene glycol (MIRALAX) packet 17 g, 17 g, Oral, Daily PRN **AND** bisacodyl (DULCOLAX) EC tablet 5 mg, 5 mg, Oral, Daily PRN **AND** bisacodyl (DULCOLAX) suppository 10 mg, 10 mg, Rectal, Daily PRN, Leonor Marcelino, APRN    digoxin (LANOXIN) tablet 125 mcg, 125 mcg, Oral, Daily, Teodora Franklin, APRN, 125 mcg at 07/12/24 0828    folic acid (FOLVITE) tablet 1 mg, 1 mg, Oral, Daily, Teodora Franklin, APRN, 1 mg at 07/12/24 0828    levothyroxine (SYNTHROID, LEVOTHROID) tablet 75 mcg, 75 mcg, Oral, Daily, Teodora Franklin, APRN, 75 mcg at 07/12/24 0828    melatonin tablet 10 mg, 10 mg, Oral, Nightly, Teodora Franklin APRN, 10 mg at 07/11/24 2115    metoprolol succinate XL (TOPROL-XL) 24 hr tablet 25 mg, 25 mg, Oral, Q24H, Teodora Franklin APRN, 25 mg at 07/11/24 0844    mirtazapine (REMERON SOL-TAB) disintegrating tablet 15 mg, 15 mg, Oral, Nightly, Teodora Franklin APRN    nitroglycerin (NITROSTAT) SL tablet 0.4 mg, 0.4 mg, Sublingual, Q5 Min PRN, Leonor Marcelino,  PILI    ondansetron ODT (ZOFRAN-ODT) disintegrating tablet 4 mg, 4 mg, Oral, Q6H PRN **OR** ondansetron (ZOFRAN) injection 4 mg, 4 mg, Intravenous, Q6H PRN, Leonor Marcelino APRN    pantoprazole (PROTONIX) EC tablet 40 mg, 40 mg, Oral, BID TAWANDA, Cheyenne Nicole APRN, 40 mg at 07/12/24 0828    polyethylene glycol (MIRALAX) powder 1 bottle, 1 bottle, Oral, Daily, Teodora Franklin APRN    sodium chloride 0.9 % infusion, 100 mL/hr, Intravenous, Continuous, Leonor Marcelino APRN, Last Rate: 100 mL/hr at 07/10/24 2307, 100 mL/hr at 07/10/24 2307    sucralfate (CARAFATE) tablet 1 g, 1 g, Oral, TID Corey NEFF Kelsey, APRN, 1 g at 07/12/24 0828  Review of Systems:               All systems were reviewed and negative except for:  Constitution:  positive for See HPI  Gastrointestinal: positive for  See HPI    Objective     Vital Signs  Temp:  [97.3 °F (36.3 °C)-97.9 °F (36.6 °C)] 97.3 °F (36.3 °C)  Heart Rate:  [] 72  Resp:  [16-18] 18  BP: ()/() 104/59  Body mass index is 20.24 kg/m².                  General Appearance:  awake, alert, oriented, in no acute distress  Abdomen:  Soft, non-tender, normal bowel sounds; no bruits, organomegaly or masses.                Results Review:                I reviewed the patient's new clinical results.    Results from last 7 days   Lab Units 07/12/24  0334 07/11/24  0543 07/10/24  1553   WBC 10*3/mm3 6.31 5.32 7.53   HEMOGLOBIN g/dL 13.0 12.9* 14.1   HEMATOCRIT % 39.8 38.1 41.9   PLATELETS 10*3/mm3 85* 79* 92*     Results from last 7 days   Lab Units 07/12/24  0334 07/11/24  0543 07/10/24  1553 07/09/24  1110   SODIUM mmol/L 138 135* 135* 139   POTASSIUM mmol/L 4.3 4.1 4.5 4.4   CHLORIDE mmol/L 107 100 99 102   CO2 mmol/L 23.3 25.3 25.1 27.0   BUN mg/dL 18 28* 35* 30*   CREATININE mg/dL 0.73* 0.96 1.31* 0.96   CALCIUM mg/dL 8.9 8.8 9.3 9.3   BILIRUBIN mg/dL  --   --  0.4 0.4   ALK PHOS U/L  --   --  71 66   ALT (SGPT) U/L  --   --  9 9   AST (SGOT) U/L  --    --  25 25   GLUCOSE mg/dL 104* 99 93 76         Lab Results   Lab Value Date/Time    LIPASE 35 07/10/2024 1553    LIPASE 29 06/10/2024 2026    LIPASE 18 04/30/2024 1732       Radiology:  MRI Brain Without Contrast   Final Result   There is no evidence of acute infarction.           This report was finalized on 7/11/2024 5:32 PM by Dr. Ronnie Orozco M.D   on Workstation: [id number]          XR Chest 1 View   Final Result          Assessment & Plan     Active Hospital Problems    Diagnosis     **Syncope     Atrial fibrillation, chronic     Dehydration     Chronic anticoagulation     Thrombocytopenia     JOSIAH (acute kidney injury)     Essential hypertension     Hypothyroidism (acquired)     History of CVA (cerebrovascular accident)     GERD without esophagitis     Bipolar disorder     HLD (hyperlipidemia)        Assessment:  Syncope  Dehydration  JOSIAH  GERD   atrial fibrillation-chronic anticoagulation with Eliquis  Bipolar      Plan:  Continue supportive care with IV fluids and antiemetics per hospitalist team  Continue pantoprazole 40 mg twice daily and add carafate 1 g 3 times daily prior to meals   Can ADAT from GI standpoint  Overall patient is doing well from a GI standpoint.  Recommend outpatient follow-up in 4 to 6 weeks with [facility] to determine timing of future EGD evaluation.  Scheduling staff at [facility] notified with plans to contact patient's guardian Carla to facilitate scheduling outpatient appointment.    GI will sign off for now.  As always, thank you for allowing us to participate in the care of your patient.  If we can be of further assistance please not hesitate to reach out to us.      I discussed the patients findings and my recommendations with patient, nursing staff, and Dr. Suggs .          PILI Cifuentes  [facility] [address] [address]

## 2024-07-12 NOTE — PROGRESS NOTES
Discharge Planning Assessment  [facility]     Patient Name: Helder Abbott  MRN: [id number]  Today's Date: 7/12/2024    Admit Date: 7/10/2024    Plan: From a group home for person's that have intellectual difficulities ( waiting on legal guardian to make final discharge plans)   Discharge Needs Assessment    No documentation.                  Discharge Plan       Row Name 07/12/24 0909       Plan    Plan From a group home for person's that have intellectual difficulities ( waiting on legal guardian to make final discharge plans)    Plan Comments Spoke with legal guardian Carla Velasco (work phone [phone]) to discuss discharge plans. Legal Guardian stated she is planning a meeting with his  and caregiver at his group home, to discuss if he is too medically fragile to return to the group home setting. He may need a long term Medicaid bed. Legal Guardian stated she will call [facility] back to discuss discharge plans after her meeting. Rachael VILLARREAL                  Continued Care and Services - Admitted Since 7/10/2024    No active coordination exists for this encounter.          Demographic Summary    No documentation.                  Functional Status    No documentation.                  Psychosocial    No documentation.                  Abuse/Neglect    No documentation.                  Legal    No documentation.                  Substance Abuse    No documentation.                  Patient Forms    No documentation.                     Lisette Bustos, RN

## 2024-07-12 NOTE — NURSING NOTE
Bladder scanned pt @ 0500, pt had 755ml in bladder. Per nursing protocol, went ahead and did straight cath. 600ml obtained via straight cath with 149ml still in bladder after bladder scan. Cleaned pt and made sure he was dry, handed him his call light, turned bed alarm back on.

## 2024-07-12 NOTE — PLAN OF CARE
Problem: Adult Inpatient Plan of Care  Goal: Plan of Care Review  Outcome: Met  Flowsheets  Taken 7/12/2024 1838 by Joann Morataya RN  Progress: improving  Taken 7/12/2024 1008 by Shante Shannon PT  Plan of Care Reviewed With: patient  Goal: Patient-Specific Goal (Individualized)  Outcome: Met  Goal: Absence of Hospital-Acquired Illness or Injury  Outcome: Met  Intervention: Identify and Manage Fall Risk  Recent Flowsheet Documentation  Taken 7/12/2024 1400 by Joann Morataya RN  Safety Promotion/Fall Prevention: safety round/check completed  Taken 7/12/2024 1010 by Joann Morataya RN  Safety Promotion/Fall Prevention: safety round/check completed  Taken 7/12/2024 0825 by Joann Morataya RN  Safety Promotion/Fall Prevention: safety round/check completed  Intervention: Prevent Skin Injury  Recent Flowsheet Documentation  Taken 7/12/2024 1400 by Joann Morataya RN  Body Position: position changed independently  Taken 7/12/2024 1010 by Joann Morataya RN  Body Position: position changed independently  Taken 7/12/2024 0825 by Joann Morataya RN  Body Position: position changed independently  Intervention: Prevent and Manage VTE (Venous Thromboembolism) Risk  Recent Flowsheet Documentation  Taken 7/12/2024 1400 by Joann Morataya RN  Activity Management: patient refuses activity  Taken 7/12/2024 1010 by Joann Morataya RN  Activity Management: activity encouraged  Taken 7/12/2024 0825 by Joann Morataya RN  Activity Management: activity encouraged  Goal: Optimal Comfort and Wellbeing  Outcome: Met  Goal: Readiness for Transition of Care  Outcome: Met     Problem: Fall Injury Risk  Goal: Absence of Fall and Fall-Related Injury  Outcome: Met  Intervention: Promote Injury-Free Environment  Recent Flowsheet Documentation  Taken 7/12/2024 1400 by Joann Morataya RN  Safety Promotion/Fall Prevention: safety round/check completed  Taken 7/12/2024 1010 by Joann Morataya RN  Safety Promotion/Fall Prevention: safety round/check completed  Taken  7/12/2024 0825 by Joann Morataya, RN  Safety Promotion/Fall Prevention: safety round/check completed     Problem: Skin Injury Risk Increased  Goal: Skin Health and Integrity  Outcome: Met  Intervention: Optimize Skin Protection  Recent Flowsheet Documentation  Taken 7/12/2024 1400 by Joann Morataya, RN  Head of Bed (HOB) Positioning: HOB elevated  Taken 7/12/2024 1010 by Joann Morataya, RN  Head of Bed (HOB) Positioning: HOB elevated  Taken 7/12/2024 0825 by Joann Morataya, RN  Head of Bed (HOB) Positioning: HOB elevated   Goal Outcome Evaluation:           Progress: improving

## 2024-07-12 NOTE — PROGRESS NOTES
Discharge Planning Assessment  [facility]     Patient Name: Helder Abbott  MRN: [id number]  Today's Date: 7/12/2024    Admit Date: 7/10/2024    Plan: [facility] has accepted for skilled rehab; [facility] arranged for 2200 tonight 7/12   Discharge Needs Assessment    No documentation.                  Discharge Plan       Row Name 07/12/24 1524       Plan    Plan [facility] has accepted for skilled rehab; [facility] arranged for 2200 tonight 7/12    Plan Comments Spoke with Legal Guardian Carla Rod [phone] she stated patient will need skilled rehab at discharge before he can return to his group home. Legal Guardian  gave CCP permission to make referrals for skilled rehab and placement at [facility].  Call placed to [facility]  [phone], [facility] has accepted for skilled rehab and has a bed available.  Transportation arranged for 2200 tonight 7/12/2024 with [facility]. Packet in eulalia. Rachael VILLARREAL                  Continued Care and Services - Admitted Since 7/10/2024       Destination Coordination complete.      Service Provider Request Status Selected Services Address Phone Fax Patient Preferred    [facility]  Selected Skilled Nursing [address] [phone] --    [facility] Accepted N/A [address] [id number] [phone] --    [facility] Accepted N/A [address] [facility] [address] [phone] --    [facility] Considering  Requires onsite review N/A [address] [facility] [address] [phone] --    [facility] Pending - Request Sent N/A [address] [facility] [phone] --    [facility] Pending - Request Sent N/A [address] [facility] [id number] [phone] --    [facility] Pending  - Request Sent N/A [address] [facility] [address] [phone] --                     Demographic Summary    No documentation.                  Functional Status    No documentation.                  Psychosocial    No documentation.                  Abuse/Neglect    No documentation.                  Legal    No documentation.                  Substance Abuse    No documentation.                  Patient Forms    No documentation.                     Lisette Bustos RN

## 2024-07-12 NOTE — DISCHARGE PLACEMENT REQUEST
"Helder Abbott (49 y.o. Male)       Date of Birth   1975    Social Security Number       Address   [address] [facility] [address]    Home Phone       MRN   [id number]       Jainism   Roman Catholic    Marital Status   Single                            Admission Date   7/10/24    Admission Type   Emergency    Admitting Provider   Jimbo Pool MD    Attending Provider   Vinny Suggs MD    Department, Room/Bed   [address], S515/1       Discharge Date       Discharge Disposition       Discharge Destination                                 Attending Provider: Vinny Suggs MD    Allergies: Clonazepam, Fluoxetine, Grass, Grass Pollen([id number]-o-[id number]-t-swt [place]), Hydroxyzine, Methylphenidate, Quetiapine, Risperidone    Isolation: None   Infection: None   Code Status: CPR    Ht: 182.9 cm (72\")   Wt: 67.7 kg (149 lb 4 oz)    Admission Cmt: None   Principal Problem: Syncope [R55]                   Active Insurance as of 7/10/2024       Primary Coverage       Payor Plan Insurance Group Employer/Plan Group    MEDICARE MEDICARE A & B        Payor Plan Address Payor Plan Phone Number Payor Plan Fax Number Effective Dates    [address] [phone]  2/1/1995 - None Entered    [facility] [id number]         Subscriber Name Subscriber Birth Date Member ID       HELDER ABBOTT 1975 [id number]               Secondary Coverage       Payor Plan Insurance Group Employer/Plan Group    [place]        Payor Plan Address Payor Plan Phone Number Payor Plan Fax Number Effective Dates    [address] [phone]  9/14/2022 - None Entered    [place] [address]         Subscriber Name Subscriber Birth Date Member ID       HELDER ABBOTT 1975 [id number]                     Emergency Contacts        (Rel.) Home Phone Work Phone Mobile Phone    Carla Velasco (Legal Guardian) -- [phone]    Rober Kaba -- [phone] --    " Karan Kim (Care Giver) -- -- [phone]

## 2024-07-12 NOTE — PROGRESS NOTES
"    Patient Name: Helder Abbott  :1975  49 y.o.      Patient Care Team:  José Garcia APRN as PCP - General (Family Medicine)  Gian Marquez MD as Consulting Physician (Hematology and Oncology)  José Garcia APRN as Referring Physician (Family Medicine)  Phoenix Snata MD as Consulting Physician (Hematology and Oncology)    Chief Complaint: follow up syncope    Interval History: resting in bed. No CP or SOA. Lying flat. Feels heart racing all the time.      Orthostatics attempted with PT yesterday : Patient with BP of 90/53 in supine. BP 77/67 while seated at EOB . Had erroneous reading while standing then unable to keep standing for another BP.  Retaining urine requiring straight cath.     He has been intermittently hypotensive. Metoprolol held this morning due to low BP.     Objective   Vital Signs  Temp:  [97.3 °F (36.3 °C)-97.9 °F (36.6 °C)] 97.3 °F (36.3 °C)  Heart Rate:  [57-78] 57  Resp:  [16-18] 18  BP: ()/() 87/65    Intake/Output Summary (Last 24 hours) at 2024 1108  Last data filed at 2024 0825  Gross per 24 hour   Intake 360 ml   Output 949 ml   Net -589 ml     Flowsheet Rows      Flowsheet Row First Filed Value   Admission Height 182.9 cm (72\") Documented at 07/10/2024 1554   Admission Weight 49.9 kg (110 lb) Documented at 07/10/2024 1554            Physical Exam:   General Appearance:    Alert, cooperative, in no acute distress   Lungs:     Clear to auscultation.  Normal respiratory effort and rate.      Heart:    Regular rhythm and normal rate, normal S1 and S2, no murmurs, gallops or rubs.     Chest Wall:    No abnormalities observed   Abdomen:     Soft, nontender, positive bowel sounds.     Extremities:   no cyanosis, clubbing or edema.  No marked joint deformities.  Adequate musculoskeletal strength.       Results Review:    Results from last 7 days   Lab Units 24  0334   SODIUM mmol/L 138   POTASSIUM mmol/L 4.3   CHLORIDE mmol/L 107   CO2 mmol/L " 23.3   BUN mg/dL 18   CREATININE mg/dL 0.73*   GLUCOSE mg/dL 104*   CALCIUM mg/dL 8.9     Results from last 7 days   Lab Units 07/10/24  1801 07/10/24  1553 07/09/24  1110   HSTROP T ng/L 28* 28* 23*     Results from last 7 days   Lab Units 07/12/24  0334   WBC 10*3/mm3 6.31   HEMOGLOBIN g/dL 13.0   HEMATOCRIT % 39.8   PLATELETS 10*3/mm3 85*         Results from last 7 days   Lab Units 07/12/24  0334   MAGNESIUM mg/dL 1.6                   Medication Review:   apixaban, 5 mg, Oral, Q12H  atorvastatin, 40 mg, Oral, Daily  benztropine, 1 mg, Oral, BID  digoxin, 125 mcg, Oral, Daily  folic acid, 1 mg, Oral, Daily  levothyroxine, 75 mcg, Oral, Daily  melatonin, 10 mg, Oral, Nightly  metoprolol succinate XL, 25 mg, Oral, Q24H  mirtazapine, 15 mg, Oral, Nightly  pantoprazole, 40 mg, Oral, BID AC  polyethylene glycol, 1 bottle, Oral, Daily  sucralfate, 1 g, Oral, TID AC         sodium chloride, 100 mL/hr, Last Rate: 100 mL/hr (07/10/24 2307)        Assessment & Plan   Syncope and collapse , witnessed by home health. Suspected orthostatic/hypotensive. Has had continued trouble with hypotension here. Unable to perform full orthostatic vitals but SBP 70's with sitting.  He is on IVF.   Permanent atrial fibrillation, rate controlled on digoxin . Beta blocker held for hypotension. Will transition to low dose short acting to see if he tolerates this a little better. Anticoagulated with apixaban.   Renal insufficiency, appears to be secondary to dehydration and has improved with IVF resuscitation.   Bipolar disorder  History of stroke  [facility]     IVF per primary team. Adjust beta blocker to see if he will tolerate it. Hold parameters in place.     PILI Obregon  [facility]  07/12/24  11:08 EDT

## 2024-07-12 NOTE — PROGRESS NOTES
"DOS: 2024  NAME: Helder Abbott   : 1975  PCP: José Garcia APRN  Chief Complaint   Patient presents with    Abdominal Pain       Chief complaint: syncope  Subjective: Pt eating lunch.  Orthostatics attempted this morn but could not stand long enough to perform.  PT feels shaky.  Says he is 'getting old'    Objective:  Vital signs: /59 (BP Location: Right arm, Patient Position: Lying)   Pulse 72   Temp 97.3 °F (36.3 °C) (Oral)   Resp 18   Ht 182.9 cm (72\")   Wt 67.7 kg (149 lb 4 oz)   SpO2 98%   BMI 20.24 kg/m²      Gen: NAD, vitals reviewed  MS: alert, communicative, tangential speech, no aphasia, fair attention/concentration, language intact, no neglect.  CN: visual acuity grossly normal, PERRL, EOMI, no facial droop, no dysarthria  Motor: tremulousness, intensifies with action, paratonia throughout, good symmetric power  Sensory: intact to light touch all 4 ext.  Reflexes: intact, no clonus, no hoffmans    ROS:  No weakness, numbness  No fevers, chills      Laboratory results:  Lab Results   Component Value Date    GLUCOSE 104 (H) 2024    CALCIUM 8.9 2024     2024    K 4.3 2024    CO2 23.3 2024     2024    BUN 18 2024    CREATININE 0.73 (L) 2024    BCR 24.7 2024    ANIONGAP 7.7 2024     Lab Results   Component Value Date    WBC 6.31 2024    HGB 13.0 2024    HCT 39.8 2024    MCV 89.0 2024    PLT 85 (L) 2024     No results found for: \"LDL\"         Review of labs:     Review and interpretation of imaging: MRI brain w/o is negative for acute finding    Workup to date:    Diagnoses:  Syncope  Biopolar  Developmental delay    Impression: 50 yo M with h/o bipolar d/o on psychotropics, afib on eliquis, hypothyroidism, questionable h/o stroke, recent admission with AMS and another in May with partial bowel obstruction and impaction.  Neurology is asked to consult r/t syncope at doctor's " office.  There was note of unresponsiveness for about 10s.  He feels shaky and unsteady.  There is documented hypotension.  His exam is notable for extrapyramidal features.  MRI was obtained.  Overall feel symptoms are medication related.  He is on multiple BP meds, eliquis, and CNS acting meds, zyprexa, VPA, benztropine, mirtazapine and abilify, and documentation of baclofen and nightly haldol.  It appears he was in a new group home in May.  BP is elevated this morn, upto 190s systolic and no drop with standing on my measure.  This morn it was 87/65 and bp meds held.      Would consider psych c/s incase can optimize his psychotropics, but no further inpt neuro recs.       Thank you for this consultation.  Discussed above plan with neuro attending, Dr. Elmore who agrees with above plan.  Neurology team is available for concerns or questions.

## 2024-07-12 NOTE — DISCHARGE SUMMARY
NAME: Helder Abbott ADMIT: 7/10/2024   : 1975  PCP: José Garcia APRN    MRN: [id number] LOS: 1 days   AGE/SEX: 49 y.o. male  ROOM: [facility]     Date of Admission:  7/10/2024  Date of Discharge:  2024    PCP: José Garcia APRN    CHIEF COMPLAINT  Abdominal Pain      DISCHARGE DIAGNOSIS  Active Hospital Problems    Diagnosis  POA    **Syncope [R55]  Yes    Atrial fibrillation, chronic [I48.20]  Yes    Dehydration [E86.0]  Yes    Chronic anticoagulation [Z79.01]  Not Applicable    Thrombocytopenia [D69.6]  Yes    Essential hypertension [I10]  Yes    Hypothyroidism (acquired) [E03.9]  Yes    History of CVA (cerebrovascular accident) [Z86.73]  Not Applicable    GERD without esophagitis [K21.9]  Yes    Bipolar disorder [F31.9]  Yes    HLD (hyperlipidemia) [E78.5]  Yes      Resolved Hospital Problems    Diagnosis Date Resolved POA    JOSIAH (acute kidney injury) [N17.9] 2024 Yes       SECONDARY DIAGNOSES  Past Medical History:   Diagnosis Date    Benign essential hypertension     Bipolar disorder     Dyslipidemia     GERD (gastroesophageal reflux disease)     Heart disease     History of stroke     Hypothyroidism        CONSULTS   Cardiology  Neurology  GI    HOSPITAL COURSE  Patient is a 49 y.o. male with history of Afib, hypothyroid, bipolar    He was seen in PCP office and had episode of presyncope. JOSIAH on admit and was reportedly complaining of ABD pain too for which PCP was going to refer to GI until he passed out. Cards not planning further w/u- though his metoprolol was decreased and with hold parementers as he likely has element of chronic Orthostasis. Neuro ordered MRI which was normal. GI rec PPI/carafate and OP EGD.         DIAGNOSTICS           2024 0334 2024 0419 Basic Metabolic Panel [[id number]]    (Abnormal)   Blood    Final result Component Value Units   Glucose 104 High  mg/dL   BUN 18 mg/dL   Creatinine 0.73 Low  mg/dL   Sodium 138 mmol/L   Potassium 4.3  mmol/L   Chloride 107 mmol/L   CO2 23.3 mmol/L   Calcium 8.9 mg/dL   BUN/Creatinine Ratio 24.7    Anion Gap 7.7 mmol/L   eGFR 111.5 mL/min/1.73          07/12/2024 0334 07/12/2024 0417 CBC (No Diff) [[id number]]   (Abnormal)   Blood    Final result Component Value Units   WBC 6.31 10*3/mm3   RBC 4.47 10*6/mm3   Hemoglobin 13.0 g/dL   Hematocrit 39.8 %   MCV 89.0 fL   MCH 29.1 pg   MCHC 32.7 g/dL   RDW 13.6 %   RDW-SD 44.5 fl   MPV 11.6 fL   Platelets 85 Low  10*3/mm3          07/12/2024 0334 07/12/2024 0419 Magnesium [[id number]]   Blood    Final result Component Value Units   Magnesium 1.6 mg/dL          07/12/2024 0334 07/12/2024 0418 Phosphorus [[id number]]   Blood    Final result Component Value Units   Phosphorus 2.5 mg/dL          07/11/2024 1214 07/11/2024 1318 TSH [[id number]]   (Abnormal)   Blood    Final result Component Value Units   TSH 4.930 High  uIU/mL          07/11/2024 1214 07/11/2024 1318 Vitamin B12 [[id number]]    Blood    Final result Component Value Units   Vitamin B-12 847 pg/mL          07/11/2024 1214 07/11/2024 1318 Folate [[id number]]    Blood    Final result Component Value Units   Folate >20.00 ng/mL                  MRI Brain Without Contrast [[id number]] Caio as Reviewed   Order Status: Completed Collected: 07/11/24 1725    Updated: 07/11/24 1735   Narrative:     MRI EXAMINATION OF THE BRAIN WITHOUT CONTRAST     HISTORY: Dysarthria.     COMPARISON: CT head 7/9/2024.     FINDINGS:  There is no evidence of restricted diffusion diffusion to  suggest acute infarction. There is expected flow void in the basilar  artery and in the distal aspect of the internal carotid arteries  bilaterally on axial T2 sequence. Minimal small vessel ischemic disease  is noted. There is no evidence of mass or mass effect on this  noncontrasted MRI examination of the brain.      Impression:     There is no evidence of acute infarction.        This report was finalized on 7/11/2024 5:32 PM by Dr. Ronnie Orozco,  M.D  on Workstation: [id number]       XR Chest 1 View [[id number]] Caio as Reviewed   Order Status: Completed Collected: 07/10/24 1619    Updated: 07/10/24 1623   Narrative:     PORTABLE CHEST     HISTORY: Syncope.     COMPARISON: Chest x-ray 7/9/2024.     FINDINGS: A single portable view of the chest demonstrates the heart to  be within normal limits in size. There is no evidence of infiltrate,  effusion or of congestive failure.     This report was finalized on 7/10/2024 4:19 PM by Dr. Ronnie Orozco M.D  on Workstation: [id number]       CT Head Without Contrast [[id number]] Caio as Reviewed   Order Status: Completed Collected: 07/09/24 1352    Updated: 07/09/24 1419   Narrative:     CT HEAD WITHOUT CONTRAST     CLINICAL HISTORY: Fall. Confusion. No head trauma.     TECHNIQUE: CT scan of the head was obtained with 3 mm axial soft tissue  algorithm images. No intravenous contrast was administered. Sagittal and  coronal reconstructions were obtained.     COMPARISON: CT head dated 04/30/2024.     FINDINGS:       There is prominence of the size of the ventricles, sulci, and cisterns  for a patient of this age compatible with moderate diffuse parenchymal  atrophic change. Otherwise, the gray-white matter differentiation is  within normal limits. The basal ganglia and thalami are unremarkable.  The posterior fossa structures are within normal limits.      Impression:        Moderate diffuse parenchymal atrophy without evidence for acute  intracranial pathology. The etiology of the patient's acute confusion is  not further elucidated on this examination. If further radiographic  assessment is warranted, one could obtain an MRI of the brain for  follow-up.        Radiation dose reduction techniques were utilized, including automated  exposure control and exposure modulation based on body size.     This report was finalized on 7/9/2024 2:16 PM by Dr. Jorge L Gomez M.D  on Workstation: [id number]       XR Chest 1 View  "[[id number]] Caio as Reviewed   Order Status: Completed Collected: 07/09/24 1152    Updated: 07/09/24 1155   Narrative:     XR CHEST 1 VW-7/9/2024     HISTORY: Weakness, dizziness.     Heart size is within normal limits. Lungs appear free of acute  infiltrates. Bones and soft tissues are unremarkable.      Impression:     1. No acute process.          PHYSICAL EXAM  Objective:  Vital signs: (most recent): Blood pressure 115/78, pulse 66, temperature 97.5 °F (36.4 °C), temperature source Oral, resp. rate 18, height 182.9 cm (72\"), weight 67.7 kg (149 lb 4 oz), SpO2 91%.                Alert  No resp distress  Chronically ill  Some impaired cognition    CONDITION ON DISCHARGE  Stable.      DISCHARGE DISPOSITION   Skilled Nursing Facility (DC - External)      DISCHARGE MEDICATIONS       Your medication list        START taking these medications        Instructions Last Dose Given Next Dose Due   metoprolol tartrate 25 MG tablet  Commonly known as: LOPRESSOR      Take 0.5 tablets by mouth Every 12 (Twelve) Hours. Hold SBP <95       sucralfate 1 g tablet  Commonly known as: CARAFATE      Take 1 tablet by mouth 3 (Three) Times a Day Before Meals.              CONTINUE taking these medications        Instructions Last Dose Given Next Dose Due   Acetaminophen Extra Strength 500 MG tablet      Take 1 tablet by mouth 4 (Four) Times a Day As Needed.       atorvastatin 40 MG tablet  Commonly known as: LIPITOR           benztropine 0.5 MG tablet  Commonly known as: COGENTIN      Take 2 tablets by mouth 2 (Two) Times a Day.       digoxin 125 MCG tablet  Commonly known as: LANOXIN           divalproex 500 MG DR tablet  Commonly known as: DEPAKOTE           Eliquis 5 MG tablet tablet  Generic drug: apixaban      Take 1 tablet by mouth Every 12 (Twelve) Hours.       folic acid 1 MG tablet  Commonly known as: FOLVITE      Take 1 tablet by mouth Daily for 30 days.       levothyroxine 50 MCG tablet  Commonly known as: SYNTHROID, " LEVOTHROID      Take 1.5 tablets by mouth Daily.       melatonin 5 MG tablet tablet      Take 2 tablets by mouth Every Night.       mirtazapine 15 MG disintegrating tablet  Commonly known as: REMERON SOL-TAB      Place 1 tablet on the tongue Every Night.       OLANZapine zydis 5 MG disintegrating tablet  Commonly known as: zyPREXA           pantoprazole 40 MG EC tablet  Commonly known as: PROTONIX      Take 1 tablet by mouth 2 (Two) Times a Day Before Meals for 30 days.       polyethylene glycol 17 GM/SCOOP powder  Commonly known as: MIRALAX      Dissolve 17 g (1 capful) in liquid and drink by mouth Daily.              STOP taking these medications      Abilify Maintena 400 MG Suspension Reconstituted ER IM injection ER  Generic drug: ARIPiprazole ER        Diclofenac Sodium 1 % gel gel  Commonly known as: VOLTAREN        ibuprofen 600 MG tablet  Commonly known as: ADVIL,MOTRIN        metoprolol succinate XL 25 MG 24 hr tablet  Commonly known as: TOPROL-XL        mupirocin 2 % cream  Commonly known as: BACTROBAN        Oyster Shell Calcium/D 250-3.125 MG-MCG tablet                  Where to Get Your Medications        Information about where to get these medications is not yet available    Ask your nurse or doctor about these medications  metoprolol tartrate 25 MG tablet  sucralfate 1 g tablet        No future appointments.  Additional Instructions for the Follow-ups that You Need to Schedule       Discharge Follow-up with Specialty: GI as outpatient for EGD   As directed      Specialty: GI as outpatient for EGD        Discharge Follow-up with Specialty: Rehab physician this week, PCP in 1-2 weeks, Cardiology in 2-4 weeks   As directed      Specialty: Rehab physician this week, PCP in 1-2 weeks, Cardiology in 2-4 weeks               Contact information for follow-up providers       José Garcia APRN .    Specialty: Family Medicine  Contact information:  [address] [facility] [address] [phone]                        Contact information for after-discharge care       Destination       [facility] .    Service: Skilled Nursing  Contact information:  [address] [facility] [address] [phone]                                   TEST  RESULTS PENDING AT DISCHARGE         Vinny Suggs MD  [facility]  07/12/24  16:18 EDT      Time: greater than 32 minutes on discharge  It was a pleasure taking care of this patient while in the hospital.

## 2024-07-14 NOTE — CASE MANAGEMENT/SOCIAL WORK
Case Management Discharge Note      Final Note: dc to SNF         Selected Continued Care - Discharged on 7/12/2024 Admission date: 7/10/2024 - Discharge disposition: Skilled Nursing Facility (DC - External)      Destination Coordination complete.      Service Provider Selected Services Address Phone Fax Patient Preferred    [facility] [address] [facility] [address] [phone] --              Durable Medical Equipment    No services have been selected for the patient.                Dialysis/Infusion    No services have been selected for the patient.                Home Medical Care    No services have been selected for the patient.                Therapy    No services have been selected for the patient.                Community Resources    No services have been selected for the patient.                Community & DME    No services have been selected for the patient.                         Final Discharge Disposition Code: 03 - skilled nursing facility (SNF)

## 2025-03-11 ENCOUNTER — HOSPITAL ENCOUNTER (OUTPATIENT)
Facility: HOSPITAL | Age: 50
Setting detail: OBSERVATION
Discharge: HOME | End: 2025-03-13
Attending: STUDENT IN AN ORGANIZED HEALTH CARE EDUCATION/TRAINING PROGRAM | Admitting: INTERNAL MEDICINE
Payer: MEDICARE

## 2025-03-11 ENCOUNTER — APPOINTMENT (OUTPATIENT)
Dept: CT IMAGING | Facility: HOSPITAL | Age: 50
End: 2025-03-11
Payer: MEDICARE

## 2025-03-11 DIAGNOSIS — E87.1 HYPONATREMIA: ICD-10-CM

## 2025-03-11 DIAGNOSIS — R56.9 SEIZURE-LIKE ACTIVITY: Primary | ICD-10-CM

## 2025-03-11 LAB
ALBUMIN SERPL-MCNC: 3.9 G/DL (ref 3.5–5.2)
ALBUMIN/GLOB SERPL: 1.4 G/DL
ALP SERPL-CCNC: 86 U/L (ref 39–117)
ALT SERPL W P-5'-P-CCNC: 10 U/L (ref 1–41)
ANION GAP SERPL CALCULATED.3IONS-SCNC: 10.3 MMOL/L (ref 5–15)
AST SERPL-CCNC: 30 U/L (ref 1–40)
BASOPHILS # BLD AUTO: 0.03 10*3/MM3 (ref 0–0.2)
BASOPHILS NFR BLD AUTO: 0.4 % (ref 0–1.5)
BILIRUB SERPL-MCNC: 0.7 MG/DL (ref 0–1.2)
BUN SERPL-MCNC: 12 MG/DL (ref 6–20)
BUN/CREAT SERPL: 14.6 (ref 7–25)
CALCIUM SPEC-SCNC: 9.1 MG/DL (ref 8.6–10.5)
CHLORIDE SERPL-SCNC: 94 MMOL/L (ref 98–107)
CK SERPL-CCNC: 219 U/L (ref 20–200)
CO2 SERPL-SCNC: 23.7 MMOL/L (ref 22–29)
CREAT SERPL-MCNC: 0.82 MG/DL (ref 0.76–1.27)
D-LACTATE SERPL-SCNC: 1.2 MMOL/L (ref 0.5–2)
DEPRECATED RDW RBC AUTO: 55 FL (ref 37–54)
DIGOXIN SERPL-MCNC: 0.6 NG/ML (ref 0.6–1.2)
EGFRCR SERPLBLD CKD-EPI 2021: 107 ML/MIN/1.73
EOSINOPHIL # BLD AUTO: 0.03 10*3/MM3 (ref 0–0.4)
EOSINOPHIL NFR BLD AUTO: 0.4 % (ref 0.3–6.2)
ERYTHROCYTE [DISTWIDTH] IN BLOOD BY AUTOMATED COUNT: 17.1 % (ref 12.3–15.4)
GLOBULIN UR ELPH-MCNC: 2.7 GM/DL
GLUCOSE SERPL-MCNC: 85 MG/DL (ref 65–99)
HCT VFR BLD AUTO: 45.5 % (ref 37.5–51)
HGB BLD-MCNC: 15.4 G/DL (ref 13–17.7)
HOLD SPECIMEN: NORMAL
HOLD SPECIMEN: NORMAL
IMM GRANULOCYTES # BLD AUTO: 0.07 10*3/MM3 (ref 0–0.05)
IMM GRANULOCYTES NFR BLD AUTO: 0.9 % (ref 0–0.5)
LYMPHOCYTES # BLD AUTO: 1.82 10*3/MM3 (ref 0.7–3.1)
LYMPHOCYTES NFR BLD AUTO: 22.7 % (ref 19.6–45.3)
MCH RBC QN AUTO: 29.8 PG (ref 26.6–33)
MCHC RBC AUTO-ENTMCNC: 33.8 G/DL (ref 31.5–35.7)
MCV RBC AUTO: 88.2 FL (ref 79–97)
MONOCYTES # BLD AUTO: 0.6 10*3/MM3 (ref 0.1–0.9)
MONOCYTES NFR BLD AUTO: 7.5 % (ref 5–12)
NEUTROPHILS NFR BLD AUTO: 5.47 10*3/MM3 (ref 1.7–7)
NEUTROPHILS NFR BLD AUTO: 68.1 % (ref 42.7–76)
PLATELET # BLD AUTO: 117 10*3/MM3 (ref 140–450)
PMV BLD AUTO: 9.6 FL (ref 6–12)
POTASSIUM SERPL-SCNC: 4.3 MMOL/L (ref 3.5–5.2)
PROT SERPL-MCNC: 6.6 G/DL (ref 6–8.5)
RBC # BLD AUTO: 5.16 10*6/MM3 (ref 4.14–5.8)
SODIUM SERPL-SCNC: 128 MMOL/L (ref 136–145)
VALPROATE SERPL-MCNC: 85 MCG/ML (ref 50–125)
WBC NRBC COR # BLD AUTO: 8.02 10*3/MM3 (ref 3.4–10.8)
WHOLE BLOOD HOLD COAG: NORMAL
WHOLE BLOOD HOLD SPECIMEN: NORMAL

## 2025-03-11 PROCEDURE — 70450 CT HEAD/BRAIN W/O DYE: CPT

## 2025-03-11 PROCEDURE — 83605 ASSAY OF LACTIC ACID: CPT | Performed by: STUDENT IN AN ORGANIZED HEALTH CARE EDUCATION/TRAINING PROGRAM

## 2025-03-11 PROCEDURE — 82550 ASSAY OF CK (CPK): CPT | Performed by: STUDENT IN AN ORGANIZED HEALTH CARE EDUCATION/TRAINING PROGRAM

## 2025-03-11 PROCEDURE — 80162 ASSAY OF DIGOXIN TOTAL: CPT | Performed by: STUDENT IN AN ORGANIZED HEALTH CARE EDUCATION/TRAINING PROGRAM

## 2025-03-11 PROCEDURE — G0378 HOSPITAL OBSERVATION PER HR: HCPCS

## 2025-03-11 PROCEDURE — 85025 COMPLETE CBC W/AUTO DIFF WBC: CPT | Performed by: STUDENT IN AN ORGANIZED HEALTH CARE EDUCATION/TRAINING PROGRAM

## 2025-03-11 PROCEDURE — 80164 ASSAY DIPROPYLACETIC ACD TOT: CPT | Performed by: STUDENT IN AN ORGANIZED HEALTH CARE EDUCATION/TRAINING PROGRAM

## 2025-03-11 PROCEDURE — 99285 EMERGENCY DEPT VISIT HI MDM: CPT

## 2025-03-11 PROCEDURE — 51798 US URINE CAPACITY MEASURE: CPT

## 2025-03-11 PROCEDURE — 80053 COMPREHEN METABOLIC PANEL: CPT | Performed by: STUDENT IN AN ORGANIZED HEALTH CARE EDUCATION/TRAINING PROGRAM

## 2025-03-11 RX ORDER — DIVALPROEX SODIUM 500 MG/1
500 TABLET, DELAYED RELEASE ORAL EVERY 12 HOURS SCHEDULED
Status: DISCONTINUED | OUTPATIENT
Start: 2025-03-11 | End: 2025-03-12

## 2025-03-11 RX ORDER — BENZTROPINE MESYLATE 1 MG/1
1 TABLET ORAL 2 TIMES DAILY
Status: DISCONTINUED | OUTPATIENT
Start: 2025-03-11 | End: 2025-03-13 | Stop reason: HOSPADM

## 2025-03-11 RX ORDER — POLYETHYLENE GLYCOL 3350 17 G/17G
17 POWDER, FOR SOLUTION ORAL DAILY PRN
Status: DISCONTINUED | OUTPATIENT
Start: 2025-03-11 | End: 2025-03-13 | Stop reason: HOSPADM

## 2025-03-11 RX ORDER — ATORVASTATIN CALCIUM 20 MG/1
40 TABLET, FILM COATED ORAL DAILY
Status: DISCONTINUED | OUTPATIENT
Start: 2025-03-12 | End: 2025-03-13 | Stop reason: HOSPADM

## 2025-03-11 RX ORDER — MIRTAZAPINE 15 MG/1
15 TABLET, ORALLY DISINTEGRATING ORAL NIGHTLY
Status: DISCONTINUED | OUTPATIENT
Start: 2025-03-11 | End: 2025-03-12

## 2025-03-11 RX ORDER — BISACODYL 10 MG
10 SUPPOSITORY, RECTAL RECTAL DAILY PRN
Status: DISCONTINUED | OUTPATIENT
Start: 2025-03-11 | End: 2025-03-13 | Stop reason: HOSPADM

## 2025-03-11 RX ORDER — AMOXICILLIN 250 MG
2 CAPSULE ORAL 2 TIMES DAILY PRN
Status: DISCONTINUED | OUTPATIENT
Start: 2025-03-11 | End: 2025-03-13 | Stop reason: HOSPADM

## 2025-03-11 RX ORDER — ACETAMINOPHEN 325 MG/1
650 TABLET ORAL EVERY 4 HOURS PRN
Status: DISCONTINUED | OUTPATIENT
Start: 2025-03-11 | End: 2025-03-13 | Stop reason: HOSPADM

## 2025-03-11 RX ORDER — ONDANSETRON 4 MG/1
4 TABLET, ORALLY DISINTEGRATING ORAL EVERY 6 HOURS PRN
Status: DISCONTINUED | OUTPATIENT
Start: 2025-03-11 | End: 2025-03-13 | Stop reason: HOSPADM

## 2025-03-11 RX ORDER — DIGOXIN 125 MCG
125 TABLET ORAL DAILY
Status: DISCONTINUED | OUTPATIENT
Start: 2025-03-12 | End: 2025-03-13 | Stop reason: HOSPADM

## 2025-03-11 RX ORDER — METOPROLOL TARTRATE 25 MG/1
12.5 TABLET, FILM COATED ORAL EVERY 12 HOURS SCHEDULED
Status: DISCONTINUED | OUTPATIENT
Start: 2025-03-11 | End: 2025-03-12

## 2025-03-11 RX ORDER — LEVOTHYROXINE SODIUM 75 UG/1
75 TABLET ORAL DAILY
Status: DISCONTINUED | OUTPATIENT
Start: 2025-03-12 | End: 2025-03-13 | Stop reason: HOSPADM

## 2025-03-11 RX ORDER — BISACODYL 5 MG/1
5 TABLET, DELAYED RELEASE ORAL DAILY PRN
Status: DISCONTINUED | OUTPATIENT
Start: 2025-03-11 | End: 2025-03-13 | Stop reason: HOSPADM

## 2025-03-11 RX ORDER — OLANZAPINE 5 MG/1
5 TABLET, ORALLY DISINTEGRATING ORAL NIGHTLY
Status: DISCONTINUED | OUTPATIENT
Start: 2025-03-11 | End: 2025-03-12

## 2025-03-11 RX ORDER — ONDANSETRON 2 MG/ML
4 INJECTION INTRAMUSCULAR; INTRAVENOUS EVERY 6 HOURS PRN
Status: DISCONTINUED | OUTPATIENT
Start: 2025-03-11 | End: 2025-03-13 | Stop reason: HOSPADM

## 2025-03-11 RX ORDER — ACETAMINOPHEN 160 MG/5ML
650 SOLUTION ORAL EVERY 4 HOURS PRN
Status: DISCONTINUED | OUTPATIENT
Start: 2025-03-11 | End: 2025-03-12

## 2025-03-11 RX ORDER — ACETAMINOPHEN 650 MG/1
650 SUPPOSITORY RECTAL EVERY 4 HOURS PRN
Status: DISCONTINUED | OUTPATIENT
Start: 2025-03-11 | End: 2025-03-12

## 2025-03-11 RX ADMIN — DIVALPROEX SODIUM 500 MG: 500 TABLET, DELAYED RELEASE ORAL at 22:09

## 2025-03-11 RX ADMIN — METOPROLOL TARTRATE 12.5 MG: 25 TABLET, FILM COATED ORAL at 22:09

## 2025-03-11 RX ADMIN — BENZTROPINE MESYLATE 1 MG: 1 TABLET ORAL at 22:09

## 2025-03-11 RX ADMIN — MIRTAZAPINE 15 MG: 15 TABLET, ORALLY DISINTEGRATING ORAL at 22:09

## 2025-03-11 RX ADMIN — APIXABAN 5 MG: 5 TABLET, FILM COATED ORAL at 22:09

## 2025-03-11 RX ADMIN — OLANZAPINE 5 MG: 5 TABLET, ORALLY DISINTEGRATING ORAL at 22:09

## 2025-03-11 NOTE — ED NOTES
Nursing report ED to floor  Helder Abbott  50 y.o.  male    HPI :  HPI  Stated Reason for Visit: unwittnessed seizure  History Obtained From: patient    Chief Complaint  Chief Complaint   Patient presents with    Seizures       Admitting doctor:   Dafne Weinberg MD    Admitting diagnosis:   The primary encounter diagnosis was Seizure-like activity. A diagnosis of Hyponatremia was also pertinent to this visit.    Code status:   Current Code Status       Date Active Code Status Order ID Comments User Context       3/11/2025 1731 CPR (Attempt to Resuscitate) [id number]  Dafne Weinberg MD ED        Question Answer    Code Status (Patient has no pulse and is not breathing) CPR (Attempt to Resuscitate)    Medical Interventions (Patient has pulse or is breathing) Full                    Allergies:   Clonazepam, Fluoxetine, Grass, Grass pollen(k-o-r-t-swt benigno), Hydroxyzine, Methylphenidate, Quetiapine, Risperidone, and Topamax [topiramate]    Isolation:   No active isolations    Intake and Output  No intake or output data in the 24 hours ending 03/11/25 1838    Weight:   There were no vitals filed for this visit.    Most recent vitals:   Vitals:    03/11/25 1507 03/11/25 1631 03/11/25 1701 03/11/25 1709   BP: (!) 144/113 132/92 130/94    BP Location: Right arm      Patient Position: Sitting      Pulse: 83 79 70 75   Resp: 18      Temp: 97.6 °F (36.4 °C)      TempSrc: Tympanic      SpO2: 98% 98% 96% 97%       Active LDAs/IV Access:   Lines, Drains & Airways       Active LDAs       Name Placement date Placement time Site Days    Peripheral IV 03/11/25 1530 Right Antecubital 03/11/25  1530  Antecubital  less than 1                    Labs (abnormal labs have a star):   Labs Reviewed   COMPREHENSIVE METABOLIC PANEL - Abnormal; Notable for the following components:       Result Value    Sodium 128 (*)     Chloride 94 (*)     All other components within normal limits    Narrative:     GFR Categories in Chronic  Kidney Disease (CKD)      GFR Category          GFR (mL/min/1.73)    Interpretation  G1                     90 or greater         Normal or high (1)  G2                      60-89                Mild decrease (1)  G3a                   45-59                Mild to moderate decrease  G3b                   30-44                Moderate to severe decrease  G4                    15-29                Severe decrease  G5                    14 or less           Kidney failure          (1)In the absence of evidence of kidney disease, neither GFR category G1 or G2 fulfill the criteria for CKD.    eGFR calculation 2021 CKD-EPI creatinine equation, which does not include race as a factor   CBC WITH AUTO DIFFERENTIAL - Abnormal; Notable for the following components:    RDW 17.1 (*)     RDW-SD 55.0 (*)     Platelets 117 (*)     Immature Grans % 0.9 (*)     Immature Grans, Absolute 0.07 (*)     All other components within normal limits   CK - Abnormal; Notable for the following components:    Creatine Kinase 219 (*)     All other components within normal limits   VALPROIC ACID LEVEL, TOTAL - Normal    Narrative:     Therapeutic Ranges for Valproic Acid    Epilepsy:       mcg/ml  Bipolar/Enedelia  up to 125 mcg/ml     LACTIC ACID, PLASMA - Normal   DIGOXIN LEVEL - Normal   RAINBOW DRAW    Narrative:     The following orders were created for panel order [place] Draw.  Procedure                               Abnormality         Status                     ---------                               -----------         ------                     Green Top (Gel)[[id number]]                                  Final result               Lavender Top[[id number]]                                     Final result               Gold Top - SST[[id number]]                                   Final result               Light Blue Top[[id number]]                                   Final result                 Please view results for these tests on the  individual orders.   CBC AND DIFFERENTIAL    Narrative:     The following orders were created for panel order CBC & Differential.  Procedure                               Abnormality         Status                     ---------                               -----------         ------                     CBC Auto Differential[[id number]]        Abnormal            Final result                 Please view results for these tests on the individual orders.   GREEN TOP   LAVENDER TOP   GOLD TOP - SST   LIGHT BLUE TOP       EKG:   No orders to display       Meds given in ED:   Medications   acetaminophen (TYLENOL) tablet 650 mg (has no administration in time range)     Or   acetaminophen (TYLENOL) 160 MG/5ML oral solution 650 mg (has no administration in time range)     Or   acetaminophen (TYLENOL) suppository 650 mg (has no administration in time range)   ondansetron ODT (ZOFRAN-ODT) disintegrating tablet 4 mg (has no administration in time range)     Or   ondansetron (ZOFRAN) injection 4 mg (has no administration in time range)   melatonin tablet 2.5 mg (has no administration in time range)   sennosides-docusate (PERICOLACE) 8.6-50 MG per tablet 2 tablet (has no administration in time range)     And   polyethylene glycol (MIRALAX) packet 17 g (has no administration in time range)     And   bisacodyl (DULCOLAX) EC tablet 5 mg (has no administration in time range)     And   bisacodyl (DULCOLAX) suppository 10 mg (has no administration in time range)       Imaging results:  No radiology results for the last day    Ambulatory status:   - assist x 1    Social issues:   Social History     Socioeconomic History    Marital status: Single   Tobacco Use    Smoking status: Never    Smokeless tobacco: Never   Vaping Use    Vaping status: Never Used   Substance and Sexual Activity    Alcohol use: Never    Drug use: Never    Sexual activity: Defer       Peripheral Neurovascular  Peripheral Neurovascular (Adult)  Peripheral  Neurovascular WDL: WDL    Neuro Cognitive  Neuro Cognitive (Adult)  Cognitive/Neuro/Behavioral WDL: .WDL except, orientation  Orientation: disoriented to, time  Additional Documentation: Seizure Assessment (Group)  Seizure Assessment  Seizure Activity: reported    Learning  Learning Assessment  Learning Readiness and Ability: cognitive limitation noted  Education Provided  Person Taught: patient, other (see comments) (caregiver)  Teaching Method: verbal instruction  Teaching Focus: symptom/problem overview  Education Outcome Evaluation: eager to learn, acceptance expressed, verbalizes understanding    Respiratory  Respiratory  Airway WDL: WDL  Respiratory WDL  Respiratory WDL: WDL    Abdominal Pain       Pain Assessments  Pain (Adult)  (0-10) Pain Rating: Rest: 0    NIH Stroke Scale       Lois France RN  03/11/25 18:38 EDT

## 2025-03-11 NOTE — ED PROVIDER NOTES
EMERGENCY DEPARTMENT ENCOUNTER  Room Number:  38/38  PCP: Provider, No Known  Independent Historians: Patient      HPI:  Chief Complaint: had concerns including Seizures.     Context: Helder Abbott is a 50 y.o. male with a medical history of HTN, hypothyroid, CVA, GERD, bipolar, who presents to the ED c/o acute activity.  Patient reports last night he began feeling weak and reports onset of blurred vision.  Patient states he had some generalized shaking but was awake throughout the entire event.  Patient states he is never had a seizure before.  Patient became very weak during this and fell to the ground.  Patient did not have any tongue biting.      Review of prior external notes (non-ED) -and- Review of prior external test results outside of this encounter: Discharge summary from 7/12/2024 reviewed and notable for hospitalization secondary to abdominal pain, presyncope.  Syncope thought to be secondary to orthostasis.  MRI was normal.  Patient discharged with PPI and Carafate with outpatient EGD.    Prescription drug monitoring program review:         PAST MEDICAL HISTORY  Active Ambulatory Problems     Diagnosis Date Noted    Essential hypertension 04/30/2024    Hypothyroidism (acquired) 04/30/2024    History of CVA (cerebrovascular accident) 04/30/2024    GERD without esophagitis 04/30/2024    HLD (hyperlipidemia) 04/30/2024    Bipolar disorder 04/30/2024    Partial bowel obstruction 04/30/2024    Fecal impaction 04/30/2024    Severe malnutrition 05/02/2024    Hyperkalemia 06/11/2024    Syncope 07/10/2024    Atrial fibrillation, chronic 07/10/2024    Dehydration 07/10/2024    Chronic anticoagulation 07/10/2024    Thrombocytopenia 07/10/2024     Resolved Ambulatory Problems     Diagnosis Date Noted    JOSIAH (acute kidney injury) 04/30/2024     Past Medical History:   Diagnosis Date    Benign essential hypertension     Dyslipidemia     GERD (gastroesophageal reflux disease)     Heart disease     History of stroke      Hypothyroidism          PAST SURGICAL HISTORY  No past surgical history on file.      FAMILY HISTORY  Family History   Problem Relation Age of Onset    Diabetes Other     Hypertension Other     Heart disease Other          SOCIAL HISTORY  Social History     Socioeconomic History    Marital status: Single   Tobacco Use    Smoking status: Never    Smokeless tobacco: Never   Vaping Use    Vaping status: Never Used   Substance and Sexual Activity    Alcohol use: Never    Drug use: Never    Sexual activity: Defer         ALLERGIES  Clonazepam, Fluoxetine, Grass, Grass pollen([id number] benigno), Hydroxyzine, Methylphenidate, Quetiapine, Risperidone, and Topamax [topiramate]      REVIEW OF SYSTEMS  Review of Systems  Included in HPI  All systems reviewed and negative except for those discussed in HPI.      PHYSICAL EXAM    I have reviewed the triage vital signs and nursing notes.    ED Triage Vitals [03/11/25 1507]   Temp Heart Rate Resp BP SpO2   97.6 °F (36.4 °C) 83 18 (!) 144/113 98 %      Temp src Heart Rate Source Patient Position BP Location FiO2 (%)   Tympanic -- Sitting Right arm --       Physical Exam  GENERAL: alert, no acute distress  SKIN: Warm, dry  HENT: Normocephalic, atraumatic.  No tongue bite  EYES: no scleral icterus  CV: regular rhythm, regular rate  RESPIRATORY: normal effort, lungs clear  ABDOMEN: soft, nontender, nondistended  MUSCULOSKELETAL: no deformity  NEURO: alert, moves all extremities, follows commands            LAB RESULTS  Recent Results (from the past 24 hours)   Comprehensive Metabolic Panel    Collection Time: 03/11/25  3:31 PM    Specimen: Blood   Result Value Ref Range    Glucose 85 65 - 99 mg/dL    BUN 12 6 - 20 mg/dL    Creatinine 0.82 0.76 - 1.27 mg/dL    Sodium 128 (L) 136 - 145 mmol/L    Potassium 4.3 3.5 - 5.2 mmol/L    Chloride 94 (L) 98 - 107 mmol/L    CO2 23.7 22.0 - 29.0 mmol/L    Calcium 9.1 8.6 - 10.5 mg/dL    Total Protein 6.6 6.0 - 8.5 g/dL    Albumin 3.9 3.5 - 5.2  g/dL    ALT (SGPT) 10 1 - 41 U/L    AST (SGOT) 30 1 - 40 U/L    Alkaline Phosphatase 86 39 - 117 U/L    Total Bilirubin 0.7 0.0 - 1.2 mg/dL    Globulin 2.7 gm/dL    A/G Ratio 1.4 g/dL    BUN/Creatinine Ratio 14.6 7.0 - 25.0    Anion Gap 10.3 5.0 - 15.0 mmol/L    eGFR 107.0 >60.0 mL/min/1.73   Valproic Acid Level, Total    Collection Time: 03/11/25  3:31 PM    Specimen: Blood   Result Value Ref Range    Valproic Acid 85.0 50.0 - 125.0 mcg/mL   Green Top (Gel)    Collection Time: 03/11/25  3:31 PM   Result Value Ref Range    Extra Tube Hold for add-ons.    Lavender Top    Collection Time: 03/11/25  3:31 PM   Result Value Ref Range    Extra Tube hold for add-on    Gold Top - SST    Collection Time: 03/11/25  3:31 PM   Result Value Ref Range    Extra Tube Hold for add-ons.    Light Blue Top    Collection Time: 03/11/25  3:31 PM   Result Value Ref Range    Extra Tube Hold for add-ons.    CBC Auto Differential    Collection Time: 03/11/25  3:31 PM    Specimen: Blood   Result Value Ref Range    WBC 8.02 3.40 - 10.80 10*3/mm3    RBC 5.16 4.14 - 5.80 10*6/mm3    Hemoglobin 15.4 13.0 - 17.7 g/dL    Hematocrit 45.5 37.5 - 51.0 %    MCV 88.2 79.0 - 97.0 fL    MCH 29.8 26.6 - 33.0 pg    MCHC 33.8 31.5 - 35.7 g/dL    RDW 17.1 (H) 12.3 - 15.4 %    RDW-SD 55.0 (H) 37.0 - 54.0 fl    MPV 9.6 6.0 - 12.0 fL    Platelets 117 (L) 140 - 450 10*3/mm3    Neutrophil % 68.1 42.7 - 76.0 %    Lymphocyte % 22.7 19.6 - 45.3 %    Monocyte % 7.5 5.0 - 12.0 %    Eosinophil % 0.4 0.3 - 6.2 %    Basophil % 0.4 0.0 - 1.5 %    Immature Grans % 0.9 (H) 0.0 - 0.5 %    Neutrophils, Absolute 5.47 1.70 - 7.00 10*3/mm3    Lymphocytes, Absolute 1.82 0.70 - 3.10 10*3/mm3    Monocytes, Absolute 0.60 0.10 - 0.90 10*3/mm3    Eosinophils, Absolute 0.03 0.00 - 0.40 10*3/mm3    Basophils, Absolute 0.03 0.00 - 0.20 10*3/mm3    Immature Grans, Absolute 0.07 (H) 0.00 - 0.05 10*3/mm3   CK    Collection Time: 03/11/25  3:31 PM    Specimen: Blood   Result Value Ref Range     Creatine Kinase 219 (H) 20 - 200 U/L   Lactic Acid, Plasma    Collection Time: 03/11/25  3:38 PM    Specimen: Blood   Result Value Ref Range    Lactate 1.2 0.5 - 2.0 mmol/L         RADIOLOGY  No Radiology Exams Resulted Within Past 24 Hours      MEDICATIONS GIVEN IN ER  Medications - No data to display      ORDERS PLACED DURING THIS VISIT:  Orders Placed This Encounter   Procedures    CT Head Without Contrast    [place] Draw    Comprehensive Metabolic Panel    Valproic Acid Level, Total    CBC Auto Differential    Lactic Acid, Plasma    CK    Digoxin Level    Initiate Observation Status    CBC & Differential    Green Top (Gel)    Lavender Top    Gold Top - SST    Light Blue Top         OUTPATIENT MEDICATION MANAGEMENT:  No current Epic-ordered facility-administered medications on file.     Current Outpatient Medications Ordered in Epic   Medication Sig Dispense Refill    Acetaminophen Extra Strength 500 MG tablet Take 1 tablet by mouth 4 (Four) Times a Day As Needed.      atorvastatin (LIPITOR) 40 MG tablet       benztropine (COGENTIN) 0.5 MG tablet Take 2 tablets by mouth 2 (Two) Times a Day.      digoxin (LANOXIN) 125 MCG tablet       divalproex (DEPAKOTE) 500 MG DR tablet       Eliquis 5 MG tablet tablet Take 1 tablet by mouth Every 12 (Twelve) Hours.      levothyroxine (SYNTHROID, LEVOTHROID) 50 MCG tablet Take 1.5 tablets by mouth Daily.      melatonin 5 MG tablet tablet Take 2 tablets by mouth Every Night.      metoprolol tartrate (LOPRESSOR) 25 MG tablet Take 0.5 tablets by mouth Every 12 (Twelve) Hours. Hold SBP <95      mirtazapine (REMERON SOL-TAB) 15 MG disintegrating tablet Place 1 tablet on the tongue Every Night.      OLANZapine zydis (zyPREXA) 5 MG disintegrating tablet       polyethylene glycol (MIRALAX) 17 GM/SCOOP powder Dissolve 17 g (1 capful) in liquid and drink by mouth Daily. 510 g 1    sucralfate (CARAFATE) 1 g tablet Take 1 tablet by mouth 3 (Three) Times a Day Before Meals.            PROCEDURES  Procedures            PROGRESS, DATA ANALYSIS, CONSULTS, AND MEDICAL DECISION MAKING  All labs have been independently interpreted by me.  All radiology studies have been reviewed by me. All EKG's have been independently viewed and interpreted by me.  Discussion below represents my analysis of pertinent findings related to patient's condition, differential diagnosis, treatment plan and final disposition.    Differential diagnosis includes but is not limited to seizure, seizure-like activity, electrolyte abnormality, intracranial injury, CVA.    Clinical Scores:                                       ED Course as of 03/11/25 1642   Tue Mar 11, 2025   1633 Workup in the emergency department is notable for CK of 219 just slightly above normal and a sodium of 128.  Patient's presentation does not appear consistent with seizure however given unknown etiology, ongoing blurred vision and hyponatremia I do believe he warrants admission for further evaluation and management.  Will obtain CT head. [MW]   1641 Discussed with Dr. Weinberg who agrees to admit [MW]      ED Course User Index  [MW] Salvatore Abbott MD             AS OF 16:42 EDT VITALS:    BP - 132/92  HR - 79  TEMP - 97.6 °F (36.4 °C) (Tympanic)  O2 SATS - 98%    COMPLEXITY OF CARE  The patient requires admission.      DIAGNOSIS  Final diagnoses:   Seizure-like activity   Hyponatremia         DISPOSITION  ED Disposition       ED Disposition   Decision to Admit    Condition   --    Comment   Level of Care: Telemetry [5]   Diagnosis: Seizure-like activity [991935]   Admitting Physician: LYDIA WEINBERG [[id number]]   Attending Physician: LYDIA WEINBERG [[id number]]   Is patient appropriate for Inpatient Observation Unit?: No [0]                  Please note that portions of this document were completed with a voice recognition program.    Note Disclaimer: At [facility], we believe that sharing information builds trust and better  relationships. You are receiving this note because you recently visited [facility]. It is possible you will see health information before a provider has talked with you about it. This kind of information can be easy to misunderstand. To help you fully understand what it means for your health, we urge you to discuss this note with your provider.         Salvatore Abbott MD  03/11/25 8660

## 2025-03-11 NOTE — CASE MANAGEMENT/SOCIAL WORK
Discharge Planning Assessment  [facility]     Patient Name: Helder Abbott  MRN: [id number]  Today's Date: 3/11/2025    Admit Date: 3/11/2025        Discharge Needs Assessment    No documentation.                  Discharge Plan       Row Name 03/11/25 1603       Plan    Plan Comments Patient with legal guardian on file, listed [place] a [place] of the state. Registration obtained consent to treat from legal guardian. Paperwork has been scanned into Epic. [facility] is present upon chart review. Sanjuana BAKER RN CCP manager  updated. Disposition from ER pending. BUZZ SinghN RN                       Demographic Summary    No documentation.                  Functional Status    No documentation.                  Psychosocial    No documentation.                  Abuse/Neglect    No documentation.                  Legal    No documentation.                  Substance Abuse    No documentation.                  Patient Forms    No documentation.                     Santos Ngo, RN

## 2025-03-11 NOTE — CASE MANAGEMENT/SOCIAL WORK
Discharge Planning Assessment  [facility]     Patient Name: Helder Abbott  MRN: [id number]  Today's Date: 3/11/2025    Admit Date: 3/11/2025        Discharge Needs Assessment    No documentation.                  Discharge Plan       Row Name 03/11/25 5901       Plan    Plan Comments Contacted [employer] and spoke with Rigo Rolon to update that patient is being admitted to [facility] today. All questions answered. LEONIDES Singh RN      Row Name 03/11/25 8892       Plan    Plan Comments Patient with legal guardian on file, listed as a salgado of the state. Registration obtained consent to treat from legal guardian. Paperwork has been scanned into Epic. [facility] is present upon chart review. Sanjuana BAKER RN CCP manager  updated. Disposition from ER pending. LEONIDES Singh RN                       Demographic Summary    No documentation.                  Functional Status    No documentation.                  Psychosocial    No documentation.                  Abuse/Neglect    No documentation.                  Legal    No documentation.                  Substance Abuse    No documentation.                  Patient Forms    No documentation.                     Santos Ngo RN

## 2025-03-12 ENCOUNTER — APPOINTMENT (OUTPATIENT)
Dept: NEUROLOGY | Facility: HOSPITAL | Age: 50
End: 2025-03-12
Payer: MEDICARE

## 2025-03-12 PROBLEM — E87.1 HYPONATREMIA: Status: ACTIVE | Noted: 2025-03-12

## 2025-03-12 LAB
ANION GAP SERPL CALCULATED.3IONS-SCNC: 10.3 MMOL/L (ref 5–15)
BUN SERPL-MCNC: 11 MG/DL (ref 6–20)
BUN/CREAT SERPL: 15.3 (ref 7–25)
CALCIUM SPEC-SCNC: 8.6 MG/DL (ref 8.6–10.5)
CHLORIDE SERPL-SCNC: 99 MMOL/L (ref 98–107)
CO2 SERPL-SCNC: 23.7 MMOL/L (ref 22–29)
CREAT SERPL-MCNC: 0.72 MG/DL (ref 0.76–1.27)
DEPRECATED RDW RBC AUTO: 55.9 FL (ref 37–54)
EGFRCR SERPLBLD CKD-EPI 2021: 111.3 ML/MIN/1.73
ERYTHROCYTE [DISTWIDTH] IN BLOOD BY AUTOMATED COUNT: 17.1 % (ref 12.3–15.4)
GLUCOSE SERPL-MCNC: 74 MG/DL (ref 65–99)
HCT VFR BLD AUTO: 43 % (ref 37.5–51)
HGB BLD-MCNC: 14.7 G/DL (ref 13–17.7)
MCH RBC QN AUTO: 30.5 PG (ref 26.6–33)
MCHC RBC AUTO-ENTMCNC: 34.2 G/DL (ref 31.5–35.7)
MCV RBC AUTO: 89.2 FL (ref 79–97)
PLATELET # BLD AUTO: 110 10*3/MM3 (ref 140–450)
PMV BLD AUTO: 9.6 FL (ref 6–12)
POTASSIUM SERPL-SCNC: 4.3 MMOL/L (ref 3.5–5.2)
RBC # BLD AUTO: 4.82 10*6/MM3 (ref 4.14–5.8)
SODIUM SERPL-SCNC: 133 MMOL/L (ref 136–145)
TSH SERPL DL<=0.05 MIU/L-ACNC: 4.57 UIU/ML (ref 0.27–4.2)
WBC NRBC COR # BLD AUTO: 6.16 10*3/MM3 (ref 3.4–10.8)

## 2025-03-12 PROCEDURE — 97166 OT EVAL MOD COMPLEX 45 MIN: CPT

## 2025-03-12 PROCEDURE — 95816 EEG AWAKE AND DROWSY: CPT

## 2025-03-12 PROCEDURE — G0378 HOSPITAL OBSERVATION PER HR: HCPCS

## 2025-03-12 PROCEDURE — 95819 EEG AWAKE AND ASLEEP: CPT | Performed by: STUDENT IN AN ORGANIZED HEALTH CARE EDUCATION/TRAINING PROGRAM

## 2025-03-12 PROCEDURE — 80048 BASIC METABOLIC PNL TOTAL CA: CPT | Performed by: INTERNAL MEDICINE

## 2025-03-12 PROCEDURE — 97530 THERAPEUTIC ACTIVITIES: CPT

## 2025-03-12 PROCEDURE — 97162 PT EVAL MOD COMPLEX 30 MIN: CPT

## 2025-03-12 PROCEDURE — 84443 ASSAY THYROID STIM HORMONE: CPT | Performed by: INTERNAL MEDICINE

## 2025-03-12 PROCEDURE — 97535 SELF CARE MNGMENT TRAINING: CPT

## 2025-03-12 PROCEDURE — 36415 COLL VENOUS BLD VENIPUNCTURE: CPT | Performed by: INTERNAL MEDICINE

## 2025-03-12 PROCEDURE — 85027 COMPLETE CBC AUTOMATED: CPT | Performed by: INTERNAL MEDICINE

## 2025-03-12 PROCEDURE — 99214 OFFICE O/P EST MOD 30 MIN: CPT | Performed by: STUDENT IN AN ORGANIZED HEALTH CARE EDUCATION/TRAINING PROGRAM

## 2025-03-12 PROCEDURE — 51798 US URINE CAPACITY MEASURE: CPT

## 2025-03-12 RX ORDER — METOPROLOL SUCCINATE 25 MG/1
25 TABLET, EXTENDED RELEASE ORAL DAILY
Status: DISCONTINUED | OUTPATIENT
Start: 2025-03-13 | End: 2025-03-13 | Stop reason: HOSPADM

## 2025-03-12 RX ORDER — ARIPIPRAZOLE 400 MG
400 KIT INTRAMUSCULAR
COMMUNITY

## 2025-03-12 RX ORDER — OLANZAPINE 5 MG/1
10 TABLET ORAL NIGHTLY
Status: DISCONTINUED | OUTPATIENT
Start: 2025-03-12 | End: 2025-03-13 | Stop reason: HOSPADM

## 2025-03-12 RX ORDER — TORSEMIDE 20 MG/1
20 TABLET ORAL DAILY
Status: DISCONTINUED | OUTPATIENT
Start: 2025-03-13 | End: 2025-03-13

## 2025-03-12 RX ORDER — METOPROLOL SUCCINATE 25 MG/1
25 TABLET, EXTENDED RELEASE ORAL DAILY
COMMUNITY

## 2025-03-12 RX ORDER — TORSEMIDE 20 MG/1
40 TABLET ORAL ONCE
Status: DISCONTINUED | OUTPATIENT
Start: 2025-03-12 | End: 2025-03-12

## 2025-03-12 RX ORDER — HALOPERIDOL 5 MG/1
7.5 TABLET ORAL 2 TIMES DAILY
COMMUNITY

## 2025-03-12 RX ORDER — TORSEMIDE 20 MG/1
20 TABLET ORAL DAILY
Status: DISCONTINUED | OUTPATIENT
Start: 2025-03-12 | End: 2025-03-12

## 2025-03-12 RX ORDER — HALOPERIDOL 5 MG/1
5 TABLET ORAL 3 TIMES DAILY
Status: DISCONTINUED | OUTPATIENT
Start: 2025-03-12 | End: 2025-03-13 | Stop reason: HOSPADM

## 2025-03-12 RX ORDER — OLANZAPINE 10 MG/1
10 TABLET ORAL NIGHTLY
COMMUNITY

## 2025-03-12 RX ORDER — DIVALPROEX SODIUM 500 MG/1
2000 TABLET, DELAYED RELEASE ORAL NIGHTLY
Status: DISCONTINUED | OUTPATIENT
Start: 2025-03-13 | End: 2025-03-13 | Stop reason: HOSPADM

## 2025-03-12 RX ADMIN — HALOPERIDOL 5 MG: 5 TABLET ORAL at 20:36

## 2025-03-12 RX ADMIN — HALOPERIDOL 5 MG: 5 TABLET ORAL at 10:51

## 2025-03-12 RX ADMIN — BENZTROPINE MESYLATE 1 MG: 1 TABLET ORAL at 20:37

## 2025-03-12 RX ADMIN — OLANZAPINE 10 MG: 5 TABLET, FILM COATED ORAL at 20:37

## 2025-03-12 RX ADMIN — APIXABAN 5 MG: 5 TABLET, FILM COATED ORAL at 20:37

## 2025-03-12 RX ADMIN — DIVALPROEX SODIUM 500 MG: 500 TABLET, DELAYED RELEASE ORAL at 09:32

## 2025-03-12 RX ADMIN — TORSEMIDE 20 MG: 20 TABLET ORAL at 09:32

## 2025-03-12 RX ADMIN — BENZTROPINE MESYLATE 1 MG: 1 TABLET ORAL at 09:33

## 2025-03-12 RX ADMIN — HALOPERIDOL 5 MG: 5 TABLET ORAL at 17:22

## 2025-03-12 RX ADMIN — DIGOXIN 125 MCG: 0.12 TABLET ORAL at 09:32

## 2025-03-12 RX ADMIN — METOPROLOL TARTRATE 12.5 MG: 25 TABLET, FILM COATED ORAL at 09:33

## 2025-03-12 RX ADMIN — LEVOTHYROXINE SODIUM 75 MCG: 0.07 TABLET ORAL at 09:33

## 2025-03-12 RX ADMIN — APIXABAN 5 MG: 5 TABLET, FILM COATED ORAL at 09:33

## 2025-03-12 RX ADMIN — ATORVASTATIN CALCIUM 40 MG: 20 TABLET, FILM COATED ORAL at 09:32

## 2025-03-12 NOTE — CONSULTS
Neurology Consult Note    Consult Date: 3/12/2025    Referring MD: Dafne Weinberg, *    Reason for Consult I have been asked to see the patient in neurological consultation to render advice and opinion regarding possible seizure.    Helder Abbott is a 50 y.o. white male with known diagnosis of intellectual disability since childhood, bipolar disorder, hypothyroidism, chronic atrial fibrillation on Eliquis, hypertension presented to the hospital yesterday for multiple complaints including generalized weakness, headache, possible seizure.  I called his caregiver who stated that the patient told him he had a seizure, patient never had a seizure before, patient stated that he was shaking all over for several minutes without loss of consciousness no urinary or fecal incontinence, no postictal confusion the event was unwitnessed by the caregiver, patient was complaining of headache he stated that a monster in his head makes him to have a headache, currently his headache resolved, reported history of stroke but I do not see details on chart review.  He is on multiple and psych medication including benztropine, Depakote,Zyprexa, Haldol.  On presentation blood pressure 144/113, currently 126/90.  His labs showed digoxin level 0.6, Depakote 85, TSH 4.57, initial sodium 128 currently improved to 133, normal white blood cell count.    Past Medical History:   Diagnosis Date    Benign essential hypertension     Bipolar disorder     Dyslipidemia     GERD (gastroesophageal reflux disease)     Heart disease     History of stroke     Hypothyroidism        Exam  /90 (BP Location: Right arm, Patient Position: Lying)   Pulse 65   Temp 97.2 °F (36.2 °C) (Oral)   Resp 18   Wt 69.9 kg (154 lb 1.6 oz)   SpO2 98%   BMI 20.90 kg/m²   Gen: NAD, vitals reviewed  MS: oriented x1 to self only not to the place he told me month was February and the year was 2005, able to follow simple commands intermittently, struggled with  "complex commands, language grossly intact, no neglect.  CN: visual acuity grossly normal, PERRL, EOMI, no facial droop, moderate dysarthria  Motor: 5/5 throughout upper and lower extremities, normal tone    DATA:    Lab Results   Component Value Date    GLUCOSE 74 03/12/2025    CALCIUM 8.6 03/12/2025     (L) 03/12/2025    K 4.3 03/12/2025    CO2 23.7 03/12/2025    CL 99 03/12/2025    BUN 11 03/12/2025    CREATININE 0.72 (L) 03/12/2025    BCR 15.3 03/12/2025    ANIONGAP 10.3 03/12/2025     Lab Results   Component Value Date    WBC 6.16 03/12/2025    HGB 14.7 03/12/2025    HCT 43.0 03/12/2025    MCV 89.2 03/12/2025     (L) 03/12/2025       Lab review: Digoxin level 0.6, Depakote 85, TSH 4.57, initial sodium 128 currently improved to 133, normal white blood cell count.    Imaging review: Personally viewed the CT scan of the head which showed mild diffuse brain atrophy, no no acute finding.    Diagnoses:  -Generalized shaking without loss of consciousness, seizure felt less likely and the patient already on Depakote for bipolar and his level was therapeutic.  -Headache probably was related to high diastolic blood pressure  -Atrial fibrillation on Eliquis    PLAN:   -Routine EEG if negative no further neurology workup needed  -Recommend blood pressure goal less than 140/90, management as per the primary/PCP  -I will follow peripherally on the EEG results.  -Discussed with patient, caregiver over the phone    Medical Decision Making for this neurology consultation consists of the following:  Review of previous chart, including H/P, provider and nursing notes as applicable.  Review of medications and vital signs.  Review of previous labs, neuroimaging, and additional relevant diagnostics.   Interpretation of laboratory, imaging, and other diagnostic results  Discussion with other providers and family   Total face-to-face/floor time: 30-61 minutes.     \"Dictated utilizing Dragon dictation\".    Addendum " 3/13  EEG negative for seizure activity  Clinical Interpretation:  This routine EEG recording is normal in the awake and sleep states.  No potentially epileptogenic activity, seizure activity, or focal slowing is present.

## 2025-03-12 NOTE — PLAN OF CARE
Goal Outcome Evaluation:  Plan of Care Reviewed With: patient           Outcome Evaluation: No acute changes this shift. Meds given per MAR.

## 2025-03-12 NOTE — H&P
HISTORY AND PHYSICAL   [facility]        Date of Admission: 3/11/2025  Patient Identification:  Name: Helder Abbott  Age: 50 y.o.  Sex: male  :  1975  MRN: [id number]                     Primary Care Physician: Provider, No Known    Chief Complaint:  50 year old gentleman presented to the emergency room after he was weak and shaky last night; he had blurred vision and had to sit down; he did not lose consciousness; he has a history of bipolar disorder but not seizures; no visitors are present with him; he feels better at this time; no visitors are present with him    History of Present Illness:   As above    Past Medical History:  Past Medical History:   Diagnosis Date    Benign essential hypertension     Bipolar disorder     Dyslipidemia     GERD (gastroesophageal reflux disease)     Heart disease     History of stroke     Hypothyroidism      Past Surgical History:  No past surgical history on file.   Home Meds:  Medications Prior to Admission   Medication Sig Dispense Refill Last Dose/Taking    Acetaminophen Extra Strength 500 MG tablet Take 1 tablet by mouth 4 (Four) Times a Day As Needed.       atorvastatin (LIPITOR) 40 MG tablet        benztropine (COGENTIN) 0.5 MG tablet Take 2 tablets by mouth 2 (Two) Times a Day.       digoxin (LANOXIN) 125 MCG tablet        divalproex (DEPAKOTE) 500 MG DR tablet        Eliquis 5 MG tablet tablet Take 1 tablet by mouth Every 12 (Twelve) Hours.       levothyroxine (SYNTHROID, LEVOTHROID) 50 MCG tablet Take 1.5 tablets by mouth Daily.       melatonin 5 MG tablet tablet Take 2 tablets by mouth Every Night.       metoprolol tartrate (LOPRESSOR) 25 MG tablet Take 0.5 tablets by mouth Every 12 (Twelve) Hours. Hold SBP <95       mirtazapine (REMERON SOL-TAB) 15 MG disintegrating tablet Place 1 tablet on the tongue Every Night.       OLANZapine zydis (zyPREXA) 5 MG disintegrating tablet        polyethylene glycol (MIRALAX) 17 GM/SCOOP powder Dissolve 17 g (1  capful) in liquid and drink by mouth Daily. 510 g 1     sucralfate (CARAFATE) 1 g tablet Take 1 tablet by mouth 3 (Three) Times a Day Before Meals.          Allergies:  Allergies   Allergen Reactions    Clonazepam Unknown (See Comments)    Fluoxetine Unknown (See Comments)    Grass Unknown - High Severity    Grass Pollen(K-O-R-T-Swt Kennedy) Unknown - Low Severity    Hydroxyzine Unknown (See Comments)    Methylphenidate Unknown (See Comments)    Quetiapine Unknown - Low Severity    Risperidone Unknown (See Comments)    Topamax [Topiramate] Unknown - High Severity     Immunizations:  Immunization History   Administered Date(s) Administered    COVID-19 (PFIZER) Purple Cap Monovalent 02/17/2021, 03/21/2021, 02/17/2022     Social History:   Social History     Social History Narrative    Not on file     Social History     Socioeconomic History    Marital status: Single   Tobacco Use    Smoking status: Never    Smokeless tobacco: Never   Vaping Use    Vaping status: Never Used   Substance and Sexual Activity    Alcohol use: Never    Drug use: Never    Sexual activity: Defer       Family History:  Family History   Problem Relation Age of Onset    Diabetes Other     Hypertension Other     Heart disease Other         Review of Systems  See history of present illness and past medical history.  Patient denies headache, dizziness, syncope,  , trauma, change in vision, change in hearing, change in taste, changes in weight, changes in appetite, focal weakness, numbness, or paresthesia.  Patient denies chest pain, palpitations, dyspnea, orthopnea, PND, cough, sinus pressure, rhinorrhea, epistaxis, hemoptysis, nausea, vomiting,hematemesis, diarrhea, constipation or hematochezia.  Denies cold or heat intolerance, polydipsia, polyuria, polyphagia. Denies hematuria, pyuria, dysuria, hesitancy, frequency or urgency. Denies consumption of raw and under cooked meats foods or change in water source.  Denies fever, chills, sweats, night  sweats.       Objective:  T Max 24 hrs: Temp (24hrs), Av.6 °F (36.4 °C), Min:97.6 °F (36.4 °C), Max:97.6 °F (36.4 °C)    Vitals Ranges:   Temp:  [97.6 °F (36.4 °C)] 97.6 °F (36.4 °C)  Heart Rate:  [70-83] 82  Resp:  [18] 18  BP: (130-144)/() 142/94      Exam:  /94   Pulse 82   Temp 97.6 °F (36.4 °C) (Tympanic)   Resp 18   SpO2 98%     General Appearance:    Alert, cooperative, no distress, appears stated age; chronically ill appearing   Head:    Normocephalic, without obvious abnormality, atraumatic   Eyes:    PERRL, conjunctivae/corneas clear, EOM's intact, both eyes   Ears:    Normal external ear canals, both ears   Nose:   Nares normal, septum midline, mucosa normal, no drainage    or sinus tenderness   Throat:   Lips, mucosa, and tongue normal   Neck:   Supple, symmetrical, trachea midline, no adenopathy;     thyroid:  no enlargement/tenderness/nodules; no carotid    bruit or JVD   Back:     Symmetric, no curvature, ROM normal, no CVA tenderness   Lungs:     Clear to auscultation bilaterally, respirations unlabored   Chest Wall:    No tenderness or deformity    Heart:    Regular rate and rhythm, S1 and S2 normal, no murmur, rub   or gallop   Abdomen:     Soft, nontender, bowel sounds active all four quadrants,     no masses, no hepatomegaly, no splenomegaly   Extremities:   Extremities normal, atraumatic, no cyanosis or edema                       .    Data Review:  Labs in chart were reviewed.  WBC   Date Value Ref Range Status   2025 8.02 3.40 - 10.80 10*3/mm3 Final     Hemoglobin   Date Value Ref Range Status   2025 15.4 13.0 - 17.7 g/dL Final     Hematocrit   Date Value Ref Range Status   2025 45.5 37.5 - 51.0 % Final     Platelets   Date Value Ref Range Status   2025 117 (L) 140 - 450 10*3/mm3 Final     Sodium   Date Value Ref Range Status   2025 128 (L) 136 - 145 mmol/L Final     Potassium   Date Value Ref Range Status   2025 4.3 3.5 - 5.2 mmol/L  Final     Chloride   Date Value Ref Range Status   03/11/2025 94 (L) 98 - 107 mmol/L Final     CO2   Date Value Ref Range Status   03/11/2025 23.7 22.0 - 29.0 mmol/L Final     BUN   Date Value Ref Range Status   03/11/2025 12 6 - 20 mg/dL Final     Creatinine   Date Value Ref Range Status   03/11/2025 0.82 0.76 - 1.27 mg/dL Final     Glucose   Date Value Ref Range Status   03/11/2025 85 65 - 99 mg/dL Final     Calcium   Date Value Ref Range Status   03/11/2025 9.1 8.6 - 10.5 mg/dL Final                Imaging Results (All)       Procedure Component Value Units Date/Time    CT Head Without Contrast [[id number]] Collected: 03/11/25 1803     Updated: 03/11/25 1813    Narrative:      CT HEAD WO CONTRAST-     HISTORY:  seizure; R56.9-Unspecified convulsions; E87.8-Ffau-trlldtfrlj  and hyponatremia     COMPARISON: None     FINDINGS: The brain ventricles are symmetrical. Sulci are mildly  prominent for age suggesting mild atrophy, similar appearances compared  to the CT examination 7/9/2024. There is no evidence of hemorrhage,  hydrocephalus or of acute infarction. Mild vascular calcifications  noted. Further evaluation could be performed with a MRI examination of  the brain, particularly if the patient has a history of new onset  seizure activity.                 Radiation dose reduction techniques were utilized, including automated  exposure modulation based on body size.     This report was finalized on 3/11/2025 6:09 PM by Dr. Ronnie Orozco M.D  on Workstation: [id number]                 Assessment:  Active Hospital Problems    Diagnosis  POA    **Seizure-like activity [R56.9]  Yes      Resolved Hospital Problems   No resolved problems to display.   Hyponatremia  Hypothyroidism  Hyperlipidemia  A fib  Mild cognitive impairment    Plan:  Ask nephrology to see him regarding sodium  Pt.ot to see  Ask neurology to see him  Does not sound like seizure   Monitor on telemetry  Pt/ot to see  Dw patient and ed  provider    Dafne Weinberg MD  3/11/2025  20:34 EDT

## 2025-03-12 NOTE — PROGRESS NOTES
[facility] Clinical Pharmacy Services: Medication Reconciliation     Helder Abbott is a 50 y.o. male presenting to [facility] for Hyponatremia [E87.1]  Seizure-like activity [R56.9]       He  has a past medical history of Benign essential hypertension, Bipolar disorder, Dyslipidemia, GERD (gastroesophageal reflux disease), Heart disease, History of stroke, and Hypothyroidism.       Allergies as of 03/11/2025 - Reviewed 03/11/2025   Allergen Reaction Noted    Clonazepam Unknown (See Comments) 05/21/2023    Fluoxetine Unknown (See Comments) 05/21/2023    Grass Unknown - High Severity 04/30/2024    Grass pollen(k-o-r-t-swt benigno) Unknown - Low Severity 05/21/2023    Hydroxyzine Unknown (See Comments) 05/21/2023    Methylphenidate Unknown (See Comments) 05/21/2023    Quetiapine Unknown - Low Severity 04/30/2024    Risperidone Unknown (See Comments) 05/21/2023    Topamax [topiramate] Unknown - High Severity 03/11/2025          Medication information was obtained from: pharmacy and provider's office     Prior to Admission Medications       Prescriptions Last Dose Informant Patient Reported? Taking?    Acetaminophen Extra Strength 500 MG tablet   Yes No    Take 1 tablet by mouth 4 (Four) Times a Day As Needed.    atorvastatin (LIPITOR) 40 MG tablet   Yes No    benztropine (COGENTIN) 1 MG tablet  Spouse/Significant Other Yes No    Take 1 tablet by mouth 2 (Two) Times a Day.    digoxin (LANOXIN) 125 MCG tablet  Pharmacy Yes No    Take 1 tablet by mouth Daily.    divalproex (DEPAKOTE) 500 MG DR tablet  Provider's Office Yes No    Take 4 tablets by mouth Every Night.    Eliquis 5 MG tablet tablet   Yes No    Take 1 tablet by mouth Every 12 (Twelve) Hours.    haloperidol (HALDOL) 5 MG tablet  Pharmacy Yes No    Take 1 tablet by mouth 3 (Three) Times a Day.    levothyroxine (SYNTHROID, LEVOTHROID) 75 MCG tablet   Yes No    Take 1 tablet by mouth Daily.    melatonin 5 MG tablet tablet   Yes No    Take 2  tablets by mouth Every Night.    metoprolol succinate XL (TOPROL-XL) 25 MG 24 hr tablet  Pharmacy Yes No    Take 1 tablet by mouth Daily.    OLANZapine (zyPREXA) 10 MG tablet   Yes No    Take 1 tablet by mouth Every Night.    polyethylene glycol (MIRALAX) 17 GM/SCOOP powder   No No    Dissolve 17 g (1 capful) in liquid and drink by mouth Daily.           Medication notes:   The following updates were made to his admission med rec:  -Metoprolol to XL form.   -Mirtazapine DC'd and olanzapine to correct dose of 10 mg  -Depakote  mg 4 tabs HS confirmed with Dr Chicas office  -Haloperidol added      This medication list is complete to the best of my knowledge as of 3/12/2025       Please call if questions.     James Gomez, [facility]  Clinical Pharmacist

## 2025-03-12 NOTE — CASE MANAGEMENT/SOCIAL WORK
Discharge Planning Assessment  [facility]     Patient Name: Helder Abbott  MRN: [id number]  Today's Date: 3/12/2025    Admit Date: 3/11/2025    Plan: Home with 24/7 caregivers. Personal caregivers to provide transport at discharge   Discharge Needs Assessment       Row Name 03/12/25 1633       Living Environment    People in Home other (see comments)    Current Living Arrangements group home    Potentially Unsafe Housing Conditions none    In the past 12 months has the electric, gas, oil, or water company threatened to shut off services in your home? No    Family Caregiver if Needed other (see comments)    Able to Return to Prior Arrangements yes       Transition Planning    Patient/Family Anticipates Transition to home       Discharge Needs Assessment    Equipment Currently Used at Home none                   Discharge Plan       Row Name 03/12/25 1631       Plan    Plan Home with 24/7 caregivers. Personal caregivers to provide transport at discharge    Roadmap to Recovery Yes    Patient/Family in Agreement with Plan yes    Plan Comments Spoke with patient's legal guardian/ Keja via outbound call. Introduced self and explained CCP role. Verified face sheet and local pharmacy is [facility] agreed to enrollment in [facility]. Patient has 24/7 caregivers in home. Patient is independent with mobility at baseline.  plans for patient to return home with caregivers. Caregivers to provide transport at time of discharge. [facility] [phone].                    Expected Discharge Date and Time       Expected Discharge Date Expected Discharge Time    Mar 13, 2025            Demographic Summary       Row Name 03/12/25 1632       General Information    Admission Type observation    Required Notices Provided Observation Status Notice    Referral Source admission list    Reason for Consult discharge planning                   Functional Status       Row Name 03/12/25 1632       Functional Status    Usual Activity Tolerance  moderate    Current Activity Tolerance fair       Functional Status, IADL    Medications assistive person    Meal Preparation assistive person    Housekeeping assistive person    Shopping completely dependent                   Psychosocial    No documentation.                  Abuse/Neglect    No documentation.                  Legal    No documentation.                  Substance Abuse    No documentation.                  Patient Forms    No documentation.                     June Acharya RN

## 2025-03-12 NOTE — CONSULTS
[facility] Consult Note      Patient Name: Helder Abbott  : 1975  MRN: [id number]  Primary Care Physician:  Provider, No Known  Referring Physician: Dafne Weinberg MD  Date of admission: 3/11/2025    Subjective     Reason for Consult: Hyponatremia    HPI:   Helder Abbott is a 50 y.o. male with past medical history of bipolar disorder dyslipidemia, gastroesophageal flux disease, cerebrovascular accident hypothyroidism, and mild cognitive impairment, chronic atrial fibrillation    Patient presents to the emerged department after onset of generalized weakness accompanied with visual disturbances.  In the emerged department found to have acute on chronic hyponatremia    Nephrology consultation been requested for the management    Review of Systems:   14 point review of systems is otherwise negative except for mentioned above on HPI    Personal History     Past Medical History:   Diagnosis Date    Benign essential hypertension     Bipolar disorder     Dyslipidemia     GERD (gastroesophageal reflux disease)     Heart disease     History of stroke     Hypothyroidism        History reviewed. No pertinent surgical history.    Family History: family history includes Diabetes in an other family member; Heart disease in an other family member; Hypertension in an other family member.    Social History:  reports that he has never smoked. He has never used smokeless tobacco. He reports that he does not drink alcohol and does not use drugs.    Home Medications:  Prior to Admission medications    Medication Sig Start Date End Date Taking? Authorizing Provider   Acetaminophen Extra Strength 500 MG tablet Take 1 tablet by mouth 4 (Four) Times a Day As Needed. 11/15/22   Tobin Mcbride MD   atorvastatin (LIPITOR) 40 MG tablet  22   Tobin Mcbride MD   benztropine (COGENTIN) 0.5 MG tablet Take 2 tablets by mouth 2 (Two) Times a Day. 10/11/22   Tobin Mcbride MD    digoxin (LANOXIN) 125 MCG tablet  11/18/22   Tobin Mcbride MD   divalproex (DEPAKOTE) 500 MG DR tablet  10/27/22   Tobin Mcbride MD   Eliquis 5 MG tablet tablet Take 1 tablet by mouth Every 12 (Twelve) Hours. 11/21/22   Tobin Mcbride MD   levothyroxine (SYNTHROID, LEVOTHROID) 50 MCG tablet Take 1.5 tablets by mouth Daily. 11/18/22   Tobin Mcbride MD   melatonin 5 MG tablet tablet Take 2 tablets by mouth Every Night.    Tobin Mcbride MD   metoprolol tartrate (LOPRESSOR) 25 MG tablet Take 0.5 tablets by mouth Every 12 (Twelve) Hours. Hold SBP <95 7/12/24   Vinny Suggs MD   mirtazapine (REMERON SOL-TAB) 15 MG disintegrating tablet Place 1 tablet on the tongue Every Night.    Tobin Mcbride MD   OLANZapine zydis (zyPREXA) 5 MG disintegrating tablet  11/28/22   Tobin Mcbride MD   polyethylene glycol (MIRALAX) 17 GM/SCOOP powder Dissolve 17 g (1 capful) in liquid and drink by mouth Daily. 5/6/24   Ronnie Rojas MD   sucralfate (CARAFATE) 1 g tablet Take 1 tablet by mouth 3 (Three) Times a Day Before Meals. 7/12/24   Vinny Suggs MD       Allergies:  Allergies   Allergen Reactions    Clonazepam Unknown (See Comments)    Fluoxetine Unknown (See Comments)    Grass Unknown - High Severity    Grass Pollen(K-O-R-T-Swt Kennedy) Unknown - Low Severity    Hydroxyzine Unknown (See Comments)    Methylphenidate Unknown (See Comments)    Quetiapine Unknown - Low Severity    Risperidone Unknown (See Comments)    Topamax [Topiramate] Unknown - High Severity       Objective     Vitals:   Temp:  [97.2 °F (36.2 °C)-98.7 °F (37.1 °C)] 97.2 °F (36.2 °C)  Heart Rate:  [65-83] 65  Resp:  [18] 18  BP: (111-144)/() 126/90    Intake/Output Summary (Last 24 hours) at 3/12/2025 0825  Last data filed at 3/12/2025 0535  Gross per 24 hour   Intake 210 ml   Output 700 ml   Net -490 ml       Physical Exam:   Constitutional: Awake, alert, no acute distress.  HEENT: Sclera  anicteric, no conjunctival injection  Neck: Supple, no thyromegaly, no lymphadenopathy, trachea at midline, no JVD  Respiratory: Clear to auscultation bilaterally, nonlabored respiration  Cardiovascular: RRR, no murmurs, no rubs or gallops, no carotid bruit  Gastrointestinal: Positive bowel sounds, abdomen is soft, nontender and nondistended  : No palpable bladder  Musculoskeletal: No edema, no clubbing or cyanosis  Psychiatric: Appropriate affect, cooperative  Neurologic: Oriented x3, moving all extremities, normal speech and mental status  Skin: Warm and dry       Scheduled Meds:     apixaban, 5 mg, Oral, Q12H  atorvastatin, 40 mg, Oral, Daily  benztropine, 1 mg, Oral, BID  digoxin, 125 mcg, Oral, Daily  divalproex, 500 mg, Oral, Q12H  levothyroxine, 75 mcg, Oral, Daily  metoprolol tartrate, 12.5 mg, Oral, Q12H  mirtazapine, 15 mg, Oral, Nightly  OLANZapine zydis, 5 mg, Oral, Nightly  torsemide, 20 mg, Oral, Daily      IV Meds:        Results Reviewed:   I have personally reviewed the results from the time of this admission to 3/12/2025 08:25 EDT     Lab Results   Component Value Date    GLUCOSE 74 03/12/2025    CALCIUM 8.6 03/12/2025     (L) 03/12/2025    K 4.3 03/12/2025    CO2 23.7 03/12/2025    CL 99 03/12/2025    BUN 11 03/12/2025    CREATININE 0.72 (L) 03/12/2025    BCR 15.3 03/12/2025    ANIONGAP 10.3 03/12/2025      Lab Results   Component Value Date    MG 1.6 07/12/2024    PHOS 2.5 07/12/2024    ALBUMIN 3.9 03/11/2025           Assessment / Plan       Seizure-like activity      ASSESSMENT:    Acute on chronic hypotonic normovolemic hyponatremia.  Urine osmolality 274, urine osmolarity 447 urine sodium 79 consistent with SIADH pattern will continue fluid restriction sodium gradually improved with medical management    Bipolar disorder as per primary team    Congestive heart failure currently on digoxin.  No recent echocardiogram available.  Volume status normovolemic    Chronic atrial fibrillation  controlled metoprolol anticoagulant anticoagulation      PLAN:  -Will continue medical management with fluid restriction and will add torsemide 20 mg daily  -Unlikely that his initial symptoms might be Atrovent by his hyponatremia as patient is known to have chronic hyponatremia and this is not of his regular range  -Avoid overcorrection of his sodium allowing 6 to 8 mEq/day  -Continue surveillance labs    Thank you for involving us in the care of Helder Abbott.  Please feel free to call with any questions.    Maico Marin MD  03/12/25  08:25 EDT    [facility]  [phone]      Please note that portions of this note were completed with a voice recognition program.

## 2025-03-12 NOTE — THERAPY EVALUATION
Patient Name: Helder Abbott  : 1975    MRN: [id number]                              Today's Date: 3/12/2025       Admit Date: 3/11/2025    Visit Dx:     ICD-10-CM ICD-9-CM   1. Seizure-like activity  R56.9 780.39   2. Hyponatremia  E87.1 276.1     Patient Active Problem List   Diagnosis    Essential hypertension    Hypothyroidism (acquired)    History of CVA (cerebrovascular accident)    GERD without esophagitis    HLD (hyperlipidemia)    Bipolar disorder    Partial bowel obstruction    Fecal impaction    Severe malnutrition    Hyperkalemia    Syncope    Atrial fibrillation, chronic    Dehydration    Chronic anticoagulation    Thrombocytopenia    Seizure-like activity    Hyponatremia     Past Medical History:   Diagnosis Date    Benign essential hypertension     Bipolar disorder     Dyslipidemia     GERD (gastroesophageal reflux disease)     Heart disease     History of stroke     Hypothyroidism      History reviewed. No pertinent surgical history.   General Information       Row Name 25 1032          OT Time and Intention    Subjective Information no complaints  -RB     Document Type evaluation  -RB     Mode of Treatment individual therapy;occupational therapy  -RB     Patient Effort good  -RB       Row Name 25 1032          General Information    Patient Profile Reviewed yes  -RB     Prior Level of Function --  supervision from his caregiver for ADL/mobility at his group home  -RB     Existing Precautions/Restrictions fall;seizures  -RB     Barriers to Rehab cognitive status;previous functional deficit  -RB       Row Name 25 1032          Living Environment    Current Living Arrangements group home  -RB       Row Name 25 1032          Cognition    Orientation Status (Cognition) oriented to;person;place;verbal cues/prompts needed for orientation  baseline cognitive deficit  -RB       Row Name 25 1032          Safety Issues/Impairments Affecting Functional Mobility    Safety  Issues Affecting Function (Mobility) safety precaution awareness;safety precautions follow-through/compliance;insight into deficits/self-awareness;awareness of need for assistance;problem-solving;judgment;sequencing abilities  -RB     Impairments Affecting Function (Mobility) balance;endurance/activity tolerance;cognition;strength  -RB               User Key  (r) = Recorded By, (t) = Taken By, (c) = Cosigned By      Initials Name Provider Type    RB Karen Barone OT Occupational Therapist                     Mobility/ADL's       Row Name 03/12/25 1035          Bed Mobility    Bed Mobility supine-sit;sit-supine  -RB     Supine-Sit [place] (Bed Mobility) standby assist;verbal cues  -RB     Sit-Supine [place] (Bed Mobility) standby assist;verbal cues  -RB       Row Name 03/12/25 1035          Transfers    Transfers sit-stand transfer;stand-sit transfer  -RB       Row Name 03/12/25 1035          Sit-Stand Transfer    Sit-Stand [place] (Transfers) verbal cues;contact guard  -RB     Comment, (Sit-Stand Transfer) a  -       Row Name 03/12/25 1035          Stand-Sit Transfer    Stand-Sit [place] (Transfers) contact guard;verbal cues  -RB     Comment, (Stand-Sit Transfer) a  -       Row Name 03/12/25 1035          Functional Mobility    Functional Mobility- Ind. Level contact guard assist;verbal cues required  -RB     Functional Mobility- Comment [facility] - mobilized to sinkside for standing grooming  -       Row Name 03/12/25 1035          Activities of Daily Living    BADL Assessment/Intervention lower body dressing;grooming  -[facility] Name 03/12/25 1035          Lower Body Dressing Assessment/Training    [place] Level (Lower Body Dressing) lower body dressing skills;set up;socks;verbal cues  -RB     Position (Lower Body Dressing) edge of bed sitting  -       Row Name 03/12/25 1035          Grooming Assessment/Training    [place] Level (Grooming) grooming skills;verbal  cues;oral care regimen;contact guard assist  -RB     Position (Grooming) sink side;supported standing  -RB     Comment, (Grooming) cues for thoroughness  -RB               User Key  (r) = Recorded By, (t) = Taken By, (c) = Cosigned By      Initials Name Provider Type    Karen Castillo OT Occupational Therapist                   Obj/Interventions       Row Name 03/12/25 1038          Sensory Assessment (Somatosensory)    Sensory Assessment (Somatosensory) sensation intact  -RB       Row Name 03/12/25 1038          Vision Assessment/Intervention    Visual Impairment/Limitations WFL  -RB       Row Name 03/12/25 1038          Range of Motion Comprehensive    General Range of Motion no range of motion deficits identified  -RB       Row Name 03/12/25 1038          Strength Comprehensive (MMT)    Comment, General Manual Muscle Testing (MMT) Assessment Generalized weakness in his BLE's, no focal deficits  -RB       Row Name 03/12/25 1038          Balance    Comment, Balance SBA/CGA sitting balance, CGA via HHA for standing balance  -RB               User Key  (r) = Recorded By, (t) = Taken By, (c) = Cosigned By      Initials Name Provider Type    Karen Castillo OT Occupational Therapist                   Goals/Plan       Row Name 03/12/25 1040          Bed Mobility Goal 1 (OT)    Activity/Assistive Device (Bed Mobility Goal 1, OT) bed mobility activities, all  -RB     [place] Level/Cues Needed (Bed Mobility Goal 1, OT) standby assist  -RB     Time Frame (Bed Mobility Goal 1, OT) short term goal (STG);2 weeks  -RB     Progress/Outcomes (Bed Mobility Goal 1, OT) new goal  -RB       Row Name 03/12/25 1040          Transfer Goal 1 (OT)    Activity/Assistive Device (Transfer Goal 1, OT) transfers, all  -RB     [place] Level/Cues Needed (Transfer Goal 1, OT) standby assist  -RB     Time Frame (Transfer Goal 1, OT) short term goal (STG);2 weeks  -RB     Progress/Outcome (Transfer Goal 1, OT) new goal  -RB        Row Name 03/12/25 1040          Bathing Goal 1 (OT)    Activity/Device (Bathing Goal 1, OT) bathing skills, all  -RB     [place] Level/Cues Needed (Bathing Goal 1, OT) standby assist  -RB     Time Frame (Bathing Goal 1, OT) short term goal (STG);2 weeks  -RB     Progress/Outcomes (Bathing Goal 1, OT) new goal  -RB       Row Name 03/12/25 1040          Dressing Goal 1 (OT)    Activity/Device (Dressing Goal 1, OT) dressing skills, all  -RB     [place]/Cues Needed (Dressing Goal 1, OT) standby assist  -RB     Time Frame (Dressing Goal 1, OT) short term goal (STG);2 weeks  -RB     Progress/Outcome (Dressing Goal 1, OT) new goal  -RB       Row Name 03/12/25 1040          Toileting Goal 1 (OT)    Activity/Device (Toileting Goal 1, OT) toileting skills, all  -RB     [place] Level/Cues Needed (Toileting Goal 1, OT) standby assist  -RB     Time Frame (Toileting Goal 1, OT) short term goal (STG);2 weeks  -RB     Progress/Outcome (Toileting Goal 1, OT) new goal  -RB       Row Name 03/12/25 1040          Grooming Goal 1 (OT)    Activity/Device (Grooming Goal 1, OT) grooming skills, all  -RB     [place] (Grooming Goal 1, OT) standby assist  -RB     Time Frame (Grooming Goal 1, OT) short term goal (STG);2 weeks  -RB     Progress/Outcome (Grooming Goal 1, OT) new goal  -RB       Row Name 03/12/25 1040          Therapy Assessment/Plan (OT)    Planned Therapy Interventions (OT) transfer/mobility retraining;strengthening exercise;ROM/therapeutic exercise;activity tolerance training;adaptive equipment training;BADL retraining;functional balance retraining;occupation/activity based interventions;patient/caregiver education/training;cognitive/visual perception retraining  -RB               User Key  (r) = Recorded By, (t) = Taken By, (c) = Cosigned By      Initials Name Provider Type    Karen Castillo, OT Occupational Therapist                   Clinical Impression       Row Name 03/12/25 1036           Pain Assessment    Pretreatment Pain Rating 0/10 - no pain  -RB     Posttreatment Pain Rating 0/10 - no pain  -RB       Row Name 03/12/25 1039          Plan of Care Review    Plan of Care Reviewed With patient  -RB     Progress no change  -RB     Outcome Evaluation The pt was admitted to [facility] 2/2 to feeling weak and reports acute onset of blurred vision. Dx includes seizure like activity and hyponatremia. Pmhx significant for bipolar disorder, dyslipidemia, gastroesophageal flux disease, cerebrovascular accident hypothyroidism, mild cognitive impairment, chronic atrial fibrillation. The pt resides in a group home where it is reported that he is independent with ADL's and mobility with caregiver supervision. Today, the pt was pleasantly confused. He completed bed mobility with SBA. S/S LBD seated at the EOB. The pt stood with CGA via HHA and mobilized to sinkside for standing grooming - he required cues for sequencing, following multi step commands and thoroughness of oral care. The pt demonstrated no LOB, slight unsteadiness noticed.. The pt may benefit from continued skilled OT services while admitted to [facility] with a d/c back to his group home with  pending progress.  -RB       Row Name 03/12/25 1039          Therapy Assessment/Plan (OT)    Rehab Potential (OT) good  -RB     Criteria for Skilled Therapeutic Interventions Met (OT) yes;skilled treatment is necessary  -RB     Therapy Frequency (OT) 5 times/wk  -RB       Row Name 03/12/25 1039          Therapy Plan Review/Discharge Plan (OT)    Anticipated Discharge Disposition (OT) home with assist;home with home health  -RB       Row Name 03/12/25 1039          Positioning and Restraints    Pre-Treatment Position in bed  -RB     Post Treatment Position bed  -RB     In Bed notified nsg;supine;encouraged to call for assist;exit alarm on;fowlers;call light within reach;legs elevated  -RB               User Key  (r) = Recorded By, (t) = Taken By, (c) = Cosigned By       Initials Name Provider Type    Karen Castillo OT Occupational Therapist                   Outcome Measures       Row Name 03/12/25 1041          How much help from another is currently needed...    Putting on and taking off regular lower body clothing? 3  -RB     Bathing (including washing, rinsing, and drying) 2  -RB     Toileting (which includes using toilet bed pan or urinal) 2  -RB     Putting on and taking off regular upper body clothing 3  -RB     Taking care of personal grooming (such as brushing teeth) 3  -RB     Eating meals 3  -RB     AM-PAC 6 Clicks Score (OT) 16  -RB       Row Name 03/11/25 2303          How much help from another person do you currently need...    Turning from your back to your side while in flat bed without using bedrails? 4  -JW     Moving from lying on back to sitting on the side of a flat bed without bedrails? 4  -JW     Moving to and from a bed to a chair (including a wheelchair)? 3  -JW     Standing up from a chair using your arms (e.g., wheelchair, bedside chair)? 3  -JW     Climbing 3-5 steps with a railing? 3  -JW     To walk in hospital room? 3  -JW     AM-PAC 6 Clicks Score (PT) 20  -JW       Row Name 03/12/25 1041          Modified [place] Scale    Modified Pamela Scale 4 - Moderately severe disability.  Unable to walk without assistance, and unable to attend to own bodily needs without assistance.  -       Row Name 03/12/25 1041          Functional Assessment    Outcome Measure Options AM-PAC 6 Clicks Daily Activity (OT);Modified Pamela  -RB               User Key  (r) = Recorded By, (t) = Taken By, (c) = Cosigned By      Initials Name Provider Type    Karen Castillo OT Occupational Therapist    Jad Velazco, RN Registered Nurse                      OT Recommendation and Plan  Planned Therapy Interventions (OT): transfer/mobility retraining, strengthening exercise, ROM/therapeutic exercise, activity tolerance training, adaptive equipment training, BADL  retraining, functional balance retraining, occupation/activity based interventions, patient/caregiver education/training, cognitive/visual perception retraining  Therapy Frequency (OT): 5 times/wk  Plan of Care Review  Plan of Care Reviewed With: patient  Progress: no change  Outcome Evaluation: The pt was admitted to [facility] 2/2 to feeling weak and reports acute onset of blurred vision. Dx includes seizure like activity and hyponatremia. Pmhx significant for bipolar disorder, dyslipidemia, gastroesophageal flux disease, cerebrovascular accident hypothyroidism, mild cognitive impairment, chronic atrial fibrillation. The pt resides in a group home where it is reported that he is independent with ADL's and mobility with caregiver supervision. Today, the pt was pleasantly confused. He completed bed mobility with SBA. S/S LBD seated at the EOB. The pt stood with CGA via HHA and mobilized to sinkside for standing grooming - he required cues for sequencing, following multi step commands and thoroughness of oral care. The pt demonstrated no LOB, slight unsteadiness noticed.. The pt may benefit from continued skilled OT services while admitted to [facility] with a d/c back to his group home with  pending progress.     Time Calculation:   Evaluation Complexity (OT)  Review Occupational Profile/Medical/Therapy History Complexity: expanded/moderate complexity  Assessment, Occupational Performance/Identification of Deficit Complexity: 3-5 performance deficits  Clinical Decision Making Complexity (OT): detailed assessment/moderate complexity  Overall Complexity of Evaluation (OT): moderate complexity     Time Calculation- OT       Row Name 03/12/25 1031             Time Calculation- OT    OT Start Time 0932  -RB      OT Stop Time 1000  -RB      OT Time Calculation (min) 28 min  -RB      Total Timed Code Minutes- OT 18 minute(s)  -RB      OT Received On 03/12/25  -RB      OT - Next Appointment 03/13/25  -      OT Goal Re-Cert Due Date  03/26/25  -RB         Timed Charges    90439 - OT Self Care/Mgmt Minutes 18  -RB         Untimed Charges    OT Eval/Re-eval Minutes 10  -RB         Total Minutes    Timed Charges Total Minutes 18  -RB      Untimed Charges Total Minutes 10  -RB       Total Minutes 28  -RB                User Key  (r) = Recorded By, (t) = Taken By, (c) = Cosigned By      Initials Name Provider Type    RB Karen Barone OT Occupational Therapist                  Therapy Charges for Today       Code Description Service Date Service Provider Modifiers Qty    40308012677 HC OT SELF CARE/MGMT/TRAIN EA 15 MIN 3/12/2025 Karen Barone OT GO 1    23371634407 HC OT EVAL MOD COMPLEXITY 3 3/12/2025 Karen Barone OT GO 1                 Karen Barone OT  3/12/2025

## 2025-03-12 NOTE — PLAN OF CARE
Goal Outcome Evaluation:  Plan of Care Reviewed With: patient           Outcome Evaluation: Helder Abbott is a 50 year old male seen for physical therapy evaluation after admission for weakness and blurred vision. Dx includes seizure like activity. He is from a group center with daily caregiver. He reports that he is indep with mobility with help of caregiver. Cognitive impairment at baseline. He performs bed mobility with modified independence, STS with CGA and HHA and ambulates 20 ft with HHA and CGA. Suspect that pt. is at his baseline. Spoke with RN, who reports caregiver is coming to the hospital to see patient. Will continue to follow at a low frequency to ensure pt. needs are met with mobility. Rec return to group home at d/c.    Anticipated Discharge Disposition (PT): other (see comments) (group home)

## 2025-03-12 NOTE — PLAN OF CARE
Problem: Adult Inpatient Plan of Care  Goal: Plan of Care Review  Outcome: Progressing  Goal: Absence of Hospital-Acquired Illness or Injury  Outcome: Progressing  Goal: Optimal Comfort and Wellbeing  Outcome: Progressing  Goal: Readiness for Transition of Care  Outcome: Progressing  Intervention: Mutually Develop Transition Plan  Recent Flowsheet Documentation  Taken 3/11/2025 2307 by Jad Knowles, RN  Equipment Currently Used at Home: none  Taken 3/11/2025 2306 by Jad Knowles, RN  Transportation Anticipated: family or friend will provide  Patient/Family Anticipated Services at Transition: none  Patient/Family Anticipates Transition to: long-term care facility     Problem: Fall Injury Risk  Goal: Absence of Fall and Fall-Related Injury  Outcome: Progressing   Goal Outcome Evaluation:

## 2025-03-12 NOTE — PROGRESS NOTES
Name: Helder Abbott ADMIT: 3/11/2025   : 1975  PCP: Provider, No Known    MRN: [id number] LOS: 0 days   AGE/SEX: 50 y.o. male  ROOM: [facility]     Subjective   Subjective   No new complaints.  Denies headache.  Tolerating diet.       Objective   Objective   Vital Signs  Temp:  [97.2 °F (36.2 °C)-98.7 °F (37.1 °C)] 97.5 °F (36.4 °C)  Heart Rate:  [65-82] 78  Resp:  [18] 18  BP: (111-142)/(67-94) 122/83  SpO2:  [96 %-100 %] 100 %  on   ;   Device (Oxygen Therapy): room air  Body mass index is 20.9 kg/m².  Physical Exam  Vitals and nursing note reviewed.   Constitutional:       General: He is not in acute distress.  Cardiovascular:      Rate and Rhythm: Normal rate and regular rhythm.   Pulmonary:      Effort: Pulmonary effort is normal.      Breath sounds: Normal breath sounds.   Abdominal:      General: Bowel sounds are normal.      Palpations: Abdomen is soft.      Tenderness: There is no abdominal tenderness.   Musculoskeletal:         General: No swelling.   Skin:     General: Skin is warm and dry.   Neurological:      Mental Status: He is alert. Mental status is at baseline.       Results Review     I reviewed the patient's new clinical results.  Results from last 7 days   Lab Units 25  0559 25  1531   WBC 10*3/mm3 6.16 8.02   HEMOGLOBIN g/dL 14.7 15.4   PLATELETS 10*3/mm3 110* 117*     Results from last 7 days   Lab Units 25  0559 25  1531   SODIUM mmol/L 133* 128*   POTASSIUM mmol/L 4.3 4.3   CHLORIDE mmol/L 99 94*   CO2 mmol/L 23.7 23.7   BUN mg/dL 11 12   CREATININE mg/dL 0.72* 0.82   GLUCOSE mg/dL 74 85   EGFR mL/min/1.73 111.3 107.0     Results from last 7 days   Lab Units 25  1531   ALBUMIN g/dL 3.9   BILIRUBIN mg/dL 0.7   ALK PHOS U/L 86   AST (SGOT) U/L 30   ALT (SGPT) U/L 10     Results from last 7 days   Lab Units 25  0559 25  1531   CALCIUM mg/dL 8.6 9.1   ALBUMIN g/dL  --  3.9     Results from last 7 days   Lab Units 25  1538   LACTATE mmol/L  "1.2     No results found for: \"HGBA1C\", \"POCGLU\"    No radiology results for the last day    I have personally reviewed all medications:  Scheduled Medications  apixaban, 5 mg, Oral, Q12H  atorvastatin, 40 mg, Oral, Daily  benztropine, 1 mg, Oral, BID  digoxin, 125 mcg, Oral, Daily  [START ON 3/13/2025] divalproex, 2,000 mg, Oral, Nightly  haloperidol, 5 mg, Oral, TID  levothyroxine, 75 mcg, Oral, Daily  [START ON 3/13/2025] metoprolol succinate XL, 25 mg, Oral, Daily  OLANZapine, 10 mg, Oral, Nightly  [START ON 3/13/2025] torsemide, 20 mg, Oral, Daily    Infusions   Diet  Diet: Cardiac, Fluid Restriction (240 mL/tray); Healthy Heart (2-3 Na+); 1500 mL/day; Fluid Consistency: Thin (IDDSI 0)    I have personally reviewed:  [x]  Laboratory   [x]  Microbiology   [x]  Radiology   [x]  EKG/Telemetry  [x]  Cardiology/Vascular   []  Pathology    []  Records       Assessment/Plan     Active Hospital Problems    Diagnosis  POA    **Seizure-like activity [R56.9]  Yes    Hyponatremia [E87.1]  Yes    Thrombocytopenia [D69.6]  Yes    Atrial fibrillation, chronic [I48.20]  Yes    Bipolar disorder [F31.9]  Yes    Essential hypertension [I10]  Yes      Resolved Hospital Problems   No resolved problems to display.   Hyponatremia  Hypothyroidism  Hyperlipidemia  A fib  Mild cognitive impairment      50 y.o. male admitted with Seizure-like activity.    EEG still not interpreted.  Await neurology follow-up recommendations.  Appreciate nephrology assistance.  Sodium better today.    Disposition?       Eliquis (home med) for DVT prophylaxis.  Full code.  Discussed with patient.  Expected Discharge Date: 3/13/2025; Expected Discharge Time:       Fernando Wright MD  [facility]  03/12/25  16:12 EDT  "

## 2025-03-12 NOTE — PLAN OF CARE
The pt was admitted to [facility] 2/2 to feeling weak and reports acute onset of blurred vision. Dx includes seizure like activity and hyponatremia. Pmhx significant for bipolar disorder, dyslipidemia, gastroesophageal flux disease, cerebrovascular accident hypothyroidism, mild cognitive impairment, chronic atrial fibrillation. The pt resides in a group home where it is reported that he is independent with ADL's and mobility with caregiver supervision. Today, the pt was pleasantly confused. He completed bed mobility with SBA. S/S LBD seated at the EOB. The pt stood with CGA via HHA and mobilized to sinkside for standing grooming - he required cues for sequencing, following multi step commands and thoroughness of oral care. The pt demonstrated no LOB, slight unsteadiness noticed.. The pt may benefit from continued skilled OT services while admitted to [facility] with a d/c back to his group home with HH pending progress.

## 2025-03-12 NOTE — NURSING NOTE
Received call from cardiac telemetry informing author that patient had 3.3 second pause. Patient sleeping without complaint. Patient has remained in afib during shift. [facility] provider notified, no new orders received.

## 2025-03-12 NOTE — THERAPY EVALUATION
Patient Name: Helder Abbott  : 1975    MRN: [id number]                              Today's Date: 3/12/2025       Admit Date: 3/11/2025    Visit Dx:     ICD-10-CM ICD-9-CM   1. Seizure-like activity  R56.9 780.39   2. Hyponatremia  E87.1 276.1     Patient Active Problem List   Diagnosis    Essential hypertension    Hypothyroidism (acquired)    History of CVA (cerebrovascular accident)    GERD without esophagitis    HLD (hyperlipidemia)    Bipolar disorder    Partial bowel obstruction    Fecal impaction    Severe malnutrition    Hyperkalemia    Syncope    Atrial fibrillation, chronic    Dehydration    Chronic anticoagulation    Thrombocytopenia    Seizure-like activity    Hyponatremia     Past Medical History:   Diagnosis Date    Benign essential hypertension     Bipolar disorder     Dyslipidemia     GERD (gastroesophageal reflux disease)     Heart disease     History of stroke     Hypothyroidism      History reviewed. No pertinent surgical history.   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  -ER     Existing Precautions/Restrictions fall;seizures  -ER     Barriers to Rehab cognitive status;previous functional deficit  -ER       Row Name 25 1521          Living Environment    Current Living Arrangements group home  -ER     People in Home --  group home  -ER       Row Name 25 1521          Cognition    Orientation Status (Cognition) oriented to;person;place;verbal cues/prompts needed for orientation  baseline cognitive deficit  -ER       Row Name 25 1521          Safety Issues/Impairments Affecting Functional Mobility    Safety Issues Affecting Function (Mobility) safety precautions  follow-through/compliance;safety precaution awareness;insight into deficits/self-awareness;awareness of need for assistance;judgment;sequencing abilities  -ER     Impairments Affecting Function (Mobility) balance;endurance/activity tolerance;cognition;strength  -ER     Cognitive Impairments, Mobility Safety/Performance attention;insight into deficits/self-awareness;problem-solving/reasoning;safety precaution awareness;safety precaution follow-through;awareness, need for assistance  -ER               User Key  (r) = Recorded By, (t) = Taken By, (c) = Cosigned By      Initials Name Provider Type    ER Heidi Pop, PT Physical Therapist                   Mobility       Row Name 03/12/25 1523          Bed Mobility    Bed Mobility supine-sit;sit-supine  -ER     Supine-Sit [place] (Bed Mobility) modified independence  -ER     Sit-Supine [place] (Bed Mobility) modified independence  -ER       Row Name 03/12/25 1523          Sit-Stand Transfer    Sit-Stand [place] (Transfers) contact guard  -ER     Comment, (Sit-Stand Transfer) HHA  -ER       Row Name 03/12/25 1523          Gait/Stairs (Locomotion)    [place] Level (Gait) contact guard  -ER     Assistive Device (Gait) other (see comments)  HHA  -ER     Patient was able to Ambulate yes  -ER     Distance in Feet (Gait) 20  -ER     Deviations/Abnormal Patterns (Gait) bilateral deviations;gait speed decreased;naresh decreased;stride length decreased  -ER     Comment, (Gait/Stairs) Pt. easily distracted from task  -ER               User Key  (r) = Recorded By, (t) = Taken By, (c) = Cosigned By      Initials Name Provider Type    ER Heidi Pop, PT Physical Therapist                   Obj/Interventions       Row Name 03/12/25 1524          Range of Motion Comprehensive    General Range of Motion no range of motion deficits identified  -ER       Row Name 03/12/25 1524          Strength Comprehensive (MMT)    Comment, General Manual Muscle Testing (MMT)  Assessment generalized BLE weakness, not limiting this date  -ER       Row Name 03/12/25 1524          Balance    Balance Assessment sitting static balance;standing dynamic balance;standing static balance;sitting dynamic balance  -ER     Static Sitting Balance modified independence  -ER     Dynamic Sitting Balance modified independence  -ER     Position, Sitting Balance sitting edge of bed;unsupported  -ER     Static Standing Balance supervision  -ER     Dynamic Standing Balance contact guard  -ER     Position/Device Used, Standing Balance supported  -ER     Balance Interventions sitting;sit to stand;supported;static;dynamic;standing  -ER       Row Name 03/12/25 1524          Sensory Assessment (Somatosensory)    Sensory Assessment (Somatosensory) sensation intact  -ER               User Key  (r) = Recorded By, (t) = Taken By, (c) = Cosigned By      Initials Name Provider Type    ER Heidi Pop, PT Physical Therapist                   Goals/Plan       Row Name 03/12/25 1528          Bed Mobility Goal 1 (PT)    Activity/Assistive Device (Bed Mobility Goal 1, PT) bed mobility activities, all  -ER     [place] Level/Cues Needed (Bed Mobility Goal 1, PT) independent  -ER     Time Frame (Bed Mobility Goal 1, PT) 2 weeks  -ER       Row Name 03/12/25 1528          Transfer Goal 1 (PT)    Activity/Assistive Device (Transfer Goal 1, PT) transfers, all  -ER     [place] Level/Cues Needed (Transfer Goal 1, PT) standby assist;supervision required  -ER     Time Frame (Transfer Goal 1, PT) 2 weeks  -ER       Row Name 03/12/25 1528          Gait Training Goal 1 (PT)    Activity/Assistive Device (Gait Training Goal 1, PT) gait (walking locomotion)  -ER     [place] Level (Gait Training Goal 1, PT) standby assist;supervision required  -ER     Time Frame (Gait Training Goal 1, PT) 2 weeks  -ER       Row Name 03/12/25 1528          Therapy Assessment/Plan (PT)    Planned Therapy Interventions (PT) balance training;bed  mobility training;gait training;patient/family education;stretching;strengthening;stair training;postural re-education;transfer training;home exercise program;neuromuscular re-education;ROM (range of motion)  -ER               User Key  (r) = Recorded By, (t) = Taken By, (c) = Cosigned By      Initials Name Provider Type    ER Heidi Pop, PT Physical Therapist                   Clinical Impression       Row Name 03/12/25 1524          Pain    Pretreatment Pain Rating 0/10 - no pain  -ER     Posttreatment Pain Rating 0/10 - no pain  -ER       Row Name 03/12/25 1523          Plan of Care Review    Plan of Care Reviewed With patient  -ER     Outcome Evaluation Helder Abbott is a 50 year old male seen for physical therapy evaluation after admission for weakness and blurred vision. Dx includes seizure like activity. He is from a group center with daily caregiver. He reports that he is indep with mobility with help of caregiver. Cognitive impairment at baseline. He performs bed mobility with modified independence, STS with CGA and HHA and ambulates 20 ft with HHA and CGA. Suspect that pt. is at his baseline. Spoke with RN, who reports caregiver is coming to the hospital to see patient. Will continue to follow at a low frequency to ensure pt. needs are met with mobility. Rec return to group home at d/c.  -ER       Row Name 03/12/25 1520          Therapy Assessment/Plan (PT)    Criteria for Skilled Interventions Met (PT) yes  -ER     Therapy Frequency (PT) 2 times/wk  -ER       Row Name 03/12/25 1520          Positioning and Restraints    Pre-Treatment Position in bed  -ER     Post Treatment Position bed  -ER     In Bed notified nsg;call light within reach;encouraged to call for assist;exit alarm on  -ER               User Key  (r) = Recorded By, (t) = Taken By, (c) = Cosigned By      Initials Name Provider Type    ER Heidi Pop, PT Physical Therapist                   Outcome Measures       Row Name 03/12/25 1529           How much help from another person do you currently need...    Turning from your back to your side while in flat bed without using bedrails? 4  -ER     Moving from lying on back to sitting on the side of a flat bed without bedrails? 4  -ER     Moving to and from a bed to a chair (including a wheelchair)? 3  -ER     Standing up from a chair using your arms (e.g., wheelchair, bedside chair)? 3  -ER     Climbing 3-5 steps with a railing? 3  -ER     To walk in hospital room? 3  -ER     AM-PAC 6 Clicks Score (PT) 20  -ER     Highest Level of Mobility Goal 6 --> Walk 10 steps or more  -ER       Row Name 03/12/25 1041          Modified [place] Scale    Modified [place] Scale 4 - Moderately severe disability.  Unable to walk without assistance, and unable to attend to own bodily needs without assistance.  -RB       Row Name 03/12/25 1528 03/12/25 1041       Functional Assessment    Outcome Measure Options AM-PAC 6 Clicks Basic Mobility (PT)  -ER AM-PAC 6 Clicks Daily Activity (OT);Modified [place]  -RB              User Key  (r) = Recorded By, (t) = Taken By, (c) = Cosigned By      Initials Name Provider Type    Karen Castillo OT Occupational Therapist    Heidi Vieira PT Physical Therapist                                 Physical Therapy Education       Title: PT OT SLP Therapies (In Progress)       Topic: Physical Therapy (In Progress)       Point: Mobility training (In Progress)       Learning Progress Summary            Patient Acceptance, E, NL,NR by ER at 3/12/2025 1529                      Point: Home exercise program (In Progress)       Learning Progress Summary            Patient Acceptance, E, NL,NR by ER at 3/12/2025 1529                      Point: Body mechanics (In Progress)       Learning Progress Summary            Patient Acceptance, E, NL,NR by ER at 3/12/2025 1529                      Point: Precautions (In Progress)       Learning Progress Summary            Patient Acceptance, E, NL,NR by ER at  3/12/2025 1529                                      User Key       Initials Effective Dates Name Provider Type Discipline    ER 10/15/23 -  Heidi Pop PT Physical Therapist PT                  PT Recommendation and Plan  Planned Therapy Interventions (PT): balance training, bed mobility training, gait training, patient/family education, stretching, strengthening, stair training, postural re-education, transfer training, home exercise program, neuromuscular re-education, ROM (range of motion)  Outcome Evaluation: Helder Abbott is a 50 year old male seen for physical therapy evaluation after admission for weakness and blurred vision. Dx includes seizure like activity. He is from a group center with daily caregiver. He reports that he is indep with mobility with help of caregiver. Cognitive impairment at baseline. He performs bed mobility with modified independence, STS with CGA and HHA and ambulates 20 ft with HHA and CGA. Suspect that pt. is at his baseline. Spoke with RN, who reports caregiver is coming to the hospital to see patient. Will continue to follow at a low frequency to ensure pt. needs are met with mobility. Rec return to group home at d/c.     Time Calculation:         PT Charges       Row Name 03/12/25 1529             Time Calculation    Start Time 1430  -ER      Stop Time 1445  -ER      Time Calculation (min) 15 min  -ER      PT Received On 03/12/25  -ER      PT - Next Appointment 03/14/25  -ER      PT Goal Re-Cert Due Date 03/26/25  -ER         Time Calculation- PT    Total Timed Code Minutes- PT 10 minute(s)  -ER         Timed Charges    92787 - PT Therapeutic Activity Minutes 10  -ER         Total Minutes    Timed Charges Total Minutes 10  -ER       Total Minutes 10  -ER                User Key  (r) = Recorded By, (t) = Taken By, (c) = Cosigned By      Initials Name Provider Type    ER Heidi Pop PT Physical Therapist                  Therapy Charges for Today       Code Description Service  Date Service Provider Modifiers Qty    [id number]  PT THERAPEUTIC ACT EA 15 MIN 3/12/2025 Heidi Pop, PT GP 1    [id number]  PT EVAL MOD COMPLEXITY 3 3/12/2025 Heidi Pop, PT GP 1            PT G-Codes  Outcome Measure Options: AM-PAC 6 Clicks Basic Mobility (PT)  AM-PAC 6 Clicks Score (PT): 20  AM-PAC 6 Clicks Score (OT): 16  Modified [place] Scale: 4 - Moderately severe disability.  Unable to walk without assistance, and unable to attend to own bodily needs without assistance.  PT Discharge Summary  Anticipated Discharge Disposition (PT): other (see comments) (group home)    Heidi Pop, PT  3/12/2025

## 2025-03-13 VITALS
WEIGHT: 152.78 LBS | TEMPERATURE: 97.5 F | DIASTOLIC BLOOD PRESSURE: 61 MMHG | BODY MASS INDEX: 20.72 KG/M2 | RESPIRATION RATE: 18 BRPM | OXYGEN SATURATION: 99 % | SYSTOLIC BLOOD PRESSURE: 103 MMHG | HEART RATE: 75 BPM

## 2025-03-13 PROCEDURE — G0378 HOSPITAL OBSERVATION PER HR: HCPCS

## 2025-03-13 PROCEDURE — 25810000003 SODIUM CHLORIDE 0.9 % SOLUTION: Performed by: NURSE PRACTITIONER

## 2025-03-13 RX ORDER — TORSEMIDE 20 MG/1
10 TABLET ORAL ONCE
Status: COMPLETED | OUTPATIENT
Start: 2025-03-13 | End: 2025-03-13

## 2025-03-13 RX ADMIN — LEVOTHYROXINE SODIUM 75 MCG: 0.07 TABLET ORAL at 09:42

## 2025-03-13 RX ADMIN — ACETAMINOPHEN 650 MG: 325 TABLET, FILM COATED ORAL at 13:03

## 2025-03-13 RX ADMIN — ATORVASTATIN CALCIUM 40 MG: 20 TABLET, FILM COATED ORAL at 09:42

## 2025-03-13 RX ADMIN — TORSEMIDE 10 MG: 20 TABLET ORAL at 09:42

## 2025-03-13 RX ADMIN — BENZTROPINE MESYLATE 1 MG: 1 TABLET ORAL at 09:41

## 2025-03-13 RX ADMIN — SODIUM CHLORIDE 250 ML: 9 INJECTION, SOLUTION INTRAVENOUS at 05:58

## 2025-03-13 RX ADMIN — HALOPERIDOL 5 MG: 5 TABLET ORAL at 09:41

## 2025-03-13 RX ADMIN — APIXABAN 5 MG: 5 TABLET, FILM COATED ORAL at 09:42

## 2025-03-13 RX ADMIN — METOPROLOL SUCCINATE 25 MG: 25 TABLET, EXTENDED RELEASE ORAL at 09:42

## 2025-03-13 RX ADMIN — DIGOXIN 125 MCG: 0.12 TABLET ORAL at 09:41

## 2025-03-13 RX ADMIN — HALOPERIDOL 5 MG: 5 TABLET ORAL at 16:23

## 2025-03-13 NOTE — PROGRESS NOTES
[facility] Progress Note      Patient Name: Helder Abbott  : 1975  MRN: [id number]  Primary Care Physician:  Provider, No Known  Date of admission: 3/11/2025    Subjective     Interval History:   Follow-up on hyponatremia    Patient laying in bed  Sleeping soundly, did not even wake up during examination  Bracing his drawing notebooks while in bed   Breakfast tray barely touched    Review of Systems:   As noted above    Objective     Vitals:   Temp:  [97.3 °F (36.3 °C)-97.9 °F (36.6 °C)] 97.9 °F (36.6 °C)  Heart Rate:  [61-78] 75  Resp:  [18] 18  BP: ()/() 99/52    Intake/Output Summary (Last 24 hours) at 3/13/2025 0931  Last data filed at 3/13/2025 0921  Gross per 24 hour   Intake 640 ml   Output --   Net 640 ml       Physical Exam:    General Appearance: Sleeping, chronically ill, no acute distress  Skin: warm and dry  HEENT: oral mucosa normal, nonicteric sclera  Neck: supple, no JVD  Lungs: CTA no rales  Heart: RRR, normal S1 and S2  Abdomen: soft, nontender, nondistended  : no palpable bladder  Extremities: Trace BLE edema, no cyanosis or clubbing  Neuro: normal speech and mental status     Scheduled Meds:     apixaban, 5 mg, Oral, Q12H  atorvastatin, 40 mg, Oral, Daily  benztropine, 1 mg, Oral, BID  digoxin, 125 mcg, Oral, Daily  divalproex, 2,000 mg, Oral, Nightly  haloperidol, 5 mg, Oral, TID  levothyroxine, 75 mcg, Oral, Daily  metoprolol succinate XL, 25 mg, Oral, Daily  OLANZapine, 10 mg, Oral, Nightly  torsemide, 10 mg, Oral, Once      IV Meds:        Results Reviewed:   I have personally reviewed the results from the time of this admission to 3/13/2025 09:31 EDT     Results from last 7 days   Lab Units 25  0559 25  1531   SODIUM mmol/L 133* 128*   POTASSIUM mmol/L 4.3 4.3   CHLORIDE mmol/L 99 94*   CO2 mmol/L 23.7 23.7   BUN mg/dL 11 12   CREATININE mg/dL 0.72* 0.82   CALCIUM mg/dL 8.6 9.1   BILIRUBIN mg/dL  --  0.7   ALK PHOS U/L  --  86    ALT (SGPT) U/L  --  10   AST (SGOT) U/L  --  30   GLUCOSE mg/dL 74 85       Estimated Creatinine Clearance: 120.3 mL/min (A) (by C-G formula based on SCr of 0.72 mg/dL (L)).                Results from last 7 days   Lab Units 03/12/25  0559 03/11/25  1531   WBC 10*3/mm3 6.16 8.02   HEMOGLOBIN g/dL 14.7 15.4   PLATELETS 10*3/mm3 110* 117*             Assessment / Plan     ASSESSMENT:    Acute on chronic hypotonic normovolemic hyponatremia.  Urine osmolality 274, urine osmolarity 447 urine sodium 79 consistent with SIADH pattern, sodium improved to 133 after discontinuing IVF and adding torsemide 20 mg daily.  Renal function well-preserved at baseline.  Has very mild edema in legs    Bipolar disorder, on Haldol and Zyprexa, managed by primary team     Congestive heart failure currently on digoxin.  No recent echocardiogram available.  No signs of acute exacerbation     Chronic atrial fibrillation controlled metoprolol and anticoagulant    Hypothyroidism, on replacement therapy      PLAN:  Start  torsemide 10 mg daily and Continue fluid restriction at 1.5 L/day. Sodium gradually improving w / medical management   No objection to be discharged from renal standpoint    Thank you for involving us in the care of Helder Abbott.  Please feel free to call with any questions.    Maico Marin MD  03/13/25  09:31 EDT    [facility]  [phone]    Please note that portions of this note were completed with a voice recognition program.

## 2025-03-13 NOTE — PROGRESS NOTES
Name: Helder Abbtot ADMIT: 3/11/2025   : 1975  PCP: Provider, No Known    MRN: [id number] LOS: 0 days   AGE/SEX: 50 y.o. male  ROOM: [facility]     Subjective   Subjective   No new complaints.  Denies headache.  Tolerating diet.       Objective   Objective   Vital Signs  Temp:  [97.3 °F (36.3 °C)-97.9 °F (36.6 °C)] 97.9 °F (36.6 °C)  Heart Rate:  [61-78] 75  Resp:  [18] 18  BP: ()/() 99/52  SpO2:  [98 %-100 %] 98 %  on   ;   Device (Oxygen Therapy): room air  Body mass index is 20.72 kg/m².  Physical Exam  Vitals and nursing note reviewed.   Constitutional:       General: He is not in acute distress.  Cardiovascular:      Rate and Rhythm: Normal rate and regular rhythm.   Pulmonary:      Effort: Pulmonary effort is normal.      Breath sounds: Normal breath sounds.   Abdominal:      General: Bowel sounds are normal.      Palpations: Abdomen is soft.      Tenderness: There is no abdominal tenderness.   Musculoskeletal:         General: No swelling.   Skin:     General: Skin is warm and dry.   Neurological:      Mental Status: He is alert. Mental status is at baseline.       Results Review     I reviewed the patient's new clinical results.  Results from last 7 days   Lab Units 25  0559 25  1531   WBC 10*3/mm3 6.16 8.02   HEMOGLOBIN g/dL 14.7 15.4   PLATELETS 10*3/mm3 110* 117*     Results from last 7 days   Lab Units 25  0559 25  1531   SODIUM mmol/L 133* 128*   POTASSIUM mmol/L 4.3 4.3   CHLORIDE mmol/L 99 94*   CO2 mmol/L 23.7 23.7   BUN mg/dL 11 12   CREATININE mg/dL 0.72* 0.82   GLUCOSE mg/dL 74 85   EGFR mL/min/1.73 111.3 107.0     Results from last 7 days   Lab Units 25  1531   ALBUMIN g/dL 3.9   BILIRUBIN mg/dL 0.7   ALK PHOS U/L 86   AST (SGOT) U/L 30   ALT (SGPT) U/L 10     Results from last 7 days   Lab Units 25  0559 25  1531   CALCIUM mg/dL 8.6 9.1   ALBUMIN g/dL  --  3.9     Results from last 7 days   Lab Units 25  1538   LACTATE mmol/L  "1.2     No results found for: \"HGBA1C\", \"POCGLU\"    No radiology results for the last day    I have personally reviewed all medications:  Scheduled Medications  apixaban, 5 mg, Oral, Q12H  atorvastatin, 40 mg, Oral, Daily  benztropine, 1 mg, Oral, BID  digoxin, 125 mcg, Oral, Daily  divalproex, 2,000 mg, Oral, Nightly  haloperidol, 5 mg, Oral, TID  levothyroxine, 75 mcg, Oral, Daily  metoprolol succinate XL, 25 mg, Oral, Daily  OLANZapine, 10 mg, Oral, Nightly    Infusions   Diet  Diet: Cardiac, Fluid Restriction (240 mL/tray); Healthy Heart (2-3 Na+); 1500 mL/day; Fluid Consistency: Thin (IDDSI 0)    I have personally reviewed:  [x]  Laboratory   [x]  Microbiology   [x]  Radiology   [x]  EKG/Telemetry  [x]  Cardiology/Vascular   []  Pathology    []  Records       Assessment/Plan     Active Hospital Problems    Diagnosis  POA    **Seizure-like activity [R56.9]  Yes    Hyponatremia [E87.1]  Yes    Thrombocytopenia [D69.6]  Yes    Atrial fibrillation, chronic [I48.20]  Yes    Bipolar disorder [F31.9]  Yes    Essential hypertension [I10]  Yes      Resolved Hospital Problems   No resolved problems to display.   Hyponatremia  Hypothyroidism  Hyperlipidemia  A fib  Mild cognitive impairment      50 y.o. male admitted with Seizure-like activity.    Afebrile  BP 87/63 this morning up to 102/70 with bolus  Normal O2 sats  No labs       EEG was negative    He had 1 isolated elevated BP at initial presentation to ER at which time /113.  Recheck an hour later 132/92 and has had no further hypertensive episodes.  This morning his blood pressure got as low as 87/63 and received 250 cc bolus of fluid.  Most recent BP 99/52.  Torsemide held.  Toprol ordered with holding parameters.      Eliquis (home med) for DVT prophylaxis.  Full code.  Discussed with patient and care team on multidisciplinary rounds.  Expected Discharge Date: 3/13/2025; Expected Discharge Time:       Fernando Wright MD  [facility]  03/13/25  10:50 EDT   Click to add…

## 2025-03-13 NOTE — DISCHARGE SUMMARY
Patient Name: Helder Abbott  : 1975  MRN: [id number]    Date of Admission: 3/11/2025  Date of Discharge:  3/13/2025  Primary Care Physician: Provider, No Known      Chief Complaint:   Seizures      Discharge Diagnoses     Active Hospital Problems    Diagnosis  POA    **Seizure-like activity [R56.9]  Yes    Hyponatremia [E87.1]  Yes    Thrombocytopenia [D69.6]  Yes    Atrial fibrillation, chronic [I48.20]  Yes    Bipolar disorder [F31.9]  Yes    Essential hypertension [I10]  Yes      Resolved Hospital Problems   No resolved problems to display.        Hospital Course     Mr. Abbott is a 50 y.o. male with a history of intellectual disability, chronic A-fib on Eliquis, bipolar disorder, HTN and prior stroke who presented to [facility] initially complaining of an episode of weakness.  Please see the admitting history and physical for further details.  He was found to have elevated BP and was admitted to the hospital for further evaluation and treatment.  He had an episode where he felt weak and shaky and had some blurred vision.  He also had headache.  The patient actually told his caregiver that he thought he had a seizure.  This was not witnessed.  He was seen by neurology here and underwent EEG that was negative for any seizure activity.  Neurology felt presenting symptoms are likely blood pressure related and did not recommend any change in his medications or additional neurology workup.    He had 1 isolated elevated BP at initial presentation to ER at which time /113.  Recheck an hour later 132/92 and has had no further hypertensive episodes.  This morning his blood pressure got as low as 87/63 and received 250 cc bolus of fluid.  Most recent BP 99/52.  He was seen by nephrology due to hyponatremia which improved with holding parameter and torsemide.  Likely his torsemide is what dropped his blood pressure and this will be discontinued at discharge.  Nephrology recommends 2000 cc  fluid restriction to help prevent recurrence of hyponatremia.        Day of Discharge     Subjective:  See today's progress note    Physical Exam:  Temp:  [97.3 °F (36.3 °C)-97.9 °F (36.6 °C)] 97.9 °F (36.6 °C)  Heart Rate:  [61-78] 75  Resp:  [18] 18  BP: ()/() 99/52  Body mass index is 20.72 kg/m².  Physical Exam    Consultants     Consult Orders (all) (From admission, onward)       Start     Ordered    03/11/25 2042  Inpatient Nephrology Consult  Once        Specialty:  Nephrology  Provider:  Alexandre Jimenez MD    03/11/25 2041 03/11/25 2042  Inpatient Neurology Consult General  Once        Specialty:  Neurology  Provider:  Ayush Waller MD    03/11/25 2041            Procedures     Imaging Results (All)       Procedure Component Value Units Date/Time    CT Head Without Contrast [[id number]] Collected: 03/11/25 1803     Updated: 03/11/25 1813    Narrative:      CT HEAD WO CONTRAST-     HISTORY:  seizure; R56.9-Unspecified convulsions; E87.6-Zxrv-mpycxqgdyt  and hyponatremia     COMPARISON: None     FINDINGS: The brain ventricles are symmetrical. Sulci are mildly  prominent for age suggesting mild atrophy, similar appearances compared  to the CT examination 7/9/2024. There is no evidence of hemorrhage,  hydrocephalus or of acute infarction. Mild vascular calcifications  noted. Further evaluation could be performed with a MRI examination of  the brain, particularly if the patient has a history of new onset  seizure activity.           Pertinent Labs     Results from last 7 days   Lab Units 03/12/25  0559 03/11/25  1531   WBC 10*3/mm3 6.16 8.02   HEMOGLOBIN g/dL 14.7 15.4   PLATELETS 10*3/mm3 110* 117*     Results from last 7 days   Lab Units 03/12/25  0559 03/11/25  1531   SODIUM mmol/L 133* 128*   POTASSIUM mmol/L 4.3 4.3   CHLORIDE mmol/L 99 94*   CO2 mmol/L 23.7 23.7   BUN mg/dL 11 12   CREATININE mg/dL 0.72* 0.82   GLUCOSE mg/dL 74 85   EGFR mL/min/1.73 111.3 107.0     Results  "from last 7 days   Lab Units 03/11/25  1531   ALBUMIN g/dL 3.9   BILIRUBIN mg/dL 0.7   ALK PHOS U/L 86   AST (SGOT) U/L 30   ALT (SGPT) U/L 10     Results from last 7 days   Lab Units 03/12/25  0559 03/11/25  1531   CALCIUM mg/dL 8.6 9.1   ALBUMIN g/dL  --  3.9       Results from last 7 days   Lab Units 03/11/25  1531   CK TOTAL U/L 219*   DIGOXIN LVL ng/mL 0.60           Invalid input(s): \"LDLCALC\"          Test Results Pending at Discharge     Pending Results       None              Discharge Details        Discharge Medications        Continue These Medications        Instructions Start Date   Abilify Maintena 400 MG prefilled syringe IM prefilled syringe  Generic drug: ARIPiprazole ER   400 mg, Every 28 Days      Acetaminophen Extra Strength 500 MG tablet   500 mg, 4 Times Daily PRN      atorvastatin 40 MG tablet  Commonly known as: LIPITOR   No dose, route, or frequency recorded.      benztropine 1 MG tablet  Commonly known as: COGENTIN   1 mg, 2 Times Daily      digoxin 125 MCG tablet  Commonly known as: LANOXIN   125 mcg, Daily Digoxin      divalproex 500 MG DR tablet  Commonly known as: DEPAKOTE   2,000 mg, Nightly      Eliquis 5 MG tablet tablet  Generic drug: apixaban   5 mg, Every 12 Hours Scheduled      haloperidol 5 MG tablet  Commonly known as: HALDOL   7.5 mg, Oral, 2 Times Daily      levothyroxine 75 MCG tablet  Commonly known as: SYNTHROID, LEVOTHROID   75 mcg, Daily      melatonin 5 MG tablet tablet   10 mg, Nightly      metoprolol succinate XL 25 MG 24 hr tablet  Commonly known as: TOPROL-XL   25 mg, Daily      OLANZapine 10 MG tablet  Commonly known as: zyPREXA   10 mg, Nightly      polyethylene glycol 17 GM/SCOOP powder  Commonly known as: MIRALAX   Dissolve 17 g (1 capful) in liquid and drink by mouth Daily.               Allergies   Allergen Reactions    Clonazepam Unknown (See Comments)    Fluoxetine Unknown (See Comments)    Grass Unknown - High Severity    Grass Pollen(K-O-R-T-Swt Kennedy) " Unknown - Low Severity    Hydroxyzine Unknown (See Comments)    Methylphenidate Unknown (See Comments)    Quetiapine Unknown - Low Severity    Risperidone Unknown (See Comments)    Topamax [Topiramate] Unknown - High Severity       Discharge Disposition:  Home or Self Care      Discharge Diet:  Diet Order   Procedures    Diet: Cardiac, Fluid Restriction (240 mL/tray); Healthy Heart (2-3 Na+); 1500 mL/day; Fluid Consistency: Thin (IDDSI 0)       Discharge Activity:   Activity Instructions       Activity as Tolerated              CODE STATUS:    Code Status and Medical Interventions: CPR (Attempt to Resuscitate); Full   Ordered at: 03/11/25 1731     Code Status (Patient has no pulse and is not breathing):    CPR (Attempt to Resuscitate)     Medical Interventions (Patient has pulse or is breathing):    Full       No future appointments.  Additional Instructions for the Follow-ups that You Need to Schedule       Discharge Follow-up with PCP   As directed       Currently Documented PCP:    Provider, No Known    PCP Phone Number:    [phone]     Follow Up Details: 1 to 2 weeks (or sooner if problems)               Follow-up Information       Provider, No Known .    Why: 1 to 2 weeks (or sooner if problems)  Contact information:  [place] [address]  [phone]                             Additional Instructions for the Follow-ups that You Need to Schedule       Discharge Follow-up with PCP   As directed       Currently Documented PCP:    Provider, No Known    PCP Phone Number:    [phone]     Follow Up Details: 1 to 2 weeks (or sooner if problems)            Time Spent on Discharge:  Greater than 30 minutes      Fernando Wright MD  [facility]  03/13/25  11:23 EDT

## 2025-03-13 NOTE — SIGNIFICANT NOTE
Ultrasound Inserted IV Site LFA    Catheter Length 1.75 inch    Diameter 0.3 cm    Depth 0.5 cm      Vascular Access Score= 5  1) Palpable / Visible / Dis  2) Palpable / Viasible / Not Distended  3) Easily Palpable / Not Visibile  4) Poorly Palpable / Visible  5) Poorly / Nonpalpable / NV

## 2025-03-13 NOTE — PLAN OF CARE
Goal Outcome Evaluation:  Plan of Care Reviewed With: patient     Problem: Adult Inpatient Plan of Care  Goal: Plan of Care Review  Outcome: Progressing  Flowsheets (Taken 3/13/2025 0520)  Progress: improving  Plan of Care Reviewed With: patient  Goal: Patient-Specific Goal (Individualized)  Outcome: Progressing  Goal: Absence of Hospital-Acquired Illness or Injury  Outcome: Progressing  Intervention: Identify and Manage Fall Risk  Recent Flowsheet Documentation  Taken 3/13/2025 0400 by Gema Davidson RN  Safety Promotion/Fall Prevention: safety round/check completed  Taken 3/13/2025 0200 by Gema Davidson RN  Safety Promotion/Fall Prevention: safety round/check completed  Taken 3/13/2025 0000 by Gema Davidson RN  Safety Promotion/Fall Prevention: safety round/check completed  Taken 3/12/2025 2200 by Gema Davidson RN  Safety Promotion/Fall Prevention: safety round/check completed  Taken 3/12/2025 2000 by Gema Davidson RN  Safety Promotion/Fall Prevention:   assistive device/personal items within reach   clutter free environment maintained   safety round/check completed   nonskid shoes/slippers when out of bed  Intervention: Prevent Skin Injury  Recent Flowsheet Documentation  Taken 3/13/2025 0400 by Gema Davidson RN  Body Position: position changed independently  Taken 3/13/2025 0200 by Gema Davidson RN  Body Position: position changed independently  Taken 3/13/2025 0000 by Gema Davidson RN  Body Position: position changed independently  Taken 3/12/2025 2200 by Gema Davidson RN  Body Position: position changed independently  Taken 3/12/2025 2000 by Gema Davidson RN  Body Position: position changed independently  Intervention: Prevent Infection  Recent Flowsheet Documentation  Taken 3/13/2025 0400 by Gema Davidson RN  Infection Prevention:   rest/sleep promoted   single patient room provided  Taken 3/13/2025 0200 by Gema Davidson RN  Infection Prevention:   rest/sleep promoted   single patient room provided  Taken 3/13/2025 0000  by Rice, Gema, RN  Infection Prevention:   rest/sleep promoted   single patient room provided  Taken 3/12/2025 2200 by Gema Davidson RN  Infection Prevention:   rest/sleep promoted   single patient room provided  Taken 3/12/2025 2000 by Gema Davidson RN  Infection Prevention:   rest/sleep promoted   single patient room provided  Goal: Optimal Comfort and Wellbeing  Outcome: Progressing  Intervention: Provide Person-Centered Care  Recent Flowsheet Documentation  Taken 3/12/2025 2000 by Gema Davidson RN  Trust Relationship/Rapport:   care explained   reassurance provided  Goal: Readiness for Transition of Care  Outcome: Progressing     Problem: Fall Injury Risk  Goal: Absence of Fall and Fall-Related Injury  Outcome: Progressing  Intervention: Identify and Manage Contributors  Recent Flowsheet Documentation  Taken 3/13/2025 0400 by Gema Davidson RN  Medication Review/Management: medications reviewed  Taken 3/13/2025 0200 by Gema Davidson RN  Medication Review/Management: medications reviewed  Taken 3/13/2025 0000 by Gema Davidson RN  Medication Review/Management: medications reviewed  Taken 3/12/2025 2200 by Gema Davidson RN  Medication Review/Management: medications reviewed  Taken 3/12/2025 2000 by Gema Davidson RN  Medication Review/Management: medications reviewed  Intervention: Promote Injury-Free Environment  Recent Flowsheet Documentation  Taken 3/13/2025 0400 by Gema Davidson RN  Safety Promotion/Fall Prevention: safety round/check completed  Taken 3/13/2025 0200 by Gema Davidson RN  Safety Promotion/Fall Prevention: safety round/check completed  Taken 3/13/2025 0000 by Gema Davidson RN  Safety Promotion/Fall Prevention: safety round/check completed  Taken 3/12/2025 2200 by Gema Davidson RN  Safety Promotion/Fall Prevention: safety round/check completed  Taken 3/12/2025 2000 by Gema Davidson RN  Safety Promotion/Fall Prevention:   assistive device/personal items within reach   clutter free environment maintained   safety  round/check completed   nonskid shoes/slippers when out of bed        Progress: improving

## 2025-03-14 NOTE — CASE MANAGEMENT/SOCIAL WORK
Case Management Discharge Note      Final Note: Home with 24/7 caregivers.         Selected Continued Care - Discharged on 3/13/2025 Admission date: 3/11/2025 - Discharge disposition: Home or Self Care      Destination    No services have been selected for the patient.                Durable Medical Equipment    No services have been selected for the patient.                Dialysis/Infusion    No services have been selected for the patient.                Home Medical Care    No services have been selected for the patient.                Therapy    No services have been selected for the patient.                Community Resources    No services have been selected for the patient.                Community & DME    No services have been selected for the patient.                         Final Discharge Disposition Code: 01 - home or self-care

## 2025-03-28 ENCOUNTER — APPOINTMENT (OUTPATIENT)
Dept: GENERAL RADIOLOGY | Facility: HOSPITAL | Age: 50
End: 2025-03-28
Payer: MEDICARE

## 2025-03-28 ENCOUNTER — APPOINTMENT (OUTPATIENT)
Dept: CT IMAGING | Facility: HOSPITAL | Age: 50
End: 2025-03-28
Payer: MEDICARE

## 2025-03-28 ENCOUNTER — HOSPITAL ENCOUNTER (INPATIENT)
Facility: HOSPITAL | Age: 50
LOS: 6 days | Discharge: HOME | End: 2025-04-03
Attending: EMERGENCY MEDICINE | Admitting: INTERNAL MEDICINE
Payer: MEDICARE

## 2025-03-28 DIAGNOSIS — S72.141A CLOSED INTERTROCHANTERIC FRACTURE, RIGHT, INITIAL ENCOUNTER: ICD-10-CM

## 2025-03-28 DIAGNOSIS — S72.144A CLOSED NONDISPLACED INTERTROCHANTERIC FRACTURE OF RIGHT FEMUR, INITIAL ENCOUNTER: Primary | ICD-10-CM

## 2025-03-28 LAB
ALBUMIN SERPL-MCNC: 4.1 G/DL (ref 3.5–5.2)
ALBUMIN/GLOB SERPL: 1.4 G/DL
ALP SERPL-CCNC: 86 U/L (ref 39–117)
ALT SERPL W P-5'-P-CCNC: 17 U/L (ref 1–41)
ANION GAP SERPL CALCULATED.3IONS-SCNC: 11.3 MMOL/L (ref 5–15)
AST SERPL-CCNC: 27 U/L (ref 1–40)
BASOPHILS # BLD AUTO: 0.02 10*3/MM3 (ref 0–0.2)
BASOPHILS NFR BLD AUTO: 0.3 % (ref 0–1.5)
BILIRUB SERPL-MCNC: 0.6 MG/DL (ref 0–1.2)
BUN SERPL-MCNC: 11 MG/DL (ref 6–20)
BUN/CREAT SERPL: 10.9 (ref 7–25)
CALCIUM SPEC-SCNC: 9.6 MG/DL (ref 8.6–10.5)
CHLORIDE SERPL-SCNC: 95 MMOL/L (ref 98–107)
CO2 SERPL-SCNC: 24.7 MMOL/L (ref 22–29)
CREAT SERPL-MCNC: 1.01 MG/DL (ref 0.76–1.27)
DEPRECATED RDW RBC AUTO: 51 FL (ref 37–54)
EGFRCR SERPLBLD CKD-EPI 2021: 90.6 ML/MIN/1.73
EOSINOPHIL # BLD AUTO: 0.03 10*3/MM3 (ref 0–0.4)
EOSINOPHIL NFR BLD AUTO: 0.4 % (ref 0.3–6.2)
ERYTHROCYTE [DISTWIDTH] IN BLOOD BY AUTOMATED COUNT: 15.8 % (ref 12.3–15.4)
GLOBULIN UR ELPH-MCNC: 2.9 GM/DL
GLUCOSE SERPL-MCNC: 96 MG/DL (ref 65–99)
HCT VFR BLD AUTO: 48.2 % (ref 37.5–51)
HGB BLD-MCNC: 16.6 G/DL (ref 13–17.7)
IMM GRANULOCYTES # BLD AUTO: 0.07 10*3/MM3 (ref 0–0.05)
IMM GRANULOCYTES NFR BLD AUTO: 0.9 % (ref 0–0.5)
LYMPHOCYTES # BLD AUTO: 1.84 10*3/MM3 (ref 0.7–3.1)
LYMPHOCYTES NFR BLD AUTO: 24.7 % (ref 19.6–45.3)
MCH RBC QN AUTO: 30.3 PG (ref 26.6–33)
MCHC RBC AUTO-ENTMCNC: 34.4 G/DL (ref 31.5–35.7)
MCV RBC AUTO: 88.1 FL (ref 79–97)
MONOCYTES # BLD AUTO: 0.57 10*3/MM3 (ref 0.1–0.9)
MONOCYTES NFR BLD AUTO: 7.7 % (ref 5–12)
NEUTROPHILS NFR BLD AUTO: 4.92 10*3/MM3 (ref 1.7–7)
NEUTROPHILS NFR BLD AUTO: 66 % (ref 42.7–76)
NRBC BLD AUTO-RTO: 0 /100 WBC (ref 0–0.2)
PLATELET # BLD AUTO: 153 10*3/MM3 (ref 140–450)
PMV BLD AUTO: 10.1 FL (ref 6–12)
POTASSIUM SERPL-SCNC: 4.5 MMOL/L (ref 3.5–5.2)
PROT SERPL-MCNC: 7 G/DL (ref 6–8.5)
RBC # BLD AUTO: 5.47 10*6/MM3 (ref 4.14–5.8)
SODIUM SERPL-SCNC: 131 MMOL/L (ref 136–145)
WBC NRBC COR # BLD AUTO: 7.45 10*3/MM3 (ref 3.4–10.8)

## 2025-03-28 PROCEDURE — 80053 COMPREHEN METABOLIC PANEL: CPT | Performed by: PHYSICIAN ASSISTANT

## 2025-03-28 PROCEDURE — 99285 EMERGENCY DEPT VISIT HI MDM: CPT

## 2025-03-28 PROCEDURE — 25010000002 MORPHINE PER 10 MG: Performed by: INTERNAL MEDICINE

## 2025-03-28 PROCEDURE — 71045 X-RAY EXAM CHEST 1 VIEW: CPT

## 2025-03-28 PROCEDURE — 25010000002 MORPHINE PER 10 MG: Performed by: EMERGENCY MEDICINE

## 2025-03-28 PROCEDURE — 25010000002 ONDANSETRON PER 1 MG: Performed by: EMERGENCY MEDICINE

## 2025-03-28 PROCEDURE — 73502 X-RAY EXAM HIP UNI 2-3 VIEWS: CPT

## 2025-03-28 PROCEDURE — 85025 COMPLETE CBC W/AUTO DIFF WBC: CPT | Performed by: PHYSICIAN ASSISTANT

## 2025-03-28 PROCEDURE — 72192 CT PELVIS W/O DYE: CPT

## 2025-03-28 RX ORDER — ONDANSETRON 2 MG/ML
4 INJECTION INTRAMUSCULAR; INTRAVENOUS ONCE
Status: COMPLETED | OUTPATIENT
Start: 2025-03-28 | End: 2025-03-28

## 2025-03-28 RX ORDER — SODIUM CHLORIDE 0.9 % (FLUSH) 0.9 %
10 SYRINGE (ML) INJECTION AS NEEDED
Status: DISCONTINUED | OUTPATIENT
Start: 2025-03-28 | End: 2025-04-03 | Stop reason: HOSPADM

## 2025-03-28 RX ORDER — OLANZAPINE 10 MG/1
10 TABLET ORAL NIGHTLY
Status: DISCONTINUED | OUTPATIENT
Start: 2025-03-28 | End: 2025-04-03 | Stop reason: HOSPADM

## 2025-03-28 RX ORDER — SODIUM CHLORIDE 0.9 % (FLUSH) 0.9 %
10 SYRINGE (ML) INJECTION EVERY 12 HOURS SCHEDULED
Status: DISCONTINUED | OUTPATIENT
Start: 2025-03-28 | End: 2025-04-03 | Stop reason: HOSPADM

## 2025-03-28 RX ORDER — SODIUM CHLORIDE 9 MG/ML
40 INJECTION, SOLUTION INTRAVENOUS AS NEEDED
Status: DISCONTINUED | OUTPATIENT
Start: 2025-03-28 | End: 2025-04-03 | Stop reason: HOSPADM

## 2025-03-28 RX ORDER — MORPHINE SULFATE 2 MG/ML
4 INJECTION, SOLUTION INTRAMUSCULAR; INTRAVENOUS ONCE
Status: COMPLETED | OUTPATIENT
Start: 2025-03-28 | End: 2025-03-28

## 2025-03-28 RX ORDER — LEVOTHYROXINE SODIUM 75 UG/1
75 TABLET ORAL
Status: DISCONTINUED | OUTPATIENT
Start: 2025-03-29 | End: 2025-04-03 | Stop reason: HOSPADM

## 2025-03-28 RX ORDER — BENZTROPINE MESYLATE 1 MG/1
1 TABLET ORAL 2 TIMES DAILY
Status: DISCONTINUED | OUTPATIENT
Start: 2025-03-28 | End: 2025-04-03 | Stop reason: HOSPADM

## 2025-03-28 RX ORDER — MORPHINE SULFATE 2 MG/ML
4 INJECTION, SOLUTION INTRAMUSCULAR; INTRAVENOUS EVERY 4 HOURS PRN
Status: DISCONTINUED | OUTPATIENT
Start: 2025-03-28 | End: 2025-04-03

## 2025-03-28 RX ORDER — HYDROCODONE BITARTRATE AND ACETAMINOPHEN 5; 325 MG/1; MG/1
1 TABLET ORAL EVERY 6 HOURS PRN
Status: DISCONTINUED | OUTPATIENT
Start: 2025-03-28 | End: 2025-03-31

## 2025-03-28 RX ORDER — DIVALPROEX SODIUM 250 MG/1
2000 TABLET, DELAYED RELEASE ORAL NIGHTLY
Status: DISCONTINUED | OUTPATIENT
Start: 2025-03-28 | End: 2025-04-03 | Stop reason: HOSPADM

## 2025-03-28 RX ORDER — ATORVASTATIN CALCIUM 20 MG/1
40 TABLET, FILM COATED ORAL DAILY
Status: DISCONTINUED | OUTPATIENT
Start: 2025-03-28 | End: 2025-04-03 | Stop reason: HOSPADM

## 2025-03-28 RX ORDER — NITROGLYCERIN 0.4 MG/1
0.4 TABLET SUBLINGUAL
Status: DISCONTINUED | OUTPATIENT
Start: 2025-03-28 | End: 2025-04-03 | Stop reason: HOSPADM

## 2025-03-28 RX ORDER — ONDANSETRON 2 MG/ML
4 INJECTION INTRAMUSCULAR; INTRAVENOUS EVERY 6 HOURS PRN
Status: DISCONTINUED | OUTPATIENT
Start: 2025-03-28 | End: 2025-04-03 | Stop reason: HOSPADM

## 2025-03-28 RX ORDER — DIGOXIN 125 MCG
125 TABLET ORAL
Status: DISCONTINUED | OUTPATIENT
Start: 2025-03-29 | End: 2025-04-03 | Stop reason: HOSPADM

## 2025-03-28 RX ORDER — ACETAMINOPHEN 500 MG
500 TABLET ORAL 4 TIMES DAILY PRN
Status: DISCONTINUED | OUTPATIENT
Start: 2025-03-28 | End: 2025-04-03 | Stop reason: HOSPADM

## 2025-03-28 RX ORDER — METOPROLOL SUCCINATE 25 MG/1
25 TABLET, EXTENDED RELEASE ORAL DAILY
Status: DISCONTINUED | OUTPATIENT
Start: 2025-03-28 | End: 2025-04-03 | Stop reason: HOSPADM

## 2025-03-28 RX ORDER — POLYETHYLENE GLYCOL 3350 17 G/17G
0.5 POWDER, FOR SOLUTION ORAL DAILY
Status: DISCONTINUED | OUTPATIENT
Start: 2025-03-28 | End: 2025-03-29

## 2025-03-28 RX ADMIN — HYDROCODONE BITARTRATE AND ACETAMINOPHEN 1 TABLET: 5; 325 TABLET ORAL at 15:36

## 2025-03-28 RX ADMIN — ATORVASTATIN CALCIUM 40 MG: 20 TABLET, FILM COATED ORAL at 15:35

## 2025-03-28 RX ADMIN — MORPHINE SULFATE 4 MG: 2 INJECTION, SOLUTION INTRAMUSCULAR; INTRAVENOUS at 15:36

## 2025-03-28 RX ADMIN — METOPROLOL SUCCINATE 25 MG: 25 TABLET, EXTENDED RELEASE ORAL at 15:35

## 2025-03-28 RX ADMIN — ONDANSETRON 4 MG: 2 INJECTION, SOLUTION INTRAMUSCULAR; INTRAVENOUS at 13:44

## 2025-03-28 RX ADMIN — HALOPERIDOL 7.5 MG: 5 TABLET ORAL at 21:21

## 2025-03-28 RX ADMIN — Medication 10 ML: at 15:36

## 2025-03-28 RX ADMIN — OLANZAPINE 10 MG: 10 TABLET, FILM COATED ORAL at 21:20

## 2025-03-28 RX ADMIN — Medication 10 MG: at 21:20

## 2025-03-28 RX ADMIN — BENZTROPINE MESYLATE 1 MG: 1 TABLET ORAL at 21:20

## 2025-03-28 RX ADMIN — HYDROCODONE BITARTRATE AND ACETAMINOPHEN 1 TABLET: 5; 325 TABLET ORAL at 21:25

## 2025-03-28 RX ADMIN — Medication 10 ML: at 21:21

## 2025-03-28 RX ADMIN — MORPHINE SULFATE 4 MG: 2 INJECTION, SOLUTION INTRAMUSCULAR; INTRAVENOUS at 13:44

## 2025-03-28 RX ADMIN — DIVALPROEX SODIUM 2000 MG: 500 TABLET, DELAYED RELEASE ORAL at 21:20

## 2025-03-28 RX ADMIN — MORPHINE SULFATE 4 MG: 2 INJECTION, SOLUTION INTRAMUSCULAR; INTRAVENOUS at 23:56

## 2025-03-28 NOTE — H&P
Internal medicine history and physical  INTERNAL MEDICINE   [facility]       Patient Identification:  Name: Helder Abbott  Age: 50 y.o.  Sex: male  :  1975  MRN: [id number]                   Primary Care Physician: Provider, No Known                               Date of admission:3/28/2025    Chief Complaint: Pain and discomfort in the right hip after the fall yesterday.    History of Present Illness:   Patient is a 50-year-old male who has complicated past medical history remarkable which is recent hospitalization for 2 days from 3/11/2025 through 3/13/2025 when he presented with seizure-like activity.  Patient has history of intellectual disability, chronic atrial fibrillation on anticoagulation therapy, bipolar disorder, hypertension and prior stroke.  Patient did have hyponatremia which was thought to be contributing to his symptoms and after evaluation by nephrology service his torsemide was discontinued and he was placed on 2000 cc fluid restriction.  In this background patient was discharged on 3/13/2025 and there is not much available in the history of what happened since then but apparently he had a fall yesterday and subsequently was complaining of pain and discomfort in his right hip.  This resulted in arrival to the emergency room where workup showed possibility of right intertrochanteric fracture on plain x-ray.  Patient is being admitted for further evaluation of right hip pain and CT scan of the pelvis is ordered to further confirm possibility of intertrochanteric fracture of the right hip seen on the x-ray.  Orthopedic surgery service is consulted.    Past Medical History:  Past Medical History:   Diagnosis Date    Benign essential hypertension     Bipolar disorder     Dyslipidemia     GERD (gastroesophageal reflux disease)     Heart disease     History of stroke     Hypothyroidism      Past Surgical History:  No past surgical history on file.   Home Meds:  (Not in a  hospital admission)    Current Meds:     Current Facility-Administered Medications:     [COMPLETED] Insert Peripheral IV, , , Once **AND** sodium chloride 0.9 % flush 10 mL, 10 mL, Intravenous, PRN, Claudia Alas PA    Current Outpatient Medications:     Acetaminophen Extra Strength 500 MG tablet, Take 1 tablet by mouth 4 (Four) Times a Day As Needed., Disp: , Rfl:     ARIPiprazole ER (Abilify Maintena) 400 MG prefilled syringe IM prefilled syringe, Inject 400 mg into the appropriate muscle as directed by prescriber Every 28 (Twenty-Eight) Days., Disp: , Rfl:     atorvastatin (LIPITOR) 40 MG tablet, Take 1 tablet by mouth Daily., Disp: , Rfl:     benztropine (COGENTIN) 1 MG tablet, Take 1 tablet by mouth 2 (Two) Times a Day., Disp: , Rfl:     digoxin (LANOXIN) 125 MCG tablet, Take 1 tablet by mouth Daily., Disp: , Rfl:     divalproex (DEPAKOTE) 500 MG DR tablet, Take 4 tablets by mouth Every Night., Disp: , Rfl:     Eliquis 5 MG tablet tablet, Take 1 tablet by mouth Every 12 (Twelve) Hours., Disp: , Rfl:     haloperidol (HALDOL) 5 MG tablet, Take 1.5 tablets by mouth 2 (Two) Times a Day., Disp: , Rfl:     levothyroxine (SYNTHROID, LEVOTHROID) 75 MCG tablet, Take 1 tablet by mouth Daily., Disp: , Rfl:     melatonin 5 MG tablet tablet, Take 2 tablets by mouth Every Night., Disp: , Rfl:     metoprolol succinate XL (TOPROL-XL) 25 MG 24 hr tablet, Take 1 tablet by mouth Daily., Disp: , Rfl:     OLANZapine (zyPREXA) 10 MG tablet, Take 1 tablet by mouth Every Night., Disp: , Rfl:     polyethylene glycol (MIRALAX) 17 GM/SCOOP powder, Dissolve 17 g (1 capful) in liquid and drink by mouth Daily., Disp: 510 g, Rfl: 1  Allergies:  Allergies   Allergen Reactions    Clonazepam Unknown (See Comments)    Fluoxetine Unknown (See Comments)    Grass Unknown - High Severity    Grass Pollen(K-O-R-T-Swt Kennedy) Unknown - Low Severity    Hydroxyzine Unknown (See Comments)    Methylphenidate Unknown (See Comments)    Quetiapine Unknown -  "Low Severity    Risperidone Unknown (See Comments)    Topamax [Topiramate] Unknown - High Severity     Social History:   Social History     Tobacco Use    Smoking status: Never    Smokeless tobacco: Never   Substance Use Topics    Alcohol use: Never      Family History:  Family History   Problem Relation Age of Onset    Diabetes Other     Hypertension Other     Heart disease Other           Review of Systems  See history of present illness and past medical history.        Vitals:   BP 95/70   Pulse 65   Temp 96.2 °F (35.7 °C) (Tympanic)   Resp 14   Ht 182.9 cm (72\")   Wt 69.3 kg (152 lb 12.5 oz)   SpO2 100%   BMI 20.72 kg/m²   I/O: No intake or output data in the 24 hours ending 03/28/25 1520  Exam:  Patient is examined using the personal protective equipment as per guidelines from infection control for this particular patient as enacted.  Hand washing was performed before and after patient interaction.  General Appearance:  Awake interactive male hard to understand because of his speech issues.   Head:  Syndromic facies.   Eyes:    PERRL, conjunctiva/corneas clear, EOM's intact, both eyes   Ears:    Normal external ear canals, both ears   Nose: No nasal drainage noted   Throat:   Lips, tongue, gums normal; oral mucosa pink and moist   Neck:   Supple, symmetrical, trachea midline, no adenopathy;     thyroid:  no enlargement/tenderness/nodules; no carotid    bruit or JVD   Back:     Symmetric, no curvature, ROM normal, no CVA tenderness   Lungs:     Clear to auscultation bilaterally, respirations unlabored   Chest Wall:    No tenderness or deformity    Heart:  S1-S2 regular   Abdomen:   Soft nontender   Extremities: Discomfort upon passive movement of the right hip.       Skin: Chronic changes   Neurologic: Grossly nonfocal with right lower extremity movement limited because of pain.       Data Review:      I reviewed the patient's new clinical results.  Results from last 7 days   Lab Units 03/28/25  1300 "   WBC 10*3/mm3 7.45   HEMOGLOBIN g/dL 16.6   PLATELETS 10*3/mm3 153     Results from last 7 days   Lab Units 03/28/25  1300   SODIUM mmol/L 131*   POTASSIUM mmol/L 4.5   CHLORIDE mmol/L 95*   CO2 mmol/L 24.7   BUN mg/dL 11   CREATININE mg/dL 1.01   CALCIUM mg/dL 9.6   GLUCOSE mg/dL 96         XR Chest 1 View  Result Date: 3/28/2025  1. No acute process   This report was finalized on 3/28/2025 1:00 PM by Dr. Sang Lane M.D on Workstation: [id number]      XR Hip With or Without Pelvis 2 - 3 View Right  Result Date: 3/28/2025   Suspicion for right intertrochanteric nondisplaced fracture, CT correlation advised.    This report was finalized on 3/28/2025 12:16 PM by Dr. Alfred Cavazos M.D on Workstation: [id number]        Assessment:  Active Hospital Problems    Diagnosis  POA    **Closed intertrochanteric fracture, right, initial encounter [S72.141A]  Yes    Hyponatremia [E87.1]  Yes    Atrial fibrillation, chronic [I48.20]  Yes    Chronic anticoagulation [Z79.01]  Not Applicable    Bipolar disorder [F31.9]  Yes    Essential hypertension [I10]  Yes    History of CVA (cerebrovascular accident) [Z86.73]  Not Applicable    Hypothyroidism (acquired) [E03.9]  Yes       Medical decision making/care plan: See admitting orders  Right hip pain with possible closed intratrochanteric fracture of the right hip-confirmation pending-plan is to admit the patient control his pain orthopedic consultation and make him n.p.o. after midnight in case if he needs surgical intervention while confirming his diagnosis.  Control his pain and provide him with continuous pulse ox monitoring.  History of chronic atrial fibrillation-hold his anticoagulation therapy in anticipation for possible surgery in a.m. while continuing his digoxin and metoprolol.  Bipolar disorder-continue his home regimen.  History of intellectual disability and previous CVA-continue his current regimen and monitor  Hypertension-continue antihypertensive regimen  and avoid hypotensive episodes.  Hypothyroidism-continue thyroid replacement therapy.  Hyponatremia-continue with fluid restriction.    Jose Alvarenga MD   3/28/2025  15:20 EDT    Parts of this note may be an electronic transcription/translation of spoken language to printed text using the Dragon dictation system.

## 2025-03-28 NOTE — ED PROVIDER NOTES
MD ATTESTATION NOTE      Brief HPI: Patient complains of acute right hip pain after falling and landing on his right hip yesterday.  He has been unable to ambulate or bear weight on his right leg since then.  He did not hit his head.  Denies loss of consciousness or other injuries.    PHYSICAL EXAM    GENERAL: Awake and alert.  Resting comfortably in no acute distress  HENT: nares patent, NCAT  EYES: no scleral icterus  CV: regular rhythm, normal rate  RESPIRATORY: normal effort, CTAB  ABDOMEN: soft  MUSCULOSKELETAL: no deformity, there is tenderness over the right anterior and lateral hip, there is painful range of motion of the right hip, remainder of the right leg is nontender  NEURO: Follows commands, speech is clear and fluent, no facial droop  PSYCH:  calm, cooperative  SKIN: warm, dry    Vital signs and nursing notes reviewed.        Plan: Patient presents to the ED with acute right hip pain after falling.  Will obtain imaging studies for further evaluation.    Right hip x-rays personally interpreted by me.  My personal interpreted as: There is a linear lucency in the intertrochanteric region suspicious for fracture.  No dislocation.  No pelvic fracture--> will obtain CT pelvis     ED Course as of 03/28/25 1358   Fri Mar 28, 2025   1210 XR Hip With or Without Pelvis 2 - 3 View Right  Radiology study independently interpreted by me and my findings are suspected right intertrochanteric hip fracture    [DC]   1246 Discussed case with Dr. Serrano, orthopedic surgery, who recommends obtaining CT for further evaluation and will see in consult.  Patient may eat today but n.p.o. at midnight for surgery tomorrow.  Will hold Eliquis. [DC]   1307 Discussed case with Dr. Alvarenga, [facility], who will admit the patient. [DC]      ED Course User Index  [DC] Claudia Alas PA     MDM: Patient presented to the ED with a right hip injury.  X-rays showed suspected intertrochanteric fracture.  Pelvis CT is pending.  Patient will be  admitted to the hospitalist.       Olegario Chris MD  03/28/25 2747

## 2025-03-28 NOTE — ED NOTES
"Nursing report ED to floor  Helder Abbott  50 y.o.  male    HPI :  HPI  Stated Reason for Visit: fall yesterday, right hip pain  History Obtained From: patient    Chief Complaint  Chief Complaint   Patient presents with    Fall    Hip Pain       Admitting doctor:   Jose Alvarenga MD    Admitting diagnosis:   The primary encounter diagnosis was Closed nondisplaced intertrochanteric fracture of right femur, initial encounter. A diagnosis of Closed intertrochanteric fracture, right, initial encounter was also pertinent to this visit.    Code status:   Current Code Status       Date Active Code Status Order ID Comments User Context       Prior            Allergies:   Clonazepam, Fluoxetine, Grass, Grass pollen(k-o-r-t-swt benigno), Hydroxyzine, Methylphenidate, Quetiapine, Risperidone, and Topamax [topiramate]    Isolation:   No active isolations    Intake and Output  No intake or output data in the 24 hours ending 03/28/25 1334    Weight:       03/28/25  1135   Weight: 69.3 kg (152 lb 12.5 oz)       Most recent vitals:   Vitals:    03/28/25 1119 03/28/25 1135 03/28/25 1304   BP:  (!) 123/103 (!) 153/105   Pulse: 69  75   Resp: 14     Temp: 96.2 °F (35.7 °C)     TempSrc: Tympanic     SpO2: 98%  98%   Weight:  69.3 kg (152 lb 12.5 oz)    Height:  182.9 cm (72\")        Active LDAs/IV Access:   Lines, Drains & Airways       Active LDAs       Name Placement date Placement time Site Days    Peripheral IV 03/28/25 1301 Right Antecubital 03/28/25  1301  Antecubital  less than 1                    Labs (abnormal labs have a star):   Labs Reviewed   CBC WITH AUTO DIFFERENTIAL - Abnormal; Notable for the following components:       Result Value    RDW 15.8 (*)     Immature Grans % 0.9 (*)     Immature Grans, Absolute 0.07 (*)     All other components within normal limits   COMPREHENSIVE METABOLIC PANEL   CBC AND DIFFERENTIAL    Narrative:     The following orders were created for panel order CBC & Differential.  Procedure            "                    Abnormality         Status                     ---------                               -----------         ------                     CBC Auto Differential[[id number]]        Abnormal            Final result                 Please view results for these tests on the individual orders.       EKG:   No orders to display       Meds given in ED:   Medications   sodium chloride 0.9 % flush 10 mL (has no administration in time range)   morphine injection 4 mg (has no administration in time range)   ondansetron (ZOFRAN) injection 4 mg (has no administration in time range)       Imaging results:  XR Chest 1 View  Result Date: 3/28/2025  1. No acute process   This report was finalized on 3/28/2025 1:00 PM by Dr. Sang Lane M.D on Workstation: [id number]      XR Hip With or Without Pelvis 2 - 3 View Right  Result Date: 3/28/2025   Suspicion for right intertrochanteric nondisplaced fracture, CT correlation advised.    This report was finalized on 3/28/2025 12:16 PM by Dr. Alfred Cavazos M.D on Workstation: [id number]        Ambulatory status:   - assist     Social issues:   Social History     Socioeconomic History    Marital status: Single   Tobacco Use    Smoking status: Never    Smokeless tobacco: Never   Vaping Use    Vaping status: Never Used   Substance and Sexual Activity    Alcohol use: Never    Drug use: Never    Sexual activity: Defer       Peripheral Neurovascular       Neuro Cognitive       Learning  Learning Assessment  Learning Readiness and Ability: cognitive limitation noted  Education Provided  Person Taught: patient  Teaching Method: verbal instruction  Teaching Focus: symptom/problem overview  Education Outcome Evaluation: needs reinforcement, verbalizes understanding    Respiratory       Abdominal Pain       Pain Assessments  Pain (Adult)  (0-10) Pain Rating: Rest: 10  (0-10) Pain Rating: Activity: 10  Pain Location: hip  Pain Side/Orientation: right    NIH Stroke Scale        Mary Jasso RN  03/28/25 13:34 EDT

## 2025-03-28 NOTE — CONSULTS
I reviewed pelvis CT no evidence of fracture if warranted we can order an MRI of the pelvis.  Patient will not have surgery tomorrow for my standpoint I will see him in the morning

## 2025-03-28 NOTE — CASE MANAGEMENT/SOCIAL WORK
Discharge Planning Assessment  [facility]     Patient Name: Helder Abbott  MRN: [id number]  Today's Date: 3/28/2025    Admit Date: 3/28/2025        Discharge Needs Assessment    No documentation.                  Discharge Plan       Row Name 03/28/25 1152       Plan    Plan Comments Patient with legal guardian on file and is listed as a [place]. Registration obtained consent to treat from legal guardian on file. [place] is present upon chart review. Guardianship paperwork has been scanned into Epic. Disposition from ER pending. Avis BASSETT RN CCP director updated. BUZZ SinghN RN                       Demographic Summary    No documentation.                  Functional Status    No documentation.                  Psychosocial    No documentation.                  Abuse/Neglect    No documentation.                  Legal    No documentation.                  Substance Abuse    No documentation.                  Patient Forms    No documentation.                     Santos Ngo, RN

## 2025-03-28 NOTE — PROGRESS NOTES
Clinical Pharmacy Services: Medication History    Helder Abbott is a 50 y.o. male presenting to [facility] for   Chief Complaint   Patient presents with    Fall    Hip Pain       He  has a past medical history of Benign essential hypertension, Bipolar disorder, Dyslipidemia, GERD (gastroesophageal reflux disease), Heart disease, History of stroke, and Hypothyroidism.    Allergies as of 03/28/2025 - Reviewed 03/28/2025   Allergen Reaction Noted    Clonazepam Unknown (See Comments) 05/21/2023    Fluoxetine Unknown (See Comments) 05/21/2023    Grass Unknown - High Severity 04/30/2024    Grass pollen(k-o-r-t-swt benigno) Unknown - Low Severity 05/21/2023    Hydroxyzine Unknown (See Comments) 05/21/2023    Methylphenidate Unknown (See Comments) 05/21/2023    Quetiapine Unknown - Low Severity 04/30/2024    Risperidone Unknown (See Comments) 05/21/2023    Topamax [topiramate] Unknown - High Severity 03/11/2025       Medication information was obtained from: Pharmacy  Pharmacy and Phone Number:   [facility] [place] [facility] [address] - [place], [place] - [address] [facility] - [phone]  - [phone] FX  [address] [place] [address]  Phone: [phone] Fax: [phone]    [facility]  [address] [facility] [address]  Phone: [phone] Fax: [phone]    [facility] - [place], [place] - [address] [facility] [phone] [facility] [phone] [address] [address]  Phone: [phone] Fax: [phone]        Prior to Admission Medications       Prescriptions Last Dose Informant Patient Reported? Taking?    Acetaminophen Extra Strength 500 MG tablet  Self Yes Yes    Take 1 tablet by mouth 4 (Four) Times a Day As Needed.    ARIPiprazole ER (Abilify Maintena) 400 MG prefilled syringe IM prefilled syringe  Provider's Office, Pharmacy Yes Yes    Inject 400 mg into the appropriate muscle as directed by prescriber Every 28 (Twenty-Eight) Days.     atorvastatin (LIPITOR) 40 MG tablet  Pharmacy Yes Yes    Take 1 tablet by mouth Daily.    benztropine (COGENTIN) 1 MG tablet  Spouse/Significant Other, Pharmacy Yes Yes    Take 1 tablet by mouth 2 (Two) Times a Day.    digoxin (LANOXIN) 125 MCG tablet  Pharmacy Yes Yes    Take 1 tablet by mouth Daily.    divalproex (DEPAKOTE) 500 MG DR tablet  Provider's Office, Pharmacy Yes Yes    Take 4 tablets by mouth Every Night.    Eliquis 5 MG tablet tablet  Pharmacy Yes Yes    Take 1 tablet by mouth Every 12 (Twelve) Hours.    haloperidol (HALDOL) 5 MG tablet  Pharmacy, Provider's Office Yes Yes    Take 1.5 tablets by mouth 2 (Two) Times a Day.    levothyroxine (SYNTHROID, LEVOTHROID) 75 MCG tablet  Pharmacy Yes Yes    Take 1 tablet by mouth Daily.    melatonin 5 MG tablet tablet  Self Yes Yes    Take 2 tablets by mouth Every Night.    metoprolol succinate XL (TOPROL-XL) 25 MG 24 hr tablet  Pharmacy Yes Yes    Take 1 tablet by mouth Daily.    OLANZapine (zyPREXA) 10 MG tablet  Pharmacy Yes Yes    Take 1 tablet by mouth Every Night.    polyethylene glycol (MIRALAX) 17 GM/SCOOP powder  Self No Yes    Dissolve 17 g (1 capful) in liquid and drink by mouth Daily.              Medication notes:     This medication list is complete to the best of my knowledge as of 3/28/2025    Please call if questions.    Jad Cazares  Medication History Technician  [phone]    3/28/2025 12:40 EDT

## 2025-03-28 NOTE — ED PROVIDER NOTES
EMERGENCY DEPARTMENT ENCOUNTER      PCP: Provider, No Known  Patient Care Team:  Provider, No Known as PCP - General (Nurse Practitioner)  Gian Marquez MD as Consulting Physician (Hematology and Oncology)  José Garcia APRN as Referring Physician (Family Medicine)  Phoenix Santa MD as Consulting Physician (Hematology and Oncology)   Independent Historians: Patient, caregiver at bedside    HPI:  Chief Complaint: Hip pain   A complete HPI/ROS/PMH/PSH/SH/FH are unobtainable due to: None    Chronic or social conditions impacting patient care (social determinants of health): None    Context: Helder Abbott is a 50 y.o. male who presents to the ED c/o acute right hip pain after fall that occurred yesterday.  Patient ambulates on his own but is very unsteady and has frequent falls.  Patient has had continued pain today and has not been able to put weight on the right leg without significant pain.  He denies hitting his head or loss of consciousness.  Patient does take Eliquis and last dose was this morning.  He denies other pain or injury.  No fevers or chills.    Review of prior external notes and/or external test results outside of this encounter: CT head on 3/11/2025 showed no evidence of hemorrhage, hydrocephalus or acute infarction.      PAST MEDICAL HISTORY  Active Ambulatory Problems     Diagnosis Date Noted    Essential hypertension 04/30/2024    Hypothyroidism (acquired) 04/30/2024    History of CVA (cerebrovascular accident) 04/30/2024    GERD without esophagitis 04/30/2024    HLD (hyperlipidemia) 04/30/2024    Bipolar disorder 04/30/2024    Partial bowel obstruction 04/30/2024    Fecal impaction 04/30/2024    Severe malnutrition 05/02/2024    Hyperkalemia 06/11/2024    Syncope 07/10/2024    Atrial fibrillation, chronic 07/10/2024    Dehydration 07/10/2024    Chronic anticoagulation 07/10/2024    Thrombocytopenia 07/10/2024    Seizure-like activity 03/11/2025    Hyponatremia 03/12/2025     Resolved  Ambulatory Problems     Diagnosis Date Noted    JOSIAH (acute kidney injury) 04/30/2024     Past Medical History:   Diagnosis Date    Benign essential hypertension     Dyslipidemia     GERD (gastroesophageal reflux disease)     Heart disease     History of stroke     Hypothyroidism        The patient has started, but not completed, their COVID-19 vaccination series.    PAST SURGICAL HISTORY  No past surgical history on file.      FAMILY HISTORY  Family History   Problem Relation Age of Onset    Diabetes Other     Hypertension Other     Heart disease Other          SOCIAL HISTORY  Social History     Socioeconomic History    Marital status: Single   Tobacco Use    Smoking status: Never    Smokeless tobacco: Never   Vaping Use    Vaping status: Never Used   Substance and Sexual Activity    Alcohol use: Never    Drug use: Never    Sexual activity: Defer         ALLERGIES  Clonazepam, Fluoxetine, Grass, Grass pollen(k-o-r-t-swt benigno), Hydroxyzine, Methylphenidate, Quetiapine, Risperidone, and Topamax [topiramate]        REVIEW OF SYSTEMS  Review of Systems   Musculoskeletal:  Positive for arthralgias (Right hip).        All systems reviewed and negative except for those discussed in HPI.       PHYSICAL EXAM    I have reviewed the triage vital signs and nursing notes.    ED Triage Vitals   Temp Heart Rate Resp BP SpO2   03/28/25 1119 03/28/25 1119 03/28/25 1119 03/28/25 1135 03/28/25 1119   96.2 °F (35.7 °C) 69 14 (!) 123/103 98 %      Temp src Heart Rate Source Patient Position BP Location FiO2 (%)   03/28/25 1119 03/28/25 1119 -- -- --   Tympanic Monitor          Physical Exam  GENERAL: alert, no acute distress  SKIN: Warm, dry  HENT: Normocephalic, atraumatic  EYES: no scleral icterus  CV: regular rhythm, regular rate  RESPIRATORY: normal effort, lungs clear  ABDOMEN: soft, nontender, nondistended  MUSCULOSKELETAL: There is right hip tenderness and pain with range of motion at the right hip  NEURO: alert, moves all  extremities, follows commands          LAB RESULTS  Recent Results (from the past 24 hours)   Comprehensive Metabolic Panel    Collection Time: 03/28/25  1:00 PM    Specimen: Arm, Right; Blood   Result Value Ref Range    Glucose 96 65 - 99 mg/dL    BUN 11 6 - 20 mg/dL    Creatinine 1.01 0.76 - 1.27 mg/dL    Sodium 131 (L) 136 - 145 mmol/L    Potassium 4.5 3.5 - 5.2 mmol/L    Chloride 95 (L) 98 - 107 mmol/L    CO2 24.7 22.0 - 29.0 mmol/L    Calcium 9.6 8.6 - 10.5 mg/dL    Total Protein 7.0 6.0 - 8.5 g/dL    Albumin 4.1 3.5 - 5.2 g/dL    ALT (SGPT) 17 1 - 41 U/L    AST (SGOT) 27 1 - 40 U/L    Alkaline Phosphatase 86 39 - 117 U/L    Total Bilirubin 0.6 0.0 - 1.2 mg/dL    Globulin 2.9 gm/dL    A/G Ratio 1.4 g/dL    BUN/Creatinine Ratio 10.9 7.0 - 25.0    Anion Gap 11.3 5.0 - 15.0 mmol/L    eGFR 90.6 >60.0 mL/min/1.73   CBC Auto Differential    Collection Time: 03/28/25  1:00 PM    Specimen: Arm, Right; Blood   Result Value Ref Range    WBC 7.45 3.40 - 10.80 10*3/mm3    RBC 5.47 4.14 - 5.80 10*6/mm3    Hemoglobin 16.6 13.0 - 17.7 g/dL    Hematocrit 48.2 37.5 - 51.0 %    MCV 88.1 79.0 - 97.0 fL    MCH 30.3 26.6 - 33.0 pg    MCHC 34.4 31.5 - 35.7 g/dL    RDW 15.8 (H) 12.3 - 15.4 %    RDW-SD 51.0 37.0 - 54.0 fl    MPV 10.1 6.0 - 12.0 fL    Platelets 153 140 - 450 10*3/mm3    Neutrophil % 66.0 42.7 - 76.0 %    Lymphocyte % 24.7 19.6 - 45.3 %    Monocyte % 7.7 5.0 - 12.0 %    Eosinophil % 0.4 0.3 - 6.2 %    Basophil % 0.3 0.0 - 1.5 %    Immature Grans % 0.9 (H) 0.0 - 0.5 %    Neutrophils, Absolute 4.92 1.70 - 7.00 10*3/mm3    Lymphocytes, Absolute 1.84 0.70 - 3.10 10*3/mm3    Monocytes, Absolute 0.57 0.10 - 0.90 10*3/mm3    Eosinophils, Absolute 0.03 0.00 - 0.40 10*3/mm3    Basophils, Absolute 0.02 0.00 - 0.20 10*3/mm3    Immature Grans, Absolute 0.07 (H) 0.00 - 0.05 10*3/mm3    nRBC 0.0 0.0 - 0.2 /100 WBC       Ordered the above labs and independently reviewed and interpreted the results.        RADIOLOGY  XR Chest 1  View  Result Date: 3/28/2025  XR CHEST 1 VW-3/28/2025  HISTORY: Preop. Hip fracture.  The heart size is within normal limits. Lungs appear free of acute infiltrates. Bones and soft tissues are unremarkable.      1. No acute process   This report was finalized on 3/28/2025 1:00 PM by Dr. Sang Lane M.D on Workstation: [id number]      XR Hip With or Without Pelvis 2 - 3 View Right  Result Date: 3/28/2025  XR HIP W OR WO PELVIS 2-3 VIEW RIGHT-  INDICATIONS: Trauma  TECHNIQUE: Frontal view of the pelvis, 2 views of the right hip  COMPARISON: None available  FINDINGS:  A linear lucency at the right intertrochanteric region raises suspicion for nondisplaced intertrochanteric fracture, CT correlation advised (alternatively, soft tissue shadows could potentially contribute to this appearance). No other evidence of fracture, erosion, or dislocation is identified. Mild bilateral hip joint space narrowing is apparent. Much stool at the rectum, correlate clinically to exclude possibly rectal stool impaction.       Suspicion for right intertrochanteric nondisplaced fracture, CT correlation advised.    This report was finalized on 3/28/2025 12:16 PM by Dr. Alfred Cavazos M.D on Workstation: [id number]        I ordered the above noted radiological studies. Independently reviewed and interpreted by me.  See dictation for official radiology interpretation.      PROCEDURES    Procedures      MEDICATIONS GIVEN IN ER    Medications   sodium chloride 0.9 % flush 10 mL (has no administration in time range)   morphine injection 4 mg (4 mg Intravenous Given 3/28/25 1344)   ondansetron (ZOFRAN) injection 4 mg (4 mg Intravenous Given 3/28/25 1344)         PROGRESS, DATA ANALYSIS, CONSULTS, AND MEDICAL DECISION MAKING    All labs have been independently reviewed and interpreted by me.  All radiology studies have been independently reviewed and interpreted by me and discussed with radiologist dictating the report.   EKG's  independently reviewed and interpreted by me.  Discussion below represents my analysis of pertinent findings related to patient's condition, differential diagnosis, treatment plan and final disposition.    Differential diagnosis: Hip fracture, hip contusion, dislocation    ED Course as of 03/28/25 1345   Fri Mar 28, 2025   1210 XR Hip With or Without Pelvis 2 - 3 View Right  Radiology study independently interpreted by me and my findings are suspected right intertrochanteric hip fracture    [DC]   1246 Discussed case with Dr. Serrano, orthopedic surgery, who recommends obtaining CT for further evaluation and will see in consult.  Patient may eat today but n.p.o. at midnight for surgery tomorrow.  Will hold Eliquis. [DC]   1307 Discussed case with Dr. Mallory, [facility], who will admit the patient. [DC]      ED Course User Index  [DC] Claudia Alas PA             AS OF 13:45 EDT VITALS:    BP - (!) 141/105  HR - 74  TEMP - 96.2 °F (35.7 °C) (Tympanic)  O2 SATS - 100%        DIAGNOSIS  Final diagnoses:   Closed nondisplaced intertrochanteric fracture of right femur, initial encounter         DISPOSITION  ED Disposition       ED Disposition   Decision to Admit    Condition   --    Comment   Level of Care: Telemetry [5]   Diagnosis: Closed intertrochanteric fracture, right, initial encounter [[id number]]   Admitting Physician: TWYLA MALLORY [[id number]]   Attending Physician: TWYLA MALLORY [[id number]]   Certification: I Certify That Inpatient Hospital Services Are Medically Necessary For Greater Than 2 Midnights                    Note Disclaimer: At [facility], we believe that sharing information builds trust and better relationships. You are receiving this note because you recently visited [facility]. It is possible you will see health information before a provider has talked with you about it. This kind of information can be easy to misunderstand. To help you fully understand what it means for your health, we urge you to discuss  this note with your provider.         Claudia Alas PA  03/28/25 0571

## 2025-03-29 ENCOUNTER — APPOINTMENT (OUTPATIENT)
Dept: MRI IMAGING | Facility: HOSPITAL | Age: 50
End: 2025-03-29
Payer: MEDICARE

## 2025-03-29 LAB
ALBUMIN SERPL-MCNC: 3.2 G/DL (ref 3.5–5.2)
ALBUMIN/GLOB SERPL: 1.3 G/DL
ALP SERPL-CCNC: 67 U/L (ref 39–117)
ALT SERPL W P-5'-P-CCNC: 15 U/L (ref 1–41)
ANION GAP SERPL CALCULATED.3IONS-SCNC: 12 MMOL/L (ref 5–15)
ANION GAP SERPL CALCULATED.3IONS-SCNC: 8 MMOL/L (ref 5–15)
AST SERPL-CCNC: 21 U/L (ref 1–40)
BASOPHILS # BLD AUTO: 0.02 10*3/MM3 (ref 0–0.2)
BASOPHILS NFR BLD AUTO: 0.3 % (ref 0–1.5)
BILIRUB SERPL-MCNC: 0.4 MG/DL (ref 0–1.2)
BUN SERPL-MCNC: 18 MG/DL (ref 6–20)
BUN SERPL-MCNC: 20 MG/DL (ref 6–20)
BUN/CREAT SERPL: 16.5 (ref 7–25)
BUN/CREAT SERPL: 19.8 (ref 7–25)
CALCIUM SPEC-SCNC: 8.6 MG/DL (ref 8.6–10.5)
CALCIUM SPEC-SCNC: 9 MG/DL (ref 8.6–10.5)
CHLORIDE SERPL-SCNC: 96 MMOL/L (ref 98–107)
CHLORIDE SERPL-SCNC: 96 MMOL/L (ref 98–107)
CO2 SERPL-SCNC: 22 MMOL/L (ref 22–29)
CO2 SERPL-SCNC: 23 MMOL/L (ref 22–29)
CREAT SERPL-MCNC: 1.01 MG/DL (ref 0.76–1.27)
CREAT SERPL-MCNC: 1.09 MG/DL (ref 0.76–1.27)
DEPRECATED RDW RBC AUTO: 50.5 FL (ref 37–54)
EGFRCR SERPLBLD CKD-EPI 2021: 82.7 ML/MIN/1.73
EGFRCR SERPLBLD CKD-EPI 2021: 90.6 ML/MIN/1.73
EOSINOPHIL # BLD AUTO: 0.04 10*3/MM3 (ref 0–0.4)
EOSINOPHIL NFR BLD AUTO: 0.6 % (ref 0.3–6.2)
ERYTHROCYTE [DISTWIDTH] IN BLOOD BY AUTOMATED COUNT: 15.7 % (ref 12.3–15.4)
GLOBULIN UR ELPH-MCNC: 2.4 GM/DL
GLUCOSE SERPL-MCNC: 74 MG/DL (ref 65–99)
GLUCOSE SERPL-MCNC: 76 MG/DL (ref 65–99)
HCT VFR BLD AUTO: 41.9 % (ref 37.5–51)
HGB BLD-MCNC: 14.4 G/DL (ref 13–17.7)
IMM GRANULOCYTES # BLD AUTO: 0.07 10*3/MM3 (ref 0–0.05)
IMM GRANULOCYTES NFR BLD AUTO: 1 % (ref 0–0.5)
LYMPHOCYTES # BLD AUTO: 2.24 10*3/MM3 (ref 0.7–3.1)
LYMPHOCYTES NFR BLD AUTO: 33 % (ref 19.6–45.3)
MCH RBC QN AUTO: 30.3 PG (ref 26.6–33)
MCHC RBC AUTO-ENTMCNC: 34.4 G/DL (ref 31.5–35.7)
MCV RBC AUTO: 88 FL (ref 79–97)
MONOCYTES # BLD AUTO: 0.5 10*3/MM3 (ref 0.1–0.9)
MONOCYTES NFR BLD AUTO: 7.4 % (ref 5–12)
NEUTROPHILS NFR BLD AUTO: 3.92 10*3/MM3 (ref 1.7–7)
NEUTROPHILS NFR BLD AUTO: 57.7 % (ref 42.7–76)
OSMOLALITY SERPL: 279 MOSM/KG (ref 275–300)
PLATELET # BLD AUTO: 139 10*3/MM3 (ref 140–450)
PMV BLD AUTO: 10.2 FL (ref 6–12)
POTASSIUM SERPL-SCNC: 5.1 MMOL/L (ref 3.5–5.2)
POTASSIUM SERPL-SCNC: 5.2 MMOL/L (ref 3.5–5.2)
PROT SERPL-MCNC: 5.6 G/DL (ref 6–8.5)
RBC # BLD AUTO: 4.76 10*6/MM3 (ref 4.14–5.8)
SODIUM SERPL-SCNC: 126 MMOL/L (ref 136–145)
SODIUM SERPL-SCNC: 131 MMOL/L (ref 136–145)
TSH SERPL DL<=0.05 MIU/L-ACNC: 3.55 UIU/ML (ref 0.27–4.2)
URATE SERPL-MCNC: 8.8 MG/DL (ref 3.4–7)
WBC NRBC COR # BLD AUTO: 6.79 10*3/MM3 (ref 3.4–10.8)

## 2025-03-29 PROCEDURE — 84550 ASSAY OF BLOOD/URIC ACID: CPT | Performed by: INTERNAL MEDICINE

## 2025-03-29 PROCEDURE — 84443 ASSAY THYROID STIM HORMONE: CPT | Performed by: HOSPITALIST

## 2025-03-29 PROCEDURE — 80053 COMPREHEN METABOLIC PANEL: CPT | Performed by: INTERNAL MEDICINE

## 2025-03-29 PROCEDURE — 25810000003 SODIUM CHLORIDE 0.9 % SOLUTION: Performed by: INTERNAL MEDICINE

## 2025-03-29 PROCEDURE — 25810000003 SODIUM CHLORIDE 0.9 % SOLUTION: Performed by: NURSE PRACTITIONER

## 2025-03-29 PROCEDURE — 85025 COMPLETE CBC W/AUTO DIFF WBC: CPT | Performed by: INTERNAL MEDICINE

## 2025-03-29 PROCEDURE — 73721 MRI JNT OF LWR EXTRE W/O DYE: CPT

## 2025-03-29 PROCEDURE — 83930 ASSAY OF BLOOD OSMOLALITY: CPT | Performed by: INTERNAL MEDICINE

## 2025-03-29 RX ORDER — HALOPERIDOL 5 MG/1
7.5 TABLET ORAL 2 TIMES DAILY
Status: DISCONTINUED | OUTPATIENT
Start: 2025-03-29 | End: 2025-04-03 | Stop reason: HOSPADM

## 2025-03-29 RX ORDER — SODIUM CHLORIDE 9 MG/ML
100 INJECTION, SOLUTION INTRAVENOUS CONTINUOUS
Status: ACTIVE | OUTPATIENT
Start: 2025-03-29 | End: 2025-03-30

## 2025-03-29 RX ADMIN — DIGOXIN 125 MCG: 0.12 TABLET ORAL at 13:08

## 2025-03-29 RX ADMIN — Medication 10 MG: at 21:44

## 2025-03-29 RX ADMIN — ATORVASTATIN CALCIUM 40 MG: 20 TABLET, FILM COATED ORAL at 10:54

## 2025-03-29 RX ADMIN — SODIUM CHLORIDE 500 ML: 9 INJECTION, SOLUTION INTRAVENOUS at 06:02

## 2025-03-29 RX ADMIN — DIVALPROEX SODIUM 2000 MG: 500 TABLET, DELAYED RELEASE ORAL at 21:45

## 2025-03-29 RX ADMIN — SODIUM CHLORIDE 100 ML/HR: 9 INJECTION, SOLUTION INTRAVENOUS at 16:04

## 2025-03-29 RX ADMIN — LEVOTHYROXINE SODIUM 75 MCG: 0.07 TABLET ORAL at 05:27

## 2025-03-29 RX ADMIN — OLANZAPINE 10 MG: 10 TABLET, FILM COATED ORAL at 21:44

## 2025-03-29 RX ADMIN — Medication 10 ML: at 10:55

## 2025-03-29 RX ADMIN — Medication 10 ML: at 21:46

## 2025-03-29 RX ADMIN — BENZTROPINE MESYLATE 1 MG: 1 TABLET ORAL at 10:55

## 2025-03-29 RX ADMIN — BENZTROPINE MESYLATE 1 MG: 1 TABLET ORAL at 21:44

## 2025-03-29 NOTE — PLAN OF CARE
Problem: Adult Inpatient Plan of Care  Goal: Plan of Care Review  Outcome: Progressing  Flowsheets (Taken 3/29/2025 0421)  Outcome Evaluation: Pt oriented to self place. Disoriented to time and situation. VSS. C/O pain to R leg. Fall recautions in place. NPO since midnight.  Plan of Care Reviewed With: patient  Goal: Absence of Hospital-Acquired Illness or Injury  Outcome: Progressing  Intervention: Identify and Manage Fall Risk  Recent Flowsheet Documentation  Taken 3/29/2025 0421 by Karie Camara, RN  Safety Promotion/Fall Prevention:   assistive device/personal items within reach   clutter free environment maintained   fall prevention program maintained   nonskid shoes/slippers when out of bed   room organization consistent   safety round/check completed  Taken 3/29/2025 0208 by Karie Camara, CHERELLE  Safety Promotion/Fall Prevention:   assistive device/personal items within reach   clutter free environment maintained   fall prevention program maintained   nonskid shoes/slippers when out of bed   room organization consistent   safety round/check completed  Taken 3/29/2025 0027 by Karie Camara, CHERELLE  Safety Promotion/Fall Prevention:   assistive device/personal items within reach   clutter free environment maintained   fall prevention program maintained   nonskid shoes/slippers when out of bed   room organization consistent   safety round/check completed  Taken 3/28/2025 2210 by Karie Camara, CHERELLE  Safety Promotion/Fall Prevention:   assistive device/personal items within reach   clutter free environment maintained   fall prevention program maintained   lighting adjusted   nonskid shoes/slippers when out of bed   room organization consistent   safety round/check completed  Taken 3/28/2025 2042 by Karie Camara, RN  Safety Promotion/Fall Prevention:   activity supervised   assistive device/personal items within reach   clutter free environment maintained   fall prevention program maintained   lighting adjusted    nonskid shoes/slippers when out of bed   room organization consistent   safety round/check completed  Intervention: Prevent Skin Injury  Recent Flowsheet Documentation  Taken 3/29/2025 0421 by Karie Camara RN  Body Position: position changed independently  Taken 3/29/2025 0208 by Karie Camara RN  Body Position: position changed independently  Taken 3/29/2025 0027 by Karie Camara RN  Body Position: position changed independently  Taken 3/28/2025 2210 by Karie Camara RN  Body Position: position changed independently  Taken 3/28/2025 2042 by Karie Camara RN  Body Position: position changed independently  Skin Protection:   incontinence pads utilized   pectin skin barriers applied   transparent dressing maintained  Intervention: Prevent and Manage VTE (Venous Thromboembolism) Risk  Recent Flowsheet Documentation  Taken 3/28/2025 2042 by Karie Camara RN  VTE Prevention/Management:   bilateral   SCDs (sequential compression devices) on  Intervention: Prevent Infection  Recent Flowsheet Documentation  Taken 3/28/2025 2042 by Karie Camara RN  Infection Prevention:   environmental surveillance performed   hand hygiene promoted   personal protective equipment utilized   rest/sleep promoted   single patient room provided  Goal: Optimal Comfort and Wellbeing  Outcome: Progressing  Intervention: Provide Person-Centered Care  Recent Flowsheet Documentation  Taken 3/28/2025 2042 by Karie Camara RN  Trust Relationship/Rapport:   care explained   questions answered   questions encouraged  Goal: Readiness for Transition of Care  Outcome: Progressing     Problem: Skin Injury Risk Increased  Goal: Skin Health and Integrity  Outcome: Progressing  Intervention: Optimize Skin Protection  Recent Flowsheet Documentation  Taken 3/29/2025 0421 by Karie Camara RN  Activity Management: (asleep) other (see comments)  Taken 3/29/2025 0208 by Karie Camara RN  Activity Management: (asleep) other (see comments)  Taken 3/29/2025  0027 by Karie Camara, RN  Activity Management: activity minimized  Taken 3/28/2025 2210 by Karei Camara, RN  Activity Management: activity minimized  Taken 3/28/2025 2042 by Karie Camara, CHERELLE  Activity Management: activity encouraged  Pressure Reduction Techniques:   frequent weight shift encouraged   heels elevated off bed  Pressure Reduction Devices:   heel offloading device utilized   positioning supports utilized   foam padding utilized  Skin Protection:   incontinence pads utilized   pectin skin barriers applied   transparent dressing maintained     Problem: Fall Injury Risk  Goal: Absence of Fall and Fall-Related Injury  Outcome: Progressing  Intervention: Identify and Manage Contributors  Recent Flowsheet Documentation  Taken 3/28/2025 2042 by Karie Camara, RN  Medication Review/Management: medications reviewed  Intervention: Promote Injury-Free Environment  Recent Flowsheet Documentation  Taken 3/29/2025 0421 by Karie Camara RN  Safety Promotion/Fall Prevention:   assistive device/personal items within reach   clutter free environment maintained   fall prevention program maintained   nonskid shoes/slippers when out of bed   room organization consistent   safety round/check completed  Taken 3/29/2025 0208 by Karie Camara RN  Safety Promotion/Fall Prevention:   assistive device/personal items within reach   clutter free environment maintained   fall prevention program maintained   nonskid shoes/slippers when out of bed   room organization consistent   safety round/check completed  Taken 3/29/2025 0027 by Karie Camara RN  Safety Promotion/Fall Prevention:   assistive device/personal items within reach   clutter free environment maintained   fall prevention program maintained   nonskid shoes/slippers when out of bed   room organization consistent   safety round/check completed  Taken 3/28/2025 2210 by Karie Camara, CHERELLE  Safety Promotion/Fall Prevention:   assistive device/personal items within reach    clutter free environment maintained   fall prevention program maintained   lighting adjusted   nonskid shoes/slippers when out of bed   room organization consistent   safety round/check completed  Taken 3/28/2025 2042 by Karie Camara RN  Safety Promotion/Fall Prevention:   activity supervised   assistive device/personal items within reach   clutter free environment maintained   fall prevention program maintained   lighting adjusted   nonskid shoes/slippers when out of bed   room organization consistent   safety round/check completed   Goal Outcome Evaluation:  Plan of Care Reviewed With: patient           Outcome Evaluation: Pt oriented to self place. Disoriented to time and situation. VSS. C/O pain to R leg. Fall recautions in place. NPO since midnight.

## 2025-03-29 NOTE — CONSULTS
[facility] Consult Note      Patient Name: Helder Abbott  : 1975  MRN: [id number]  Primary Care Physician:  Provider, No Known  Referring Physician: Jose Alvarenga MD  Date of admission: 3/28/2025    Subjective     Reason for Consult: Chronic hyponatremia    HPI:   Helder Abbott is a 50 y.o. male patient was admitted on 3/28/2025, with pain of his right hip after a fall to have closed intertrochanteric right hip fracture, evaluated by orthopedics.  He has bipolar disorder, prior CVA, hypothyroidism, hypertension, cognitive impairment and chronic A-fib.  He is currently crying because he is asking about his friend.  Unable to give me any meaningful information.    Review of Systems:   Not obtainable    Personal History     Past Medical History:   Diagnosis Date    Benign essential hypertension     Bipolar disorder     Dyslipidemia     GERD (gastroesophageal reflux disease)     Heart disease     History of stroke     Hypothyroidism        History reviewed. No pertinent surgical history.    Family History: family history includes Diabetes in an other family member; Heart disease in an other family member; Hypertension in an other family member.    Social History:  reports that he has never smoked. He has never used smokeless tobacco. He reports that he does not drink alcohol and does not use drugs.    Home Medications:  Prior to Admission medications    Medication Sig Start Date End Date Taking? Authorizing Provider   Acetaminophen Extra Strength 500 MG tablet Take 1 tablet by mouth 4 (Four) Times a Day As Needed. 11/15/22  Yes Tobin Mcbride MD   ARIPiprazole ER (Abilify Maintena) 400 MG prefilled syringe IM prefilled syringe Inject 400 mg into the appropriate muscle as directed by prescriber Every 28 (Twenty-Eight) Days.   Yes Tobin Mcbride MD   atorvastatin (LIPITOR) 40 MG tablet Take 1 tablet by mouth Daily. 22  Yes Tobin Mcbride MD   benztropine  (COGENTIN) 1 MG tablet Take 1 tablet by mouth 2 (Two) Times a Day. 10/11/22  Yes Tobin Mcbride MD   digoxin (LANOXIN) 125 MCG tablet Take 1 tablet by mouth Daily. 11/18/22  Yes Tobin Mcbride MD   divalproex (DEPAKOTE) 500 MG DR tablet Take 4 tablets by mouth Every Night. 10/27/22  Yes Tobin Mcbride MD   Eliquis 5 MG tablet tablet Take 1 tablet by mouth Every 12 (Twelve) Hours. 11/21/22  Yes Tobin Mcbride MD   haloperidol (HALDOL) 5 MG tablet Take 1.5 tablets by mouth 2 (Two) Times a Day.   Yes Tobin Mcbride MD   levothyroxine (SYNTHROID, LEVOTHROID) 75 MCG tablet Take 1 tablet by mouth Daily. 11/18/22  Yes Tobin Mcbride MD   melatonin 5 MG tablet tablet Take 2 tablets by mouth Every Night.   Yes Tobin Mcbride MD   metoprolol succinate XL (TOPROL-XL) 25 MG 24 hr tablet Take 1 tablet by mouth Daily.   Yes Tobin Mcbride MD   OLANZapine (zyPREXA) 10 MG tablet Take 1 tablet by mouth Every Night.   Yes Tobin Mcbride MD   polyethylene glycol (MIRALAX) 17 GM/SCOOP powder Dissolve 17 g (1 capful) in liquid and drink by mouth Daily. 5/6/24  Yes Ronnie Rojas MD       Allergies:  Allergies   Allergen Reactions    Clonazepam Unknown (See Comments)    Fluoxetine Unknown (See Comments)    Grass Unknown - High Severity    Grass Pollen(K-O-R-T-Swt Kennedy) Unknown - Low Severity    Hydroxyzine Unknown (See Comments)    Methylphenidate Unknown (See Comments)    Quetiapine Unknown - Low Severity    Risperidone Unknown (See Comments)    Topamax [Topiramate] Unknown - High Severity       Objective     Vitals:   Temp:  [97.5 °F (36.4 °C)-98.2 °F (36.8 °C)] 97.6 °F (36.4 °C)  Heart Rate:  [65-83] 80  Resp:  [14-16] 16  BP: ()/() 137/89    Intake/Output Summary (Last 24 hours) at 3/29/2025 1349  Last data filed at 3/29/2025 0700  Gross per 24 hour   Intake 240 ml   Output --   Net 240 ml       Physical Exam:   Constitutional: Awake, crying, chronically  ill, no acute distress  HEENT: Sclera anicteric, no conjunctival injection, oral mucosa dry  Neck: No JVD  Respiratory: Bilateral rhonchi, breathing effort not labored  Cardiovascular: Irregularly irregular,  Gastrointestinal: Positive bowel sounds, abdomen is soft, nontender and nondistended  : No palpable bladder  Musculoskeletal: No edema, no clubbing or cyanosis  Psychiatric: Difficult to assess he is crying  Neurologic: He has impaired speech   Skin: Warm and dry       Scheduled Meds:     [Held by provider] apixaban, 5 mg, Oral, Q12H  atorvastatin, 40 mg, Oral, Daily  benztropine, 1 mg, Oral, BID  digoxin, 125 mcg, Oral, Daily  divalproex, 2,000 mg, Oral, Nightly  haloperidol, 7.5 mg, Oral, BID  levothyroxine, 75 mcg, Oral, Q AM  melatonin, 10 mg, Oral, Nightly  metoprolol succinate XL, 25 mg, Oral, Daily  OLANZapine, 10 mg, Oral, Nightly  sodium chloride, 10 mL, Intravenous, Q12H      IV Meds:        Results Reviewed:   I have personally reviewed the results from the time of this admission to 3/29/2025 13:49 EDT     Lab Results   Component Value Date    GLUCOSE 76 03/29/2025    CALCIUM 9.0 03/29/2025     (L) 03/29/2025    K 5.1 03/29/2025    CO2 23.0 03/29/2025    CL 96 (L) 03/29/2025    BUN 20 03/29/2025    CREATININE 1.01 03/29/2025    BCR 19.8 03/29/2025    ANIONGAP 12.0 03/29/2025      Lab Results   Component Value Date    MG 1.6 07/12/2024    PHOS 2.5 07/12/2024    ALBUMIN 3.2 (L) 03/29/2025           Assessment / Plan       Closed intertrochanteric fracture, right, initial encounter    Essential hypertension    Hypothyroidism (acquired)    History of CVA (cerebrovascular accident)    Bipolar disorder    Atrial fibrillation, chronic    Chronic anticoagulation    Hyponatremia      ASSESSMENT:  Hyponatremia currently appears to be hypovolemic with increased uric acid the patient was hypotensive last night, sodium is 131 no urine study to evaluate at this time.  Creatinine slightly elevated 1.01  related to hypovolemia baseline creatinine 0.8  Bipolar disorder  Old stroke with dysarthria  Fall with right hip fracture  Cognitive dysfunction  A-fib      PLAN:  Restrict water intake to 1500 cc per 24 hours  Will give IV fluid normal saline 100 cc/h x 2 L  Surveillance labs      Reviewed the chart and other providers notes, reviewed labs.  I discussed the case with the patient's nurse.    Thank you for involving us in the care of Helder Abbott.  Please feel free to call with any questions.    Jun Lund MD  03/29/25  13:49 EDT    [facility]  [phone]      Please note that portions of this note were completed with a voice recognition program.

## 2025-03-29 NOTE — PROGRESS NOTES
[facility]     LOS: 1 day     Subjective:    CC:Fall and Hip Pain    DIET:  Diet Order   Procedures    Diet: Regular/House, Fluid Restriction (240 mL/tray); 1500 mL/day; Fluid Consistency: Thin (IDDSI 0)       Objective:    Vital Signs:  Temp:  [97.5 °F (36.4 °C)-97.9 °F (36.6 °C)] 97.8 °F (36.6 °C)  Heart Rate:  [65-81] 81  Resp:  [16] 16  BP: ()/(61-94) 141/94    SpO2:  [98 %-100 %] 98 %  on   ;   Device (Oxygen Therapy): room air  Body mass index is 20.72 kg/m².    Physical Exam  Constitutional:       General: He is not in acute distress.  Pulmonary:      Effort: Pulmonary effort is normal.      Breath sounds: Normal breath sounds.   Abdominal:      General: There is no distension.      Palpations: Abdomen is soft.      Tenderness: There is no abdominal tenderness.   Skin:     General: Skin is warm and dry.   Neurological:      General: No focal deficit present.      Mental Status: He is alert.   Psychiatric:         Mood and Affect: Mood normal.         Behavior: Behavior normal.         Results Review:    Glucose   Date Value Ref Range Status   03/29/2025 76 65 - 99 mg/dL Final   03/29/2025 74 65 - 99 mg/dL Final   03/28/2025 96 65 - 99 mg/dL Final     Results from last 7 days   Lab Units 03/29/25  0547   WBC 10*3/mm3 6.79   HEMOGLOBIN g/dL 14.4   HEMATOCRIT % 41.9   PLATELETS 10*3/mm3 139*     Results from last 7 days   Lab Units 03/29/25  1143 03/29/25  0547   SODIUM mmol/L 131* 126*   POTASSIUM mmol/L 5.1 5.2   CHLORIDE mmol/L 96* 96*   CO2 mmol/L 23.0 22.0   BUN mg/dL 20 18   CREATININE mg/dL 1.01 1.09   CALCIUM mg/dL 9.0 8.6   BILIRUBIN mg/dL  --  0.4   ALK PHOS U/L  --  67   ALT (SGPT) U/L  --  15   AST (SGOT) U/L  --  21   GLUCOSE mg/dL 76 74                 Cultures:  No results found for: \"BLOODCX\", \"URINECX\", \"WOUNDCX\", \"MRSACX\", \"RESPCX\", \"STOOLCX\"    I have reviewed daily medications and changes in CPOE    Scheduled meds  [Held by provider] apixaban, 5 mg, Oral, " Q12H  atorvastatin, 40 mg, Oral, Daily  benztropine, 1 mg, Oral, BID  digoxin, 125 mcg, Oral, Daily  divalproex, 2,000 mg, Oral, Nightly  haloperidol, 7.5 mg, Oral, BID  levothyroxine, 75 mcg, Oral, Q AM  melatonin, 10 mg, Oral, Nightly  metoprolol succinate XL, 25 mg, Oral, Daily  OLANZapine, 10 mg, Oral, Nightly  sodium chloride, 10 mL, Intravenous, Q12H        sodium chloride, 100 mL/hr, Last Rate: 100 mL/hr (03/29/25 1604)      PRN meds    acetaminophen    HYDROcodone-acetaminophen    Morphine    nitroglycerin    ondansetron    [COMPLETED] Insert Peripheral IV **AND** sodium chloride    sodium chloride    sodium chloride        Closed intertrochanteric fracture, right, initial encounter    Essential hypertension    Hypothyroidism (acquired)    History of CVA (cerebrovascular accident)    Bipolar disorder    Atrial fibrillation, chronic    Chronic anticoagulation    Hyponatremia        Assessment/Plan:    Right hip pain with possible closed intratrochanteric fracture of the right hip-confirmation pending  -Further imaging with MRI shows no evidence of fracture    History of chronic atrial fibrillation  -Severe edematous change and bruising of right gluteus hold on blood thinners    Bipolar disorder-continue his home regimen.    History of intellectual disability and previous CVA-continue his current regimen and monitor    Hypertension-continue antihypertensive regimen and avoid hypotensive episodes.    Hypothyroidism-continue thyroid replacement therapy.    Hyponatremia-continue with fluid restriction.  -Worse today but repeat is better  -Nephrology consulted  -TSH nl and cortisol level in am    Possible discharge tomorrow    Ronnie Padilla MD  03/29/25  16:41 EDT

## 2025-03-29 NOTE — NURSING NOTE
Called pt's guardian to obtain consent for MRI. Spoke with Sarah Evans (guardianship afterhou) and she gave consent for MRI with the condition that I call pt's facility @ [phone] and speak have Jaime Kim answer the MRI questions. I attempted to call this person, but they did not answer. Will try again in AM.

## 2025-03-29 NOTE — CONSULTS
Orthopaedic Consult    Joann Serrano MD      Patient: Helder Abbott    Date of Admission: 3/28/2025 11:36 AM    YOB: 1975    Medical Record Number: [id number]    Attending Physician: Ronnie Padilla MD  Consulting Physician: Joann Serrano MD      Chief Complaints: Closed nondisplaced intertrochanteric fracture of right femur, initial encounter [S72.144A]  Closed intertrochanteric fracture, right, initial encounter [S72.141A]      History of Present Illness: 50 y.o. male admitted to [facility] with Closed nondisplaced intertrochanteric fracture of right femur, initial encounter [S72.144A]  Closed intertrochanteric fracture, right, initial encounter [S72.141A]. I was consulted for further evaluation and treatment. Onset of symptoms was yesterday the patient had a fall.  He has a history of intellectual disability chronic A-fib bipolar disorder hypertension prior stroke.  Reports a prior fall with difficulty ambulating after the fall.  Images in the ER were concerning for an inner troches fracture CT scan negative.    Allergies:   Allergies   Allergen Reactions    Clonazepam Unknown (See Comments)    Fluoxetine Unknown (See Comments)    Grass Unknown - High Severity    Grass Pollen(K-O-R-T-Swt Kennedy) Unknown - Low Severity    Hydroxyzine Unknown (See Comments)    Methylphenidate Unknown (See Comments)    Quetiapine Unknown - Low Severity    Risperidone Unknown (See Comments)    Topamax [Topiramate] Unknown - High Severity       Medications:   Home Medications:  Medications Prior to Admission   Medication Sig Dispense Refill Last Dose/Taking    Acetaminophen Extra Strength 500 MG tablet Take 1 tablet by mouth 4 (Four) Times a Day As Needed.   Taking As Needed    ARIPiprazole ER (Abilify Maintena) 400 MG prefilled syringe IM prefilled syringe Inject 400 mg into the appropriate muscle as directed by prescriber Every 28 (Twenty-Eight) Days.   Taking    atorvastatin (LIPITOR) 40 MG tablet  Take 1 tablet by mouth Daily.   Taking    benztropine (COGENTIN) 1 MG tablet Take 1 tablet by mouth 2 (Two) Times a Day.   Taking    digoxin (LANOXIN) 125 MCG tablet Take 1 tablet by mouth Daily.   Taking    divalproex (DEPAKOTE) 500 MG DR tablet Take 4 tablets by mouth Every Night.   Taking    Eliquis 5 MG tablet tablet Take 1 tablet by mouth Every 12 (Twelve) Hours.   Taking    haloperidol (HALDOL) 5 MG tablet Take 1.5 tablets by mouth 2 (Two) Times a Day.   Taking    levothyroxine (SYNTHROID, LEVOTHROID) 75 MCG tablet Take 1 tablet by mouth Daily.   Taking    melatonin 5 MG tablet tablet Take 2 tablets by mouth Every Night.   Taking    metoprolol succinate XL (TOPROL-XL) 25 MG 24 hr tablet Take 1 tablet by mouth Daily.   Taking    OLANZapine (zyPREXA) 10 MG tablet Take 1 tablet by mouth Every Night.   Taking    polyethylene glycol (MIRALAX) 17 GM/SCOOP powder Dissolve 17 g (1 capful) in liquid and drink by mouth Daily. 510 g 1 Taking       Current Medications:  Scheduled Meds:[Held by provider] apixaban, 5 mg, Oral, Q12H  atorvastatin, 40 mg, Oral, Daily  benztropine, 1 mg, Oral, BID  digoxin, 125 mcg, Oral, Daily  divalproex, 2,000 mg, Oral, Nightly  haloperidol, 7.5 mg, Oral, BID  levothyroxine, 75 mcg, Oral, Q AM  melatonin, 10 mg, Oral, Nightly  metoprolol succinate XL, 25 mg, Oral, Daily  OLANZapine, 10 mg, Oral, Nightly  sodium chloride, 10 mL, Intravenous, Q12H      Continuous Infusions:   PRN Meds:.  acetaminophen    HYDROcodone-acetaminophen    Morphine    nitroglycerin    ondansetron    [COMPLETED] Insert Peripheral IV **AND** sodium chloride    sodium chloride    sodium chloride    Past Medical History:   Diagnosis Date    Benign essential hypertension     Bipolar disorder     Dyslipidemia     GERD (gastroesophageal reflux disease)     Heart disease     History of stroke     Hypothyroidism      History reviewed. No pertinent surgical history.  Social History     Occupational History     Employer:  DISABLED   Tobacco Use    Smoking status: Never    Smokeless tobacco: Never   Vaping Use    Vaping status: Never Used   Substance and Sexual Activity    Alcohol use: Never    Drug use: Never    Sexual activity: Defer      Social History     Social History Narrative    Not on file     Family History   Problem Relation Age of Onset    Diabetes Other     Hypertension Other     Heart disease Other          Review of Systems:   Constitutional: Negative for fatigue, fever, or weight loss  HEENT: Patient denies any headaches, vision changes, sore throat. Admits to wearing glasses  Pulmonary: Patient denies any cough, congestion, SOA, or wheezing  Cardiovascular: Patient denies any chest pain, palpitations, weakness,  Gastrointestinal:  Patient denies nausea, vomiting, diarrhea, constipation  Genital/Urinary: Patient denies dysuria, urinary frequency, urgency, incontinence, retention  Musculoskeletal: Positive for right hip pain. Negative for muscle cramps  Neurological: Patient denies dizziness, paresthesia, loss of sensation, seizures, syncope  Endocrine system: Patient denies tremors, cold or heat intolerance  Psychological: Patient denies thoughts/plans or harming self or other; hallucinations,  insomnia  Skin: Patient denies any bruising, rashes, lesions, or ulcers   Hematopoietic: Patient denies history of MRSA or slow wound healing,  spontaneous or excessive bleeding    Physical Exam: 50 y.o. male  Vitals:    03/29/25 0005 03/29/25 0539 03/29/25 0604 03/29/25 0700   BP: 92/61 (!) 87/72  Comment: david noted 110/82 117/74   BP Location: Left arm Left arm  Right arm   Patient Position: Lying Lying  Lying   Pulse: 70 65  70   Resp: 16 16  16   Temp: 97.7 °F (36.5 °C) 97.7 °F (36.5 °C)  97.9 °F (36.6 °C)   TempSrc: Oral Oral  Oral   SpO2: 100% 99%  99%   Weight:       Height:                                General Appearance:  Alert, cooperative, in no acute distress    HEENT:    Normocephalic,  Atraumatic, Pupils are equal    Neck:   No adenopathy, supple, trachea midline, no thyromegaly       Lungs:     Clear to auscultation, equal expansion bilaterally, respirations regular, even and               unlabored    Heart:    Regular rhythm and normal rate, normal S1 and S2, no murmur, no gallops   Chest Wall:    Within normal limits   Abdomen:     Normal bowel sounds, no masses,  soft non-tender, non-distended,       Rectal:  Musculoskeletal:     Deferred  Pain with palpation of the right hip   Extremities:   No clubbing, no edema, no cyanosis   Pulses  Neurovascular:   Pulses palpable and equal bilaterally in all 4 extremities    Patient was alert and oriented x 3 spheres   Skin:   No lesions, ulcers or rashes   Neurologic:   Cranial nerves 2 - 12 grossly intact, sensation intact            Diagnostic Tests:      Plain films and CT scan demonstrate that there is no evidence of a right intertrochanteric femur fracture.  I did review an MRI of his right hip which is demonstrate that he has a tear of his gluteus musculature    Assessment:    Closed intertrochanteric fracture, right, initial encounter    Essential hypertension    Hypothyroidism (acquired)    History of CVA (cerebrovascular accident)    Bipolar disorder    Atrial fibrillation, chronic    Chronic anticoagulation    Hyponatremia      Gluteal tear right       Plan:      no restrictions from an orthopedic standpoint no plan for surgical intervention.  Patient is okay to be weightbearing as tolerated would benefit from skilled physical therapy.  Orthopedics will sign off at this time.  He is okay to follow-up with us as an outpatient if needed.  Questions encouraged and answered no guarantees given    Date: 3/29/2025  Joann Serrano MD  Orthopaedic Surgeon    [facility]  [phone]  [email or web address]

## 2025-03-30 LAB
ALBUMIN SERPL-MCNC: 2.9 G/DL (ref 3.5–5.2)
ANION GAP SERPL CALCULATED.3IONS-SCNC: 9.9 MMOL/L (ref 5–15)
ANION GAP SERPL CALCULATED.3IONS-SCNC: 9.9 MMOL/L (ref 5–15)
BASOPHILS # BLD AUTO: 0.01 10*3/MM3 (ref 0–0.2)
BASOPHILS NFR BLD AUTO: 0.2 % (ref 0–1.5)
BUN SERPL-MCNC: 14 MG/DL (ref 6–20)
BUN SERPL-MCNC: 14 MG/DL (ref 6–20)
BUN/CREAT SERPL: 23.3 (ref 7–25)
BUN/CREAT SERPL: 23.3 (ref 7–25)
CALCIUM SPEC-SCNC: 8.6 MG/DL (ref 8.6–10.5)
CALCIUM SPEC-SCNC: 8.6 MG/DL (ref 8.6–10.5)
CHLORIDE SERPL-SCNC: 102 MMOL/L (ref 98–107)
CHLORIDE SERPL-SCNC: 102 MMOL/L (ref 98–107)
CO2 SERPL-SCNC: 19.1 MMOL/L (ref 22–29)
CO2 SERPL-SCNC: 19.1 MMOL/L (ref 22–29)
CORTIS SERPL-MCNC: 10.4 MCG/DL
CREAT SERPL-MCNC: 0.6 MG/DL (ref 0.76–1.27)
CREAT SERPL-MCNC: 0.6 MG/DL (ref 0.76–1.27)
DEPRECATED RDW RBC AUTO: 52.8 FL (ref 37–54)
EGFRCR SERPLBLD CKD-EPI 2021: 117.6 ML/MIN/1.73
EGFRCR SERPLBLD CKD-EPI 2021: 117.6 ML/MIN/1.73
EOSINOPHIL # BLD AUTO: 0.03 10*3/MM3 (ref 0–0.4)
EOSINOPHIL NFR BLD AUTO: 0.5 % (ref 0.3–6.2)
ERYTHROCYTE [DISTWIDTH] IN BLOOD BY AUTOMATED COUNT: 15.4 % (ref 12.3–15.4)
GLUCOSE SERPL-MCNC: 75 MG/DL (ref 65–99)
GLUCOSE SERPL-MCNC: 75 MG/DL (ref 65–99)
HCT VFR BLD AUTO: 47.5 % (ref 37.5–51)
HGB BLD-MCNC: 15.3 G/DL (ref 13–17.7)
IMM GRANULOCYTES # BLD AUTO: 0.03 10*3/MM3 (ref 0–0.05)
IMM GRANULOCYTES NFR BLD AUTO: 0.5 % (ref 0–0.5)
LYMPHOCYTES # BLD AUTO: 2.15 10*3/MM3 (ref 0.7–3.1)
LYMPHOCYTES NFR BLD AUTO: 34.8 % (ref 19.6–45.3)
MAGNESIUM SERPL-MCNC: 1.8 MG/DL (ref 1.6–2.6)
MCH RBC QN AUTO: 30.1 PG (ref 26.6–33)
MCHC RBC AUTO-ENTMCNC: 32.2 G/DL (ref 31.5–35.7)
MCV RBC AUTO: 93.3 FL (ref 79–97)
MONOCYTES # BLD AUTO: 0.43 10*3/MM3 (ref 0.1–0.9)
MONOCYTES NFR BLD AUTO: 7 % (ref 5–12)
NEUTROPHILS NFR BLD AUTO: 3.53 10*3/MM3 (ref 1.7–7)
NEUTROPHILS NFR BLD AUTO: 57 % (ref 42.7–76)
PHOSPHATE SERPL-MCNC: 2.6 MG/DL (ref 2.5–4.5)
PLATELET # BLD AUTO: 130 10*3/MM3 (ref 140–450)
PMV BLD AUTO: 10 FL (ref 6–12)
POTASSIUM SERPL-SCNC: 4.8 MMOL/L (ref 3.5–5.2)
POTASSIUM SERPL-SCNC: 4.8 MMOL/L (ref 3.5–5.2)
RBC # BLD AUTO: 5.09 10*6/MM3 (ref 4.14–5.8)
SODIUM SERPL-SCNC: 131 MMOL/L (ref 136–145)
SODIUM SERPL-SCNC: 131 MMOL/L (ref 136–145)
URATE SERPL-MCNC: 8.3 MG/DL (ref 3.4–7)
WBC NRBC COR # BLD AUTO: 6.18 10*3/MM3 (ref 3.4–10.8)

## 2025-03-30 PROCEDURE — 82533 TOTAL CORTISOL: CPT | Performed by: HOSPITALIST

## 2025-03-30 PROCEDURE — 80048 BASIC METABOLIC PNL TOTAL CA: CPT | Performed by: HOSPITALIST

## 2025-03-30 PROCEDURE — 84550 ASSAY OF BLOOD/URIC ACID: CPT | Performed by: INTERNAL MEDICINE

## 2025-03-30 PROCEDURE — 25810000003 SODIUM CHLORIDE 0.9 % SOLUTION: Performed by: INTERNAL MEDICINE

## 2025-03-30 PROCEDURE — 83735 ASSAY OF MAGNESIUM: CPT | Performed by: INTERNAL MEDICINE

## 2025-03-30 PROCEDURE — 25010000002 MORPHINE PER 10 MG: Performed by: INTERNAL MEDICINE

## 2025-03-30 PROCEDURE — 85025 COMPLETE CBC W/AUTO DIFF WBC: CPT | Performed by: HOSPITALIST

## 2025-03-30 PROCEDURE — 80069 RENAL FUNCTION PANEL: CPT | Performed by: INTERNAL MEDICINE

## 2025-03-30 RX ORDER — SODIUM CHLORIDE 9 MG/ML
100 INJECTION, SOLUTION INTRAVENOUS CONTINUOUS
Status: ACTIVE | OUTPATIENT
Start: 2025-03-30 | End: 2025-03-31

## 2025-03-30 RX ADMIN — LEVOTHYROXINE SODIUM 75 MCG: 0.07 TABLET ORAL at 07:16

## 2025-03-30 RX ADMIN — METOPROLOL SUCCINATE 25 MG: 25 TABLET, EXTENDED RELEASE ORAL at 08:19

## 2025-03-30 RX ADMIN — DIVALPROEX SODIUM 2000 MG: 500 TABLET, DELAYED RELEASE ORAL at 23:00

## 2025-03-30 RX ADMIN — BENZTROPINE MESYLATE 1 MG: 1 TABLET ORAL at 20:42

## 2025-03-30 RX ADMIN — Medication 10 MG: at 20:42

## 2025-03-30 RX ADMIN — HALOPERIDOL 7.5 MG: 5 TABLET ORAL at 08:19

## 2025-03-30 RX ADMIN — SODIUM CHLORIDE 100 ML/HR: 9 INJECTION, SOLUTION INTRAVENOUS at 12:25

## 2025-03-30 RX ADMIN — Medication 10 ML: at 08:28

## 2025-03-30 RX ADMIN — HALOPERIDOL 7.5 MG: 5 TABLET ORAL at 20:42

## 2025-03-30 RX ADMIN — Medication 10 ML: at 20:42

## 2025-03-30 RX ADMIN — APIXABAN 5 MG: 5 TABLET, FILM COATED ORAL at 20:42

## 2025-03-30 RX ADMIN — OLANZAPINE 10 MG: 10 TABLET, FILM COATED ORAL at 20:42

## 2025-03-30 RX ADMIN — BENZTROPINE MESYLATE 1 MG: 1 TABLET ORAL at 08:19

## 2025-03-30 RX ADMIN — MORPHINE SULFATE 4 MG: 2 INJECTION, SOLUTION INTRAMUSCULAR; INTRAVENOUS at 20:01

## 2025-03-30 RX ADMIN — ATORVASTATIN CALCIUM 40 MG: 20 TABLET, FILM COATED ORAL at 08:19

## 2025-03-30 RX ADMIN — DIGOXIN 125 MCG: 0.12 TABLET ORAL at 12:28

## 2025-03-30 NOTE — PROGRESS NOTES
[facility] Progress Note      Patient Name: Helder Abbott  : 1975  MRN: [id number]  Primary Care Physician:  Provider, No Known  Date of admission: 3/28/2025    Subjective     Interval History:   Follow-up hyponatremia    Patient is obtunded, arousable but very confused    Review of Systems:   As noted above    Objective     Vitals:   Temp:  [97.5 °F (36.4 °C)-98.4 °F (36.9 °C)] 97.5 °F (36.4 °C)  Heart Rate:  [63-88] 88  Resp:  [16] 16  BP: ()/(60-94) 133/92    Intake/Output Summary (Last 24 hours) at 3/30/2025 1028  Last data filed at 3/29/2025 1801  Gross per 24 hour   Intake 292 ml   Output --   Net 292 ml       Physical Exam:    General Appearance: Obtunded, arousable but very confused, chronically, no acute distress   Skin: warm and dry  HEENT: o oral mucosa is  Neck: No JVD  Lungs: Bilateral rhonchi, unlabored breathing  Heart: Irregularly irregular, no rub  Abdomen: soft, nontender, nondistended  : no palpable bladder  Extremities: no edema, cyanosis or clubbing  Neuro: Unable to assess    Scheduled Meds:     [Held by provider] apixaban, 5 mg, Oral, Q12H  atorvastatin, 40 mg, Oral, Daily  benztropine, 1 mg, Oral, BID  digoxin, 125 mcg, Oral, Daily  divalproex, 2,000 mg, Oral, Nightly  haloperidol, 7.5 mg, Oral, BID  levothyroxine, 75 mcg, Oral, Q AM  melatonin, 10 mg, Oral, Nightly  metoprolol succinate XL, 25 mg, Oral, Daily  OLANZapine, 10 mg, Oral, Nightly  sodium chloride, 10 mL, Intravenous, Q12H      IV Meds:   sodium chloride, 100 mL/hr, Last Rate: 100 mL/hr (25 1604)        Results Reviewed:   I have personally reviewed the results from the time of this admission to 3/30/2025 10:28 EDT     Results from last 7 days   Lab Units 25  0721 25  1143 25  0547 25  1300   SODIUM mmol/L 131*  131* 131* 126* 131*   POTASSIUM mmol/L 4.8  4.8 5.1 5.2 4.5   CHLORIDE mmol/L 102  102 96* 96* 95*   CO2 mmol/L 19.1*  19.1* 23.0 22.0 24.7    BUN mg/dL 14  14 20 18 11   CREATININE mg/dL 0.60*  0.60* 1.01 1.09 1.01   CALCIUM mg/dL 8.6  8.6 9.0 8.6 9.6   BILIRUBIN mg/dL  --   --  0.4 0.6   ALK PHOS U/L  --   --  67 86   ALT (SGPT) U/L  --   --  15 17   AST (SGOT) U/L  --   --  21 27   GLUCOSE mg/dL 75  75 76 74 96       Estimated Creatinine Clearance: 144.4 mL/min (A) (by C-G formula based on SCr of 0.6 mg/dL (L)).    Results from last 7 days   Lab Units 03/30/25  0721   MAGNESIUM mg/dL 1.8   PHOSPHORUS mg/dL 2.6       Results from last 7 days   Lab Units 03/30/25  0721 03/29/25  1143   URIC ACID mg/dL 8.3* 8.8*       Results from last 7 days   Lab Units 03/30/25  0721 03/29/25  0547 03/28/25  1300   WBC 10*3/mm3 6.18 6.79 7.45   HEMOGLOBIN g/dL 15.3 14.4 16.6   PLATELETS 10*3/mm3 130* 139* 153             Assessment / Plan     ASSESSMENT:  Hyponatremia currently appears to be hypovolemic with increased uric acid the patient was hypotensive last night, sodium is up to 132 slightly increased with the IV fluid and the uric acid slightly down to 8.3 this is would be against SIADH more consistent with the volume depletion.  Urine studies were not done as ordered.  Bipolar disorder  Old stroke with dysarthria  Fall with right hip fracture  Cognitive dysfunction  A-fib    PLAN:  Continue oral fluid restriction to 1500 cc per 24-hour  I will give normal saline again 100 cc/h x 2 L  Surveillance labs    I reviewed the chart and other providers notes, reviewed labs.  Copied text in this note has been reviewed and is accurate as of 03/30/25.       Thank you for involving us in the care of Helder Abbott.  Please feel free to call with any questions.    Jun Lund MD  03/30/25  10:28 EDT    [facility]  [phone]    Please note that portions of this note were completed with a voice recognition program.

## 2025-03-30 NOTE — PROGRESS NOTES
[facility]     LOS: 2 days     Subjective:    CC:Fall and Hip Pain    DIET:  Diet Order   Procedures    Diet: Regular/House, Fluid Restriction (240 mL/tray); 1500 mL/day; Fluid Consistency: Thin (IDDSI 0)       Objective:    Vital Signs:  Temp:  [97.5 °F (36.4 °C)-98.4 °F (36.9 °C)] 97.9 °F (36.6 °C)  Heart Rate:  [63-88] 88  Resp:  [16-18] 17  BP: ()/(60-92) 142/87    SpO2:  [94 %-99 %] 94 %  on   ;   Device (Oxygen Therapy): room air  Body mass index is 20.72 kg/m².    Physical Exam  Constitutional:       General: He is not in acute distress.  Pulmonary:      Effort: Pulmonary effort is normal.      Breath sounds: Normal breath sounds.   Abdominal:      General: There is no distension.      Palpations: Abdomen is soft.      Tenderness: There is no abdominal tenderness.   Musculoskeletal:      Comments: Patient is doing well, moving leg without any difficulty.  Pain is controlled   Skin:     General: Skin is warm and dry.   Neurological:      General: No focal deficit present.      Mental Status: He is alert.   Psychiatric:         Mood and Affect: Mood normal.         Behavior: Behavior normal.         Results Review:    Glucose   Date Value Ref Range Status   03/30/2025 75 65 - 99 mg/dL Final   03/30/2025 75 65 - 99 mg/dL Final   03/29/2025 76 65 - 99 mg/dL Final   03/29/2025 74 65 - 99 mg/dL Final   03/28/2025 96 65 - 99 mg/dL Final     Results from last 7 days   Lab Units 03/30/25  0721   WBC 10*3/mm3 6.18   HEMOGLOBIN g/dL 15.3   HEMATOCRIT % 47.5   PLATELETS 10*3/mm3 130*     Results from last 7 days   Lab Units 03/30/25  0721 03/29/25  1143 03/29/25  0547   SODIUM mmol/L 131*  131*   < > 126*   POTASSIUM mmol/L 4.8  4.8   < > 5.2   CHLORIDE mmol/L 102  102   < > 96*   CO2 mmol/L 19.1*  19.1*   < > 22.0   BUN mg/dL 14  14   < > 18   CREATININE mg/dL 0.60*  0.60*   < > 1.09   CALCIUM mg/dL 8.6  8.6   < > 8.6   BILIRUBIN mg/dL  --   --  0.4   ALK PHOS U/L  --   --  67  "  ALT (SGPT) U/L  --   --  15   AST (SGOT) U/L  --   --  21   GLUCOSE mg/dL 75  75   < > 74    < > = values in this interval not displayed.         Results from last 7 days   Lab Units 03/30/25  0721   MAGNESIUM mg/dL 1.8         Cultures:  No results found for: \"BLOODCX\", \"URINECX\", \"WOUNDCX\", \"MRSACX\", \"RESPCX\", \"STOOLCX\"    I have reviewed daily medications and changes in CPOE    Scheduled meds  apixaban, 5 mg, Oral, Q12H  atorvastatin, 40 mg, Oral, Daily  benztropine, 1 mg, Oral, BID  digoxin, 125 mcg, Oral, Daily  divalproex, 2,000 mg, Oral, Nightly  haloperidol, 7.5 mg, Oral, BID  levothyroxine, 75 mcg, Oral, Q AM  melatonin, 10 mg, Oral, Nightly  metoprolol succinate XL, 25 mg, Oral, Daily  OLANZapine, 10 mg, Oral, Nightly  sodium chloride, 10 mL, Intravenous, Q12H        sodium chloride, 100 mL/hr, Last Rate: Stopped (03/30/25 1742)      PRN meds    acetaminophen    HYDROcodone-acetaminophen    Morphine    nitroglycerin    ondansetron    [COMPLETED] Insert Peripheral IV **AND** sodium chloride    sodium chloride    sodium chloride        Closed intertrochanteric fracture, right, initial encounter    Essential hypertension    Hypothyroidism (acquired)    History of CVA (cerebrovascular accident)    Bipolar disorder    Atrial fibrillation, chronic    Chronic anticoagulation    Hyponatremia        Assessment/Plan:    Right hip pain with possible closed intratrochanteric fracture of the right hip-confirmation pending  -Further imaging with MRI shows no evidence of fracture    History of chronic atrial fibrillation  -Severe edematous change and bruising of right gluteus hold on blood thinners, exam fairly unremarkable and hemoglobin stable good restart Eliquis    Bipolar disorder-continue his home regimen.    History of intellectual disability and previous CVA-continue his current regimen and monitor    Hypertension-continue antihypertensive regimen and avoid hypotensive episodes.    Hypothyroidism-continue " thyroid replacement therapy.    Hyponatremia-continue with fluid restriction.  -Worse today but repeat is better  -Nephrology consulted  -TSH nl and cortisol level in am    Will ask physical therapy to see the patient evaluation  Possible discharge after PT trip Padilla MD  03/30/25  18:15 EDT

## 2025-03-30 NOTE — PROGRESS NOTES
[facility]     LOS: 2 days     Subjective:    CC:Fall and Hip Pain    DIET:  Diet Order   Procedures    Diet: Regular/House, Fluid Restriction (240 mL/tray); 1500 mL/day; Fluid Consistency: Thin (IDDSI 0)       Objective:    Vital Signs:  Temp:  [97.5 °F (36.4 °C)-98.4 °F (36.9 °C)] 97.9 °F (36.6 °C)  Heart Rate:  [63-88] 88  Resp:  [16-18] 18  BP: ()/(60-94) 135/81    SpO2:  [97 %-99 %] 99 %  on   ;   Device (Oxygen Therapy): room air  Body mass index is 20.72 kg/m².    Physical Exam  Constitutional:       General: He is not in acute distress.  Pulmonary:      Effort: Pulmonary effort is normal.      Breath sounds: Normal breath sounds.   Abdominal:      General: There is no distension.      Palpations: Abdomen is soft.      Tenderness: There is no abdominal tenderness.   Skin:     General: Skin is warm and dry.   Neurological:      General: No focal deficit present.      Mental Status: He is alert.   Psychiatric:         Mood and Affect: Mood normal.         Behavior: Behavior normal.         Results Review:    Glucose   Date Value Ref Range Status   03/30/2025 75 65 - 99 mg/dL Final   03/30/2025 75 65 - 99 mg/dL Final   03/29/2025 76 65 - 99 mg/dL Final   03/29/2025 74 65 - 99 mg/dL Final   03/28/2025 96 65 - 99 mg/dL Final     Results from last 7 days   Lab Units 03/30/25  0721   WBC 10*3/mm3 6.18   HEMOGLOBIN g/dL 15.3   HEMATOCRIT % 47.5   PLATELETS 10*3/mm3 130*     Results from last 7 days   Lab Units 03/30/25  0721 03/29/25  1143 03/29/25  0547   SODIUM mmol/L 131*  131*   < > 126*   POTASSIUM mmol/L 4.8  4.8   < > 5.2   CHLORIDE mmol/L 102  102   < > 96*   CO2 mmol/L 19.1*  19.1*   < > 22.0   BUN mg/dL 14  14   < > 18   CREATININE mg/dL 0.60*  0.60*   < > 1.09   CALCIUM mg/dL 8.6  8.6   < > 8.6   BILIRUBIN mg/dL  --   --  0.4   ALK PHOS U/L  --   --  67   ALT (SGPT) U/L  --   --  15   AST (SGOT) U/L  --   --  21   GLUCOSE mg/dL 75  75   < > 74    < > = values in  "this interval not displayed.         Results from last 7 days   Lab Units 03/30/25  0721   MAGNESIUM mg/dL 1.8         Cultures:  No results found for: \"BLOODCX\", \"URINECX\", \"WOUNDCX\", \"MRSACX\", \"RESPCX\", \"STOOLCX\"    I have reviewed daily medications and changes in CPOE    Scheduled meds  [Held by provider] apixaban, 5 mg, Oral, Q12H  atorvastatin, 40 mg, Oral, Daily  benztropine, 1 mg, Oral, BID  digoxin, 125 mcg, Oral, Daily  divalproex, 2,000 mg, Oral, Nightly  haloperidol, 7.5 mg, Oral, BID  levothyroxine, 75 mcg, Oral, Q AM  melatonin, 10 mg, Oral, Nightly  metoprolol succinate XL, 25 mg, Oral, Daily  OLANZapine, 10 mg, Oral, Nightly  sodium chloride, 10 mL, Intravenous, Q12H        sodium chloride, 100 mL/hr, Last Rate: 100 mL/hr (03/30/25 1225)      PRN meds    acetaminophen    HYDROcodone-acetaminophen    Morphine    nitroglycerin    ondansetron    [COMPLETED] Insert Peripheral IV **AND** sodium chloride    sodium chloride    sodium chloride        Closed intertrochanteric fracture, right, initial encounter    Essential hypertension    Hypothyroidism (acquired)    History of CVA (cerebrovascular accident)    Bipolar disorder    Atrial fibrillation, chronic    Chronic anticoagulation    Hyponatremia        Assessment/Plan:    Right hip pain with possible closed intratrochanteric fracture of the right hip-confirmation pending  -Further imaging with MRI shows no evidence of fracture    History of chronic atrial fibrillation  -Severe edematous change and bruising of right gluteus hold on blood thinners    Bipolar disorder-continue his home regimen.    History of intellectual disability and previous CVA-continue his current regimen and monitor    Hypertension-continue antihypertensive regimen and avoid hypotensive episodes.    Hypothyroidism-continue thyroid replacement therapy.    Hyponatremia-continue with fluid restriction.  -Worse today but repeat is better  -Nephrology consulted  -TSH nl and cortisol level " in am    Possible discharge tomorrow    Ronnie Padilla MD  03/30/25  15:22 EDT

## 2025-03-31 LAB
ALBUMIN SERPL-MCNC: 2.6 G/DL (ref 3.5–5.2)
ANION GAP SERPL CALCULATED.3IONS-SCNC: 4.1 MMOL/L (ref 5–15)
BUN SERPL-MCNC: 11 MG/DL (ref 6–20)
BUN/CREAT SERPL: 15.7 (ref 7–25)
CALCIUM SPEC-SCNC: 8.4 MG/DL (ref 8.6–10.5)
CHLORIDE SERPL-SCNC: 106 MMOL/L (ref 98–107)
CHLORIDE UR-SCNC: 89 MMOL/L
CO2 SERPL-SCNC: 22.9 MMOL/L (ref 22–29)
CREAT SERPL-MCNC: 0.7 MG/DL (ref 0.76–1.27)
EGFRCR SERPLBLD CKD-EPI 2021: 112.3 ML/MIN/1.73
GLUCOSE SERPL-MCNC: 68 MG/DL (ref 65–99)
MAGNESIUM SERPL-MCNC: 1.9 MG/DL (ref 1.6–2.6)
OSMOLALITY UR: 433 MOSM/KG
PHOSPHATE SERPL-MCNC: 2.5 MG/DL (ref 2.5–4.5)
POTASSIUM SERPL-SCNC: 4.5 MMOL/L (ref 3.5–5.2)
SODIUM SERPL-SCNC: 133 MMOL/L (ref 136–145)
SODIUM UR-SCNC: 76 MMOL/L
URATE SERPL-MCNC: 8.5 MG/DL (ref 3.4–7)

## 2025-03-31 PROCEDURE — 83735 ASSAY OF MAGNESIUM: CPT | Performed by: INTERNAL MEDICINE

## 2025-03-31 PROCEDURE — 84300 ASSAY OF URINE SODIUM: CPT | Performed by: INTERNAL MEDICINE

## 2025-03-31 PROCEDURE — 80069 RENAL FUNCTION PANEL: CPT | Performed by: INTERNAL MEDICINE

## 2025-03-31 PROCEDURE — 83935 ASSAY OF URINE OSMOLALITY: CPT | Performed by: INTERNAL MEDICINE

## 2025-03-31 PROCEDURE — 82436 ASSAY OF URINE CHLORIDE: CPT | Performed by: INTERNAL MEDICINE

## 2025-03-31 PROCEDURE — 84550 ASSAY OF BLOOD/URIC ACID: CPT | Performed by: INTERNAL MEDICINE

## 2025-03-31 PROCEDURE — 97162 PT EVAL MOD COMPLEX 30 MIN: CPT

## 2025-03-31 RX ORDER — HYDROCODONE BITARTRATE AND ACETAMINOPHEN 7.5; 325 MG/1; MG/1
1 TABLET ORAL EVERY 4 HOURS PRN
Refills: 0 | Status: DISCONTINUED | OUTPATIENT
Start: 2025-03-31 | End: 2025-04-03 | Stop reason: HOSPADM

## 2025-03-31 RX ADMIN — APIXABAN 5 MG: 5 TABLET, FILM COATED ORAL at 20:20

## 2025-03-31 RX ADMIN — Medication 10 ML: at 20:21

## 2025-03-31 RX ADMIN — BENZTROPINE MESYLATE 1 MG: 1 TABLET ORAL at 20:20

## 2025-03-31 RX ADMIN — APIXABAN 5 MG: 5 TABLET, FILM COATED ORAL at 09:32

## 2025-03-31 RX ADMIN — HALOPERIDOL 7.5 MG: 5 TABLET ORAL at 20:20

## 2025-03-31 RX ADMIN — OLANZAPINE 10 MG: 10 TABLET, FILM COATED ORAL at 20:21

## 2025-03-31 RX ADMIN — Medication 10 MG: at 20:21

## 2025-03-31 RX ADMIN — DIGOXIN 125 MCG: 0.12 TABLET ORAL at 13:35

## 2025-03-31 RX ADMIN — ATORVASTATIN CALCIUM 40 MG: 20 TABLET, FILM COATED ORAL at 09:32

## 2025-03-31 RX ADMIN — BENZTROPINE MESYLATE 1 MG: 1 TABLET ORAL at 09:32

## 2025-03-31 RX ADMIN — HALOPERIDOL 7.5 MG: 5 TABLET ORAL at 09:32

## 2025-03-31 RX ADMIN — Medication 10 ML: at 09:32

## 2025-03-31 RX ADMIN — DIVALPROEX SODIUM 2000 MG: 500 TABLET, DELAYED RELEASE ORAL at 20:21

## 2025-03-31 RX ADMIN — METOPROLOL SUCCINATE 25 MG: 25 TABLET, EXTENDED RELEASE ORAL at 09:32

## 2025-03-31 RX ADMIN — LEVOTHYROXINE SODIUM 75 MCG: 0.07 TABLET ORAL at 06:21

## 2025-03-31 NOTE — PAYOR COMM NOTE
"Helder Abbott (50 y.o. Male)    PLEASE SEE ATTACHED FOR INPT AUTH  REF#[id number]    THANK YOU   MIREYA STEWART RN/ DEPT   [facility]   PH: [phone]   FAX:  DEPT [phone]     Date of Birth   1975    Social Security Number       [address] [place] [address]    Home Phone       MRN   [id number]       Sabianist   Latter day    Marital Status   Single                            Admission Date   3/28/2025    Admission Type   Emergency    Admitting Provider   Jose Alvarenga MD    Attending Provider   Antoine Fernando MD    Department, Room/Bed   [facility] [address] [place], P894/1       Discharge Date       Discharge Disposition       Discharge Destination                                 Attending Provider: Antoine Fernando MD    Allergies: Clonazepam, Fluoxetine, Grass, Grass Pollen(k-o-r-t-swt Kennedy), Hydroxyzine, Methylphenidate, Quetiapine, Risperidone, Topamax [Topiramate]    Isolation: None   Infection: None   Code Status: CPR    Ht: 182.9 cm (72\")   Wt: 69.3 kg (152 lb 12.5 oz)    Admission Cmt: None   Principal Problem: Closed intertrochanteric fracture, right, initial encounter [S72.141A]                   Active Insurance as of 3/28/2025       Primary Coverage       Payor Plan Insurance Group Employer/Plan Group    MEDICARE MEDICARE A & B        Payor Plan Address Payor Plan Phone Number Payor Plan Fax Number Effective Dates    [address] [phone]  2/1/1995 - None Entered    [facility] [id number]         Subscriber Name Subscriber Birth Date Member ID       HELDER ABBOTT 1975 [id number]               Secondary Coverage       Payor Plan Insurance Group Employer/Plan Group    [place]        Payor Plan Address Payor Plan Phone Number Payor Plan Fax Number Effective Dates    [address] [phone]  9/14/2022 - None Entered    [place] [address]         Subscriber Name Subscriber Birth Date Member ID       " HELEDR ABBOTT 1975 [id number]                     Emergency Contacts        (Rel.) Home Phone Work Phone Mobile Phone    Carla Velasco (Legal Guardian) -- [phone]    Guardianship,AfterHounel -- [phone] --    Karan Kim (Care Giver) -- -- [phone]              [place]: [facility] [id number]  Tax ID [id number]     History & Physical        Jose Alvarenga MD at 25 1520          Internal medicine history and physical  INTERNAL MEDICINE   [facility]       Patient Identification:  Name: Helder Abbott  Age: 50 y.o.  Sex: male  :  1975  MRN: [id number]                   Primary Care Physician: Provider, No Known                               Date of admission:3/28/2025    Chief Complaint: Pain and discomfort in the right hip after the fall yesterday.    History of Present Illness:   Patient is a 50-year-old male who has complicated past medical history remarkable which is recent hospitalization for 2 days from 3/11/2025 through 3/13/2025 when he presented with seizure-like activity.  Patient has history of intellectual disability, chronic atrial fibrillation on anticoagulation therapy, bipolar disorder, hypertension and prior stroke.  Patient did have hyponatremia which was thought to be contributing to his symptoms and after evaluation by nephrology service his torsemide was discontinued and he was placed on 2000 cc fluid restriction.  In this background patient was discharged on 3/13/2025 and there is not much available in the history of what happened since then but apparently he had a fall yesterday and subsequently was complaining of pain and discomfort in his right hip.  This resulted in arrival to the emergency room where workup showed possibility of right intertrochanteric fracture on plain x-ray.  Patient is being admitted for further evaluation of right hip pain and CT scan of the pelvis is ordered to further confirm possibility of  intertrochanteric fracture of the right hip seen on the x-ray.  Orthopedic surgery service is consulted.    Past Medical History:  Past Medical History:   Diagnosis Date    Benign essential hypertension     Bipolar disorder     Dyslipidemia     GERD (gastroesophageal reflux disease)     Heart disease     History of stroke     Hypothyroidism      Past Surgical History:  No past surgical history on file.   Home Meds:  (Not in a hospital admission)    Current Meds:     Current Facility-Administered Medications:     [COMPLETED] Insert Peripheral IV, , , Once **AND** sodium chloride 0.9 % flush 10 mL, 10 mL, Intravenous, PRN, Claudia Alas PA    Current Outpatient Medications:     Acetaminophen Extra Strength 500 MG tablet, Take 1 tablet by mouth 4 (Four) Times a Day As Needed., Disp: , Rfl:     ARIPiprazole ER (Abilify Maintena) 400 MG prefilled syringe IM prefilled syringe, Inject 400 mg into the appropriate muscle as directed by prescriber Every 28 (Twenty-Eight) Days., Disp: , Rfl:     atorvastatin (LIPITOR) 40 MG tablet, Take 1 tablet by mouth Daily., Disp: , Rfl:     benztropine (COGENTIN) 1 MG tablet, Take 1 tablet by mouth 2 (Two) Times a Day., Disp: , Rfl:     digoxin (LANOXIN) 125 MCG tablet, Take 1 tablet by mouth Daily., Disp: , Rfl:     divalproex (DEPAKOTE) 500 MG DR tablet, Take 4 tablets by mouth Every Night., Disp: , Rfl:     Eliquis 5 MG tablet tablet, Take 1 tablet by mouth Every 12 (Twelve) Hours., Disp: , Rfl:     haloperidol (HALDOL) 5 MG tablet, Take 1.5 tablets by mouth 2 (Two) Times a Day., Disp: , Rfl:     levothyroxine (SYNTHROID, LEVOTHROID) 75 MCG tablet, Take 1 tablet by mouth Daily., Disp: , Rfl:     melatonin 5 MG tablet tablet, Take 2 tablets by mouth Every Night., Disp: , Rfl:     metoprolol succinate XL (TOPROL-XL) 25 MG 24 hr tablet, Take 1 tablet by mouth Daily., Disp: , Rfl:     OLANZapine (zyPREXA) 10 MG tablet, Take 1 tablet by mouth Every Night., Disp: , Rfl:     polyethylene  "glycol (MIRALAX) 17 GM/SCOOP powder, Dissolve 17 g (1 capful) in liquid and drink by mouth Daily., Disp: 510 g, Rfl: 1  Allergies:  Allergies   Allergen Reactions    Clonazepam Unknown (See Comments)    Fluoxetine Unknown (See Comments)    Grass Unknown - High Severity    Grass Pollen(K-O-R-T-Swt Kennedy) Unknown - Low Severity    Hydroxyzine Unknown (See Comments)    Methylphenidate Unknown (See Comments)    Quetiapine Unknown - Low Severity    Risperidone Unknown (See Comments)    Topamax [Topiramate] Unknown - High Severity     Social History:   Social History     Tobacco Use    Smoking status: Never    Smokeless tobacco: Never   Substance Use Topics    Alcohol use: Never      Family History:  Family History   Problem Relation Age of Onset    Diabetes Other     Hypertension Other     Heart disease Other           Review of Systems  See history of present illness and past medical history.        Vitals:   BP 95/70   Pulse 65   Temp 96.2 °F (35.7 °C) (Tympanic)   Resp 14   Ht 182.9 cm (72\")   Wt 69.3 kg (152 lb 12.5 oz)   SpO2 100%   BMI 20.72 kg/m²   I/O: No intake or output data in the 24 hours ending 03/28/25 1520  Exam:  Patient is examined using the personal protective equipment as per guidelines from infection control for this particular patient as enacted.  Hand washing was performed before and after patient interaction.  General Appearance:  Awake interactive male hard to understand because of his speech issues.   Head:  Syndromic facies.   Eyes:    PERRL, conjunctiva/corneas clear, EOM's intact, both eyes   Ears:    Normal external ear canals, both ears   Nose: No nasal drainage noted   Throat:   Lips, tongue, gums normal; oral mucosa pink and moist   Neck:   Supple, symmetrical, trachea midline, no adenopathy;     thyroid:  no enlargement/tenderness/nodules; no carotid    bruit or JVD   Back:     Symmetric, no curvature, ROM normal, no CVA tenderness   Lungs:     Clear to auscultation bilaterally, " respirations unlabored   Chest Wall:    No tenderness or deformity    Heart:  S1-S2 regular   Abdomen:   Soft nontender   Extremities: Discomfort upon passive movement of the right hip.       Skin: Chronic changes   Neurologic: Grossly nonfocal with right lower extremity movement limited because of pain.       Data Review:      I reviewed the patient's new clinical results.  Results from last 7 days   Lab Units 03/28/25  1300   WBC 10*3/mm3 7.45   HEMOGLOBIN g/dL 16.6   PLATELETS 10*3/mm3 153     Results from last 7 days   Lab Units 03/28/25  1300   SODIUM mmol/L 131*   POTASSIUM mmol/L 4.5   CHLORIDE mmol/L 95*   CO2 mmol/L 24.7   BUN mg/dL 11   CREATININE mg/dL 1.01   CALCIUM mg/dL 9.6   GLUCOSE mg/dL 96         XR Chest 1 View  Result Date: 3/28/2025  1. No acute process   This report was finalized on 3/28/2025 1:00 PM by Dr. Sang Lane M.D on Workstation: [id number]      XR Hip With or Without Pelvis 2 - 3 View Right  Result Date: 3/28/2025   Suspicion for right intertrochanteric nondisplaced fracture, CT correlation advised.    This report was finalized on 3/28/2025 12:16 PM by Dr. Alfred Cavazos M.D on Workstation: [id number]        Assessment:  Active Hospital Problems    Diagnosis  POA    **Closed intertrochanteric fracture, right, initial encounter [S72.141A]  Yes    Hyponatremia [E87.1]  Yes    Atrial fibrillation, chronic [I48.20]  Yes    Chronic anticoagulation [Z79.01]  Not Applicable    Bipolar disorder [F31.9]  Yes    Essential hypertension [I10]  Yes    History of CVA (cerebrovascular accident) [Z86.73]  Not Applicable    Hypothyroidism (acquired) [E03.9]  Yes       Medical decision making/care plan: See admitting orders  Right hip pain with possible closed intratrochanteric fracture of the right hip-confirmation pending-plan is to admit the patient control his pain orthopedic consultation and make him n.p.o. after midnight in case if he needs surgical intervention while confirming his  diagnosis.  Control his pain and provide him with continuous pulse ox monitoring.  History of chronic atrial fibrillation-hold his anticoagulation therapy in anticipation for possible surgery in a.m. while continuing his digoxin and metoprolol.  Bipolar disorder-continue his home regimen.  History of intellectual disability and previous CVA-continue his current regimen and monitor  Hypertension-continue antihypertensive regimen and avoid hypotensive episodes.  Hypothyroidism-continue thyroid replacement therapy.  Hyponatremia-continue with fluid restriction.    Jose Alvarenga MD   3/28/2025  15:20 EDT    Parts of this note may be an electronic transcription/translation of spoken language to printed text using the Dragon dictation system.      Electronically signed by Jose Alvarenga MD at 03/29/25 0559          Emergency Department Notes        Joann Carrera, RN at 03/28/25 1452          Pt's legal guardian and caregiver notified of pt's admission.     Electronically signed by Joann Carrera, RN at 03/28/25 8471       Mary Jasso, RN at 03/28/25 1334          Nursing report ED to floor  Helder Abbott  50 y.o.  male    HPI :  HPI  Stated Reason for Visit: fall yesterday, right hip pain  History Obtained From: patient    Chief Complaint  Chief Complaint   Patient presents with    Fall    Hip Pain       Admitting doctor:   Jose Alvarenga MD    Admitting diagnosis:   The primary encounter diagnosis was Closed nondisplaced intertrochanteric fracture of right femur, initial encounter. A diagnosis of Closed intertrochanteric fracture, right, initial encounter was also pertinent to this visit.    Code status:   Current Code Status       Date Active Code Status Order ID Comments User Context       Prior            Allergies:   Clonazepam, Fluoxetine, Grass, Grass pollen(k-o-r-t-swt benigno), Hydroxyzine, Methylphenidate, Quetiapine, Risperidone, and Topamax [topiramate]    Isolation:   No active isolations    Intake and  "Output  No intake or output data in the 24 hours ending 03/28/25 1334    Weight:       03/28/25  1135   Weight: 69.3 kg (152 lb 12.5 oz)       Most recent vitals:   Vitals:    03/28/25 1119 03/28/25 1135 03/28/25 1304   BP:  (!) 123/103 (!) 153/105   Pulse: 69  75   Resp: 14     Temp: 96.2 °F (35.7 °C)     TempSrc: Tympanic     SpO2: 98%  98%   Weight:  69.3 kg (152 lb 12.5 oz)    Height:  182.9 cm (72\")        Active LDAs/IV Access:   Lines, Drains & Airways       Active LDAs       Name Placement date Placement time Site Days    Peripheral IV 03/28/25 1301 Right Antecubital 03/28/25  1301  Antecubital  less than 1                    Labs (abnormal labs have a star):   Labs Reviewed   CBC WITH AUTO DIFFERENTIAL - Abnormal; Notable for the following components:       Result Value    RDW 15.8 (*)     Immature Grans % 0.9 (*)     Immature Grans, Absolute 0.07 (*)     All other components within normal limits   COMPREHENSIVE METABOLIC PANEL   CBC AND DIFFERENTIAL    Narrative:     The following orders were created for panel order CBC & Differential.  Procedure                               Abnormality         Status                     ---------                               -----------         ------                     CBC Auto Differential[[id number]]        Abnormal            Final result                 Please view results for these tests on the individual orders.       EKG:   No orders to display       Meds given in ED:   Medications   sodium chloride 0.9 % flush 10 mL (has no administration in time range)   morphine injection 4 mg (has no administration in time range)   ondansetron (ZOFRAN) injection 4 mg (has no administration in time range)       Imaging results:  XR Chest 1 View  Result Date: 3/28/2025  1. No acute process   This report was finalized on 3/28/2025 1:00 PM by Dr. Sang Lane M.D on Workstation: [id number]      XR Hip With or Without Pelvis 2 - 3 View Right  Result Date: 3/28/2025   " Suspicion for right intertrochanteric nondisplaced fracture, CT correlation advised.    This report was finalized on 3/28/2025 12:16 PM by Dr. Alfred Cavazos M.D on Workstation: [id number]        Ambulatory status:   - assist     Social issues:   Social History     Socioeconomic History    Marital status: Single   Tobacco Use    Smoking status: Never    Smokeless tobacco: Never   Vaping Use    Vaping status: Never Used   Substance and Sexual Activity    Alcohol use: Never    Drug use: Never    Sexual activity: Defer       Peripheral Neurovascular       Neuro Cognitive       Learning  Learning Assessment  Learning Readiness and Ability: cognitive limitation noted  Education Provided  Person Taught: patient  Teaching Method: verbal instruction  Teaching Focus: symptom/problem overview  Education Outcome Evaluation: needs reinforcement, verbalizes understanding    Respiratory       Abdominal Pain       Pain Assessments  Pain (Adult)  (0-10) Pain Rating: Rest: 10  (0-10) Pain Rating: Activity: 10  Pain Location: hip  Pain Side/Orientation: right    NIH Stroke Scale       Mary Jasso RN  03/28/25 13:34 EDT     Electronically signed by Mary Jasso RN at 03/28/25 1314       Olegario Chris MD at 03/28/25 1225          MD ATTESTATION NOTE      Brief HPI: Patient complains of acute right hip pain after falling and landing on his right hip yesterday.  He has been unable to ambulate or bear weight on his right leg since then.  He did not hit his head.  Denies loss of consciousness or other injuries.    PHYSICAL EXAM    GENERAL: Awake and alert.  Resting comfortably in no acute distress  HENT: nares patent, NCAT  EYES: no scleral icterus  CV: regular rhythm, normal rate  RESPIRATORY: normal effort, CTAB  ABDOMEN: soft  MUSCULOSKELETAL: no deformity, there is tenderness over the right anterior and lateral hip, there is painful range of motion of the right hip, remainder of the right leg is nontender  NEURO:  Follows commands, speech is clear and fluent, no facial droop  PSYCH:  calm, cooperative  SKIN: warm, dry    Vital signs and nursing notes reviewed.        Plan: Patient presents to the ED with acute right hip pain after falling.  Will obtain imaging studies for further evaluation.    Right hip x-rays personally interpreted by me.  My personal interpreted as: There is a linear lucency in the intertrochanteric region suspicious for fracture.  No dislocation.  No pelvic fracture--> will obtain CT pelvis     ED Course as of 03/28/25 1358   Fri Mar 28, 2025   1210 XR Hip With or Without Pelvis 2 - 3 View Right  Radiology study independently interpreted by me and my findings are suspected right intertrochanteric hip fracture    [DC]   1246 Discussed case with Dr. Serrano, orthopedic surgery, who recommends obtaining CT for further evaluation and will see in consult.  Patient may eat today but n.p.o. at midnight for surgery tomorrow.  Will hold Eliquis. [DC]   1307 Discussed case with Dr. Alvarenga, [facility], who will admit the patient. [DC]      ED Course User Index  [DC] Claudia Alas PA     MDM: Patient presented to the ED with a right hip injury.  X-rays showed suspected intertrochanteric fracture.  Pelvis CT is pending.  Patient will be admitted to the hospitalist.       Olegario Chris MD  03/28/25 1359      Electronically signed by Olegario Chris MD at 03/28/25 1359       Claudia Alas PA at 03/28/25 1221       Attestation signed by Olegario Chris MD at 03/28/25 1345          SHARED APC FACE TO FACE: I performed a substantive part of the MDM during the patient's E/M visit. I personally evaluated and examined the patient. I personally made or approved the documented management plan and acknowledge its risk of complications.   Olegario Chris MD 3/28/2025 13:45 EDT                              EMERGENCY DEPARTMENT ENCOUNTER      PCP: Provider, No Known  Patient Care Team:  Provider, No Known as PCP -  General (Nurse Practitioner)  Gian Marquez MD as Consulting Physician (Hematology and Oncology)  José Garcia APRN as Referring Physician (Family Medicine)  Phoenix Santa MD as Consulting Physician (Hematology and Oncology)   Independent Historians: Patient, caregiver at bedside    HPI:  Chief Complaint: Hip pain   A complete HPI/ROS/PMH/PSH/SH/FH are unobtainable due to: None    Chronic or social conditions impacting patient care (social determinants of health): None    Context: Helder Abbott is a 50 y.o. male who presents to the ED c/o acute right hip pain after fall that occurred yesterday.  Patient ambulates on his own but is very unsteady and has frequent falls.  Patient has had continued pain today and has not been able to put weight on the right leg without significant pain.  He denies hitting his head or loss of consciousness.  Patient does take Eliquis and last dose was this morning.  He denies other pain or injury.  No fevers or chills.    Review of prior external notes and/or external test results outside of this encounter: CT head on 3/11/2025 showed no evidence of hemorrhage, hydrocephalus or acute infarction.      PAST MEDICAL HISTORY  Active Ambulatory Problems     Diagnosis Date Noted    Essential hypertension 04/30/2024    Hypothyroidism (acquired) 04/30/2024    History of CVA (cerebrovascular accident) 04/30/2024    GERD without esophagitis 04/30/2024    HLD (hyperlipidemia) 04/30/2024    Bipolar disorder 04/30/2024    Partial bowel obstruction 04/30/2024    Fecal impaction 04/30/2024    Severe malnutrition 05/02/2024    Hyperkalemia 06/11/2024    Syncope 07/10/2024    Atrial fibrillation, chronic 07/10/2024    Dehydration 07/10/2024    Chronic anticoagulation 07/10/2024    Thrombocytopenia 07/10/2024    Seizure-like activity 03/11/2025    Hyponatremia 03/12/2025     Resolved Ambulatory Problems     Diagnosis Date Noted    JOSIAH (acute kidney injury) 04/30/2024     Past Medical History:    Diagnosis Date    Benign essential hypertension     Dyslipidemia     GERD (gastroesophageal reflux disease)     Heart disease     History of stroke     Hypothyroidism        The patient has started, but not completed, their COVID-19 vaccination series.    PAST SURGICAL HISTORY  No past surgical history on file.      FAMILY HISTORY  Family History   Problem Relation Age of Onset    Diabetes Other     Hypertension Other     Heart disease Other          SOCIAL HISTORY  Social History     Socioeconomic History    Marital status: Single   Tobacco Use    Smoking status: Never    Smokeless tobacco: Never   Vaping Use    Vaping status: Never Used   Substance and Sexual Activity    Alcohol use: Never    Drug use: Never    Sexual activity: Defer         ALLERGIES  Clonazepam, Fluoxetine, Grass, Grass pollen(k-o-r-t-swt benigno), Hydroxyzine, Methylphenidate, Quetiapine, Risperidone, and Topamax [topiramate]        REVIEW OF SYSTEMS  Review of Systems   Musculoskeletal:  Positive for arthralgias (Right hip).        All systems reviewed and negative except for those discussed in HPI.       PHYSICAL EXAM    I have reviewed the triage vital signs and nursing notes.    ED Triage Vitals   Temp Heart Rate Resp BP SpO2   03/28/25 1119 03/28/25 1119 03/28/25 1119 03/28/25 1135 03/28/25 1119   96.2 °F (35.7 °C) 69 14 (!) 123/103 98 %      Temp src Heart Rate Source Patient Position BP Location FiO2 (%)   03/28/25 1119 03/28/25 1119 -- -- --   Tympanic Monitor          Physical Exam  GENERAL: alert, no acute distress  SKIN: Warm, dry  HENT: Normocephalic, atraumatic  EYES: no scleral icterus  CV: regular rhythm, regular rate  RESPIRATORY: normal effort, lungs clear  ABDOMEN: soft, nontender, nondistended  MUSCULOSKELETAL: There is right hip tenderness and pain with range of motion at the right hip  NEURO: alert, moves all extremities, follows commands          LAB RESULTS  Recent Results (from the past 24 hours)   Comprehensive Metabolic  Panel    Collection Time: 03/28/25  1:00 PM    Specimen: Arm, Right; Blood   Result Value Ref Range    Glucose 96 65 - 99 mg/dL    BUN 11 6 - 20 mg/dL    Creatinine 1.01 0.76 - 1.27 mg/dL    Sodium 131 (L) 136 - 145 mmol/L    Potassium 4.5 3.5 - 5.2 mmol/L    Chloride 95 (L) 98 - 107 mmol/L    CO2 24.7 22.0 - 29.0 mmol/L    Calcium 9.6 8.6 - 10.5 mg/dL    Total Protein 7.0 6.0 - 8.5 g/dL    Albumin 4.1 3.5 - 5.2 g/dL    ALT (SGPT) 17 1 - 41 U/L    AST (SGOT) 27 1 - 40 U/L    Alkaline Phosphatase 86 39 - 117 U/L    Total Bilirubin 0.6 0.0 - 1.2 mg/dL    Globulin 2.9 gm/dL    A/G Ratio 1.4 g/dL    BUN/Creatinine Ratio 10.9 7.0 - 25.0    Anion Gap 11.3 5.0 - 15.0 mmol/L    eGFR 90.6 >60.0 mL/min/1.73   CBC Auto Differential    Collection Time: 03/28/25  1:00 PM    Specimen: Arm, Right; Blood   Result Value Ref Range    WBC 7.45 3.40 - 10.80 10*3/mm3    RBC 5.47 4.14 - 5.80 10*6/mm3    Hemoglobin 16.6 13.0 - 17.7 g/dL    Hematocrit 48.2 37.5 - 51.0 %    MCV 88.1 79.0 - 97.0 fL    MCH 30.3 26.6 - 33.0 pg    MCHC 34.4 31.5 - 35.7 g/dL    RDW 15.8 (H) 12.3 - 15.4 %    RDW-SD 51.0 37.0 - 54.0 fl    MPV 10.1 6.0 - 12.0 fL    Platelets 153 140 - 450 10*3/mm3    Neutrophil % 66.0 42.7 - 76.0 %    Lymphocyte % 24.7 19.6 - 45.3 %    Monocyte % 7.7 5.0 - 12.0 %    Eosinophil % 0.4 0.3 - 6.2 %    Basophil % 0.3 0.0 - 1.5 %    Immature Grans % 0.9 (H) 0.0 - 0.5 %    Neutrophils, Absolute 4.92 1.70 - 7.00 10*3/mm3    Lymphocytes, Absolute 1.84 0.70 - 3.10 10*3/mm3    Monocytes, Absolute 0.57 0.10 - 0.90 10*3/mm3    Eosinophils, Absolute 0.03 0.00 - 0.40 10*3/mm3    Basophils, Absolute 0.02 0.00 - 0.20 10*3/mm3    Immature Grans, Absolute 0.07 (H) 0.00 - 0.05 10*3/mm3    nRBC 0.0 0.0 - 0.2 /100 WBC       Ordered the above labs and independently reviewed and interpreted the results.        RADIOLOGY  XR Chest 1 View  Result Date: 3/28/2025  XR CHEST 1 VW-3/28/2025  HISTORY: Preop. Hip fracture.  The heart size is within normal limits.  Lungs appear free of acute infiltrates. Bones and soft tissues are unremarkable.      1. No acute process   This report was finalized on 3/28/2025 1:00 PM by Dr. Sang Lane M.D on Workstation: [id number]      XR Hip With or Without Pelvis 2 - 3 View Right  Result Date: 3/28/2025  XR HIP W OR WO PELVIS 2-3 VIEW RIGHT-  INDICATIONS: Trauma  TECHNIQUE: Frontal view of the pelvis, 2 views of the right hip  COMPARISON: None available  FINDINGS:  A linear lucency at the right intertrochanteric region raises suspicion for nondisplaced intertrochanteric fracture, CT correlation advised (alternatively, soft tissue shadows could potentially contribute to this appearance). No other evidence of fracture, erosion, or dislocation is identified. Mild bilateral hip joint space narrowing is apparent. Much stool at the rectum, correlate clinically to exclude possibly rectal stool impaction.       Suspicion for right intertrochanteric nondisplaced fracture, CT correlation advised.    This report was finalized on 3/28/2025 12:16 PM by Dr. Alfred Cavazos M.D on Workstation: [id number]        I ordered the above noted radiological studies. Independently reviewed and interpreted by me.  See dictation for official radiology interpretation.      PROCEDURES    Procedures      MEDICATIONS GIVEN IN ER    Medications   sodium chloride 0.9 % flush 10 mL (has no administration in time range)   morphine injection 4 mg (4 mg Intravenous Given 3/28/25 1344)   ondansetron (ZOFRAN) injection 4 mg (4 mg Intravenous Given 3/28/25 1344)         PROGRESS, DATA ANALYSIS, CONSULTS, AND MEDICAL DECISION MAKING    All labs have been independently reviewed and interpreted by me.  All radiology studies have been independently reviewed and interpreted by me and discussed with radiologist dictating the report.   EKG's independently reviewed and interpreted by me.  Discussion below represents my analysis of pertinent findings related to patient's  condition, differential diagnosis, treatment plan and final disposition.    Differential diagnosis: Hip fracture, hip contusion, dislocation    ED Course as of 03/28/25 1345   Fri Mar 28, 2025   1210 XR Hip With or Without Pelvis 2 - 3 View Right  Radiology study independently interpreted by me and my findings are suspected right intertrochanteric hip fracture    [DC]   1246 Discussed case with Dr. Serrano, orthopedic surgery, who recommends obtaining CT for further evaluation and will see in consult.  Patient may eat today but n.p.o. at midnight for surgery tomorrow.  Will hold Eliquis. [DC]   1307 Discussed case with Dr. Alvarenga, [facility], who will admit the patient. [DC]      ED Course User Index  [DC] Claudia Alas PA             AS OF 13:45 EDT VITALS:    BP - (!) 141/105  HR - 74  TEMP - 96.2 °F (35.7 °C) (Tympanic)  O2 SATS - 100%        DIAGNOSIS  Final diagnoses:   Closed nondisplaced intertrochanteric fracture of right femur, initial encounter         DISPOSITION  ED Disposition       ED Disposition   Decision to Admit    Condition   --    Comment   Level of Care: Telemetry [5]   Diagnosis: Closed intertrochanteric fracture, right, initial encounter [[id number]]   Admitting Physician: TWYLA ALVARENGA [[id number]]   Attending Physician: TWYLA ALVARENGA [[id number]]   Certification: I Certify That Inpatient Hospital Services Are Medically Necessary For Greater Than 2 Midnights                    Note Disclaimer: At [facility], we believe that sharing information builds trust and better relationships. You are receiving this note because you recently visited [facility]. It is possible you will see health information before a provider has talked with you about it. This kind of information can be easy to misunderstand. To help you fully understand what it means for your health, we urge you to discuss this note with your provider.         Claudia Alas PA  03/28/25 1345      Electronically signed by Olegario Chris MD at  03/28/25 1345       Katherine Mcconnell RN at 03/28/25 1118          Patient fell yesterday and c/o right hip pain.     Electronically signed by Katherine Mcconnell RN at 03/28/25 1119       Medication Administration Report for Helder Abbott as of 3/31/25 through 3/31/25     Legend:    Given Hold Not Given Due Canceled Entry Other Actions    Time Time (Time) Time Time-Action         Discontinued     Completed     Future     MAR Hold     Linked             Medications 03/31/25     acetaminophen (TYLENOL) tablet 500 mg  Dose: 500 mg  Freq: 4 Times Daily PRN Route: PO  PRN Reasons: Mild Pain,Moderate Pain,Headache,Fever  Start: 03/28/25 1525     Admin Instructions:   Based on patient request - if ordered for moderate or severe pain, provider allows for administration of a medication prescribed for a lower pain scale.    Do not exceed 4 grams of acetaminophen in a 24 hr period. Max dose of 2gm for AST/ALT greater than 120 units/L.    If given for pain, use the following pain scale:   Mild Pain = Pain Score of 1-3, CPOT 1-2  Moderate Pain = Pain Score of 4-6, CPOT 3-4  Severe Pain = Pain Score of 7-10, CPOT 5-8         apixaban (ELIQUIS) tablet 5 mg  Dose: 5 mg  Freq: Every 12 Hours Scheduled Route: PO  Indications of Use: ATRIAL FIBRILLATION  Start: 03/28/25 2100     Admin Instructions:   Tablet may be crushed and suspended in 60 mL of water or D5W and immediately delivered via NG tube.      0932-Given     2100                  atorvastatin (LIPITOR) tablet 40 mg  Dose: 40 mg  Freq: Daily Route: PO  Start: 03/28/25 1615     Admin Instructions:   Avoid grapefruit juice.      0932-Given                   benztropine (COGENTIN) tablet 1 mg  Dose: 1 mg  Freq: 2 Times Daily Route: PO  Start: 03/28/25 2100      0932-Given     2100                  digoxin (LANOXIN) tablet 125 mcg  Dose: 125 mcg  Freq: Daily Digoxin Route: PO  Start: 03/29/25 1200     Admin Instructions:    Check and record heart rate.      1335-Given                    divalproex (DEPAKOTE) DR tablet 2,000 mg  Dose: 2,000 mg  Freq: Nightly Route: PO  Start: 03/28/25 2100     Admin Instructions:   Do not crush or chew the capsules or tablets. The drug may not work as designed if the capsule or tablet is crushed or chewed. Swallow whole.  Group 2 (Pink) Hazardous Drug - Reproductive Risk Only - See Handling Guide      2100                   haloperidol (HALDOL) tablet 7.5 mg  Dose: 7.5 mg  Freq: 2 Times Daily Route: PO  Start: 03/29/25 0900      0932-Given     2100                  HYDROcodone-acetaminophen (NORCO) 7.5-325 MG per tablet 1 tablet  Dose: 1 tablet  Freq: Every 4 Hours PRN Route: PO  PRN Reasons: Severe Pain,Moderate Pain  Start: 03/31/25 0724 End: 04/10/25 0723     Admin Instructions:   [RANDA]    Do not exceed 4 grams of acetaminophen in a 24 hr period.    If given for pain, use the following pain scale:   Mild Pain = Pain Score of 1-3, CPOT 1-2  Moderate Pain = Pain Score of 4-6, CPOT 3-4  Severe Pain = Pain Score of 7-10, CPOT 5-8  [RANDA]    Do not exceed 4 grams of acetaminophen in a 24 hr period. Max dose of 2gm for AST/ALT greater than 120 units/L        If given for pain, use the following pain scale:   Mild Pain = Pain Score of 1-3, CPOT 1-2  Moderate Pain = Pain Score of 4-6, CPOT 3-4  Severe Pain = Pain Score of 7-10, CPOT 5-8         levothyroxine (SYNTHROID, LEVOTHROID) tablet 75 mcg  Dose: 75 mcg  Freq: Every Early Morning Route: PO  Start: 03/29/25 0600     Admin Instructions:   Take on empty stomach.      0621-Given                   melatonin tablet 10 mg  Dose: 10 mg  Freq: Nightly Route: PO  Start: 03/28/25 2100 2100                   metoprolol succinate XL (TOPROL-XL) 24 hr tablet 25 mg  Dose: 25 mg  Freq: Daily Route: PO  Start: 03/28/25 1615     Admin Instructions:   Hold for SBP less than 100, DBP less than 60, or heart rate less than 50    Do not crush or chew the capsules or tablets. The drug may not work as designed if the capsule or  "tablet is crushed or chewed. Swallow whole.  Do not crush or chew.      0932-Given                   morphine injection 4 mg  Dose: 4 mg  Freq: Every 4 Hours PRN Route: IV  PRN Reason: Severe Pain  Start: 03/28/25 1525     Admin Instructions:   Based on patient request - if ordered for moderate or severe pain, provider allows for administration of a medication prescribed for a lower pain scale.  If given for pain, use the following pain scale:  Mild Pain = Pain Score of 1-3, CPOT 1-2  Moderate Pain = Pain Score of 4-6, CPOT 3-4  Severe Pain = Pain Score of 7-10, CPOT 5-8         nitroglycerin (NITROSTAT) SL tablet 0.4 mg  Dose: 0.4 mg  Freq: Every 5 Minutes PRN Route: SL  PRN Reason: Chest Pain  Start: 03/28/25 1525     Admin Instructions:   If Pain Unrelieved After 3 Doses Notify MD  May administer up to 3 doses per episode. Hold if SBP less than 100.         OLANZapine (zyPREXA) tablet 10 mg  Dose: 10 mg  Freq: Nightly Route: PO  Start: 03/28/25 2100     Admin Instructions:   Caution: Look alike/sound alike drug alert      2100                   ondansetron (ZOFRAN) injection 4 mg  Dose: 4 mg  Freq: Every 6 Hours PRN Route: IV  PRN Reasons: Nausea,Vomiting  Start: 03/28/25 1525     Admin Instructions:   \"If multiple N/V medications ordered, use in the following order: Ondansetron, Prochlorperazine, Promethazine. Use PO unless patient refuses or patient unable to swallow.\"         sodium chloride 0.9 % flush 10 mL  Dose: 10 mL  Freq: As Needed Route: IV  PRN Reason: Line Care  Start: 03/28/25 1525         sodium chloride 0.9 % flush 10 mL  Dose: 10 mL  Freq: Every 12 Hours Scheduled Route: IV  Start: 03/28/25 2100 0932-Given     2100                   sodium chloride 0.9 % flush 10 mL  Dose: 10 mL  Freq: As Needed Route: IV  PRN Reason: Line Care  Start: 03/28/25 1221         sodium chloride 0.9 % infusion 40 mL  Dose: 40 mL  Freq: As Needed Route: IV  PRN Reason: Line Care  Start: 03/28/25 1525     Admin " "Instructions:   Following administration of an IV intermittent medication, flush line with 40mL NS at 100mL/hr.         Completed Medications  Medications 03/31/25      morphine injection 4 mg  Dose: 4 mg  Freq: Once Route: IV  Start: 03/28/25 1347 End: 03/28/25 1344     Admin Instructions:   Based on patient request - if ordered for moderate or severe pain, provider allows for administration of a medication prescribed for a lower pain scale.  If given for pain, use the following pain scale:  Mild Pain = Pain Score of 1-3, CPOT 1-2  Moderate Pain = Pain Score of 4-6, CPOT 3-4  Severe Pain = Pain Score of 7-10, CPOT 5-8         ondansetron (ZOFRAN) injection 4 mg  Dose: 4 mg  Freq: Once Route: IV  Start: 03/28/25 1347 End: 03/28/25 1344     Admin Instructions:   \"If multiple N/V medications ordered, use in the following order: Ondansetron, Prochlorperazine, Promethazine. Use PO unless patient refuses or patient unable to swallow.\"         sodium chloride 0.9 % bolus 500 mL  Dose: 500 mL  Freq: Once Route: IV  Start: 03/29/25 0645 End: 03/29/25 0632         Discontinued Medications  Medications 03/31/25      haloperidol (HALDOL) tablet 7.5 mg  Dose: 7.5 mg  Freq: 2 Times Daily Route: PO  Start: 03/28/25 2100 End: 03/29/25 0814         HYDROcodone-acetaminophen (NORCO) 5-325 MG per tablet 1 tablet  Dose: 1 tablet  Freq: Every 6 Hours PRN Route: PO  PRN Reason: Moderate Pain  Start: 03/28/25 1525 End: 03/31/25 0724     Admin Instructions:   Based on patient request - if ordered for moderate or severe pain, provider allows for administration of a medication prescribed for a lower pain scale.  [RANDA]    Do not exceed 4 grams of acetaminophen in a 24 hr period. Max dose of 2gm for AST/ALT greater than 120 units/L        If given for pain, use the following pain scale:   Mild Pain = Pain Score of 1-3, CPOT 1-2  Moderate Pain = Pain Score of 4-6, CPOT 3-4  Severe Pain = Pain Score of 7-10, CPOT 5-8         polyethylene glycol " (MIRALAX) powder 0.5 bottle  Dose: 0.5 bottle  Freq: Daily Route: PO  Start: 25 1615 End: 25 1130     Admin Instructions:   Dissolve entire contents with 64oz of liquid. Give 1/2 of the diluted soln at 5pm the day before colonoscopy. Give remaining soln on day of colonoscopy. Complete at least 4hrs before procedure.         sodium chloride 0.9 % infusion  Rate: 100 mL/hr Dose: 100 mL/hr  Freq: Continuous Route: IV  Start: 25 1130 End: 25 0824      1101-Stopped                   sodium chloride 0.9 % infusion  Rate: 100 mL/hr Dose: 100 mL/hr  Freq: Continuous Route: IV  Start: 25 1445 End: 25 1203                        Physician Progress Notes (last 72 hours)        Jun Lund MD at 25 0751              [facility] Progress Note      Patient Name: Helder Abbott  : 1975  MRN: [id number]  Primary Care Physician:  Provider, No Known  Date of admission: 3/28/2025    Subjective     Interval History:   Follow-up hyponatremia    Patient is obtunded, arousable but very confused    Review of Systems:   As noted above    Objective     Vitals:   Temp:  [97.8 °F (36.6 °C)-98.2 °F (36.8 °C)] 97.8 °F (36.6 °C)  Heart Rate:  [88] 88  Resp:  [16-18] 16  BP: ()/(72-92) 96/72    Intake/Output Summary (Last 24 hours) at 3/31/2025 0751  Last data filed at 3/31/2025 0600  Gross per 24 hour   Intake 480 ml   Output --   Net 480 ml       Physical Exam:    General Appearance: More awake but very confused, chronically, no acute distress   Skin: warm and dry  HEENT: o oral mucosa is  Neck: No JVD  Lungs: Bilateral rhonchi, unlabored breathing  Heart: Irregularly irregular, no rub  Abdomen: soft, nontender, nondistended  : no palpable bladder  Extremities: no edema, cyanosis or clubbing    Scheduled Meds:     apixaban, 5 mg, Oral, Q12H  atorvastatin, 40 mg, Oral, Daily  benztropine, 1 mg, Oral, BID  digoxin, 125 mcg, Oral, Daily  divalproex, 2,000  mg, Oral, Nightly  haloperidol, 7.5 mg, Oral, BID  levothyroxine, 75 mcg, Oral, Q AM  melatonin, 10 mg, Oral, Nightly  metoprolol succinate XL, 25 mg, Oral, Daily  OLANZapine, 10 mg, Oral, Nightly  sodium chloride, 10 mL, Intravenous, Q12H      IV Meds:   sodium chloride, 100 mL/hr, Last Rate: Stopped (03/30/25 1742)        Results Reviewed:   I have personally reviewed the results from the time of this admission to 3/31/2025 07:51 EDT     Results from last 7 days   Lab Units 03/31/25 0415 03/30/25 0721 03/29/25  1143 03/29/25  0547 03/28/25  1300   SODIUM mmol/L 133* 131*  131* 131* 126* 131*   POTASSIUM mmol/L 4.5 4.8  4.8 5.1 5.2 4.5   CHLORIDE mmol/L 106 102  102 96* 96* 95*   CO2 mmol/L 22.9 19.1*  19.1* 23.0 22.0 24.7   BUN mg/dL 11 14  14 20 18 11   CREATININE mg/dL 0.70* 0.60*  0.60* 1.01 1.09 1.01   CALCIUM mg/dL 8.4* 8.6  8.6 9.0 8.6 9.6   BILIRUBIN mg/dL  --   --   --  0.4 0.6   ALK PHOS U/L  --   --   --  67 86   ALT (SGPT) U/L  --   --   --  15 17   AST (SGOT) U/L  --   --   --  21 27   GLUCOSE mg/dL 68 75  75 76 74 96       Estimated Creatinine Clearance: 123.8 mL/min (A) (by C-G formula based on SCr of 0.7 mg/dL (L)).    Results from last 7 days   Lab Units 03/31/25 0415 03/30/25  0721   MAGNESIUM mg/dL 1.9 1.8   PHOSPHORUS mg/dL 2.5 2.6       Results from last 7 days   Lab Units 03/31/25 0415 03/30/25 0721 03/29/25  1143   URIC ACID mg/dL 8.5* 8.3* 8.8*       Results from last 7 days   Lab Units 03/30/25 0721 03/29/25  0547 03/28/25  1300   WBC 10*3/mm3 6.18 6.79 7.45   HEMOGLOBIN g/dL 15.3 14.4 16.6   PLATELETS 10*3/mm3 130* 139* 153             Assessment / Plan     ASSESSMENT:  Hyponatremia currently appears to be hypovolemic with increased uric acid the patient was hypotensive last night, sodium is up to 133 slightly increased with the IV fluid and the uric acid is 8.5 this is would be against SIADH more consistent with the volume depletion.  Urine studies were not done as  ordered.  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  Copied text in this note has been reviewed and is accurate as of 25.       Thank you for involving us in the care of Helder Abbott.  Please feel free to call with any questions.    Jun Lund MD  25  07:51 EDT    [facility]  [phone]    Please note that portions of this note were completed with a voice recognition program.    Electronically signed by Jun Lund MD at 25 4886       Antoine Fernando MD at 25 0777              DAILY PROGRESS NOTE  [facility]    Patient Identification:  Name: Helder Abbott  Age: 50 y.o.  Sex: male  :  1975  MRN: [id number]         Primary Care Physician: Provider, No Known    Subjective:  Interval History: Sitting up in bed eating breakfast.  Smiling.  Speech is difficult to understand at times.  Not complaining of anything new.  History not the most reliable.  No family present at bedside    Objective: MR/baseline    Scheduled Meds:apixaban, 5 mg, Oral, Q12H  atorvastatin, 40 mg, Oral, Daily  benztropine, 1 mg, Oral, BID  digoxin, 125 mcg, Oral, Daily  divalproex, 2,000 mg, Oral, Nightly  haloperidol, 7.5 mg, Oral, BID  levothyroxine, 75 mcg, Oral, Q AM  melatonin, 10 mg, Oral, Nightly  metoprolol succinate XL, 25 mg, Oral, Daily  OLANZapine, 10 mg, Oral, Nightly  sodium chloride, 10 mL, Intravenous, Q12H      Continuous Infusions:sodium chloride, 100 mL/hr, Last Rate: Stopped (25 1742)        Vital signs in last 24 hours:  Temp:  [97.8 °F (36.6 °C)-98.2 °F (36.8 °C)] 97.8 °F (36.6 °C)  Heart Rate:  [88] 88  Resp:   "[16-18] 16  BP: ()/(72-92) 96/72    Intake/Output:    Intake/Output Summary (Last 24 hours) at 3/31/2025 0720  Last data filed at 3/31/2025 0600  Gross per 24 hour   Intake 480 ml   Output --   Net 480 ml       Exam:  BP 96/72 (BP Location: Left arm, Patient Position: Lying)   Pulse 88   Temp 97.8 °F (36.6 °C) (Oral)   Resp 16   Ht 182.9 cm (72\")   Wt 69.3 kg (152 lb 12.5 oz)   SpO2 94%   BMI 20.72 kg/m²     General Appearance:    Alert, cooperative, nontoxic, feeding self                         Throat:   Oral mucosa pink and moist                           Neck:   No JVD                         Lungs:    Clear to auscultation bilaterally, respirations unlabored                          Heart:    Regular rate and rhythm, S1 and S2 normal                  Abdomen:     Soft, nontender, bowel sounds active                 Extremities: Right hip swelling/edema.  Resting on that right hip while in bed without issue                        Pulses:   Pulses palpable in lower extremities                                 Data Review:  Labs in chart were reviewed.    Assessment:  Active Hospital Problems    Diagnosis  POA    **Closed intertrochanteric fracture, right, initial encounter [S72.141A]  Yes    Hyponatremia [E87.1]  Yes    Atrial fibrillation, chronic [I48.20]  Yes    Chronic anticoagulation [Z79.01]  Not Applicable    Bipolar disorder [F31.9]  Yes    Essential hypertension [I10]  Yes    History of CVA (cerebrovascular accident) [Z86.73]  Not Applicable    Hypothyroidism (acquired) [E03.9]  Yes      Resolved Hospital Problems   No resolved problems to display.       Plan:    Right hip pain with no fracture.  Evaluated by Ortho and no plans for intervention   -MRI with severe edematous change and bruising over the right hip but no evidence of fracture with small foci of hematoma on AC recently resumed with stable H&H upon 3/30/2025 at 15.3 with platelet count at 130 (chronic TCP)   -Needs transition IV to " p.o. pain control.  Increase Norco to 7.5 every 4 as needed to eliminate as needed morphine        CAF-RC with digoxin/Toprol-AC    Bipolar disorder/intellectual disability from previous CVA   -Zyprexa Depakote Coglonnie Mckenziel    HTN-stable.  AM hypotension seems consistent/asymptomatic    Hyponatremia-improved 131-133 with assistance of renal   -Cortisol and TSH normal.  Uric 8.5.  Osmolality normal    Hypothyroidism on levothyroxine        Disposition: PT to evaluate.  CCP for DC needs -hopeful DC tomorrow    Antoine Fernando MD  3/31/2025  07:20 EDT      Electronically signed by Antoine Fernando MD at 03/31/25 4496       Ronnie Padilla MD at 03/30/25 0248              [facility]     LOS: 2 days     Subjective:    CC:Fall and Hip Pain    DIET:  Diet Order   Procedures    Diet: Regular/House, Fluid Restriction (240 mL/tray); 1500 mL/day; Fluid Consistency: Thin (IDDSI 0)       Objective:    Vital Signs:  Temp:  [97.5 °F (36.4 °C)-98.4 °F (36.9 °C)] 97.9 °F (36.6 °C)  Heart Rate:  [63-88] 88  Resp:  [16-18] 17  BP: ()/(60-92) 142/87    SpO2:  [94 %-99 %] 94 %  on   ;   Device (Oxygen Therapy): room air  Body mass index is 20.72 kg/m².    Physical Exam  Constitutional:       General: He is not in acute distress.  Pulmonary:      Effort: Pulmonary effort is normal.      Breath sounds: Normal breath sounds.   Abdominal:      General: There is no distension.      Palpations: Abdomen is soft.      Tenderness: There is no abdominal tenderness.   Musculoskeletal:      Comments: Patient is doing well, moving leg without any difficulty.  Pain is controlled   Skin:     General: Skin is warm and dry.   Neurological:      General: No focal deficit present.      Mental Status: He is alert.   Psychiatric:         Mood and Affect: Mood normal.         Behavior: Behavior normal.         Results Review:    Glucose   Date Value Ref Range Status   03/30/2025 75 65 - 99 mg/dL Final  "  03/30/2025 75 65 - 99 mg/dL Final   03/29/2025 76 65 - 99 mg/dL Final   03/29/2025 74 65 - 99 mg/dL Final   03/28/2025 96 65 - 99 mg/dL Final     Results from last 7 days   Lab Units 03/30/25  0721   WBC 10*3/mm3 6.18   HEMOGLOBIN g/dL 15.3   HEMATOCRIT % 47.5   PLATELETS 10*3/mm3 130*     Results from last 7 days   Lab Units 03/30/25  0721 03/29/25  1143 03/29/25  0547   SODIUM mmol/L 131*  131*   < > 126*   POTASSIUM mmol/L 4.8  4.8   < > 5.2   CHLORIDE mmol/L 102  102   < > 96*   CO2 mmol/L 19.1*  19.1*   < > 22.0   BUN mg/dL 14  14   < > 18   CREATININE mg/dL 0.60*  0.60*   < > 1.09   CALCIUM mg/dL 8.6  8.6   < > 8.6   BILIRUBIN mg/dL  --   --  0.4   ALK PHOS U/L  --   --  67   ALT (SGPT) U/L  --   --  15   AST (SGOT) U/L  --   --  21   GLUCOSE mg/dL 75  75   < > 74    < > = values in this interval not displayed.         Results from last 7 days   Lab Units 03/30/25  0721   MAGNESIUM mg/dL 1.8         Cultures:  No results found for: \"BLOODCX\", \"URINECX\", \"WOUNDCX\", \"MRSACX\", \"RESPCX\", \"STOOLCX\"    I have reviewed daily medications and changes in CPOE    Scheduled meds  apixaban, 5 mg, Oral, Q12H  atorvastatin, 40 mg, Oral, Daily  benztropine, 1 mg, Oral, BID  digoxin, 125 mcg, Oral, Daily  divalproex, 2,000 mg, Oral, Nightly  haloperidol, 7.5 mg, Oral, BID  levothyroxine, 75 mcg, Oral, Q AM  melatonin, 10 mg, Oral, Nightly  metoprolol succinate XL, 25 mg, Oral, Daily  OLANZapine, 10 mg, Oral, Nightly  sodium chloride, 10 mL, Intravenous, Q12H        sodium chloride, 100 mL/hr, Last Rate: Stopped (03/30/25 1742)      PRN meds    acetaminophen    HYDROcodone-acetaminophen    Morphine    nitroglycerin    ondansetron    [COMPLETED] Insert Peripheral IV **AND** sodium chloride    sodium chloride    sodium chloride        Closed intertrochanteric fracture, right, initial encounter    Essential hypertension    Hypothyroidism (acquired)    History of CVA (cerebrovascular accident)    Bipolar disorder    " Atrial fibrillation, chronic    Chronic anticoagulation    Hyponatremia        Assessment/Plan:    Right hip pain with possible closed intratrochanteric fracture of the right hip-confirmation pending  -Further imaging with MRI shows no evidence of fracture    History of chronic atrial fibrillation  -Severe edematous change and bruising of right gluteus hold on blood thinners, exam fairly unremarkable and hemoglobin stable good restart Eliquis    Bipolar disorder-continue his home regimen.    History of intellectual disability and previous CVA-continue his current regimen and monitor    Hypertension-continue antihypertensive regimen and avoid hypotensive episodes.    Hypothyroidism-continue thyroid replacement therapy.    Hyponatremia-continue with fluid restriction.  -Worse today but repeat is better  -Nephrology consulted  -TSH nl and cortisol level in am    Will ask physical therapy to see the patient evaluation  Possible discharge after PT chrisal    Ronnie Padilla MD  25  18:15 EDT        Electronically signed by Ronnie Padilla MD at 25 1816       Jun Lund MD at 25 1028              [facility] Progress Note      Patient Name: Helder Abbott  : 1975  MRN: [id number]  Primary Care Physician:  Provider, No Known  Date of admission: 3/28/2025    Subjective     Interval History:   Follow-up hyponatremia    Patient is obtunded, arousable but very confused    Review of Systems:   As noted above    Objective     Vitals:   Temp:  [97.5 °F (36.4 °C)-98.4 °F (36.9 °C)] 97.5 °F (36.4 °C)  Heart Rate:  [63-88] 88  Resp:  [16] 16  BP: ()/(60-94) 133/92    Intake/Output Summary (Last 24 hours) at 3/30/2025 1028  Last data filed at 3/29/2025 1801  Gross per 24 hour   Intake 292 ml   Output --   Net 292 ml       Physical Exam:    General Appearance: Obtunded, arousable but very confused, chronically, no acute distress   Skin: warm and dry  HEENT: o oral  mucosa is  Neck: No JVD  Lungs: Bilateral rhonchi, unlabored breathing  Heart: Irregularly irregular, no rub  Abdomen: soft, nontender, nondistended  : no palpable bladder  Extremities: no edema, cyanosis or clubbing  Neuro: Unable to assess    Scheduled Meds:     [Held by provider] apixaban, 5 mg, Oral, Q12H  atorvastatin, 40 mg, Oral, Daily  benztropine, 1 mg, Oral, BID  digoxin, 125 mcg, Oral, Daily  divalproex, 2,000 mg, Oral, Nightly  haloperidol, 7.5 mg, Oral, BID  levothyroxine, 75 mcg, Oral, Q AM  melatonin, 10 mg, Oral, Nightly  metoprolol succinate XL, 25 mg, Oral, Daily  OLANZapine, 10 mg, Oral, Nightly  sodium chloride, 10 mL, Intravenous, Q12H      IV Meds:   sodium chloride, 100 mL/hr, Last Rate: 100 mL/hr (03/29/25 1604)        Results Reviewed:   I have personally reviewed the results from the time of this admission to 3/30/2025 10:28 EDT     Results from last 7 days   Lab Units 03/30/25  0721 03/29/25  1143 03/29/25  0547 03/28/25  1300   SODIUM mmol/L 131*  131* 131* 126* 131*   POTASSIUM mmol/L 4.8  4.8 5.1 5.2 4.5   CHLORIDE mmol/L 102  102 96* 96* 95*   CO2 mmol/L 19.1*  19.1* 23.0 22.0 24.7   BUN mg/dL 14  14 20 18 11   CREATININE mg/dL 0.60*  0.60* 1.01 1.09 1.01   CALCIUM mg/dL 8.6  8.6 9.0 8.6 9.6   BILIRUBIN mg/dL  --   --  0.4 0.6   ALK PHOS U/L  --   --  67 86   ALT (SGPT) U/L  --   --  15 17   AST (SGOT) U/L  --   --  21 27   GLUCOSE mg/dL 75  75 76 74 96       Estimated Creatinine Clearance: 144.4 mL/min (A) (by C-G formula based on SCr of 0.6 mg/dL (L)).    Results from last 7 days   Lab Units 03/30/25  0721   MAGNESIUM mg/dL 1.8   PHOSPHORUS mg/dL 2.6       Results from last 7 days   Lab Units 03/30/25  0721 03/29/25  1143   URIC ACID mg/dL 8.3* 8.8*       Results from last 7 days   Lab Units 03/30/25  0721 03/29/25  0547 03/28/25  1300   WBC 10*3/mm3 6.18 6.79 7.45   HEMOGLOBIN g/dL 15.3 14.4 16.6   PLATELETS 10*3/mm3 130* 139* 153             Assessment / Plan      ASSESSMENT:  Hyponatremia currently appears to be hypovolemic with increased uric acid the patient was hypotensive last night, sodium is up to 132 slightly increased with the IV fluid and the uric acid slightly down to 8.3 this is would be against SIADH more consistent with the volume depletion.  Urine studies were not done as ordered.  Bipolar disorder  Old stroke with dysarthria  Fall with right hip fracture  Cognitive dysfunction  A-fib    PLAN:  Continue oral fluid restriction to 1500 cc per 24-hour  I will give normal saline again 100 cc/h x 2 L  Surveillance labs    I reviewed the chart and other providers notes, reviewed labs.  Copied text in this note has been reviewed and is accurate as of 03/30/25.       Thank you for involving us in the care of Helder Abbott.  Please feel free to call with any questions.    Jun Lund MD  03/30/25  10:28 EDT    [facility]  [phone]    Please note that portions of this note were completed with a voice recognition program.    Electronically signed by Jun Lund MD at 03/30/25 7976       Ronnie Padilla MD at 03/29/25 1644              [facility]     LOS: 1 day     Subjective:    CC:Fall and Hip Pain    DIET:  Diet Order   Procedures    Diet: Regular/House, Fluid Restriction (240 mL/tray); 1500 mL/day; Fluid Consistency: Thin (IDDSI 0)       Objective:    Vital Signs:  Temp:  [97.5 °F (36.4 °C)-97.9 °F (36.6 °C)] 97.8 °F (36.6 °C)  Heart Rate:  [65-81] 81  Resp:  [16] 16  BP: ()/(61-94) 141/94    SpO2:  [98 %-100 %] 98 %  on   ;   Device (Oxygen Therapy): room air  Body mass index is 20.72 kg/m².    Physical Exam  Constitutional:       General: He is not in acute distress.  Pulmonary:      Effort: Pulmonary effort is normal.      Breath sounds: Normal breath sounds.   Abdominal:      General: There is no distension.      Palpations: Abdomen is soft.      Tenderness: There is no  "abdominal tenderness.   Skin:     General: Skin is warm and dry.   Neurological:      General: No focal deficit present.      Mental Status: He is alert.   Psychiatric:         Mood and Affect: Mood normal.         Behavior: Behavior normal.         Results Review:    Glucose   Date Value Ref Range Status   03/29/2025 76 65 - 99 mg/dL Final   03/29/2025 74 65 - 99 mg/dL Final   03/28/2025 96 65 - 99 mg/dL Final     Results from last 7 days   Lab Units 03/29/25  0547   WBC 10*3/mm3 6.79   HEMOGLOBIN g/dL 14.4   HEMATOCRIT % 41.9   PLATELETS 10*3/mm3 139*     Results from last 7 days   Lab Units 03/29/25  1143 03/29/25  0547   SODIUM mmol/L 131* 126*   POTASSIUM mmol/L 5.1 5.2   CHLORIDE mmol/L 96* 96*   CO2 mmol/L 23.0 22.0   BUN mg/dL 20 18   CREATININE mg/dL 1.01 1.09   CALCIUM mg/dL 9.0 8.6   BILIRUBIN mg/dL  --  0.4   ALK PHOS U/L  --  67   ALT (SGPT) U/L  --  15   AST (SGOT) U/L  --  21   GLUCOSE mg/dL 76 74                 Cultures:  No results found for: \"BLOODCX\", \"URINECX\", \"WOUNDCX\", \"MRSACX\", \"RESPCX\", \"STOOLCX\"    I have reviewed daily medications and changes in CPOE    Scheduled meds  [Held by provider] apixaban, 5 mg, Oral, Q12H  atorvastatin, 40 mg, Oral, Daily  benztropine, 1 mg, Oral, BID  digoxin, 125 mcg, Oral, Daily  divalproex, 2,000 mg, Oral, Nightly  haloperidol, 7.5 mg, Oral, BID  levothyroxine, 75 mcg, Oral, Q AM  melatonin, 10 mg, Oral, Nightly  metoprolol succinate XL, 25 mg, Oral, Daily  OLANZapine, 10 mg, Oral, Nightly  sodium chloride, 10 mL, Intravenous, Q12H        sodium chloride, 100 mL/hr, Last Rate: 100 mL/hr (03/29/25 1604)      PRN meds    acetaminophen    HYDROcodone-acetaminophen    Morphine    nitroglycerin    ondansetron    [COMPLETED] Insert Peripheral IV **AND** sodium chloride    sodium chloride    sodium chloride        Closed intertrochanteric fracture, right, initial encounter    Essential hypertension    Hypothyroidism (acquired)    History of CVA (cerebrovascular " accident)    Bipolar disorder    Atrial fibrillation, chronic    Chronic anticoagulation    Hyponatremia        Assessment/Plan:    Right hip pain with possible closed intratrochanteric fracture of the right hip-confirmation pending  -Further imaging with MRI shows no evidence of fracture    History of chronic atrial fibrillation  -Severe edematous change and bruising of right gluteus hold on blood thinners    Bipolar disorder-continue his home regimen.    History of intellectual disability and previous CVA-continue his current regimen and monitor    Hypertension-continue antihypertensive regimen and avoid hypotensive episodes.    Hypothyroidism-continue thyroid replacement therapy.    Hyponatremia-continue with fluid restriction.  -Worse today but repeat is better  -Nephrology consulted  -TSH nl and cortisol level in am    Possible discharge tomorrow    Ronnie Padilla MD  25  16:41 EDT        Electronically signed by Ronnie Padilla MD at 25 1818          Consult Notes (last 72 hours)        Jun Lund MD at 25 1349        Consult Orders    1. Inpatient Nephrology Consult [[id number]] ordered by Ronnie Padilla MD at 25 1129                   [facility] Consult Note      Patient Name: Helder Abbott  : 1975  MRN: [id number]  Primary Care Physician:  Provider, No Known  Referring Physician: Jose Alvarenga MD  Date of admission: 3/28/2025    Subjective     Reason for Consult: Chronic hyponatremia    HPI:   Helder Abbott is a 50 y.o. male patient was admitted on 3/28/2025, with pain of his right hip after a fall to have closed intertrochanteric right hip fracture, evaluated by orthopedics.  He has bipolar disorder, prior CVA, hypothyroidism, hypertension, cognitive impairment and chronic A-fib.  He is currently crying because he is asking about his friend.  Unable to give me any meaningful information.    Review of Systems:   Not  obtainable    Personal History     Past Medical History:   Diagnosis Date    Benign essential hypertension     Bipolar disorder     Dyslipidemia     GERD (gastroesophageal reflux disease)     Heart disease     History of stroke     Hypothyroidism        History reviewed. No pertinent surgical history.    Family History: family history includes Diabetes in an other family member; Heart disease in an other family member; Hypertension in an other family member.    Social History:  reports that he has never smoked. He has never used smokeless tobacco. He reports that he does not drink alcohol and does not use drugs.    Home Medications:  Prior to Admission medications    Medication Sig Start Date End Date Taking? Authorizing Provider   Acetaminophen Extra Strength 500 MG tablet Take 1 tablet by mouth 4 (Four) Times a Day As Needed. 11/15/22  Yes Tobin Mcbride MD   ARIPiprazole ER (Abilify Maintena) 400 MG prefilled syringe IM prefilled syringe Inject 400 mg into the appropriate muscle as directed by prescriber Every 28 (Twenty-Eight) Days.   Yes Tobin Mcbride MD   atorvastatin (LIPITOR) 40 MG tablet Take 1 tablet by mouth Daily. 11/18/22  Yes Tobin Mcbride MD   benztropine (COGENTIN) 1 MG tablet Take 1 tablet by mouth 2 (Two) Times a Day. 10/11/22  Yes Tobin Mcbride MD   digoxin (LANOXIN) 125 MCG tablet Take 1 tablet by mouth Daily. 11/18/22  Yes Tobin Mcbride MD   divalproex (DEPAKOTE) 500 MG DR tablet Take 4 tablets by mouth Every Night. 10/27/22  Yes Tobin Mcbride MD   Eliquis 5 MG tablet tablet Take 1 tablet by mouth Every 12 (Twelve) Hours. 11/21/22  Yes Tobin Mcbride MD   haloperidol (HALDOL) 5 MG tablet Take 1.5 tablets by mouth 2 (Two) Times a Day.   Yes Tobin Mcbride MD   levothyroxine (SYNTHROID, LEVOTHROID) 75 MCG tablet Take 1 tablet by mouth Daily. 11/18/22  Yes Tobin Mcbride MD   melatonin 5 MG tablet tablet Take 2 tablets by mouth  Every Night.   Yes ProviderTobin MD   metoprolol succinate XL (TOPROL-XL) 25 MG 24 hr tablet Take 1 tablet by mouth Daily.   Yes Tobin Mcbride MD   OLANZapine (zyPREXA) 10 MG tablet Take 1 tablet by mouth Every Night.   Yes Tobin Mcbride MD   polyethylene glycol (MIRALAX) 17 GM/SCOOP powder Dissolve 17 g (1 capful) in liquid and drink by mouth Daily. 5/6/24  Yes Ronnie Rojas MD       Allergies:  Allergies   Allergen Reactions    Clonazepam Unknown (See Comments)    Fluoxetine Unknown (See Comments)    Grass Unknown - High Severity    Grass Pollen(K-O-R-T-Swt Kennedy) Unknown - Low Severity    Hydroxyzine Unknown (See Comments)    Methylphenidate Unknown (See Comments)    Quetiapine Unknown - Low Severity    Risperidone Unknown (See Comments)    Topamax [Topiramate] Unknown - High Severity       Objective     Vitals:   Temp:  [97.5 °F (36.4 °C)-98.2 °F (36.8 °C)] 97.6 °F (36.4 °C)  Heart Rate:  [65-83] 80  Resp:  [14-16] 16  BP: ()/() 137/89    Intake/Output Summary (Last 24 hours) at 3/29/2025 1349  Last data filed at 3/29/2025 0700  Gross per 24 hour   Intake 240 ml   Output --   Net 240 ml       Physical Exam:   Constitutional: Awake, crying, chronically ill, no acute distress  HEENT: Sclera anicteric, no conjunctival injection, oral mucosa dry  Neck: No JVD  Respiratory: Bilateral rhonchi, breathing effort not labored  Cardiovascular: Irregularly irregular,  Gastrointestinal: Positive bowel sounds, abdomen is soft, nontender and nondistended  : No palpable bladder  Musculoskeletal: No edema, no clubbing or cyanosis  Psychiatric: Difficult to assess he is crying  Neurologic: He has impaired speech   Skin: Warm and dry       Scheduled Meds:     [Held by provider] apixaban, 5 mg, Oral, Q12H  atorvastatin, 40 mg, Oral, Daily  benztropine, 1 mg, Oral, BID  digoxin, 125 mcg, Oral, Daily  divalproex, 2,000 mg, Oral, Nightly  haloperidol, 7.5 mg, Oral, BID  levothyroxine, 75  mcg, Oral, Q AM  melatonin, 10 mg, Oral, Nightly  metoprolol succinate XL, 25 mg, Oral, Daily  OLANZapine, 10 mg, Oral, Nightly  sodium chloride, 10 mL, Intravenous, Q12H      IV Meds:        Results Reviewed:   I have personally reviewed the results from the time of this admission to 3/29/2025 13:49 EDT     Lab Results   Component Value Date    GLUCOSE 76 03/29/2025    CALCIUM 9.0 03/29/2025     (L) 03/29/2025    K 5.1 03/29/2025    CO2 23.0 03/29/2025    CL 96 (L) 03/29/2025    BUN 20 03/29/2025    CREATININE 1.01 03/29/2025    BCR 19.8 03/29/2025    ANIONGAP 12.0 03/29/2025      Lab Results   Component Value Date    MG 1.6 07/12/2024    PHOS 2.5 07/12/2024    ALBUMIN 3.2 (L) 03/29/2025           Assessment / Plan       Closed intertrochanteric fracture, right, initial encounter    Essential hypertension    Hypothyroidism (acquired)    History of CVA (cerebrovascular accident)    Bipolar disorder    Atrial fibrillation, chronic    Chronic anticoagulation    Hyponatremia      ASSESSMENT:  Hyponatremia currently appears to be hypovolemic with increased uric acid the patient was hypotensive last night, sodium is 131 no urine study to evaluate at this time.  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  I discussed the case with the patient's nurse.    Thank you for involving us in the care of Helder Abbott.  Please feel free to call with any questions.    Jun Lund MD  03/29/25  13:49 EDT    [facility]  [phone]      Please note that portions of this note were completed with a voice recognition program.    Electronically signed by Kevon  Jun Bro MD at 03/29/25 1355       Joann Serrano MD at 03/29/25 1200            Orthopaedic Consult    Joann Serrano MD      Patient: Helder Abbott    Date of Admission: 3/28/2025 11:36 AM    YOB: 1975    Medical Record Number: [id number]    Attending Physician: Ronnie Padilla MD  Consulting Physician: Joann Serrano MD      Chief Complaints: Closed nondisplaced intertrochanteric fracture of right femur, initial encounter [S72.144A]  Closed intertrochanteric fracture, right, initial encounter [S72.141A]      History of Present Illness: 50 y.o. male admitted to [facility] with Closed nondisplaced intertrochanteric fracture of right femur, initial encounter [S72.144A]  Closed intertrochanteric fracture, right, initial encounter [S72.141A]. I was consulted for further evaluation and treatment. Onset of symptoms was yesterday the patient had a fall.  He has a history of intellectual disability chronic A-fib bipolar disorder hypertension prior stroke.  Reports a prior fall with difficulty ambulating after the fall.  Images in the ER were concerning for an inner troches fracture CT scan negative.    Allergies:   Allergies   Allergen Reactions    Clonazepam Unknown (See Comments)    Fluoxetine Unknown (See Comments)    Grass Unknown - High Severity    Grass Pollen(K-O-R-T-Swt Kennedy) Unknown - Low Severity    Hydroxyzine Unknown (See Comments)    Methylphenidate Unknown (See Comments)    Quetiapine Unknown - Low Severity    Risperidone Unknown (See Comments)    Topamax [Topiramate] Unknown - High Severity       Medications:   Home Medications:  Medications Prior to Admission   Medication Sig Dispense Refill Last Dose/Taking    Acetaminophen Extra Strength 500 MG tablet Take 1 tablet by mouth 4 (Four) Times a Day As Needed.   Taking As Needed    ARIPiprazole ER (Abilify Maintena) 400 MG prefilled syringe IM prefilled syringe Inject 400 mg into the appropriate muscle as directed by  prescriber Every 28 (Twenty-Eight) Days.   Taking    atorvastatin (LIPITOR) 40 MG tablet Take 1 tablet by mouth Daily.   Taking    benztropine (COGENTIN) 1 MG tablet Take 1 tablet by mouth 2 (Two) Times a Day.   Taking    digoxin (LANOXIN) 125 MCG tablet Take 1 tablet by mouth Daily.   Taking    divalproex (DEPAKOTE) 500 MG DR tablet Take 4 tablets by mouth Every Night.   Taking    Eliquis 5 MG tablet tablet Take 1 tablet by mouth Every 12 (Twelve) Hours.   Taking    haloperidol (HALDOL) 5 MG tablet Take 1.5 tablets by mouth 2 (Two) Times a Day.   Taking    levothyroxine (SYNTHROID, LEVOTHROID) 75 MCG tablet Take 1 tablet by mouth Daily.   Taking    melatonin 5 MG tablet tablet Take 2 tablets by mouth Every Night.   Taking    metoprolol succinate XL (TOPROL-XL) 25 MG 24 hr tablet Take 1 tablet by mouth Daily.   Taking    OLANZapine (zyPREXA) 10 MG tablet Take 1 tablet by mouth Every Night.   Taking    polyethylene glycol (MIRALAX) 17 GM/SCOOP powder Dissolve 17 g (1 capful) in liquid and drink by mouth Daily. 510 g 1 Taking       Current Medications:  Scheduled Meds:[Held by provider] apixaban, 5 mg, Oral, Q12H  atorvastatin, 40 mg, Oral, Daily  benztropine, 1 mg, Oral, BID  digoxin, 125 mcg, Oral, Daily  divalproex, 2,000 mg, Oral, Nightly  haloperidol, 7.5 mg, Oral, BID  levothyroxine, 75 mcg, Oral, Q AM  melatonin, 10 mg, Oral, Nightly  metoprolol succinate XL, 25 mg, Oral, Daily  OLANZapine, 10 mg, Oral, Nightly  sodium chloride, 10 mL, Intravenous, Q12H      Continuous Infusions:   PRN Meds:.  acetaminophen    HYDROcodone-acetaminophen    Morphine    nitroglycerin    ondansetron    [COMPLETED] Insert Peripheral IV **AND** sodium chloride    sodium chloride    sodium chloride    Past Medical History:   Diagnosis Date    Benign essential hypertension     Bipolar disorder     Dyslipidemia     GERD (gastroesophageal reflux disease)     Heart disease     History of stroke     Hypothyroidism      History reviewed. No  pertinent surgical history.  Social History     Occupational History     Employer: DISABLED   Tobacco Use    Smoking status: Never    Smokeless tobacco: Never   Vaping Use    Vaping status: Never Used   Substance and Sexual Activity    Alcohol use: Never    Drug use: Never    Sexual activity: Defer      Social History     Social History Narrative    Not on file     Family History   Problem Relation Age of Onset    Diabetes Other     Hypertension Other     Heart disease Other          Review of Systems:   Constitutional: Negative for fatigue, fever, or weight loss  HEENT: Patient denies any headaches, vision changes, sore throat. Admits to wearing glasses  Pulmonary: Patient denies any cough, congestion, SOA, or wheezing  Cardiovascular: Patient denies any chest pain, palpitations, weakness,  Gastrointestinal:  Patient denies nausea, vomiting, diarrhea, constipation  Genital/Urinary: Patient denies dysuria, urinary frequency, urgency, incontinence, retention  Musculoskeletal: Positive for right hip pain. Negative for muscle cramps  Neurological: Patient denies dizziness, paresthesia, loss of sensation, seizures, syncope  Endocrine system: Patient denies tremors, cold or heat intolerance  Psychological: Patient denies thoughts/plans or harming self or other; hallucinations,  insomnia  Skin: Patient denies any bruising, rashes, lesions, or ulcers   Hematopoietic: Patient denies history of MRSA or slow wound healing,  spontaneous or excessive bleeding    Physical Exam: 50 y.o. male  Vitals:    03/29/25 0005 03/29/25 0539 03/29/25 0604 03/29/25 0700   BP: 92/61 (!) 87/72  Comment: david noted 110/82 117/74   BP Location: Left arm Left arm  Right arm   Patient Position: Lying Lying  Lying   Pulse: 70 65  70   Resp: 16 16  16   Temp: 97.7 °F (36.5 °C) 97.7 °F (36.5 °C)  97.9 °F (36.6 °C)   TempSrc: Oral Oral  Oral   SpO2: 100% 99%  99%   Weight:       Height:                                General Appearance:  Alert,  cooperative, in no acute distress    HEENT:    Normocephalic,  Atraumatic, Pupils are equal   Neck:   No adenopathy, supple, trachea midline, no thyromegaly       Lungs:     Clear to auscultation, equal expansion bilaterally, respirations regular, even and               unlabored    Heart:    Regular rhythm and normal rate, normal S1 and S2, no murmur, no gallops   Chest Wall:    Within normal limits   Abdomen:     Normal bowel sounds, no masses,  soft non-tender, non-distended,       Rectal:  Musculoskeletal:     Deferred  Pain with palpation of the right hip   Extremities:   No clubbing, no edema, no cyanosis   Pulses  Neurovascular:   Pulses palpable and equal bilaterally in all 4 extremities    Patient was alert and oriented x 3 spheres   Skin:   No lesions, ulcers or rashes   Neurologic:   Cranial nerves 2 - 12 grossly intact, sensation intact            Diagnostic Tests:      Plain films and CT scan demonstrate that there is no evidence of a right intertrochanteric femur fracture.  I did review an MRI of his right hip which is demonstrate that he has a tear of his gluteus musculature    Assessment:    Closed intertrochanteric fracture, right, initial encounter    Essential hypertension    Hypothyroidism (acquired)    History of CVA (cerebrovascular accident)    Bipolar disorder    Atrial fibrillation, chronic    Chronic anticoagulation    Hyponatremia      Gluteal tear right       Plan:      no restrictions from an orthopedic standpoint no plan for surgical intervention.  Patient is okay to be weightbearing as tolerated would benefit from skilled physical therapy.  Orthopedics will sign off at this time.  He is okay to follow-up with us as an outpatient if needed.  Questions encouraged and answered no guarantees given    Date: 3/29/2025  Joann Serrano MD  Orthopaedic Surgeon    [facility]  [phone]  [email or web address]                    Electronically signed by  Joann Serrano MD at 03/29/25 1505

## 2025-03-31 NOTE — PLAN OF CARE
Goal Outcome Evaluation:  VSS, RA, speech garbled at baseline, pt UOOB for meals. No complaints of pain. CTM

## 2025-03-31 NOTE — PROGRESS NOTES
"    DAILY PROGRESS NOTE  [facility]    Patient Identification:  Name: Helder Abbott  Age: 50 y.o.  Sex: male  :  1975  MRN: [id number]         Primary Care Physician: Provider, No Known    Subjective:  Interval History: Sitting up in bed eating breakfast.  Smiling.  Speech is difficult to understand at times.  Not complaining of anything new.  History not the most reliable.  No family present at bedside    Objective: MR/baseline    Scheduled Meds:apixaban, 5 mg, Oral, Q12H  atorvastatin, 40 mg, Oral, Daily  benztropine, 1 mg, Oral, BID  digoxin, 125 mcg, Oral, Daily  divalproex, 2,000 mg, Oral, Nightly  haloperidol, 7.5 mg, Oral, BID  levothyroxine, 75 mcg, Oral, Q AM  melatonin, 10 mg, Oral, Nightly  metoprolol succinate XL, 25 mg, Oral, Daily  OLANZapine, 10 mg, Oral, Nightly  sodium chloride, 10 mL, Intravenous, Q12H      Continuous Infusions:sodium chloride, 100 mL/hr, Last Rate: Stopped (25)        Vital signs in last 24 hours:  Temp:  [97.8 °F (36.6 °C)-98.2 °F (36.8 °C)] 97.8 °F (36.6 °C)  Heart Rate:  [88] 88  Resp:  [16-18] 16  BP: ()/(72-92) 96/72    Intake/Output:    Intake/Output Summary (Last 24 hours) at 3/31/2025 0720  Last data filed at 3/31/2025 0600  Gross per 24 hour   Intake 480 ml   Output --   Net 480 ml       Exam:  BP 96/72 (BP Location: Left arm, Patient Position: Lying)   Pulse 88   Temp 97.8 °F (36.6 °C) (Oral)   Resp 16   Ht 182.9 cm (72\")   Wt 69.3 kg (152 lb 12.5 oz)   SpO2 94%   BMI 20.72 kg/m²     General Appearance:    Alert, cooperative, nontoxic, feeding self                         Throat:   Oral mucosa pink and moist                           Neck:   No JVD                         Lungs:    Clear to auscultation bilaterally, respirations unlabored                          Heart:    Regular rate and rhythm, S1 and S2 normal                  Abdomen:     Soft, nontender, bowel sounds active                 Extremities: Right hip " swelling/edema.  Resting on that right hip while in bed without issue                        Pulses:   Pulses palpable in lower extremities                                 Data Review:  Labs in chart were reviewed.    Assessment:  Active Hospital Problems    Diagnosis  POA    **Closed intertrochanteric fracture, right, initial encounter [S72.141A]  Yes    Hyponatremia [E87.1]  Yes    Atrial fibrillation, chronic [I48.20]  Yes    Chronic anticoagulation [Z79.01]  Not Applicable    Bipolar disorder [F31.9]  Yes    Essential hypertension [I10]  Yes    History of CVA (cerebrovascular accident) [Z86.73]  Not Applicable    Hypothyroidism (acquired) [E03.9]  Yes      Resolved Hospital Problems   No resolved problems to display.       Plan:    Right hip pain with no fracture.  Evaluated by Ortho and no plans for intervention   -MRI with severe edematous change and bruising over the right hip but no evidence of fracture with small foci of hematoma on AC recently resumed with stable H&H upon 3/30/2025 at 15.3 with platelet count at 130 (chronic TCP)   -Needs transition IV to p.o. pain control.  Increase Norco to 7.5 every 4 as needed to eliminate as needed morphine        CAF-RC with digoxin/Toprol-AC    Bipolar disorder/intellectual disability from previous CVA   -Zyprexa Depakote Cogentin Haldol    HTN-stable.  AM hypotension seems consistent/asymptomatic    Hyponatremia-improved 131-133 with assistance of renal   -Cortisol and TSH normal.  Uric 8.5.  Osmolality normal    Hypothyroidism on levothyroxine        Disposition: PT to evaluate.  CCP for DC needs -hopeful DC tomorrow    Antoine Fernando MD  3/31/2025  07:20 EDT

## 2025-03-31 NOTE — PLAN OF CARE
Goal Outcome Evaluation:  Plan of Care Reviewed With: patient        Progress: improving  Outcome Evaluation: A&O to slef, garbled speech from previous stroke, unable to collect urine, VSS, PRN pains given, tolerating 1500 ml fluid restriction, no plans ofr surgery, PT ordered, plan of care ongoing, call light within reach, bed alarm on, Na 131

## 2025-03-31 NOTE — PROGRESS NOTES
[facility] Progress Note      Patient Name: Helder Abbott  : 1975  MRN: [id number]  Primary Care Physician:  Provider, No Known  Date of admission: 3/28/2025    Subjective     Interval History:   Follow-up hyponatremia    Patient is obtunded, arousable but very confused    Review of Systems:   As noted above    Objective     Vitals:   Temp:  [97.8 °F (36.6 °C)-98.2 °F (36.8 °C)] 97.8 °F (36.6 °C)  Heart Rate:  [88] 88  Resp:  [16-18] 16  BP: ()/(72-92) 96/72    Intake/Output Summary (Last 24 hours) at 3/31/2025 0751  Last data filed at 3/31/2025 0600  Gross per 24 hour   Intake 480 ml   Output --   Net 480 ml       Physical Exam:    General Appearance: More awake but very confused, chronically, no acute distress   Skin: warm and dry  HEENT: o oral mucosa is  Neck: No JVD  Lungs: Bilateral rhonchi, unlabored breathing  Heart: Irregularly irregular, no rub  Abdomen: soft, nontender, nondistended  : no palpable bladder  Extremities: no edema, cyanosis or clubbing    Scheduled Meds:     apixaban, 5 mg, Oral, Q12H  atorvastatin, 40 mg, Oral, Daily  benztropine, 1 mg, Oral, BID  digoxin, 125 mcg, Oral, Daily  divalproex, 2,000 mg, Oral, Nightly  haloperidol, 7.5 mg, Oral, BID  levothyroxine, 75 mcg, Oral, Q AM  melatonin, 10 mg, Oral, Nightly  metoprolol succinate XL, 25 mg, Oral, Daily  OLANZapine, 10 mg, Oral, Nightly  sodium chloride, 10 mL, Intravenous, Q12H      IV Meds:   sodium chloride, 100 mL/hr, Last Rate: Stopped (25 1742)        Results Reviewed:   I have personally reviewed the results from the time of this admission to 3/31/2025 07:51 EDT     Results from last 7 days   Lab Units 25  0415 25  0721 25  1143 25  0547 25  1300   SODIUM mmol/L 133* 131*  131* 131* 126* 131*   POTASSIUM mmol/L 4.5 4.8  4.8 5.1 5.2 4.5   CHLORIDE mmol/L 106 102  102 96* 96* 95*   CO2 mmol/L 22.9 19.1*  19.1* 23.0 22.0 24.7   BUN mg/dL 11 14  14  20 18 11   CREATININE mg/dL 0.70* 0.60*  0.60* 1.01 1.09 1.01   CALCIUM mg/dL 8.4* 8.6  8.6 9.0 8.6 9.6   BILIRUBIN mg/dL  --   --   --  0.4 0.6   ALK PHOS U/L  --   --   --  67 86   ALT (SGPT) U/L  --   --   --  15 17   AST (SGOT) U/L  --   --   --  21 27   GLUCOSE mg/dL 68 75  75 76 74 96       Estimated Creatinine Clearance: 123.8 mL/min (A) (by C-G formula based on SCr of 0.7 mg/dL (L)).    Results from last 7 days   Lab Units 03/31/25  0415 03/30/25  0721   MAGNESIUM mg/dL 1.9 1.8   PHOSPHORUS mg/dL 2.5 2.6       Results from last 7 days   Lab Units 03/31/25  0415 03/30/25  0721 03/29/25  1143   URIC ACID mg/dL 8.5* 8.3* 8.8*       Results from last 7 days   Lab Units 03/30/25  0721 03/29/25  0547 03/28/25  1300   WBC 10*3/mm3 6.18 6.79 7.45   HEMOGLOBIN g/dL 15.3 14.4 16.6   PLATELETS 10*3/mm3 130* 139* 153             Assessment / Plan     ASSESSMENT:  Hyponatremia currently appears to be hypovolemic with increased uric acid the patient was hypotensive last night, sodium is up to 133 slightly increased with the IV fluid and the uric acid is 8.5 this is would be against SIADH more consistent with the volume depletion.  Urine studies were not done as ordered.  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  Copied text in this note has been reviewed and is accurate as of 03/31/25.       Thank you for involving us in the care of Helder Abbott.  Please feel free to call with any questions.    Jun Lund MD  03/31/25  07:51 EDT    [facility]  [phone]    Please note that portions of this note were completed with a voice recognition program.

## 2025-03-31 NOTE — THERAPY EVALUATION
Patient Name: Helder Abbott  : 1975    MRN: [id number]                              Today's Date: 3/31/2025       Admit Date: 3/28/2025    Visit Dx:     ICD-10-CM ICD-9-CM   1. Closed nondisplaced intertrochanteric fracture of right femur, initial encounter  S72.144A 820.21   2. Closed intertrochanteric fracture, right, initial encounter  S72.141A 820.21     Patient Active Problem List   Diagnosis    Essential hypertension    Hypothyroidism (acquired)    History of CVA (cerebrovascular accident)    GERD without esophagitis    HLD (hyperlipidemia)    Bipolar disorder    Partial bowel obstruction    Fecal impaction    Severe malnutrition    Hyperkalemia    Syncope    Atrial fibrillation, chronic    Dehydration    Chronic anticoagulation    Thrombocytopenia    Seizure-like activity    Hyponatremia    Closed intertrochanteric fracture, right, initial encounter     Past Medical History:   Diagnosis Date    Benign essential hypertension     Bipolar disorder     Dyslipidemia     GERD (gastroesophageal reflux disease)     Heart disease     History of stroke     Hypothyroidism      History reviewed. No pertinent surgical history.   General Information       Row Name 25 1033          Physical Therapy Time and Intention    Document Type evaluation  -ST     Mode of Treatment physical therapy  -       Row Name 25 1033          General Information    Patient Profile Reviewed yes  -ST     Prior Level of Function independent:;all household mobility  -ST     Existing Precautions/Restrictions fall  -ST     Barriers to Rehab cognitive status  -       Row Name 25 1033          Living Environment    Current Living Arrangements extended care facility;residential facility  -       Row Name 25 1033          Cognition    Orientation Status (Cognition) oriented to;person;place;verbal cues/prompts needed for orientation  -       Row Name 25 1033          Safety Issues/Impairments Affecting  Functional Mobility    Safety Issues Affecting Function (Mobility) insight into deficits/self-awareness;safety precaution awareness;safety precautions follow-through/compliance  -ST     Impairments Affecting Function (Mobility) balance;endurance/activity tolerance;postural/trunk control;pain;cognition  -ST     Cognitive Impairments, Mobility Safety/Performance insight into deficits/self-awareness;safety precaution awareness;safety precaution follow-through  -ST     Comment, Safety Issues/Impairments (Mobility) gait belt, nonskid socks donned  -ST               User Key  (r) = Recorded By, (t) = Taken By, (c) = Cosigned By      Initials Name Provider Type    Heather Lugo PT Physical Therapist                   Mobility       Row Name 03/31/25 1033          Bed Mobility    Bed Mobility supine-sit;sit-supine  -ST     Supine-Sit [place] (Bed Mobility) standby assist;verbal cues  -ST     Assistive Device (Bed Mobility) bed rails;head of bed elevated  -ST     Comment, (Bed Mobility) increased time to complete  -ST       Row Name 03/31/25 1033          Sit-Stand Transfer    Sit-Stand [place] (Transfers) verbal cues;contact guard  -ST     Assistive Device (Sit-Stand Transfers) walker, front-wheeled  -ST     Comment, (Sit-Stand Transfer) with and without rwx  -ST       Row Name 03/31/25 1033          Gait/Stairs (Locomotion)    [place] Level (Gait) verbal cues;contact guard  -ST     Assistive Device (Gait) walker, front-wheeled  -ST     Distance in Feet (Gait) 15  with HHA then 50' with rwx  -ST     Deviations/Abnormal Patterns (Gait) gait speed decreased;stride length decreased;naresh decreased  -ST     Bilateral Gait Deviations forward flexed posture;heel strike decreased  -ST     Comment, (Gait/Stairs) constant furniture reaching without AD, improved balance with AD  -ST               User Key  (r) = Recorded By, (t) = Taken By, (c) = Cosigned By      Initials Name Provider Type    ST Bustillo  JIMMIE Romero Physical Therapist                   Obj/Interventions       Row Name 03/31/25 1034          Range of Motion Comprehensive    Comment, General Range of Motion bilat LE L  -ST       Row Name 03/31/25 1034          Strength Comprehensive (MMT)    Comment, General Manual Muscle Testing (MMT) Assessment bilat LE L  -ST       Row Name 03/31/25 1034          Balance    Comment, Balance no overt LOB or unsteadiness - improved comort noted with use of rwx vs without - pt frequently reaching for furniture and PT's hand without AD  -ST               User Key  (r) = Recorded By, (t) = Taken By, (c) = Cosigned By      Initials Name Provider Type    ST Heather Bustillo PT Physical Therapist                   Goals/Plan       Row Name 03/31/25 1035          Bed Mobility Goal 1 (PT)    Activity/Assistive Device (Bed Mobility Goal 1, PT) bed mobility activities, all  -ST     [place] Level/Cues Needed (Bed Mobility Goal 1, PT) modified independence  -ST     Time Frame (Bed Mobility Goal 1, PT) 1 week  -ST     Progress/Outcomes (Bed Mobility Goal 1, PT) new goal  -       Row Name 03/31/25 1035          Transfer Goal 1 (PT)    Activity/Assistive Device (Transfer Goal 1, PT) sit-to-stand/stand-to-sit;bed-to-chair/chair-to-bed  -ST     [place] Level/Cues Needed (Transfer Goal 1, PT) supervision required  -ST     Time Frame (Transfer Goal 1, PT) 1 week  -ST     Progress/Outcome (Transfer Goal 1, PT) new goal  -ST       Row Name 03/31/25 1035          Gait Training Goal 1 (PT)    Activity/Assistive Device (Gait Training Goal 1, PT) gait (walking locomotion);assistive device use  -ST     [place] Level (Gait Training Goal 1, PT) standby assist  -ST     Distance (Gait Training Goal 1, PT) 150'  -ST     Time Frame (Gait Training Goal 1, PT) 1 week  -ST     Progress/Outcome (Gait Training Goal 1, PT) new goal  -ST               User Key  (r) = Recorded By, (t) = Taken By, (c) = Cosigned By      Initials  Name Provider Type    ST Heather Bustillo, PT Physical Therapist                   Clinical Impression       Row Name 03/31/25 1034          Pain    Pain Location hip;buttock  -ST     Pain Side/Orientation right;lower  -ST     Pain Management Interventions exercise or physical activity utilized  -ST     Response to Pain Interventions activity participation with tolerable pain  -ST     Pre/Posttreatment Pain Comment reports sore but does not rate pain  -ST       Row Name 03/31/25 1034          Plan of Care Review    Plan of Care Reviewed With patient  -ST     Outcome Evaluation Pt is 49 y/o M admitted to [facility] on 3/28/25 with R hip pain following fall. Initial concern for fx but further imaging is clear of fx but shows glut tear on R. Per ortho WBAT. PMH: Afib, intellectual disability, HTN, CVA, bipolar. At baseline pt is from group home where he is indep with mobility without AD. Pt currently demos SBA for bed mobility, CGA to stand and ambulate in room with HHA. Noted frequent reaching for furniture throughout. Did trial walker with improved balance and pt reports improved comfort. Pt demos mobility below baseline and therefore would benefit from acute skilled PT to address functional mobility deficits. Anticipate d/c back to group home - may benefit from rolling walker for increased safety with mobility.  -ST       Row Name 03/31/25 1034          Therapy Assessment/Plan (PT)    Rehab Potential (PT) good  -ST     Criteria for Skilled Interventions Met (PT) yes  -ST     Therapy Frequency (PT) 5 times/wk  -ST     Predicted Duration of Therapy Intervention (PT) 1 week  -ST       Row Name 03/31/25 1034          Positioning and Restraints    Pre-Treatment Position in bed  -ST     Post Treatment Position chair  -ST     In Chair notified nsg;reclined;call light within reach;encouraged to call for assist;exit alarm on  -ST               User Key  (r) = Recorded By, (t) = Taken By, (c) = Cosigned By      Initials Name  Provider Type    Heather Lugo PT Physical Therapist                   Outcome Measures       Row Name 03/31/25 1035          How much help from another person do you currently need...    Turning from your back to your side while in flat bed without using bedrails? 4  -ST     Moving from lying on back to sitting on the side of a flat bed without bedrails? 4  -ST     Moving to and from a bed to a chair (including a wheelchair)? 3  -ST     Standing up from a chair using your arms (e.g., wheelchair, bedside chair)? 3  -ST     Climbing 3-5 steps with a railing? 3  -ST     To walk in hospital room? 3  -ST     AM-PAC 6 Clicks Score (PT) 20  -ST               User Key  (r) = Recorded By, (t) = Taken By, (c) = Cosigned By      Initials Name Provider Type    Heather Lugo PT Physical Therapist                                 Physical Therapy Education       Title: PT OT SLP Therapies (In Progress)       Topic: Physical Therapy (In Progress)       Point: Mobility training (In Progress)       Learning Progress Summary            Patient Acceptance, E, NR by  at 3/31/2025 1035                      Point: Home exercise program (Not Started)       Learner Progress:  Not documented in this visit.              Point: Body mechanics (In Progress)       Learning Progress Summary            Patient Acceptance, E, NR by  at 3/31/2025 1035                      Point: Precautions (Not Started)       Learner Progress:  Not documented in this visit.                              User Key       Initials Effective Dates Name Provider Type Discipline     09/22/22 -  Heather Bustillo PT Physical Therapist PT                  PT Recommendation and Plan     Outcome Evaluation: Pt is 51 y/o M admitted to [facility] on 3/28/25 with R hip pain following fall. Initial concern for fx but further imaging is clear of fx but shows glut tear on R. Per ortho WBAT. PMH: Afib, intellectual disability, HTN, CVA, bipolar. At baseline pt  is from group home where he is indep with mobility without AD. Pt currently demos SBA for bed mobility, CGA to stand and ambulate in room with HHA. Noted frequent reaching for furniture throughout. Did trial walker with improved balance and pt reports improved comfort. Pt demos mobility below baseline and therefore would benefit from acute skilled PT to address functional mobility deficits. Anticipate d/c back to group home - may benefit from rolling walker for increased safety with mobility.     Time Calculation:         PT Charges       Row Name 03/31/25 1038             Time Calculation    Start Time 0830  -ST      Stop Time 0845  -ST      Time Calculation (min) 15 min  -ST      PT Received On 03/31/25  -ST      PT - Next Appointment 04/01/25  -ST      PT Goal Re-Cert Due Date 04/07/25  -ST         Time Calculation- PT    Total Timed Code Minutes- PT 0 minute(s)  -ST                User Key  (r) = Recorded By, (t) = Taken By, (c) = Cosigned By      Initials Name Provider Type    ST Heather Bustillo, PT Physical Therapist                  Therapy Charges for Today       Code Description Service Date Service Provider Modifiers Qty    [id number] HC PT EVAL MOD COMPLEXITY 2 3/31/2025 Heather Bustillo, PT GP 1            PT G-Codes  AM-PAC 6 Clicks Score (PT): 20  PT Discharge Summary  Anticipated Discharge Disposition (PT): extended care facility, home with home health    Heather Bustillo, JIMMIE  3/31/2025

## 2025-03-31 NOTE — PLAN OF CARE
Goal Outcome Evaluation:  Plan of Care Reviewed With: patient      Pt is 51 y/o M admitted to [facility] on 3/28/25 with R hip pain following fall. Initial concern for fx but further imaging is clear of fx but shows glut tear on R. Per ortho WBAT. PMH: Afib, intellectual disability, HTN, CVA, bipolar. At baseline pt is from group home where he is indep with mobility without AD. Pt currently demos SBA for bed mobility, CGA to stand and ambulate in room with HHA. Noted frequent reaching for furniture throughout. Did trial walker with improved balance and pt reports improved comfort. Pt demos mobility below baseline and therefore would benefit from acute skilled PT to address functional mobility deficits. Anticipate d/c back to group home - may benefit from rolling walker for increased safety with mobility.            Anticipated Discharge Disposition (PT): extended care facility

## 2025-04-01 LAB
ALBUMIN SERPL-MCNC: 3 G/DL (ref 3.5–5.2)
ANION GAP SERPL CALCULATED.3IONS-SCNC: 5.2 MMOL/L (ref 5–15)
BUN SERPL-MCNC: 12 MG/DL (ref 6–20)
BUN/CREAT SERPL: 17.4 (ref 7–25)
CALCIUM SPEC-SCNC: 8.3 MG/DL (ref 8.6–10.5)
CHLORIDE SERPL-SCNC: 102 MMOL/L (ref 98–107)
CO2 SERPL-SCNC: 26.8 MMOL/L (ref 22–29)
CREAT SERPL-MCNC: 0.69 MG/DL (ref 0.76–1.27)
EGFRCR SERPLBLD CKD-EPI 2021: 112.7 ML/MIN/1.73
GLUCOSE SERPL-MCNC: 72 MG/DL (ref 65–99)
MAGNESIUM SERPL-MCNC: 1.8 MG/DL (ref 1.6–2.6)
PHOSPHATE SERPL-MCNC: 3.1 MG/DL (ref 2.5–4.5)
POTASSIUM SERPL-SCNC: 4.4 MMOL/L (ref 3.5–5.2)
SODIUM SERPL-SCNC: 134 MMOL/L (ref 136–145)
URATE SERPL-MCNC: 8.6 MG/DL (ref 3.4–7)

## 2025-04-01 PROCEDURE — 83735 ASSAY OF MAGNESIUM: CPT | Performed by: INTERNAL MEDICINE

## 2025-04-01 PROCEDURE — 80069 RENAL FUNCTION PANEL: CPT | Performed by: INTERNAL MEDICINE

## 2025-04-01 PROCEDURE — 84550 ASSAY OF BLOOD/URIC ACID: CPT | Performed by: INTERNAL MEDICINE

## 2025-04-01 RX ADMIN — Medication 10 MG: at 20:32

## 2025-04-01 RX ADMIN — DIVALPROEX SODIUM 2000 MG: 500 TABLET, DELAYED RELEASE ORAL at 20:32

## 2025-04-01 RX ADMIN — Medication 10 ML: at 20:35

## 2025-04-01 RX ADMIN — LEVOTHYROXINE SODIUM 75 MCG: 0.07 TABLET ORAL at 06:28

## 2025-04-01 RX ADMIN — BENZTROPINE MESYLATE 1 MG: 1 TABLET ORAL at 20:32

## 2025-04-01 RX ADMIN — BENZTROPINE MESYLATE 1 MG: 1 TABLET ORAL at 08:42

## 2025-04-01 RX ADMIN — HALOPERIDOL 7.5 MG: 5 TABLET ORAL at 09:25

## 2025-04-01 RX ADMIN — APIXABAN 5 MG: 5 TABLET, FILM COATED ORAL at 08:42

## 2025-04-01 RX ADMIN — ATORVASTATIN CALCIUM 40 MG: 20 TABLET, FILM COATED ORAL at 08:42

## 2025-04-01 RX ADMIN — APIXABAN 5 MG: 5 TABLET, FILM COATED ORAL at 20:32

## 2025-04-01 RX ADMIN — METOPROLOL SUCCINATE 25 MG: 25 TABLET, EXTENDED RELEASE ORAL at 08:42

## 2025-04-01 RX ADMIN — DIGOXIN 125 MCG: 0.12 TABLET ORAL at 13:52

## 2025-04-01 RX ADMIN — NITROGLYCERIN 0.4 MG: 0.4 TABLET SUBLINGUAL at 23:42

## 2025-04-01 RX ADMIN — Medication 10 ML: at 08:42

## 2025-04-01 RX ADMIN — HALOPERIDOL 7.5 MG: 5 TABLET ORAL at 20:32

## 2025-04-01 RX ADMIN — OLANZAPINE 10 MG: 10 TABLET, FILM COATED ORAL at 20:32

## 2025-04-01 NOTE — PROGRESS NOTES
"    DAILY PROGRESS NOTE  [facility]    Patient Identification:  Name: Helder Abbott  Age: 50 y.o.  Sex: male  :  1975  MRN: [id number]         Primary Care Physician: Provider, No Known    Subjective:  Interval History: Sitting up in bed wearing sunglasses.  Casual and conversational.  No new pains or distress    Objective:    Scheduled Meds:apixaban, 5 mg, Oral, Q12H  atorvastatin, 40 mg, Oral, Daily  benztropine, 1 mg, Oral, BID  digoxin, 125 mcg, Oral, Daily  divalproex, 2,000 mg, Oral, Nightly  haloperidol, 7.5 mg, Oral, BID  levothyroxine, 75 mcg, Oral, Q AM  melatonin, 10 mg, Oral, Nightly  metoprolol succinate XL, 25 mg, Oral, Daily  OLANZapine, 10 mg, Oral, Nightly  sodium chloride, 10 mL, Intravenous, Q12H      Continuous Infusions:     Vital signs in last 24 hours:  Temp:  [97 °F (36.1 °C)-97.7 °F (36.5 °C)] 97.4 °F (36.3 °C)  Heart Rate:  [62-81] 73  Resp:  [16-18] 18  BP: ()/() 150/96    Intake/Output:    Intake/Output Summary (Last 24 hours) at 2025 0931  Last data filed at 2025 0500  Gross per 24 hour   Intake 628.33 ml   Output 1050 ml   Net -421.67 ml       Exam:  /96 (BP Location: Right arm, Patient Position: Lying)   Pulse 73   Temp 97.4 °F (36.3 °C) (Oral)   Resp 18   Ht 182.9 cm (72\")   Wt 69.3 kg (152 lb 12.5 oz)   SpO2 100%   BMI 20.72 kg/m²     General Appearance:    Alert, cooperative, no distress, no family at bedside                         Throat:   Oral mucosa pink and moist                           Neck:   No JVD                         Lungs:    Clear to auscultation bilaterally, respirations unlabored                 Chest Wall:    Wearing chains today                          Heart:    Regular rate and rhythm, S1 and S2 normal                  Abdomen:     Soft, nontender, bowel sounds active                 Extremities:   No cyanosis or edema                        Pulses:   Pulses palpable in all extremities                   "            Data Review:  Labs in chart were reviewed.    Assessment:  Active Hospital Problems    Diagnosis  POA    **Closed intertrochanteric fracture, right, initial encounter [S72.141A]  Yes    Hyponatremia [E87.1]  Yes    Atrial fibrillation, chronic [I48.20]  Yes    Chronic anticoagulation [Z79.01]  Not Applicable    Bipolar disorder [F31.9]  Yes    Essential hypertension [I10]  Yes    History of CVA (cerebrovascular accident) [Z86.73]  Not Applicable    Hypothyroidism (acquired) [E03.9]  Yes      Resolved Hospital Problems   No resolved problems to display.       Plan:    Right hip pain with no fracture.  Evaluated by Ortho and no plans for intervention              -MRI with severe edematous change and bruising over the right hip but no evidence of fracture with small foci of hematoma on AC recently resumed with stable H&H upon 3/30/2025 at 15.3 with platelet count at 130 (chronic TCP)              -P.o. pain control           CAF-RC with digoxin/Toprol-AC     Bipolar disorder/intellectual disability from previous CVA              -Zyprexa Depakote Cogentin Haldol     HTN-stable.  AM hypotension seems consistent theme/asymptomatic     Hyponatremia-improved 131-133-134 with assistance of renal              -Cortisol and TSH normal.  Uric 8.5.  Osmolality normal   -No further plans to monitor BMP from my perspective     Hypothyroidism on levothyroxine           Disposition: PT to evaluate.  Discussed with CCP regarding DC plans as he is medically ready at any time       Antoine Fernando MD  4/1/2025  09:31 EDT

## 2025-04-01 NOTE — PLAN OF CARE
Goal Outcome Evaluation:  Plan of Care Reviewed With: patient        Progress: improving  Outcome Evaluation: patient alert to self, intellectual impairment, assist x1 gait belt, walker, MRI showed hematoma no fx on right hip, no c/o pain or nausea, VSS, on RA, possible D/C 4/1, plan of care ongoing, call light with in reach,bed alarm on

## 2025-04-01 NOTE — PROGRESS NOTES
[facility] Progress Note      Patient Name: Helder Abbott  : 1975  MRN: [id number]  Primary Care Physician:  Provider, No Known  Date of admission: 3/28/2025    Subjective     Interval History:   Follow-up hyponatremia    Awake but confused    Review of Systems:   Not meaningful    Objective     Vitals:   Temp:  [97 °F (36.1 °C)-97.7 °F (36.5 °C)] 97.4 °F (36.3 °C)  Heart Rate:  [62-81] 73  Resp:  [16-18] 18  BP: ()/() 150/96    Intake/Output Summary (Last 24 hours) at 2025 1118  Last data filed at 2025 0500  Gross per 24 hour   Intake 100 ml   Output 1050 ml   Net -950 ml       Physical Exam:    General Appearance: More awake but very confused, chronically, no acute distress   Skin: warm and dry  HEENT: o oral mucosa is  Neck: No JVD  Lungs: Bilateral rhonchi, unlabored breathing  Heart: Irregularly irregular, no rub  Abdomen: soft, nontender, nondistended  : no palpable bladder  Extremities: no edema, cyanosis or clubbing    Scheduled Meds:     apixaban, 5 mg, Oral, Q12H  atorvastatin, 40 mg, Oral, Daily  benztropine, 1 mg, Oral, BID  digoxin, 125 mcg, Oral, Daily  divalproex, 2,000 mg, Oral, Nightly  haloperidol, 7.5 mg, Oral, BID  levothyroxine, 75 mcg, Oral, Q AM  melatonin, 10 mg, Oral, Nightly  metoprolol succinate XL, 25 mg, Oral, Daily  OLANZapine, 10 mg, Oral, Nightly  sodium chloride, 10 mL, Intravenous, Q12H      IV Meds:          Results Reviewed:   I have personally reviewed the results from the time of this admission to 2025 11:18 EDT     Results from last 7 days   Lab Units 25  0630 25  0415 25  0721 25  1143 25  0547 25  1300   SODIUM mmol/L 134* 133* 131*  131*   < > 126* 131*   POTASSIUM mmol/L 4.4 4.5 4.8  4.8   < > 5.2 4.5   CHLORIDE mmol/L 102 106 102  102   < > 96* 95*   CO2 mmol/L 26.8 22.9 19.1*  19.1*   < > 22.0 24.7   BUN mg/dL 12 11 14  14   < > 18 11   CREATININE mg/dL 0.69* 0.70*  0.60*  0.60*   < > 1.09 1.01   CALCIUM mg/dL 8.3* 8.4* 8.6  8.6   < > 8.6 9.6   BILIRUBIN mg/dL  --   --   --   --  0.4 0.6   ALK PHOS U/L  --   --   --   --  67 86   ALT (SGPT) U/L  --   --   --   --  15 17   AST (SGOT) U/L  --   --   --   --  21 27   GLUCOSE mg/dL 72 68 75  75   < > 74 96    < > = values in this interval not displayed.       Estimated Creatinine Clearance: 125.5 mL/min (A) (by C-G formula based on SCr of 0.69 mg/dL (L)).    Results from last 7 days   Lab Units 04/01/25  0630 03/31/25  0415 03/30/25  0721   MAGNESIUM mg/dL 1.8 1.9 1.8   PHOSPHORUS mg/dL 3.1 2.5 2.6       Results from last 7 days   Lab Units 04/01/25  0630 03/31/25  0415 03/30/25  0721 03/29/25  1143   URIC ACID mg/dL 8.6* 8.5* 8.3* 8.8*       Results from last 7 days   Lab Units 03/30/25  0721 03/29/25  0547 03/28/25  1300   WBC 10*3/mm3 6.18 6.79 7.45   HEMOGLOBIN g/dL 15.3 14.4 16.6   PLATELETS 10*3/mm3 130* 139* 153             Assessment / Plan     ASSESSMENT:  Hyponatremia currently appears to be hypovolemic with increased uric acid the patient was hypotensive last night, sodium is up to 134 slightly increased, the urine study at the present not meaningful since the urine was obtained after the patient received IV fluid and his sodium started to increase.  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  Copied text in this note has been reviewed and is accurate as of 04/01/25.       Thank you for involving us in the care of Helder Abbott.  Please feel free to call with any questions.    Jun Lund MD  04/01/25  11:18 EDT    [facility]  [phone]    Please note that portions of this note were completed with a voice recognition  program.

## 2025-04-01 NOTE — PLAN OF CARE
AAOXself and place. RA. A-fib rate controlled. 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 Caregiver states that he can be very impulsive. Charge notified. Since this event patient has been comfortably resting in room watching cartoons. Sodium stable.     Goal Outcome Evaluation:  Plan of Care Reviewed With: patient, caregiver        Progress: improving       Problem: Adult Inpatient Plan of Care  Goal: Plan of Care Review  Outcome: Progressing  Flowsheets (Taken 4/1/2025 1745)  Progress: improving  Plan of Care Reviewed With:   patient   caregiver  Goal: Patient-Specific Goal (Individualized)  Outcome: Progressing  Goal: Absence of Hospital-Acquired Illness or Injury  Outcome: Progressing  Intervention: Identify and Manage Fall Risk  Recent Flowsheet Documentation  Taken 4/1/2025 1618 by Paola Darby, RN  Safety Promotion/Fall Prevention:   safety round/check completed   room organization consistent   nonskid shoes/slippers when out of bed   lighting adjusted   clutter free environment maintained   assistive device/personal items within reach   activity supervised  Taken 4/1/2025 1440 by Paola Darby, RN  Safety Promotion/Fall Prevention:   safety round/check completed   room organization consistent   nonskid shoes/slippers when out of bed   lighting adjusted   clutter free environment maintained   assistive device/personal items within reach   activity supervised  Taken 4/1/2025 1205 by Paola Darby, RN  Safety Promotion/Fall Prevention:   room organization consistent   safety round/check completed   nonskid shoes/slippers when out of bed   lighting adjusted   clutter free environment maintained   assistive device/personal items within reach   activity supervised  Taken 4/1/2025 1010 by Paola Darby, RN  Safety Promotion/Fall Prevention:   safety round/check completed   room organization consistent   nonskid shoes/slippers when out of bed   lighting adjusted   assistive  device/personal items within reach   clutter free environment maintained   activity supervised   fall prevention program maintained  Taken 4/1/2025 0840 by Paola Darby RN  Safety Promotion/Fall Prevention:   safety round/check completed   room organization consistent   nonskid shoes/slippers when out of bed   lighting adjusted   activity supervised   assistive device/personal items within reach   clutter free environment maintained  Intervention: Prevent Skin Injury  Recent Flowsheet Documentation  Taken 4/1/2025 1618 by Paola Darby RN  Body Position:   sitting up in bed   position changed independently  Taken 4/1/2025 1440 by Paola Darby RN  Body Position: legs elevated  Taken 4/1/2025 1205 by Paola Darby RN  Body Position:   tilted   left  Taken 4/1/2025 1010 by Paola Darby RN  Body Position: legs elevated  Taken 4/1/2025 0840 by Paola Darby RN  Body Position:   legs elevated   sitting up in bed  Skin Protection:   incontinence pads utilized   protective footwear used   silicone foam dressing in place   skin sealant/moisture barrier applied   transparent dressing maintained  Intervention: Prevent and Manage VTE (Venous Thromboembolism) Risk  Recent Flowsheet Documentation  Taken 4/1/2025 0840 by Paola Darby RN  VTE Prevention/Management:   bilateral   SCDs (sequential compression devices) on  Intervention: Prevent Infection  Recent Flowsheet Documentation  Taken 4/1/2025 1618 by Paola Darby RN  Infection Prevention:   single patient room provided   rest/sleep promoted   personal protective equipment utilized   hand hygiene promoted  Taken 4/1/2025 1440 by Paola Darby RN  Infection Prevention:   single patient room provided   rest/sleep promoted   personal protective equipment utilized   hand hygiene promoted  Taken 4/1/2025 1205 by Paola Darby RN  Infection Prevention:   single patient room provided   personal protective equipment utilized   rest/sleep  promoted   hand hygiene promoted  Taken 4/1/2025 1010 by Paola Darby RN  Infection Prevention:   single patient room provided   rest/sleep promoted   hand hygiene promoted   personal protective equipment utilized  Taken 4/1/2025 0840 by Paola Darby RN  Infection Prevention:   single patient room provided   personal protective equipment utilized   rest/sleep promoted   hand hygiene promoted  Goal: Optimal Comfort and Wellbeing  Outcome: Progressing  Intervention: Provide Person-Centered Care  Recent Flowsheet Documentation  Taken 4/1/2025 1440 by Paola Darby RN  Trust Relationship/Rapport:   care explained   choices provided   emotional support provided   empathic listening provided   questions answered   thoughts/feelings acknowledged   reassurance provided   questions encouraged  Taken 4/1/2025 0840 by Paola Darby RN  Trust Relationship/Rapport:   care explained   choices provided   empathic listening provided   emotional support provided   questions answered   questions encouraged   reassurance provided   thoughts/feelings acknowledged  Goal: Readiness for Transition of Care  Outcome: Progressing     Problem: Skin Injury Risk Increased  Goal: Skin Health and Integrity  Outcome: Progressing  Intervention: Optimize Skin Protection  Recent Flowsheet Documentation  Taken 4/1/2025 1618 by Paola Darby RN  Head of Bed (HOB) Positioning: HOB elevated  Taken 4/1/2025 1440 by Paola Darby RN  Activity Management: up in chair  Taken 4/1/2025 1205 by Paola Darby RN  Head of Bed (HOB) Positioning: HOB elevated  Taken 4/1/2025 1010 by Paola Darby RN  Activity Management:   up in chair   ambulated in room  Head of Bed (HOB) Positioning: HOB flat  Taken 4/1/2025 0840 by Paola Darby RN  Pressure Reduction Techniques:   frequent weight shift encouraged   heels elevated off bed  Head of Bed (HOB) Positioning: HOB elevated  Pressure Reduction Devices:   pressure-redistributing  mattress utilized   alternating pressure pump (NICK)  Skin Protection:   incontinence pads utilized   protective footwear used   silicone foam dressing in place   skin sealant/moisture barrier applied   transparent dressing maintained     Problem: Fall Injury Risk  Goal: Absence of Fall and Fall-Related Injury  Outcome: Progressing  Intervention: Identify and Manage Contributors  Recent Flowsheet Documentation  Taken 4/1/2025 1618 by Paola Darby RN  Medication Review/Management: medications reviewed  Taken 4/1/2025 1440 by Paola Darby RN  Medication Review/Management: medications reviewed  Taken 4/1/2025 1205 by Paola Darby RN  Medication Review/Management: medications reviewed  Taken 4/1/2025 1010 by Paola Darby RN  Medication Review/Management: medications reviewed  Taken 4/1/2025 0840 by Paola Darby RN  Medication Review/Management: medications reviewed  Intervention: Promote Injury-Free Environment  Recent Flowsheet Documentation  Taken 4/1/2025 1618 by Paola Darby RN  Safety Promotion/Fall Prevention:   safety round/check completed   room organization consistent   nonskid shoes/slippers when out of bed   lighting adjusted   clutter free environment maintained   assistive device/personal items within reach   activity supervised  Taken 4/1/2025 1440 by Paola Darby RN  Safety Promotion/Fall Prevention:   safety round/check completed   room organization consistent   nonskid shoes/slippers when out of bed   lighting adjusted   clutter free environment maintained   assistive device/personal items within reach   activity supervised  Taken 4/1/2025 1205 by Paola Darby RN  Safety Promotion/Fall Prevention:   room organization consistent   safety round/check completed   nonskid shoes/slippers when out of bed   lighting adjusted   clutter free environment maintained   assistive device/personal items within reach   activity supervised  Taken 4/1/2025 1010 by Paola Darby  RN  Safety Promotion/Fall Prevention:   safety round/check completed   room organization consistent   nonskid shoes/slippers when out of bed   lighting adjusted   assistive device/personal items within reach   clutter free environment maintained   activity supervised   fall prevention program maintained  Taken 4/1/2025 0840 by Paola Darby, RN  Safety Promotion/Fall Prevention:   safety round/check completed   room organization consistent   nonskid shoes/slippers when out of bed   lighting adjusted   activity supervised   assistive device/personal items within reach   clutter free environment maintained

## 2025-04-02 LAB
ALBUMIN SERPL-MCNC: 3 G/DL (ref 3.5–5.2)
ANION GAP SERPL CALCULATED.3IONS-SCNC: 10.2 MMOL/L (ref 5–15)
BUN SERPL-MCNC: 11 MG/DL (ref 6–20)
BUN/CREAT SERPL: 15.1 (ref 7–25)
CALCIUM SPEC-SCNC: 8.7 MG/DL (ref 8.6–10.5)
CHLORIDE SERPL-SCNC: 99 MMOL/L (ref 98–107)
CO2 SERPL-SCNC: 24.8 MMOL/L (ref 22–29)
CREAT SERPL-MCNC: 0.73 MG/DL (ref 0.76–1.27)
EGFRCR SERPLBLD CKD-EPI 2021: 110.8 ML/MIN/1.73
GLUCOSE SERPL-MCNC: 76 MG/DL (ref 65–99)
MAGNESIUM SERPL-MCNC: 1.7 MG/DL (ref 1.6–2.6)
PHOSPHATE SERPL-MCNC: 3.4 MG/DL (ref 2.5–4.5)
POTASSIUM SERPL-SCNC: 4.2 MMOL/L (ref 3.5–5.2)
SODIUM SERPL-SCNC: 134 MMOL/L (ref 136–145)
URATE SERPL-MCNC: 8.6 MG/DL (ref 3.4–7)

## 2025-04-02 PROCEDURE — 80069 RENAL FUNCTION PANEL: CPT | Performed by: INTERNAL MEDICINE

## 2025-04-02 PROCEDURE — 83735 ASSAY OF MAGNESIUM: CPT | Performed by: INTERNAL MEDICINE

## 2025-04-02 PROCEDURE — 84550 ASSAY OF BLOOD/URIC ACID: CPT | Performed by: INTERNAL MEDICINE

## 2025-04-02 PROCEDURE — 97110 THERAPEUTIC EXERCISES: CPT

## 2025-04-02 RX ADMIN — DIGOXIN 125 MCG: 0.12 TABLET ORAL at 11:42

## 2025-04-02 RX ADMIN — BENZTROPINE MESYLATE 1 MG: 1 TABLET ORAL at 20:15

## 2025-04-02 RX ADMIN — Medication 10 MG: at 20:15

## 2025-04-02 RX ADMIN — HALOPERIDOL 7.5 MG: 5 TABLET ORAL at 08:28

## 2025-04-02 RX ADMIN — DIVALPROEX SODIUM 2000 MG: 500 TABLET, DELAYED RELEASE ORAL at 20:14

## 2025-04-02 RX ADMIN — LEVOTHYROXINE SODIUM 75 MCG: 0.07 TABLET ORAL at 06:03

## 2025-04-02 RX ADMIN — Medication 10 ML: at 08:30

## 2025-04-02 RX ADMIN — METOPROLOL SUCCINATE 25 MG: 25 TABLET, EXTENDED RELEASE ORAL at 08:28

## 2025-04-02 RX ADMIN — ATORVASTATIN CALCIUM 40 MG: 20 TABLET, FILM COATED ORAL at 08:28

## 2025-04-02 RX ADMIN — APIXABAN 5 MG: 5 TABLET, FILM COATED ORAL at 20:15

## 2025-04-02 RX ADMIN — HYDROCODONE BITARTRATE AND ACETAMINOPHEN 1 TABLET: 7.5; 325 TABLET ORAL at 14:41

## 2025-04-02 RX ADMIN — BENZTROPINE MESYLATE 1 MG: 1 TABLET ORAL at 08:28

## 2025-04-02 RX ADMIN — Medication 10 ML: at 20:15

## 2025-04-02 RX ADMIN — APIXABAN 5 MG: 5 TABLET, FILM COATED ORAL at 08:28

## 2025-04-02 RX ADMIN — HALOPERIDOL 7.5 MG: 5 TABLET ORAL at 20:14

## 2025-04-02 RX ADMIN — OLANZAPINE 10 MG: 10 TABLET, FILM COATED ORAL at 20:15

## 2025-04-02 NOTE — PROGRESS NOTES
[facility] Progress Note      Patient Name: Helder Abbott  : 1975  MRN: [id number]  Primary Care Physician:  Provider, No Known  Date of admission: 3/28/2025    Subjective     Interval History:   Follow-up hyponatremia    More awake and alert, denies any chest pain or shortness of air, no orthopnea or PND.    Review of Systems:   As noted above    Objective     Vitals:   Temp:  [97 °F (36.1 °C)-97.6 °F (36.4 °C)] 97.5 °F (36.4 °C)  Heart Rate:  [68-81] 71  Resp:  [16-18] 16  BP: (113-154)/() 118/84    Intake/Output Summary (Last 24 hours) at 2025 1036  Last data filed at 2025 0900  Gross per 24 hour   Intake 460 ml   Output 600 ml   Net -140 ml       Physical Exam:    General Appearance: More awake but disoriented, chronically, no acute distress   Skin: warm and dry  HEENT: o oral mucosa is  Neck: No JVD  Lungs: Bilateral rhonchi, unlabored breathing  Heart: Irregularly irregular, no rub  Abdomen: soft, nontender, nondistended  : no palpable bladder  Extremities: no edema, cyanosis or clubbing    Scheduled Meds:     apixaban, 5 mg, Oral, Q12H  atorvastatin, 40 mg, Oral, Daily  benztropine, 1 mg, Oral, BID  digoxin, 125 mcg, Oral, Daily  divalproex, 2,000 mg, Oral, Nightly  haloperidol, 7.5 mg, Oral, BID  levothyroxine, 75 mcg, Oral, Q AM  melatonin, 10 mg, Oral, Nightly  metoprolol succinate XL, 25 mg, Oral, Daily  OLANZapine, 10 mg, Oral, Nightly  sodium chloride, 10 mL, Intravenous, Q12H      IV Meds:          Results Reviewed:   I have personally reviewed the results from the time of this admission to 2025 10:36 EDT     Results from last 7 days   Lab Units 25  0711 25  0630 25  0415 25  1143 25  0547 25  1300   SODIUM mmol/L 134* 134* 133*   < > 126* 131*   POTASSIUM mmol/L 4.2 4.4 4.5   < > 5.2 4.5   CHLORIDE mmol/L 99 102 106   < > 96* 95*   CO2 mmol/L 24.8 26.8 22.9   < > 22.0 24.7   BUN mg/dL 11 12 11   < > 18 11    CREATININE mg/dL 0.73* 0.69* 0.70*   < > 1.09 1.01   CALCIUM mg/dL 8.7 8.3* 8.4*   < > 8.6 9.6   BILIRUBIN mg/dL  --   --   --   --  0.4 0.6   ALK PHOS U/L  --   --   --   --  67 86   ALT (SGPT) U/L  --   --   --   --  15 17   AST (SGOT) U/L  --   --   --   --  21 27   GLUCOSE mg/dL 76 72 68   < > 74 96    < > = values in this interval not displayed.       Estimated Creatinine Clearance: 118.7 mL/min (A) (by C-G formula based on SCr of 0.73 mg/dL (L)).    Results from last 7 days   Lab Units 04/02/25  0711 04/01/25  0630 03/31/25  0415   MAGNESIUM mg/dL 1.7 1.8 1.9   PHOSPHORUS mg/dL 3.4 3.1 2.5       Results from last 7 days   Lab Units 04/02/25  0711 04/01/25  0630 03/31/25  0415 03/30/25  0721 03/29/25  1143   URIC ACID mg/dL 8.6* 8.6* 8.5* 8.3* 8.8*       Results from last 7 days   Lab Units 03/30/25  0721 03/29/25  0547 03/28/25  1300   WBC 10*3/mm3 6.18 6.79 7.45   HEMOGLOBIN g/dL 15.3 14.4 16.6   PLATELETS 10*3/mm3 130* 139* 153             Assessment / Plan     ASSESSMENT:  Hyponatremia improved sodium 134, continue fluid restriction, improved with IV fluid unfortunately the urine study cannot be interpreted because it was obtained after liter IV fluid were given and it was done after the sodium was improved.  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  Copied text in this note has been reviewed and is accurate as of 04/02/25.       Thank you for involving us in the care of Helder Abbott.  Please feel free to call with any questions.    Jun Lund MD  04/02/25  10:36 EDT    [facility]  [phone]    Please note that portions of this note were completed with a voice recognition program.

## 2025-04-02 NOTE — PLAN OF CARE
Problem: Adult Inpatient Plan of Care  Goal: Absence of Hospital-Acquired Illness or Injury  Intervention: Identify and Manage Fall Risk  Recent Flowsheet Documentation  Taken 4/2/2025 0555 by Kelsi Lee RN  Safety Promotion/Fall Prevention: safety round/check completed  Taken 4/2/2025 0349 by Kelsi Lee RN  Safety Promotion/Fall Prevention: safety round/check completed  Taken 4/2/2025 0150 by Kelsi Lee RN  Safety Promotion/Fall Prevention: safety round/check completed  Taken 4/2/2025 0040 by Kelsi Lee RN  Safety Promotion/Fall Prevention: safety round/check completed  Taken 4/1/2025 2349 by Kelsi Lee RN  Safety Promotion/Fall Prevention: safety round/check completed  Taken 4/1/2025 2202 by Kelsi Lee RN  Safety Promotion/Fall Prevention: room organization consistent  Taken 4/1/2025 2030 by Kelsi Lee RN  Safety Promotion/Fall Prevention: safety round/check completed  Taken 4/1/2025 1937 by Kelsi Lee RN  Safety Promotion/Fall Prevention: safety round/check completed   Goal Outcome Evaluation:

## 2025-04-02 NOTE — PLAN OF CARE
Goal Outcome Evaluation:  Plan of Care Reviewed With: patient      Outcome Evaluation: Patient pleasant and agreeable to PT- oriented to person only and pleasant. SBA for bed mobility, stood with CGA to a RW and ambulated 55' CGA with a RW. No new complaints and did not report pain during ambulation this date. Anticipate returning back to group home when medically cleared. Patient did voice interest in obtaining RW prior to discharge- CCP to arrange. PT will continue to follow.    Anticipated Discharge Disposition (PT): adult foster care/group home

## 2025-04-02 NOTE — CASE MANAGEMENT/SOCIAL WORK
Continued Stay Note  [facility]     Patient Name: Helder Abbott  MRN: [id number]  Today's Date: 4/2/2025    Admit Date: 3/28/2025        Discharge Plan       Row Name 04/02/25 1544       Plan    Plan Comments CCP called pt's legal guardian, Carla Velasco, to complete screen, however, no answer. CCP left VM asking for a return call to complete screen. Full screen needed, CCP following. CHERELLE Siegel/ELIZA Whipple RN

## 2025-04-02 NOTE — PROGRESS NOTES
"    DAILY PROGRESS NOTE  [facility]    Patient Identification:  Name: Helder Abbott  Age: 50 y.o.  Sex: male  :  1975  MRN: [id number]         Primary Care Physician: Provider, No Known    Subjective:  Interval History: Sitting up getting ready to eat breakfast.  He is conversational smiling and seems to be at baseline.  He feels his right hip pain is doing better overall.    Objective:    Scheduled Meds:apixaban, 5 mg, Oral, Q12H  atorvastatin, 40 mg, Oral, Daily  benztropine, 1 mg, Oral, BID  digoxin, 125 mcg, Oral, Daily  divalproex, 2,000 mg, Oral, Nightly  haloperidol, 7.5 mg, Oral, BID  levothyroxine, 75 mcg, Oral, Q AM  melatonin, 10 mg, Oral, Nightly  metoprolol succinate XL, 25 mg, Oral, Daily  OLANZapine, 10 mg, Oral, Nightly  sodium chloride, 10 mL, Intravenous, Q12H      Continuous Infusions:     Vital signs in last 24 hours:  Temp:  [97 °F (36.1 °C)-97.6 °F (36.4 °C)] 97 °F (36.1 °C)  Heart Rate:  [68-81] 68  Resp:  [16-18] 16  BP: (113-154)/() 113/68    Intake/Output:    Intake/Output Summary (Last 24 hours) at 2025 0704  Last data filed at 2025 2108  Gross per 24 hour   Intake 220 ml   Output 600 ml   Net -380 ml       Exam:  /68 (BP Location: Left arm, Patient Position: Lying)   Pulse 68   Temp 97 °F (36.1 °C) (Oral)   Resp 16   Ht 182.9 cm (72\")   Wt 69.3 kg (152 lb 12.5 oz)   SpO2 99%   BMI 20.72 kg/m²     General Appearance:    Alert, cooperative, no distress, AAOx3                         Throat:   Oral mucosa pink and moist                         Lungs:    Clear to auscultation bilaterally, respirations unlabored                          Heart:    Regular rate and rhythm, S1 and S2 normal                  Abdomen:     Soft, nontender, bowel sounds active, right hip changes stable/less tender to palpation                 extremities: Moving all                        Pulses:   Pulses palpable in lower extremities                            "   Data Review:  Labs in chart were reviewed.    Assessment:  Active Hospital Problems    Diagnosis  POA    **Closed intertrochanteric fracture, right, initial encounter [S72.141A]  Yes    Hyponatremia [E87.1]  Yes    Atrial fibrillation, chronic [I48.20]  Yes    Chronic anticoagulation [Z79.01]  Not Applicable    Bipolar disorder [F31.9]  Yes    Essential hypertension [I10]  Yes    History of CVA (cerebrovascular accident) [Z86.73]  Not Applicable    Hypothyroidism (acquired) [E03.9]  Yes      Resolved Hospital Problems   No resolved problems to display.       Plan:    Right hip pain with no fracture.  Evaluated by Ortho and no plans for intervention              -MRI with edematous change and bruising over the right hip but no evidence of fracture with small foci of hematoma on AC recently resumed with stable H&H upon 3/30/2025 at 15.3 with platelet count at 130 (chronic TCP)              -P.o. pain control           CAF-RC with digoxin/Toprol-AC     Bipolar disorder/intellectual disability from previous CVA              -Zyprexa Depakote Cogentin Haldol     HTN-stable.  AM hypotension seems consistent theme/asymptomatic     Hyponatremia-improved 131-133-134 with assistance of renal              -Cortisol and TSH normal.  Uric 8.5.  Osmolality normal              -No further plans to monitor BMP from my perspective     Hypothyroidism on levothyroxine           Disposition: PT evaluated and I discussed things with CCP  yesterday as she has to coordinate with legal guardian for medically ready at any time    Antoine Fernando MD  4/2/2025  07:04 EDT

## 2025-04-02 NOTE — PLAN OF CARE
Goal Outcome Evaluation:      Vss, alert to self, minimal c/o pain, prn meds given x1, ambulating assist x1, plans for d/c pending

## 2025-04-02 NOTE — THERAPY TREATMENT NOTE
Patient Name: Helder Abbott  : 1975    MRN: [id number]                              Today's Date: 2025       Admit Date: 3/28/2025    Visit Dx:     ICD-10-CM ICD-9-CM   1. Closed nondisplaced intertrochanteric fracture of right femur, initial encounter  S72.144A 820.21   2. Closed intertrochanteric fracture, right, initial encounter  S72.141A 820.21     Patient Active Problem List   Diagnosis    Essential hypertension    Hypothyroidism (acquired)    History of CVA (cerebrovascular accident)    GERD without esophagitis    HLD (hyperlipidemia)    Bipolar disorder    Partial bowel obstruction    Fecal impaction    Severe malnutrition    Hyperkalemia    Syncope    Atrial fibrillation, chronic    Dehydration    Chronic anticoagulation    Thrombocytopenia    Seizure-like activity    Hyponatremia    Closed intertrochanteric fracture, right, initial encounter     Past Medical History:   Diagnosis Date    Benign essential hypertension     Bipolar disorder     Dyslipidemia     GERD (gastroesophageal reflux disease)     Heart disease     History of stroke     Hypothyroidism      History reviewed. No pertinent surgical history.   General Information       Row Name 25 1511          Physical Therapy Time and Intention    Document Type therapy note (daily note)  -MS     Mode of Treatment physical therapy  -MS       Row Name 25 1511          General Information    Existing Precautions/Restrictions fall  -MS       Row Name 25 1511          Cognition    Orientation Status (Cognition) oriented x 3  -MS       Row Name 25 1511          Safety Issues/Impairments Affecting Functional Mobility    Safety Issues Affecting Function (Mobility) judgment;positioning of assistive device;sequencing abilities  -MS     Impairments Affecting Function (Mobility) balance;endurance/activity tolerance;postural/trunk control;pain;cognition  -MS     Comment, Safety Issues/Impairments (Mobility) Gait belt and non skid  socks donned.  -MS               User Key  (r) = Recorded By, (t) = Taken By, (c) = Cosigned By      Initials Name Provider Type    Krystal Carrillo, PT Physical Therapist                   Mobility       Row Name 04/02/25 1512          Bed Mobility    Supine-Sit [place] (Bed Mobility) standby assist;verbal cues  -MS     Assistive Device (Bed Mobility) bed rails;head of bed elevated  -MS     Comment, (Bed Mobility) SBA for sitting balance.  -MS       Row Name 04/02/25 1512          Transfers    Comment, (Transfers) Sequencing and hand placement cues.  -MS       Row Name 04/02/25 1512          Sit-Stand Transfer    Sit-Stand [place] (Transfers) contact guard;verbal cues  -MS     Assistive Device (Sit-Stand Transfers) walker, front-wheeled  -MS       Row Name 04/02/25 1512          Gait/Stairs (Locomotion)    [place] Level (Gait) contact guard;verbal cues  -MS     Assistive Device (Gait) walker, front-wheeled  -MS     Patient was able to Ambulate yes  -MS     Distance in Feet (Gait) 55  -MS     Deviations/Abnormal Patterns (Gait) naresh decreased;gait speed decreased  -MS     Bilateral Gait Deviations forward flexed posture  -MS     Comment, (Gait/Stairs) No overt LOB or veering noted. Minimally unsteady- preferred using RW. Directional cues at times.  -MS               User Key  (r) = Recorded By, (t) = Taken By, (c) = Cosigned By      Initials Name Provider Type    MS CastañedaKrystal, PT Physical Therapist                   Obj/Interventions    No documentation.                  Goals/Plan    No documentation.                  Clinical Impression       Row Name 04/02/25 1513          Pain    Pretreatment Pain Rating 0/10 - no pain  -MS     Posttreatment Pain Rating 0/10 - no pain  -MS       Row Name 04/02/25 1513          Plan of Care Review    Plan of Care Reviewed With patient  -MS     Outcome Evaluation Patient pleasant and agreeable to PT- oriented to person only and pleasant. SBA for bed  mobility, stood with CGA to a RW and ambulated 55' CGA with a RW. No new complaints and did not report pain during ambulation this date. Anticipate returning back to group home when medically cleared. Patient did voice interest in obtaining RW prior to discharge- CCP to arrange. PT will continue to follow.  -MS       Row Name 04/02/25 1513          Vital Signs    O2 Delivery Pre Treatment room air  -MS       Row Name 04/02/25 1513          Positioning and Restraints    Pre-Treatment Position in bed  -MS     Post Treatment Position chair  -MS     In Chair notified nsg;reclined;call light within reach;encouraged to call for assist;exit alarm on  -MS               User Key  (r) = Recorded By, (t) = Taken By, (c) = Cosigned By      Initials Name Provider Type    Krystal Carrillo PT Physical Therapist                   Outcome Measures       Row Name 04/02/25 1517 04/02/25 0828       How much help from another person do you currently need...    Turning from your back to your side while in flat bed without using bedrails? 4  -MS 4  -EE    Moving from lying on back to sitting on the side of a flat bed without bedrails? 4  -MS 4  -EE    Moving to and from a bed to a chair (including a wheelchair)? 3  -MS 3  -EE    Standing up from a chair using your arms (e.g., wheelchair, bedside chair)? 3  -MS 3  -EE    Climbing 3-5 steps with a railing? 3  -MS 2  -EE    To walk in hospital room? 3  -MS 3  -EE    AM-PAC 6 Clicks Score (PT) 20  -MS 19  -EE              User Key  (r) = Recorded By, (t) = Taken By, (c) = Cosigned By      Initials Name Provider Type    Krystal Carrillo PT Physical Therapist    Toyin Whitlock RN Registered Nurse                                 Physical Therapy Education       Title: PT OT SLP Therapies (Done)       Topic: Physical Therapy (Done)       Point: Mobility training (Done)       Learning Progress Summary            Patient Acceptance, E,D,TB, VU,DU,NR by  at 3/31/2025 9769     Acceptance, E, NR by  at 3/31/2025 1035                      Point: Home exercise program (Done)       Learning Progress Summary            Patient Acceptance, E,D,TB, VU,DU,NR by  at 3/31/2025 2303                      Point: Body mechanics (Done)       Learning Progress Summary            Patient Acceptance, E,D,TB, VU,DU,NR by  at 3/31/2025 2303    Acceptance, E, NR by  at 3/31/2025 1035                      Point: Precautions (Done)       Learning Progress Summary            Patient Acceptance, E,D,TB, VU,DU,NR by  at 3/31/2025 2303                                      User Key       Initials Effective Dates Name Provider Type Discipline     06/16/21 -  Sangeetha Santa, RN Registered Nurse Nurse     09/22/22 -  Heather Bustillo PT Physical Therapist PT                  PT Recommendation and Plan     Outcome Evaluation: Patient pleasant and agreeable to PT- oriented to person only and pleasant. SBA for bed mobility, stood with CGA to a RW and ambulated 55' CGA with a RW. No new complaints and did not report pain during ambulation this date. Anticipate returning back to group home when medically cleared. Patient did voice interest in obtaining RW prior to discharge- CCP to arrange. PT will continue to follow.     Time Calculation:         PT Charges       Row Name 04/02/25 1510             Time Calculation    Start Time 1450  -MS      Stop Time 1508  -MS      Time Calculation (min) 18 min  -MS      PT Received On 04/02/25  -MS      PT - Next Appointment 04/03/25  -MS                User Key  (r) = Recorded By, (t) = Taken By, (c) = Cosigned By      Initials Name Provider Type    MS Krystal Castañeda, PT Physical Therapist                  Therapy Charges for Today       Code Description Service Date Service Provider Modifiers Qty    16383426404 HC PT THER PROC EA 15 MIN 4/2/2025 Krystal Castañeda, PT GP 1            PT G-Codes  AM-PAC 6 Clicks Score (PT): 20  PT Discharge Summary  Anticipated  Discharge Disposition (PT): adult foster care/group home    Krystal Castañeda, PT  4/2/2025

## 2025-04-03 ENCOUNTER — READMISSION MANAGEMENT (OUTPATIENT)
Dept: CALL CENTER | Facility: HOSPITAL | Age: 50
End: 2025-04-03
Payer: MEDICARE

## 2025-04-03 VITALS
DIASTOLIC BLOOD PRESSURE: 90 MMHG | RESPIRATION RATE: 18 BRPM | WEIGHT: 152.78 LBS | BODY MASS INDEX: 20.69 KG/M2 | TEMPERATURE: 98.6 F | HEIGHT: 72 IN | OXYGEN SATURATION: 99 % | HEART RATE: 84 BPM | SYSTOLIC BLOOD PRESSURE: 133 MMHG

## 2025-04-03 PROBLEM — M25.551 RIGHT HIP PAIN: Status: ACTIVE | Noted: 2025-04-03

## 2025-04-03 LAB
ALBUMIN SERPL-MCNC: 3.6 G/DL (ref 3.5–5.2)
ANION GAP SERPL CALCULATED.3IONS-SCNC: 11.4 MMOL/L (ref 5–15)
BUN SERPL-MCNC: 14 MG/DL (ref 6–20)
BUN/CREAT SERPL: 18.4 (ref 7–25)
CALCIUM SPEC-SCNC: 8.8 MG/DL (ref 8.6–10.5)
CHLORIDE SERPL-SCNC: 99 MMOL/L (ref 98–107)
CO2 SERPL-SCNC: 23.6 MMOL/L (ref 22–29)
CREAT SERPL-MCNC: 0.76 MG/DL (ref 0.76–1.27)
EGFRCR SERPLBLD CKD-EPI 2021: 109.5 ML/MIN/1.73
GLUCOSE SERPL-MCNC: 67 MG/DL (ref 65–99)
MAGNESIUM SERPL-MCNC: 1.8 MG/DL (ref 1.6–2.6)
PHOSPHATE SERPL-MCNC: 3.3 MG/DL (ref 2.5–4.5)
POTASSIUM SERPL-SCNC: 4.4 MMOL/L (ref 3.5–5.2)
SODIUM SERPL-SCNC: 134 MMOL/L (ref 136–145)
URATE SERPL-MCNC: 8.2 MG/DL (ref 3.4–7)

## 2025-04-03 PROCEDURE — 80069 RENAL FUNCTION PANEL: CPT | Performed by: INTERNAL MEDICINE

## 2025-04-03 PROCEDURE — 83735 ASSAY OF MAGNESIUM: CPT | Performed by: INTERNAL MEDICINE

## 2025-04-03 PROCEDURE — 84550 ASSAY OF BLOOD/URIC ACID: CPT | Performed by: INTERNAL MEDICINE

## 2025-04-03 RX ORDER — HYDROCODONE BITARTRATE AND ACETAMINOPHEN 7.5; 325 MG/1; MG/1
1 TABLET ORAL EVERY 8 HOURS PRN
Qty: 10 TABLET | Refills: 0 | Status: SHIPPED | OUTPATIENT
Start: 2025-04-03 | End: 2025-04-10

## 2025-04-03 RX ORDER — HYDROCODONE BITARTRATE AND ACETAMINOPHEN 5; 325 MG/1; MG/1
1 TABLET ORAL EVERY 6 HOURS PRN
Refills: 0 | Status: DISCONTINUED | OUTPATIENT
Start: 2025-04-03 | End: 2025-04-03 | Stop reason: HOSPADM

## 2025-04-03 RX ADMIN — APIXABAN 5 MG: 5 TABLET, FILM COATED ORAL at 10:14

## 2025-04-03 RX ADMIN — ATORVASTATIN CALCIUM 40 MG: 20 TABLET, FILM COATED ORAL at 10:14

## 2025-04-03 RX ADMIN — HALOPERIDOL 7.5 MG: 5 TABLET ORAL at 10:14

## 2025-04-03 RX ADMIN — LEVOTHYROXINE SODIUM 75 MCG: 0.07 TABLET ORAL at 06:08

## 2025-04-03 RX ADMIN — DIGOXIN 125 MCG: 0.12 TABLET ORAL at 12:39

## 2025-04-03 RX ADMIN — METOPROLOL SUCCINATE 25 MG: 25 TABLET, EXTENDED RELEASE ORAL at 10:25

## 2025-04-03 RX ADMIN — BENZTROPINE MESYLATE 1 MG: 1 TABLET ORAL at 10:14

## 2025-04-03 NOTE — CASE MANAGEMENT/SOCIAL WORK
Continued Stay Note  [facility]     Patient Name: Helder Abbott  MRN: [id number]  Today's Date: 4/3/2025    Admit Date: 3/28/2025        Discharge Plan       Row Name 04/03/25 1137       Plan    Plan Comments [facility] 2nd call to pt's legal guardian, Carla Velasco, to complete screen, however, no answer. CCP left VM asking for a return call to complete screen. Full screen needed, CCP following. CHERELLE Siegel/ELIZA Whipple RN

## 2025-04-03 NOTE — DISCHARGE SUMMARY
Date of Discharge:  4/3/2025    PCP: Provider, No Known    Discharge Diagnosis:   Active Hospital Problems    Diagnosis  POA    **Right hip pain [M25.551]  Unknown    Hyponatremia [E87.1]  Yes    Atrial fibrillation, chronic [I48.20]  Yes    Chronic anticoagulation [Z79.01]  Not Applicable    Bipolar disorder [F31.9]  Yes    Essential hypertension [I10]  Yes    History of CVA (cerebrovascular accident) [Z86.73]  Not Applicable    Hypothyroidism (acquired) [E03.9]  Yes      Resolved Hospital Problems   No resolved problems to display.     Procedure(s):  HIP TROCHANTERIC NAILING LONG WITH INTRAMEDULLARY HIP SCREW     Consults       Date and Time Order Name Status Description    3/29/2025 11:29 AM Inpatient Nephrology Consult Completed     3/28/2025 12:48 PM [facility] (on-call MD unless specified) Details      3/28/2025 12:28 PM Ortho (on-call MD unless specified) Completed     3/11/2025  8:41 PM Inpatient Neurology Consult General Completed           Hospital Course  Patient is a 50 y.o. male who had fell and hurt his right hip. Additional imaging including xray, CT, and MRI didn't reveal fracture though there was significant swelling clearly causing him pain. Ortho Dr Serrano evaluated and no surgical interventions were endorsed and medical mgnt was employed. With time and supportive care he is improving daily and no ambulating 55' contact guard assistance with rolling walker [facility].  [Ain control adequate with Tylenol and minimal Norco for breakthrough pain requiring just one dose over the last 4 days  CAF is RC on digoxin with AC on Eliquis with stable hemoglobin at 15.3.  HTN is controlled with normal morning hypotension which is o/w asymptomatic. Nephrology assisted with Hyponatremia and its essentially resolved at 134 and fluid restriction to 1500cc/day is still endorsed per Renal.  Now fevers and tolerating po with excellent appetite.  [facility] is [facility] for [facility] with Home health for PT and CCP has  coordinated DC needs with legal guardian.         Temp:  [97.3 °F (36.3 °C)-98.6 °F (37 °C)] 98.6 °F (37 °C)  Heart Rate:  [58-84] 84  Resp:  [16-18] 18  BP: ()/(50-92) 133/90  Body mass index is 20.72 kg/m².    Physical Exam  HENT:      Head: Normocephalic.      Mouth/Throat:      Mouth: Mucous membranes are moist.      Pharynx: Oropharynx is clear.   Cardiovascular:      Rate and Rhythm: Normal rate. Rhythm irregular.   Pulmonary:      Effort: Pulmonary effort is normal. No respiratory distress.   Chest:      Chest wall: No tenderness.   Abdominal:      General: Bowel sounds are normal. There is no distension.      Palpations: Abdomen is soft.      Tenderness: There is no abdominal tenderness.   Musculoskeletal:      Comments: Right hip swelling improving without skin breakdown  - nvm intact distally    Neurological:      Mental Status: He is alert.      Motor: Weakness present.      Gait: Gait abnormal.   Psychiatric:         Mood and Affect: Mood normal.       Disposition: Home or Self Care       Discharge Medications        New Medications        Instructions Start Date   HYDROcodone-acetaminophen 7.5-325 MG per tablet  Commonly known as: NORCO   1 tablet, Oral, Every 8 Hours PRN             Continue These Medications        Instructions Start Date   Abilify Maintena 400 MG prefilled syringe IM prefilled syringe  Generic drug: ARIPiprazole ER   400 mg, Every 28 Days      Acetaminophen Extra Strength 500 MG tablet   500 mg, 4 Times Daily PRN      atorvastatin 40 MG tablet  Commonly known as: LIPITOR   40 mg, Daily      benztropine 1 MG tablet  Commonly known as: COGENTIN   1 mg, 2 Times Daily      digoxin 125 MCG tablet  Commonly known as: LANOXIN   125 mcg, Daily Digoxin      divalproex 500 MG DR tablet  Commonly known as: DEPAKOTE   2,000 mg, Nightly      Eliquis 5 MG tablet tablet  Generic drug: apixaban   5 mg, Every 12 Hours Scheduled      haloperidol 5 MG tablet  Commonly known as: HALDOL   7.5 mg, 2  Times Daily      levothyroxine 75 MCG tablet  Commonly known as: SYNTHROID, LEVOTHROID   75 mcg, Daily      melatonin 5 MG tablet tablet   10 mg, Nightly      metoprolol succinate XL 25 MG 24 hr tablet  Commonly known as: TOPROL-XL   25 mg, Daily      OLANZapine 10 MG tablet  Commonly known as: zyPREXA   10 mg, Nightly      polyethylene glycol 17 GM/SCOOP powder  Commonly known as: MIRALAX   Dissolve 17 g (1 capful) in liquid and drink by mouth Daily.                 Follow-up Information       Provider, No Known .    Contact information:  [facility] [address] [phone]                          No future appointments.     Antoine Fernando MD  [facility]  04/03/25 14:53 EDT    Discharge time spent greater than 30 minutes.

## 2025-04-03 NOTE — PROGRESS NOTES
"    DAILY PROGRESS NOTE  [facility]    Patient Identification:  Name: Helder Abbott  Age: 50 y.o.  Sex: male  :  1975  MRN: [id number]         Primary Care Physician: Provider, No Known    Subjective:  Interval History: Resting comfortably.    Objective: No family at bedside    Scheduled Meds:apixaban, 5 mg, Oral, Q12H  atorvastatin, 40 mg, Oral, Daily  benztropine, 1 mg, Oral, BID  digoxin, 125 mcg, Oral, Daily  divalproex, 2,000 mg, Oral, Nightly  haloperidol, 7.5 mg, Oral, BID  levothyroxine, 75 mcg, Oral, Q AM  melatonin, 10 mg, Oral, Nightly  metoprolol succinate XL, 25 mg, Oral, Daily  OLANZapine, 10 mg, Oral, Nightly  sodium chloride, 10 mL, Intravenous, Q12H      Continuous Infusions:     Vital signs in last 24 hours:  Temp:  [97.3 °F (36.3 °C)-97.9 °F (36.6 °C)] 97.3 °F (36.3 °C)  Heart Rate:  [58-80] 58  Resp:  [16-18] 18  BP: ()/(50-92) 90/50    Intake/Output:    Intake/Output Summary (Last 24 hours) at 4/3/2025 0901  Last data filed at 2025 2355  Gross per 24 hour   Intake 800 ml   Output 550 ml   Net 250 ml       Exam:  BP 90/50 (BP Location: Right arm, Patient Position: Lying)   Pulse 58   Temp 97.3 °F (36.3 °C) (Oral)   Resp 18   Ht 182.9 cm (72\")   Wt 69.3 kg (152 lb 12.5 oz)   SpO2 100%   BMI 20.72 kg/m²     General Appearance:  Easily alert symptoms to baseline smiling to self but otherwise resting                         Throat:   Oral mucosa pink and moist                           Neck:   No JVD                         Lungs:    Clear to auscultation bilaterally, respirations unlabored                          Heart:  Irregular rate and rhythm, S1 and S2 normal                  Abdomen:     Soft, nontender, bowel sounds active                 Extremities: Right hip swelling stable                        Pulses:   Pulses palpable in lower extremities                              Data Review:  Labs in chart were reviewed.    Assessment:  Active Hospital " Problems    Diagnosis  POA    **Right hip pain [M25.551]  Unknown    Hyponatremia [E87.1]  Yes    Atrial fibrillation, chronic [I48.20]  Yes    Chronic anticoagulation [Z79.01]  Not Applicable    Bipolar disorder [F31.9]  Yes    Essential hypertension [I10]  Yes    History of CVA (cerebrovascular accident) [Z86.73]  Not Applicable    Hypothyroidism (acquired) [E03.9]  Yes      Resolved Hospital Problems   No resolved problems to display.       Plan:    Right hip pain with no fracture with secondary edema   -Continue as needed Norco   -PT notes 55 feet without mention of pain with standing and mobility    CAF-RC with digoxin and Toprol-AC    Bipolar disorder with baseline MR made worse by past history of CVA    HTN stable.  A.m. hypotension is a consistent theme/asymptomatic    Hyponatremia prerenal ultimately resolved    Hypothyroidism on levothyroxine        Disposition: Patient was ready yesterday.  I discussed case with CCP is no need for further inpatient services but apparently cannot get in touch with the legal guardian which is impeding disposition plans    Antoine Fernando MD  4/3/2025  09:01 EDT

## 2025-04-03 NOTE — OUTREACH NOTE
Prep Survey      Flowsheet Row Responses   [facility] facility patient discharged from? [facility]   Is LACE score < 7 ? No   Eligibility Readm Mgmt   Discharge diagnosis Right hip pain- HIP TROCHANTERIC NAILING LONG WITH INTRAMEDULLARY HIP SCREW   Does the patient have one of the following disease processes/diagnoses(primary or secondary)? General Surgery   Does the patient have Home health ordered? Yes   [facility]   Prep survey completed? Yes            Elsa BASSETT - Registered Nurse

## 2025-04-03 NOTE — DISCHARGE PLACEMENT REQUEST
"Helder Abbott (50 y.o. Male)       Date of Birth   1975    Social Security Number       Address   [address] [place] [facility] [address]    Home Phone       MRN   [id number]       Druze   Mandaen    Marital Status   Single                            Admission Date   3/28/2025    Admission Type   Emergency    Admitting Provider   Jose Alvarenga MD    Attending Provider   Antoine Fernando MD    Department, Room/Bed   [address], P894/1       Discharge Date       Discharge Disposition       Discharge Destination                                 Attending Provider: Antoine Fernando MD    Allergies: Clonazepam, Fluoxetine, Grass, Grass Pollen(k-o-r-t-swt [place]), Hydroxyzine, Methylphenidate, Quetiapine, Risperidone, Topamax [Topiramate]    Isolation: None   Infection: None   Code Status: CPR    Ht: 182.9 cm (72\")   Wt: 69.3 kg (152 lb 12.5 oz)    Admission Cmt: None   Principal Problem: Right hip pain [M25.551]                   Active Insurance as of 3/28/2025       Primary Coverage       Payor Plan Insurance Group Employer/Plan Group    MEDICARE MEDICARE A & B        Payor Plan Address Payor Plan Phone Number Payor Plan Fax Number Effective Dates    [address] [phone]  2/1/1995 - None Entered    [facility] [id number]         Subscriber Name Subscriber Birth Date Member ID       HELDER ABBOTT 1975 [id number]               Secondary Coverage       Payor Plan Insurance Group Employer/Plan Group    [place]        Payor Plan Address Payor Plan Phone Number Payor Plan Fax Number Effective Dates    [address] [phone]  9/14/2022 - None Entered    [place] [address]         Subscriber Name Subscriber Birth Date Member ID       HELDER ABBOTT 1975 [id number]                     Emergency Contacts        (Rel.) Home Phone Work Phone Mobile Phone    Carla Velasco (Legal Guardian) -- [phone]    " [facility],AfterHours -- [phone] --    Karan Kim (Care Giver) -- -- [phone]

## 2025-04-03 NOTE — CASE MANAGEMENT/SOCIAL WORK
Discharge Planning Assessment  [facility]     Patient Name: Hedler Abbott  MRN: [id number]  Today's Date: 4/3/2025    Admit Date: 3/28/2025    Plan: Return to Group Home, [facility] referral pending, DME: [facility] referral pending, transport via caregiver   Discharge Needs Assessment       Row Name 04/03/25 1330       Living Environment    People in Home other (see comments)  Group Home    Current Living Arrangements group home    Potentially Unsafe Housing Conditions none    In the past 12 months has the electric, gas, oil, or water company threatened to shut off services in your home? No    Primary Care Provided by self    Provides Primary Care For no one, unable/limited ability to care for self    Family Caregiver if Needed other (see comments)  caregiver    Able to Return to Prior Arrangements yes       Resource/Environmental Concerns    Resource/Environmental Concerns none    Transportation Concerns none       Transportation Needs    In the past 12 months, has lack of transportation kept you from medical appointments or from getting medications? no    In the past 12 months, has lack of transportation kept you from meetings, work, or from getting things needed for daily living? No       Food Insecurity    Within the past 12 months, you worried that your food would run out before you got the money to buy more. Never true    Within the past 12 months, the food you bought just didn't last and you didn't have money to get more. Never true       Transition Planning    Patient/Family Anticipates Transition to home with help/services    Patient/Family Anticipated Services at Transition home health care    Transportation Anticipated family or friend will provide       Discharge Needs Assessment    Readmission Within the Last 30 Days no previous admission in last 30 days    Equipment Currently Used at Home none    Concerns to be Addressed discharge planning    Anticipated Changes Related to Illness none    Equipment  Needed After Discharge walker, rolling    Provided Post Acute Provider List? Yes    Post Acute Provider List Home Health    Provided Post Acute Provider Quality & Resource List? Yes    Post Acute Provider Quality and Resource List Home Health    Delivered To Support Person    Method of Delivery In person    Patient's Choice of Community Agency(s) No preference                   Discharge Plan       Row Name 04/03/25 6595       Plan    Plan Return to Group Home, [facility] referral pending, DME: [facility] referral pending, transport via caregiver    Patient/Family in Agreement with Plan yes    Plan Comments [facility] received call from Karan Julio/pt's caregiver who reports pt lives in his Group Home with 24/7 caregiver. Karan reports he is contracted thru [employer] & pt lives with him & total of 3 clients. Pt has lived with Karan for 1 year & pt has not had any HH or SNF previously. Karan reports pt is mostly independent with ADLs, however, Karan assists with bathing. Prior to admit, pt was continent & able to transfer himself. Karan reports pt can return as long as he can assist Karan with getting himself in/out of bathtub. [facility] reviewed PT notes with Karan who verbalized understanding & agreement. Karan asking if pt could get HH referral. Karan reports he was told Carla is on vacation from 3/31 - 4/7. [facility] updated Karan that CCP will confirm DC plan with guardian & get consent for walker & HH. [facility] will update Karan at [phone]. [facility] called guardianship office for 3rd time & was  told that pt's legal guardian was been re-assigned yesterday. CCP transferred to Alyx Wheeler. [facility] reviewed pt's chart & recent PT notes with Alyx who agrees & gives consent for a walker, HH, & for pt to return to Group Home. Alyx confirms pt can return to Group Home with Karan, HH, & reports Karan might be able to transport him. Alyx reports she is agreeable to any transport  service. Alyx cell phone is [phone]. CCP updated pt's emergency contacts to reflect Alyx as legal guardian. DCP report created. Epic referrals placed. CCP notified [facility] & [facility] regarding referrals. CCP called Karan to update regarding the above & provided him with Alyx Wheeler's contact information. Karan confirms pt can return today & he can provide transport, prefers if staff can bring pt outside to him as he will have his 2 other clients with him. DC plan return to Group Home, [facility] HH referral pending, DME: [facility] referral pending, transport via caregiver. CHERELLE Siegel/ELIZA      Row Name 04/03/25 6607       Plan    Plan Comments CCP 2nd call to pt's legal guardian, Carla Velasco, to complete screen, however, no answer. CCP left VM asking for a return call to complete screen. Full screen needed, CCP following. CHERELLE Siegel/ELIZA                  Continued Care and Services - Admitted Since 3/28/2025       Durable Medical Equipment       Service Provider Request Status Services Address Phone Fax Patient Preferred    [facility] Accepted -- [address], [place] [address] [phone] --              Home Medical Care       Service Provider Request Status Services Address Phone Fax Patient Preferred    [facility] [place] [facility] Pending - Request Sent -- [address] [facility] [address] [phone] --                  Expected Discharge Date and Time       Expected Discharge Date Expected Discharge Time    Apr 3, 2025            Demographic Summary       Row Name 04/03/25 1330       General Information    Admission Type inpatient    Arrived From emergency department;home    Referral Source admission list    Reason for Consult discharge planning    Preferred Language English       Contact Information    Permission Granted to Share Info With guardian;other (see comments)  caregiver                   Functional  Status       Row Name 04/03/25 1330       Functional Status    Usual Activity Tolerance good    Current Activity Tolerance good       Assessment of Health Literacy    How often do you have someone help you read hospital materials? Occasionally    How often do you have problems learning about your medical condition because of difficulty understanding written information? Occasionally    How often do you have a problem understanding what is told to you about your medical condition? Occasionally    How confident are you filling out medical forms by yourself? Quite a bit    Health Literacy Good       Functional Status, IADL    Medications assistive person    Meal Preparation assistive person    Housekeeping assistive person    Laundry assistive person    Shopping assistive person    If for any reason you need help with day-to-day activities such as bathing, preparing meals, shopping, managing finances, etc., do you get the help you need? I get all the help I need    IADL Comments Pt resides in group home with 24/7 caregiver       Mental Status    General Appearance WDL WDL       Employment/    Employment Status retired           Elizabeth Whipple RN

## 2025-04-03 NOTE — PROGRESS NOTES
[facility] Progress Note      Patient Name: Helder Abbott  : 1975  MRN: [id number]  Primary Care Physician:  Provider, No Known  Date of admission: 3/28/2025    Subjective     Interval History:   Follow-up hyponatremia    Awake sitting up in bed today  No complaints  Denies chest pain, shortness of breath, following fluid restriction    Review of Systems:   As noted above    Objective     Vitals:   Temp:  [97.3 °F (36.3 °C)-97.9 °F (36.6 °C)] 97.3 °F (36.3 °C)  Heart Rate:  [58-80] 58  Resp:  [16-18] 18  BP: ()/(50-92) 110/70    Intake/Output Summary (Last 24 hours) at 4/3/2025 1214  Last data filed at 4/3/2025 1022  Gross per 24 hour   Intake 920 ml   Output 550 ml   Net 370 ml       Physical Exam:    General Appearance: More awake but disoriented, chronically, no acute distress   Skin: warm and dry  HEENT: o oral mucosa is  Neck: No JVD  Lungs: CTA, unlabored breathing  Heart: Irregularly irregular, no rub  Abdomen: soft, nontender, nondistended  : no palpable bladder  Extremities: no edema, cyanosis or clubbing    Scheduled Meds:     apixaban, 5 mg, Oral, Q12H  atorvastatin, 40 mg, Oral, Daily  benztropine, 1 mg, Oral, BID  digoxin, 125 mcg, Oral, Daily  divalproex, 2,000 mg, Oral, Nightly  haloperidol, 7.5 mg, Oral, BID  levothyroxine, 75 mcg, Oral, Q AM  melatonin, 10 mg, Oral, Nightly  metoprolol succinate XL, 25 mg, Oral, Daily  OLANZapine, 10 mg, Oral, Nightly  sodium chloride, 10 mL, Intravenous, Q12H      IV Meds:          Results Reviewed:   I have personally reviewed the results from the time of this admission to 4/3/2025 12:14 EDT     Results from last 7 days   Lab Units 25  0741 25  0711 25  0630 25  1143 25  0547 25  1300   SODIUM mmol/L 134* 134* 134*   < > 126* 131*   POTASSIUM mmol/L 4.4 4.2 4.4   < > 5.2 4.5   CHLORIDE mmol/L 99 99 102   < > 96* 95*   CO2 mmol/L 23.6 24.8 26.8   < > 22.0 24.7   BUN mg/dL 14 11 12    < > 18 11   CREATININE mg/dL 0.76 0.73* 0.69*   < > 1.09 1.01   CALCIUM mg/dL 8.8 8.7 8.3*   < > 8.6 9.6   BILIRUBIN mg/dL  --   --   --   --  0.4 0.6   ALK PHOS U/L  --   --   --   --  67 86   ALT (SGPT) U/L  --   --   --   --  15 17   AST (SGOT) U/L  --   --   --   --  21 27   GLUCOSE mg/dL 67 76 72   < > 74 96    < > = values in this interval not displayed.       Estimated Creatinine Clearance: 114 mL/min (by C-G formula based on SCr of 0.76 mg/dL).    Results from last 7 days   Lab Units 04/03/25  0741 04/02/25  0711 04/01/25  0630   MAGNESIUM mg/dL 1.8 1.7 1.8   PHOSPHORUS mg/dL 3.3 3.4 3.1       Results from last 7 days   Lab Units 04/03/25  0741 04/02/25  0711 04/01/25  0630 03/31/25  0415 03/30/25  0721 03/29/25  1143   URIC ACID mg/dL 8.2* 8.6* 8.6* 8.5* 8.3* 8.8*       Results from last 7 days   Lab Units 03/30/25  0721 03/29/25  0547 03/28/25  1300   WBC 10*3/mm3 6.18 6.79 7.45   HEMOGLOBIN g/dL 15.3 14.4 16.6   PLATELETS 10*3/mm3 130* 139* 153             Assessment / Plan     ASSESSMENT:  Hyponatremia stable today with sodium of 134, continue fluid restriction, improved with IV fluid unfortunately the urine study unequivocal due to obtaining after IV fluids.  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  Copied text in this note has been reviewed and is accurate as of 04/03/25.       Thank you for involving us in the care of Helder Abbott.  Please feel free to call with any questions.    Jun Lund MD  04/03/25  12:14 EDT    [facility]  [phone]    Please note that portions of this note were completed with a voice recognition program.

## 2025-04-04 NOTE — CASE MANAGEMENT/SOCIAL WORK
Case Management Discharge Note    Final Note: [facility] DME: [facility] referral pending, transport via caregiver. Christal, RN/CCP    Provided Post Acute Provider List?: Yes  Post Acute Provider List: Home Health  Provided Post Acute Provider Quality & Resource List?: Yes  Post Acute Provider Quality and Resource List: Home Health  Delivered To: Support Person  Method of Delivery: In person    Selected Continued Care - Discharged on 4/3/2025 Admission date: 3/28/2025 - Discharge disposition: Home or Self Care      Home Medical Care Coordination complete.      Service Provider Services Address Phone Fax Patient Preferred    [facility] [address]  [place] [address] [phone] --             Transportation Services  Private: Car    Final Discharge Disposition Code: 06 - home with home health care

## 2025-04-08 ENCOUNTER — READMISSION MANAGEMENT (OUTPATIENT)
Dept: CALL CENTER | Facility: HOSPITAL | Age: 50
End: 2025-04-08
Payer: MEDICARE

## 2025-04-08 NOTE — OUTREACH NOTE
General Surgery Week 1 Survey      Flowsheet Row Responses   [facility] patient discharged from? [place]   Does the patient have one of the following disease processes/diagnoses(primary or secondary)? General Surgery   Week 1 attempt successful? Yes   Call start time 1151   Call end time 1153   General alerts for this patient Patient lives in group home   Discharge diagnosis Right hip pain- HIP TROCHANTERIC NAILING LONG WITH INTRAMEDULLARY HIP SCREW   Is patient permission given to speak with other caregiver? Yes   List who call center can speak with Alyx Wheeler legal guardian, Karan Goldberg, caregiver   Week 1 call completed? Yes   Wrap up additional comments Reached legal guardian, but she did not have updated recovery information regarding the patient. She gave permission to reach out to patients caregivers for updates. Attempted to reach caregiver Karan, but no answer. No other numbers available.   Call end time 1153            EMILY BYERS - Registered Nurse

## 2025-04-17 ENCOUNTER — READMISSION MANAGEMENT (OUTPATIENT)
Dept: CALL CENTER | Facility: HOSPITAL | Age: 50
End: 2025-04-17
Payer: MEDICARE

## 2025-04-17 NOTE — OUTREACH NOTE
General Surgery Week 2 Survey      Flowsheet Row Responses   [facility] patient discharged from? [place]   Does the patient have one of the following disease processes/diagnoses(primary or secondary)? General Surgery   Week 2 attempt successful? Yes   Call start time 1546   Call end time 1548   General alerts for this patient Patient lives in group home   Discharge diagnosis Right hip pain- HIP TROCHANTERIC NAILING LONG WITH INTRAMEDULLARY HIP SCREW   List who call center can speak with Alyx Wheeler legal guardian, Karan Goldberg, caregiver   Person spoke with today (if not patient) and relationship caregiver   Meds reviewed with patient/caregiver? Yes   Is the patient having any side effects they believe may be caused by any medication additions or changes? No   Does the patient have all medications related to this admission filled (includes all antibiotics, pain medications, etc.) Yes   Is the patient taking all medications as directed (includes completed medication regime)? Yes   Does the patient have a follow up appointment scheduled with their surgeon? Yes   Has the patient kept scheduled appointments due by today? Yes   What is the Home health agency?  [facility]   Has home health visited the patient within 72 hours of discharge? Yes   Psychosocial issues? No   Did the patient receive a copy of their discharge instructions? Yes   Nursing interventions Reviewed instructions with patient   What is the patient's perception of their health status since discharge? Improving   Nursing interventions Nurse provided patient education   Is the patient /caregiver able to teach back basic post-op care? Continue use of incentive spirometry at least 1 week post discharge, Lifting as instructed by MD in discharge instructions   Is the patient/caregiver able to teach back steps to recovery at home? Set small, achievable goals for return to baseline health, Rest and rebuild  strength, gradually increase activity   If the patient is a current smoker, are they able to teach back resources for cessation? Not a smoker   Is the patient/caregiver able to teach back the hierarchy of who to call/visit for symptoms/problems? PCP, Specialist, Home health nurse, Urgent Care, ED, 911 Yes   Week 2 call completed? Yes   Graduated Yes   Is the patient interested in additional calls from an ambulatory ? No   Would this patient benefit from a Referral to Amb Social Work? No   Wrap up additional comments Caregiver stated he was doing well, Doing PT at group home   Call end time 7527            LYNN SORIANO - Registered Nurse

## 2025-06-04 ENCOUNTER — HOSPITAL ENCOUNTER (OUTPATIENT)
Facility: HOSPITAL | Age: 50
Setting detail: OBSERVATION
Discharge: HOME | End: 2025-06-06
Attending: EMERGENCY MEDICINE | Admitting: INTERNAL MEDICINE
Payer: MEDICARE

## 2025-06-04 ENCOUNTER — APPOINTMENT (OUTPATIENT)
Dept: GENERAL RADIOLOGY | Facility: HOSPITAL | Age: 50
End: 2025-06-04
Payer: MEDICARE

## 2025-06-04 DIAGNOSIS — I48.91 ATRIAL FIBRILLATION, UNSPECIFIED TYPE: ICD-10-CM

## 2025-06-04 DIAGNOSIS — R07.89 CHEST DISCOMFORT: Primary | ICD-10-CM

## 2025-06-04 PROBLEM — R07.9 CHEST PAIN: Status: ACTIVE | Noted: 2025-06-04

## 2025-06-04 LAB
ALBUMIN SERPL-MCNC: 3.5 G/DL (ref 3.5–5.2)
ALBUMIN/GLOB SERPL: 1.2 G/DL
ALP SERPL-CCNC: 94 U/L (ref 39–117)
ALT SERPL W P-5'-P-CCNC: 13 U/L (ref 1–41)
ANION GAP SERPL CALCULATED.3IONS-SCNC: 11.8 MMOL/L (ref 5–15)
ANISOCYTOSIS BLD QL: ABNORMAL
AST SERPL-CCNC: 31 U/L (ref 1–40)
BASOPHILS # BLD MANUAL: 0 10*3/MM3 (ref 0–0.2)
BASOPHILS NFR BLD MANUAL: 0 % (ref 0–1.5)
BILIRUB SERPL-MCNC: 0.5 MG/DL (ref 0–1.2)
BUN SERPL-MCNC: 15 MG/DL (ref 6–20)
BUN/CREAT SERPL: 12.9 (ref 7–25)
BURR CELLS BLD QL SMEAR: ABNORMAL
CALCIUM SPEC-SCNC: 9.2 MG/DL (ref 8.6–10.5)
CHLORIDE SERPL-SCNC: 99 MMOL/L (ref 98–107)
CO2 SERPL-SCNC: 23.2 MMOL/L (ref 22–29)
CREAT SERPL-MCNC: 1.16 MG/DL (ref 0.76–1.27)
DEPRECATED RDW RBC AUTO: 48 FL (ref 37–54)
DIGOXIN SERPL-MCNC: 1 NG/ML (ref 0.6–1.2)
EGFRCR SERPLBLD CKD-EPI 2021: 76.7 ML/MIN/1.73
EOSINOPHIL # BLD MANUAL: 0 10*3/MM3 (ref 0–0.4)
EOSINOPHIL NFR BLD MANUAL: 0 % (ref 0.3–6.2)
ERYTHROCYTE [DISTWIDTH] IN BLOOD BY AUTOMATED COUNT: 14.5 % (ref 12.3–15.4)
GEN 5 1HR TROPONIN T REFLEX: 29 NG/L
GLOBULIN UR ELPH-MCNC: 2.9 GM/DL
GLUCOSE SERPL-MCNC: 70 MG/DL (ref 65–99)
HCT VFR BLD AUTO: 42.5 % (ref 37.5–51)
HGB BLD-MCNC: 14.8 G/DL (ref 13–17.7)
HOLD SPECIMEN: NORMAL
HOLD SPECIMEN: NORMAL
LIPASE SERPL-CCNC: 19 U/L (ref 13–60)
LYMPHOCYTES # BLD MANUAL: 1.86 10*3/MM3 (ref 0.7–3.1)
LYMPHOCYTES NFR BLD MANUAL: 10.1 % (ref 5–12)
MACROCYTES BLD QL SMEAR: ABNORMAL
MCH RBC QN AUTO: 31.5 PG (ref 26.6–33)
MCHC RBC AUTO-ENTMCNC: 34.8 G/DL (ref 31.5–35.7)
MCV RBC AUTO: 90.4 FL (ref 79–97)
MONOCYTES # BLD: 0.64 10*3/MM3 (ref 0.1–0.9)
NEUTROPHILS # BLD AUTO: 3.85 10*3/MM3 (ref 1.7–7)
NEUTROPHILS NFR BLD MANUAL: 60.6 % (ref 42.7–76)
OVALOCYTES BLD QL SMEAR: ABNORMAL
PLAT MORPH BLD: NORMAL
PLATELET # BLD AUTO: 95 10*3/MM3 (ref 140–450)
PMV BLD AUTO: 9.8 FL (ref 6–12)
POIKILOCYTOSIS BLD QL SMEAR: ABNORMAL
POTASSIUM SERPL-SCNC: 4.2 MMOL/L (ref 3.5–5.2)
PROT SERPL-MCNC: 6.4 G/DL (ref 6–8.5)
QT INTERVAL: 348 MS
QTC INTERVAL: 396 MS
RBC # BLD AUTO: 4.7 10*6/MM3 (ref 4.14–5.8)
SODIUM SERPL-SCNC: 134 MMOL/L (ref 136–145)
TROPONIN T % DELTA: -29
TROPONIN T NUMERIC DELTA: -12 NG/L
TROPONIN T SERPL HS-MCNC: 41 NG/L
VARIANT LYMPHS NFR BLD MANUAL: 29.3 % (ref 19.6–45.3)
WBC MORPH BLD: NORMAL
WBC NRBC COR # BLD AUTO: 6.36 10*3/MM3 (ref 3.4–10.8)
WHOLE BLOOD HOLD COAG: NORMAL
WHOLE BLOOD HOLD SPECIMEN: NORMAL

## 2025-06-04 PROCEDURE — 84484 ASSAY OF TROPONIN QUANT: CPT | Performed by: EMERGENCY MEDICINE

## 2025-06-04 PROCEDURE — 85025 COMPLETE CBC W/AUTO DIFF WBC: CPT | Performed by: EMERGENCY MEDICINE

## 2025-06-04 PROCEDURE — 93005 ELECTROCARDIOGRAM TRACING: CPT | Performed by: EMERGENCY MEDICINE

## 2025-06-04 PROCEDURE — 71045 X-RAY EXAM CHEST 1 VIEW: CPT

## 2025-06-04 PROCEDURE — 99285 EMERGENCY DEPT VISIT HI MDM: CPT

## 2025-06-04 PROCEDURE — 25010000002 FAMOTIDINE 10 MG/ML SOLUTION: Performed by: EMERGENCY MEDICINE

## 2025-06-04 PROCEDURE — 80053 COMPREHEN METABOLIC PANEL: CPT | Performed by: EMERGENCY MEDICINE

## 2025-06-04 PROCEDURE — 80162 ASSAY OF DIGOXIN TOTAL: CPT | Performed by: EMERGENCY MEDICINE

## 2025-06-04 PROCEDURE — 93010 ELECTROCARDIOGRAM REPORT: CPT | Performed by: STUDENT IN AN ORGANIZED HEALTH CARE EDUCATION/TRAINING PROGRAM

## 2025-06-04 PROCEDURE — G0378 HOSPITAL OBSERVATION PER HR: HCPCS

## 2025-06-04 PROCEDURE — 96374 THER/PROPH/DIAG INJ IV PUSH: CPT

## 2025-06-04 PROCEDURE — 85007 BL SMEAR W/DIFF WBC COUNT: CPT | Performed by: EMERGENCY MEDICINE

## 2025-06-04 PROCEDURE — 36415 COLL VENOUS BLD VENIPUNCTURE: CPT

## 2025-06-04 PROCEDURE — 93005 ELECTROCARDIOGRAM TRACING: CPT

## 2025-06-04 PROCEDURE — 83690 ASSAY OF LIPASE: CPT | Performed by: EMERGENCY MEDICINE

## 2025-06-04 RX ORDER — LEVOTHYROXINE SODIUM 75 UG/1
75 TABLET ORAL
Status: DISCONTINUED | OUTPATIENT
Start: 2025-06-05 | End: 2025-06-06 | Stop reason: HOSPADM

## 2025-06-04 RX ORDER — DIGOXIN 125 MCG
125 TABLET ORAL
Status: DISCONTINUED | OUTPATIENT
Start: 2025-06-05 | End: 2025-06-06 | Stop reason: HOSPADM

## 2025-06-04 RX ORDER — BISACODYL 10 MG
10 SUPPOSITORY, RECTAL RECTAL DAILY PRN
Status: DISCONTINUED | OUTPATIENT
Start: 2025-06-04 | End: 2025-06-06 | Stop reason: HOSPADM

## 2025-06-04 RX ORDER — SODIUM CHLORIDE 0.9 % (FLUSH) 0.9 %
10 SYRINGE (ML) INJECTION AS NEEDED
Status: DISCONTINUED | OUTPATIENT
Start: 2025-06-04 | End: 2025-06-06 | Stop reason: HOSPADM

## 2025-06-04 RX ORDER — AMOXICILLIN 250 MG
2 CAPSULE ORAL 2 TIMES DAILY PRN
Status: DISCONTINUED | OUTPATIENT
Start: 2025-06-04 | End: 2025-06-06 | Stop reason: HOSPADM

## 2025-06-04 RX ORDER — ONDANSETRON 2 MG/ML
4 INJECTION INTRAMUSCULAR; INTRAVENOUS EVERY 6 HOURS PRN
Status: DISCONTINUED | OUTPATIENT
Start: 2025-06-04 | End: 2025-06-06 | Stop reason: HOSPADM

## 2025-06-04 RX ORDER — LIDOCAINE HYDROCHLORIDE 20 MG/ML
15 SOLUTION OROPHARYNGEAL ONCE
Status: COMPLETED | OUTPATIENT
Start: 2025-06-04 | End: 2025-06-04

## 2025-06-04 RX ORDER — ATORVASTATIN CALCIUM 20 MG/1
40 TABLET, FILM COATED ORAL DAILY
Status: DISCONTINUED | OUTPATIENT
Start: 2025-06-05 | End: 2025-06-06 | Stop reason: HOSPADM

## 2025-06-04 RX ORDER — ASPIRIN 325 MG
325 TABLET ORAL ONCE
Status: DISCONTINUED | OUTPATIENT
Start: 2025-06-04 | End: 2025-06-06 | Stop reason: HOSPADM

## 2025-06-04 RX ORDER — ACETAMINOPHEN 160 MG/5ML
650 SOLUTION ORAL EVERY 4 HOURS PRN
Status: DISCONTINUED | OUTPATIENT
Start: 2025-06-04 | End: 2025-06-06 | Stop reason: HOSPADM

## 2025-06-04 RX ORDER — BISACODYL 5 MG/1
5 TABLET, DELAYED RELEASE ORAL DAILY PRN
Status: DISCONTINUED | OUTPATIENT
Start: 2025-06-04 | End: 2025-06-06 | Stop reason: HOSPADM

## 2025-06-04 RX ORDER — POLYETHYLENE GLYCOL 3350 17 G/17G
17 POWDER, FOR SOLUTION ORAL DAILY PRN
Status: DISCONTINUED | OUTPATIENT
Start: 2025-06-04 | End: 2025-06-06 | Stop reason: HOSPADM

## 2025-06-04 RX ORDER — ALUMINA, MAGNESIA, AND SIMETHICONE 2400; 2400; 240 MG/30ML; MG/30ML; MG/30ML
30 SUSPENSION ORAL ONCE
Status: COMPLETED | OUTPATIENT
Start: 2025-06-04 | End: 2025-06-04

## 2025-06-04 RX ORDER — ACETAMINOPHEN 325 MG/1
650 TABLET ORAL EVERY 4 HOURS PRN
Status: DISCONTINUED | OUTPATIENT
Start: 2025-06-04 | End: 2025-06-06 | Stop reason: HOSPADM

## 2025-06-04 RX ORDER — METOPROLOL SUCCINATE 25 MG/1
25 TABLET, EXTENDED RELEASE ORAL DAILY
Status: DISCONTINUED | OUTPATIENT
Start: 2025-06-05 | End: 2025-06-06 | Stop reason: HOSPADM

## 2025-06-04 RX ORDER — BENZTROPINE MESYLATE 0.5 MG/1
1 TABLET ORAL 2 TIMES DAILY
Status: DISCONTINUED | OUTPATIENT
Start: 2025-06-04 | End: 2025-06-06 | Stop reason: HOSPADM

## 2025-06-04 RX ORDER — FAMOTIDINE 10 MG/ML
20 INJECTION, SOLUTION INTRAVENOUS ONCE
Status: COMPLETED | OUTPATIENT
Start: 2025-06-04 | End: 2025-06-04

## 2025-06-04 RX ORDER — ONDANSETRON 4 MG/1
4 TABLET, ORALLY DISINTEGRATING ORAL EVERY 6 HOURS PRN
Status: DISCONTINUED | OUTPATIENT
Start: 2025-06-04 | End: 2025-06-06 | Stop reason: HOSPADM

## 2025-06-04 RX ORDER — ACETAMINOPHEN 650 MG/1
650 SUPPOSITORY RECTAL EVERY 4 HOURS PRN
Status: DISCONTINUED | OUTPATIENT
Start: 2025-06-04 | End: 2025-06-06 | Stop reason: HOSPADM

## 2025-06-04 RX ORDER — DIVALPROEX SODIUM 500 MG/1
2000 TABLET, DELAYED RELEASE ORAL NIGHTLY
Status: DISCONTINUED | OUTPATIENT
Start: 2025-06-04 | End: 2025-06-06

## 2025-06-04 RX ADMIN — FAMOTIDINE 20 MG: 10 INJECTION INTRAVENOUS at 16:08

## 2025-06-04 RX ADMIN — DIVALPROEX SODIUM 2000 MG: 500 TABLET, DELAYED RELEASE ORAL at 21:49

## 2025-06-04 RX ADMIN — BENZTROPINE MESYLATE 1 MG: 0.5 TABLET ORAL at 21:48

## 2025-06-04 RX ADMIN — ALUMINUM HYDROXIDE, MAGNESIUM HYDROXIDE, AND DIMETHICONE 30 ML: 400; 400; 40 SUSPENSION ORAL at 16:09

## 2025-06-04 RX ADMIN — APIXABAN 5 MG: 5 TABLET, FILM COATED ORAL at 21:48

## 2025-06-04 RX ADMIN — LIDOCAINE HYDROCHLORIDE 15 ML: 20 SOLUTION ORAL at 16:09

## 2025-06-04 NOTE — CASE MANAGEMENT/SOCIAL WORK
Discharge Planning Assessment  [facility]     Patient Name: Helder Abbott  MRN: [id number]  Today's Date: 6/4/2025    Admit Date: 6/4/2025        Discharge Needs Assessment    No documentation.                  Discharge Plan       Row Name 06/04/25 1655       Plan    Plan Comments Registration called advising pt w/legal guardian; [facility] and paperwork on file- consent obtained; notified supervisor Sanjuana Davies per protocol                  Selected Continued Care - Prior Encounters Includes continued care and service providers with selected services from prior encounters from 3/6/2025 to 6/4/2025      Discharged on 4/3/2025 Admission date: 3/28/2025 - Discharge disposition: Home-Health Care [facility]      Home Medical Care       Service Provider Services Address Phone Fax Patient Preferred    [facility] [address]  [place] [id number] [phone] --                             Demographic Summary    No documentation.                  Functional Status    No documentation.                  Psychosocial    No documentation.                  Abuse/Neglect    No documentation.                  Legal    No documentation.                  Substance Abuse    No documentation.                  Patient Forms    No documentation.                     Radha Mauricio RN

## 2025-06-04 NOTE — ED PROVIDER NOTES
EMERGENCY DEPARTMENT ENCOUNTER  Room Number:  35/35  PCP: Provider, No Known  Independent Historians: Patient and caregiver      HPI:  Chief Complaint: Chest discomfort    A complete HPI/ROS/PMH/PSH/SH/FH are unobtainable due to: Poor historian    Chronic or social conditions impacting patient care (Social Determinants of Health): None      Context: Helder Abbott is a 50 y.o. male with a medical history of chronic A-fib, chronic anticoagulation, MR, schizophrenia, bipolar disorder, essential hypertension, prior CVA, and hypothyroidism who presents to the ED c/o acute chest discomfort that began around 1430 today.  He has a great deal of difficulty describing how it feels but reports its only in his substernal space.  He does not admit to any nausea or dyspnea at this time.      Review of prior external notes (non-ED) -and- Review of prior external test results outside of this encounter:  Hospital discharge summary 4/3/2025 reviewed: Patient admitted and evaluated for right hip pain.        PAST MEDICAL HISTORY  Active Ambulatory Problems     Diagnosis Date Noted    Essential hypertension 04/30/2024    Hypothyroidism (acquired) 04/30/2024    History of CVA (cerebrovascular accident) 04/30/2024    GERD without esophagitis 04/30/2024    HLD (hyperlipidemia) 04/30/2024    Bipolar disorder 04/30/2024    Partial bowel obstruction 04/30/2024    Fecal impaction 04/30/2024    Severe malnutrition 05/02/2024    Hyperkalemia 06/11/2024    Syncope 07/10/2024    Atrial fibrillation, chronic 07/10/2024    Dehydration 07/10/2024    Chronic anticoagulation 07/10/2024    Thrombocytopenia 07/10/2024    Seizure-like activity 03/11/2025    Hyponatremia 03/12/2025    Closed intertrochanteric fracture, right, initial encounter 03/28/2025    Right hip pain 04/03/2025     Resolved Ambulatory Problems     Diagnosis Date Noted    JOSIAH (acute kidney injury) 04/30/2024     Past Medical History:   Diagnosis Date    Benign essential  hypertension     Dyslipidemia     GERD (gastroesophageal reflux disease)     Heart disease     History of stroke     Hypothyroidism          PAST SURGICAL HISTORY  No past surgical history on file.      FAMILY HISTORY  Family History   Problem Relation Age of Onset    Diabetes Other     Hypertension Other     Heart disease Other          SOCIAL HISTORY  Social History     Socioeconomic History    Marital status: Single   Tobacco Use    Smoking status: Never    Smokeless tobacco: Never   Vaping Use    Vaping status: Never Used   Substance and Sexual Activity    Alcohol use: Never    Drug use: Never    Sexual activity: Defer         ALLERGIES  Clonazepam, Fluoxetine, Grass, Grass pollen(k-o-r-t-swt benigno), Hydroxyzine, Methylphenidate, Quetiapine, Risperidone, and Topamax [topiramate]      REVIEW OF SYSTEMS  Review of Systems  Included in HPI  All systems reviewed and negative except for those discussed in HPI.      PHYSICAL EXAM    I have reviewed the triage vital signs and nursing notes.    ED Triage Vitals   Temp Heart Rate Resp BP SpO2   06/04/25 1522 06/04/25 1454 06/04/25 1454 06/04/25 1522 06/04/25 1454   98.1 °F (36.7 °C) 80 18 102/74 97 %      Temp src Heart Rate Source Patient Position BP Location FiO2 (%)   06/04/25 1522 -- -- -- --   Oral           Physical Exam    Physical Exam   Constitutional: No distress.  Nontoxic  HENT:  Head: Normocephalic and atraumatic.   Oropharynx: Mucous membranes are moist.   Eyes: . No scleral icterus. No conjunctival pallor.  Neck: Normal range of motion. Neck supple.   Cardiovascular: Pink warm and well perfused throughout.  Irregularly irregular but rate controlled  Pulmonary/Chest: No respiratory distress.  No tachypnea or increased work of breathing appreciated.  Clear to auscultation  Abdominal: Soft. There is no tenderness. There is no rebound and no guarding.  Benign exam  Musculoskeletal: Moves all extremities equally.  No calf tenderness or swelling  Neurological:  Alert and oriented.  No acute focal deficit appreciated.  Skin: Skin is pink, warm, and dry.   Psychiatric: Flat affect with poor eye contact  Nursing note and vitals reviewed.             LAB RESULTS  Recent Results (from the past 24 hours)   ECG 12 Lead ED Triage Standing Order; Chest Pain    Collection Time: 06/04/25  2:59 PM   Result Value Ref Range    QT Interval 348 ms    QTC Interval 396 ms   Comprehensive Metabolic Panel    Collection Time: 06/04/25  3:56 PM    Specimen: Blood   Result Value Ref Range    Glucose 70 65 - 99 mg/dL    BUN 15.0 6.0 - 20.0 mg/dL    Creatinine 1.16 0.76 - 1.27 mg/dL    Sodium 134 (L) 136 - 145 mmol/L    Potassium 4.2 3.5 - 5.2 mmol/L    Chloride 99 98 - 107 mmol/L    CO2 23.2 22.0 - 29.0 mmol/L    Calcium 9.2 8.6 - 10.5 mg/dL    Total Protein 6.4 6.0 - 8.5 g/dL    Albumin 3.5 3.5 - 5.2 g/dL    ALT (SGPT) 13 1 - 41 U/L    AST (SGOT) 31 1 - 40 U/L    Alkaline Phosphatase 94 39 - 117 U/L    Total Bilirubin 0.5 0.0 - 1.2 mg/dL    Globulin 2.9 gm/dL    A/G Ratio 1.2 g/dL    BUN/Creatinine Ratio 12.9 7.0 - 25.0    Anion Gap 11.8 5.0 - 15.0 mmol/L    eGFR 76.7 >60.0 mL/min/1.73   High Sensitivity Troponin T    Collection Time: 06/04/25  3:56 PM    Specimen: Blood   Result Value Ref Range    HS Troponin T 41 (H) <22 ng/L   Green Top (Gel)    Collection Time: 06/04/25  3:56 PM   Result Value Ref Range    Extra Tube Hold for add-ons.    Lavender Top    Collection Time: 06/04/25  3:56 PM   Result Value Ref Range    Extra Tube hold for add-on    Gold Top - SST    Collection Time: 06/04/25  3:56 PM   Result Value Ref Range    Extra Tube Hold for add-ons.    Light Blue Top    Collection Time: 06/04/25  3:56 PM   Result Value Ref Range    Extra Tube Hold for add-ons.    CBC Auto Differential    Collection Time: 06/04/25  3:56 PM    Specimen: Blood   Result Value Ref Range    WBC 6.36 3.40 - 10.80 10*3/mm3    RBC 4.70 4.14 - 5.80 10*6/mm3    Hemoglobin 14.8 13.0 - 17.7 g/dL    Hematocrit 42.5  37.5 - 51.0 %    MCV 90.4 79.0 - 97.0 fL    MCH 31.5 26.6 - 33.0 pg    MCHC 34.8 31.5 - 35.7 g/dL    RDW 14.5 12.3 - 15.4 %    RDW-SD 48.0 37.0 - 54.0 fl    MPV 9.8 6.0 - 12.0 fL    Platelets 95 (L) 140 - 450 10*3/mm3   Lipase    Collection Time: 06/04/25  3:56 PM    Specimen: Blood   Result Value Ref Range    Lipase 19 13 - 60 U/L   Digoxin Level    Collection Time: 06/04/25  3:56 PM    Specimen: Blood   Result Value Ref Range    Digoxin 1.00 0.60 - 1.20 ng/mL   Manual Differential    Collection Time: 06/04/25  3:56 PM    Specimen: Blood   Result Value Ref Range    Neutrophil % 60.6 42.7 - 76.0 %    Lymphocyte % 29.3 19.6 - 45.3 %    Monocyte % 10.1 5.0 - 12.0 %    Eosinophil % 0.0 (L) 0.3 - 6.2 %    Basophil % 0.0 0.0 - 1.5 %    Neutrophils Absolute 3.85 1.70 - 7.00 10*3/mm3    Lymphocytes Absolute 1.86 0.70 - 3.10 10*3/mm3    Monocytes Absolute 0.64 0.10 - 0.90 10*3/mm3    Eosinophils Absolute 0.00 0.00 - 0.40 10*3/mm3    Basophils Absolute 0.00 0.00 - 0.20 10*3/mm3    Anisocytosis Slight/1+ None Seen    Crenated RBC's Mod/2+ None Seen    Macrocytes Slight/1+ None Seen    Ovalocytes Slight/1+ None Seen    Poikilocytes Large/3+ None Seen    WBC Morphology Normal Normal    Platelet Morphology Normal Normal         RADIOLOGY  XR Chest 1 View  Result Date: 6/4/2025  XR CHEST 1 VW-  Clinical: Chest pain  COMPARISON 3/28/2025  FINDINGS: Heart size within normal limits, no mediastinal or hilar abnormality and the lungs are clear.  CONCLUSION: No active disease in the chest  This report was finalized on 6/4/2025 4:21 PM by Dr. Benji Grant M.D on Workstation: [id number]          MEDICATIONS GIVEN IN ER  Medications   sodium chloride 0.9 % flush 10 mL (has no administration in time range)   aspirin tablet 325 mg (has no administration in time range)   aluminum-magnesium hydroxide-simethicone (MAALOX MAX) 400-400-40 MG/5ML suspension 30 mL (30 mL Oral Given 6/4/25 5308)   Lidocaine Viscous HCl (XYLOCAINE) 2 % solution  15 mL (15 mL Mouth/Throat Given 6/4/25 1609)   famotidine (PEPCID) injection 20 mg (20 mg Intravenous Given 6/4/25 1608)         ORDERS PLACED DURING THIS VISIT:  Orders Placed This Encounter   Procedures    XR Chest 1 View    [place] Draw    Comprehensive Metabolic Panel    High Sensitivity Troponin T    CBC Auto Differential    Lipase    Digoxin Level    Manual Differential    High Sensitivity Troponin T 1Hr    NPO Diet NPO Type: Strict NPO    Undress & Gown    Continuous Pulse Oximetry    LHA (on-call MD unless specified) Details    LHA (on-call MD unless specified) Details    Oxygen Therapy- Nasal Cannula; Titrate 1-6 LPM Per SpO2; 90 - 95%    ECG 12 Lead ED Triage Standing Order; Chest Pain    ECG 12 Lead ED Triage Standing Order; Chest Pain    Insert Peripheral IV    Initiate Observation Status    CBC & Differential    Green Top (Gel)    Lavender Top    Gold Top - SST    Light Blue Top         OUTPATIENT MEDICATION MANAGEMENT:  Current Facility-Administered Medications Ordered in Epic   Medication Dose Route Frequency Provider Last Rate Last Admin    aspirin tablet 325 mg  325 mg Oral Once Jeremy Farooq MD        sodium chloride 0.9 % flush 10 mL  10 mL Intravenous PRN Jeremy Farooq MD         Current Outpatient Medications Ordered in Epic   Medication Sig Dispense Refill    Acetaminophen Extra Strength 500 MG tablet Take 1 tablet by mouth 4 (Four) Times a Day As Needed.      ARIPiprazole ER (Abilify Maintena) 400 MG prefilled syringe IM prefilled syringe Inject 400 mg into the appropriate muscle as directed by prescriber Every 28 (Twenty-Eight) Days.      atorvastatin (LIPITOR) 40 MG tablet Take 1 tablet by mouth Daily.      benztropine (COGENTIN) 1 MG tablet Take 1 tablet by mouth 2 (Two) Times a Day.      digoxin (LANOXIN) 125 MCG tablet Take 1 tablet by mouth Daily.      divalproex (DEPAKOTE) 500 MG DR tablet Take 4 tablets by mouth Every Night.      Eliquis 5 MG tablet tablet Take 1 tablet by mouth  Every 12 (Twelve) Hours.      haloperidol (HALDOL) 5 MG tablet Take 1.5 tablets by mouth 2 (Two) Times a Day.      levothyroxine (SYNTHROID, LEVOTHROID) 75 MCG tablet Take 1 tablet by mouth Daily.      melatonin 5 MG tablet tablet Take 2 tablets by mouth Every Night.      metoprolol succinate XL (TOPROL-XL) 25 MG 24 hr tablet Take 1 tablet by mouth Daily.      OLANZapine (zyPREXA) 10 MG tablet Take 1 tablet by mouth Every Night.      polyethylene glycol (MIRALAX) 17 GM/SCOOP powder Dissolve 17 g (1 capful) in liquid and drink by mouth Daily. 510 g 1         PROCEDURES  Procedures            PROGRESS, DATA ANALYSIS, CONSULTS, AND MEDICAL DECISION MAKING  All labs have been independently interpreted by me.  All radiology studies have been reviewed by me. All EKGs have been independently viewed and interpreted by me.  Discussion below represents my analysis of pertinent findings related to patient's condition, differential diagnosis, treatment plan and final disposition.    Differential diagnosis:   My differential diagnosis for chest pain includes but is not limited to:  Muscle strain, costochondritis, myositis, pleurisy, rib fracture, intercostal neuritis, herpes zoster, tumor, myocardial infarction, coronary syndrome, unstable angina, angina, aortic dissection, mitral valve prolapse, pericarditis, palpitations, pulmonary embolus, pneumonia, pneumothorax, lung cancer, GERD, esophagitis, esophageal spasm      Clinical Scores: HEART Score: 4                  ED Course as of 06/04/25 1722   Wed Jun 04, 2025   1552 EKG           EKG time: 1459  Rhythm/Rate: A-fib, 80  P waves and HI: N/A   QRS, axis: Narrow complex  ST and T waves: No STEMI    Interpreted Contemporaneously by me, independently viewed  Comparison: Prior A-fib previously noted   [RS]   1553 RADIOLOGY      Study: Single view chest  Findings: No pneumothorax or large focal infiltrate  I independently viewed and interpreted these images contemporaneously with  treatment.    [RS]   1701 Digoxin: 1.00 [RS]   1701 Lipase: 19 [RS]   1701 HS Troponin T(!): 41 [RS]   1701 Glucose: 70 [RS]   1701 BUN: 15.0 [RS]   1701 Creatinine: 1.16 [RS]   1701 Sodium(!): 134 [RS]   1701 Potassium: 4.2 [RS]   1701 ALT (SGPT): 13 [RS]   1701 AST (SGOT): 31 [RS]   1701 Total Bilirubin: 0.5 [RS]   1701 WBC: 6.36 [RS]   1701 Hemoglobin: 14.8 [RS]   1714 Patient does feel better after GI cocktail and treatment here in the ED.  Unfortunately he is a very limited historian who does have cardiac history with atrial fibrillation and does not have an established cardiologist according to the group home staff member taking care of him.  His troponin is in indeterminate range and thus he warrants further evaluation.  Will ask the medicine team to admit him. [RS]   1721 CONSULT        Provider: Dr. Weinberg[facility]    Discussion: Patient history, ED presentation evaluation as well as concerns.  Agreeable to accept patient for observation admission with telemetry for further evaluation and treatment.  Patient not felt to be a good 5 S. candidate.    Agreeable c treatment and planned disposition.         [RS]      ED Course User Index  [RS] Jeremy Farooq MD         Prescription drug monitoring program review:     AS OF 17:22 EDT VITALS:    BP - 111/82  HR - 68  TEMP - 98.1 °F (36.7 °C) (Oral)  O2 SATS - 98%    COMPLEXITY OF CARE  The patient requires admission.      DIAGNOSIS  Final diagnoses:   Chest discomfort   Atrial fibrillation, unspecified type         DISPOSITION  ED Disposition       ED Disposition   Decision to Admit    Condition   --    Comment   Level of Care: Telemetry [5]   Diagnosis: Chest pain [[id number]]   Admitting Physician: LYDIA WEINBERG [[id number]]   Is patient appropriate for Inpatient Observation Unit?: No [0]                    ADMISSION    Discussed treatment plan and reason for admission with pt/family and admitting physician.  Pt/family voiced understanding of the plan for admission for  further testing/treatment as needed.       Please note that portions of this document were completed with a voice recognition program.    Note Disclaimer: At [facility], we believe that sharing information builds trust and better relationships. You are receiving this note because you recently visited [facility]. It is possible you will see health information before a provider has talked with you about it. This kind of information can be easy to misunderstand. To help you fully understand what it means for your health, we urge you to discuss this note with your provider.         Jeremy Farooq MD  06/04/25 1190

## 2025-06-04 NOTE — ED NOTES
Nursing report ED to floor  Helder Abbott  50 y.o.  male    HPI :  HPI  Stated Reason for Visit: CP during therapy today; confused at baseline; escorted by caregiver  History Obtained From: patient    Chief Complaint  Chief Complaint   Patient presents with    Chest Pain       Admitting doctor:   Dafne Weinberg MD    Admitting diagnosis:   The primary encounter diagnosis was Chest discomfort. A diagnosis of Atrial fibrillation, unspecified type was also pertinent to this visit.    Code status:   Current Code Status       Date Active Code Status Order ID Comments User Context       Prior            Allergies:   Clonazepam, Fluoxetine, Grass, Grass pollen(k-o-r-t-swt benigno), Hydroxyzine, Methylphenidate, Quetiapine, Risperidone, and Topamax [topiramate]    Isolation:   No active isolations    Intake and Output  No intake or output data in the 24 hours ending 06/04/25 1740    Weight:   There were no vitals filed for this visit.    Most recent vitals:   Vitals:    06/04/25 1538 06/04/25 1601 06/04/25 1701 06/04/25 1702   BP:  125/85 111/82    Pulse: 69 75 80 68   Resp:       Temp:       TempSrc:       SpO2: 99% 100% 100% 98%       Active LDAs/IV Access:   Lines, Drains & Airways       Active LDAs       Name Placement date Placement time Site Days    Peripheral IV 06/04/25 1554 20 G Anterior;Distal;Right;Upper Arm 06/04/25  1554  Arm  less than 1                    Labs (abnormal labs have a star):   Labs Reviewed   COMPREHENSIVE METABOLIC PANEL - Abnormal; Notable for the following components:       Result Value    Sodium 134 (*)     All other components within normal limits    Narrative:     GFR Categories in Chronic Kidney Disease (CKD)              GFR Category          GFR (mL/min/1.73)    Interpretation  G1                    90 or greater        Normal or high (1)  G2                    60-89                Mild decrease (1)  G3a                   45-59                Mild to moderate decrease  G3b                    30-44                Moderate to severe decrease  G4                    15-29                Severe decrease  G5                    14 or less           Kidney failure    (1)In the absence of evidence of kidney disease, neither GFR category G1 or G2 fulfill the criteria for CKD.    eGFR calculation 2021 CKD-EPI creatinine equation, which does not include race as a factor   TROPONIN - Abnormal; Notable for the following components:    HS Troponin T 41 (*)     All other components within normal limits    Narrative:     High Sensitive Troponin T Reference Range:  <14.0 ng/L- Negative Female for AMI  <22.0 ng/L- Negative Male for AMI  >=14 - Abnormal Female indicating possible myocardial injury.  >=22 - Abnormal Male indicating possible myocardial injury.   Clinicians would have to utilize clinical acumen, EKG, Troponin, and serial changes to determine if it is an Acute Myocardial Infarction or myocardial injury due to an underlying chronic condition.        CBC WITH AUTO DIFFERENTIAL - Abnormal; Notable for the following components:    Platelets 95 (*)     All other components within normal limits   MANUAL DIFFERENTIAL - Abnormal; Notable for the following components:    Eosinophil % 0.0 (*)     All other components within normal limits   LIPASE - Normal   DIGOXIN LEVEL - Normal   RAINBOW DRAW    Narrative:     The following orders were created for panel order [place] Draw.  Procedure                               Abnormality         Status                     ---------                               -----------         ------                     Green Top (Gel)[[id number]]                                  Final result               Lavender Top[[id number]]                                     Final result               Gold Top - SST[[id number]]                                   Final result               Light Blue Top[[id number]]                                   Final result                 Please view results  for these tests on the individual orders.   HIGH SENSITIVITIY TROPONIN T 1HR   CBC AND DIFFERENTIAL    Narrative:     The following orders were created for panel order CBC & Differential.  Procedure                               Abnormality         Status                     ---------                               -----------         ------                     CBC Auto Differential[[id number]]        Abnormal            Final result                 Please view results for these tests on the individual orders.   GREEN TOP   LAVENDER TOP   GOLD TOP - SST   LIGHT BLUE TOP       EKG:   ECG 12 Lead ED Triage Standing Order; Chest Pain   Final Result   HEART RATE=78  bpm   RR Vwushfmj=298  ms   NM Interval=  ms   P Horizontal Axis=  deg   P Front Axis=  deg   QRSD Interval=92  ms   QT Mgjdspex=923  ms   CHtH=430  ms   QRS Axis=29  deg   T Wave Axis=  deg   - ABNORMAL ECG -   Atrial fibrillation   Borderline  repolarization abnormality   When compared with ECG of 10-Jul-2024 16:00:12,   No significant change   Electronically Signed By: Yair Arreguin ([facility]) 2025-06-04 15:54:24   Date and Time of Study:2025-06-04 14:59:12          Meds given in ED:   Medications   sodium chloride 0.9 % flush 10 mL (has no administration in time range)   aspirin tablet 325 mg (has no administration in time range)   aluminum-magnesium hydroxide-simethicone (MAALOX MAX) 400-400-40 MG/5ML suspension 30 mL (30 mL Oral Given 6/4/25 1609)   Lidocaine Viscous HCl (XYLOCAINE) 2 % solution 15 mL (15 mL Mouth/Throat Given 6/4/25 1609)   famotidine (PEPCID) injection 20 mg (20 mg Intravenous Given 6/4/25 1608)       Imaging results:  No radiology results for the last day    Ambulatory status:   - bedrest    Social issues:   Social History     Socioeconomic History    Marital status: Single   Tobacco Use    Smoking status: Never    Smokeless tobacco: Never   Vaping Use    Vaping status: Never Used   Substance and Sexual Activity    Alcohol use: Never    Drug  use: Never    Sexual activity: Defer       Peripheral Neurovascular  Peripheral Neurovascular (Adult)  Peripheral Neurovascular WDL: WDL    Neuro Cognitive  Neuro Cognitive (Adult)  Cognitive/Neuro/Behavioral WDL: WDL, all  Level of Consciousness: Alert  Arousal Level: opens eyes spontaneously  Orientation: oriented x 4  Speech: logical, spontaneous, slurred  Mood/Behavior: cooperative, calm, withdrawn    Learning  Learning Assessment  Learning Readiness and Ability: cognitive limitation noted  Education Provided  Person Taught: patient, family member/friend  Teaching Method: verbal instruction  Teaching Focus: symptom/problem overview  Education Outcome Evaluation: eager to learn, able to teach back, acceptance expressed    Respiratory  Respiratory WDL  Respiratory WDL: .WDL except, all  Rhythm/Pattern, Respiratory: shortness of breath    Abdominal Pain       Pain Assessments  Pain (Adult)  (0-10) Pain Rating: Rest: 10  (0-10) Pain Rating: Activity: 10  Pain Location: chest  Pain Side/Orientation: upper, medial, anterior    NIH Stroke Scale       Amelia Landaverde RN  06/04/25 17:40 EDT

## 2025-06-05 ENCOUNTER — APPOINTMENT (OUTPATIENT)
Dept: GENERAL RADIOLOGY | Facility: HOSPITAL | Age: 50
End: 2025-06-05
Payer: MEDICARE

## 2025-06-05 ENCOUNTER — APPOINTMENT (OUTPATIENT)
Dept: CT IMAGING | Facility: HOSPITAL | Age: 50
End: 2025-06-05
Payer: MEDICARE

## 2025-06-05 ENCOUNTER — APPOINTMENT (OUTPATIENT)
Dept: CARDIOLOGY | Facility: HOSPITAL | Age: 50
End: 2025-06-05
Payer: MEDICARE

## 2025-06-05 LAB
ANION GAP SERPL CALCULATED.3IONS-SCNC: 10 MMOL/L (ref 5–15)
AORTIC DIMENSIONLESS INDEX: 0.79 (DI)
AV MEAN PRESS GRAD SYS DOP V1V2: 2.42 MMHG
AV VMAX SYS DOP: 107.2 CM/SEC
BH CV ECHO MEAS - AO MAX PG: 4.6 MMHG
BH CV ECHO MEAS - AO V2 VTI: 20.1 CM
BH CV ECHO MEAS - AVA(I,D): 2.4 CM2
BH CV ECHO MEAS - EDV(MOD-SP2): 86 ML
BH CV ECHO MEAS - EDV(MOD-SP4): 108 ML
BH CV ECHO MEAS - EF(MOD-SP2): 64 %
BH CV ECHO MEAS - EF(MOD-SP4): 66.7 %
BH CV ECHO MEAS - ESV(MOD-SP2): 31 ML
BH CV ECHO MEAS - ESV(MOD-SP4): 36 ML
BH CV ECHO MEAS - LAT PEAK E' VEL: 15.2 CM/SEC
BH CV ECHO MEAS - LV DIASTOLIC VOL/BSA (35-75): 61.7 CM2
BH CV ECHO MEAS - LV MAX PG: 3 MMHG
BH CV ECHO MEAS - LV MEAN PG: 1.5 MMHG
BH CV ECHO MEAS - LV SYSTOLIC VOL/BSA (12-30): 20.6 CM2
BH CV ECHO MEAS - LV V1 MAX: 86.1 CM/SEC
BH CV ECHO MEAS - LV V1 VTI: 15.9 CM
BH CV ECHO MEAS - LVOT AREA: 3 CM2
BH CV ECHO MEAS - LVOT DIAM: 1.96 CM
BH CV ECHO MEAS - MED PEAK E' VEL: 12.1 CM/SEC
BH CV ECHO MEAS - MV DEC SLOPE: 623.5 CM/SEC2
BH CV ECHO MEAS - MV DEC TIME: 0.16 SEC
BH CV ECHO MEAS - MV E MAX VEL: 72.4 CM/SEC
BH CV ECHO MEAS - MV MAX PG: 4 MMHG
BH CV ECHO MEAS - MV MEAN PG: 0.78 MMHG
BH CV ECHO MEAS - MV P1/2T: 47.2 MSEC
BH CV ECHO MEAS - MV V2 VTI: 20.2 CM
BH CV ECHO MEAS - MVA(P1/2T): 4.7 CM2
BH CV ECHO MEAS - MVA(VTI): 2.39 CM2
BH CV ECHO MEAS - PULM DIAS VEL: 25.7 CM/SEC
BH CV ECHO MEAS - PULM S/D: 0.83
BH CV ECHO MEAS - PULM SYS VEL: 21.4 CM/SEC
BH CV ECHO MEAS - SV(LVOT): 48.3 ML
BH CV ECHO MEAS - SV(MOD-SP2): 55 ML
BH CV ECHO MEAS - SV(MOD-SP4): 72 ML
BH CV ECHO MEAS - SVI(LVOT): 27.6 ML/M2
BH CV ECHO MEAS - SVI(MOD-SP2): 31.4 ML/M2
BH CV ECHO MEAS - SVI(MOD-SP4): 41.1 ML/M2
BH CV ECHO MEASUREMENTS AVERAGE E/E' RATIO: 5.3
BH CV XLRA - RV BASE: 2.9 CM
BH CV XLRA - RV LENGTH: 7.5 CM
BH CV XLRA - TDI S': 8.9 CM/SEC
BUN SERPL-MCNC: 14 MG/DL (ref 6–20)
BUN/CREAT SERPL: 14.6 (ref 7–25)
CALCIUM SPEC-SCNC: 8.6 MG/DL (ref 8.6–10.5)
CHLORIDE SERPL-SCNC: 101 MMOL/L (ref 98–107)
CO2 SERPL-SCNC: 21 MMOL/L (ref 22–29)
CREAT SERPL-MCNC: 0.96 MG/DL (ref 0.76–1.27)
D DIMER PPP FEU-MCNC: <0.27 MCGFEU/ML (ref 0–0.5)
DEPRECATED RDW RBC AUTO: 48.4 FL (ref 37–54)
EGFRCR SERPLBLD CKD-EPI 2021: 96.3 ML/MIN/1.73
ERYTHROCYTE [DISTWIDTH] IN BLOOD BY AUTOMATED COUNT: 14.4 % (ref 12.3–15.4)
GLUCOSE SERPL-MCNC: 67 MG/DL (ref 65–99)
HCT VFR BLD AUTO: 39.6 % (ref 37.5–51)
HGB BLD-MCNC: 13.2 G/DL (ref 13–17.7)
LEFT ATRIUM VOLUME INDEX: 38.9 ML/M2
LV EF BIPLANE MOD: 66.5 %
MCH RBC QN AUTO: 30.9 PG (ref 26.6–33)
MCHC RBC AUTO-ENTMCNC: 33.3 G/DL (ref 31.5–35.7)
MCV RBC AUTO: 92.7 FL (ref 79–97)
NT-PROBNP SERPL-MCNC: 674 PG/ML (ref 0–900)
PLATELET # BLD AUTO: 99 10*3/MM3 (ref 140–450)
PMV BLD AUTO: 10.4 FL (ref 6–12)
POTASSIUM SERPL-SCNC: 5.3 MMOL/L (ref 3.5–5.2)
QT INTERVAL: 353 MS
QTC INTERVAL: 402 MS
RBC # BLD AUTO: 4.27 10*6/MM3 (ref 4.14–5.8)
SODIUM SERPL-SCNC: 132 MMOL/L (ref 136–145)
TROPONIN T SERPL HS-MCNC: 29 NG/L
WBC NRBC COR # BLD AUTO: 6.42 10*3/MM3 (ref 3.4–10.8)

## 2025-06-05 PROCEDURE — G0378 HOSPITAL OBSERVATION PER HR: HCPCS

## 2025-06-05 PROCEDURE — 25510000001 IOPAMIDOL PER 1 ML: Performed by: INTERNAL MEDICINE

## 2025-06-05 PROCEDURE — 80048 BASIC METABOLIC PNL TOTAL CA: CPT | Performed by: INTERNAL MEDICINE

## 2025-06-05 PROCEDURE — 93306 TTE W/DOPPLER COMPLETE: CPT | Performed by: INTERNAL MEDICINE

## 2025-06-05 PROCEDURE — 25510000001 PERFLUTREN 6.52 MG/ML SUSPENSION 2 ML VIAL: Performed by: INTERNAL MEDICINE

## 2025-06-05 PROCEDURE — 84484 ASSAY OF TROPONIN QUANT: CPT | Performed by: INTERNAL MEDICINE

## 2025-06-05 PROCEDURE — 36415 COLL VENOUS BLD VENIPUNCTURE: CPT | Performed by: INTERNAL MEDICINE

## 2025-06-05 PROCEDURE — 93005 ELECTROCARDIOGRAM TRACING: CPT | Performed by: INTERNAL MEDICINE

## 2025-06-05 PROCEDURE — 93010 ELECTROCARDIOGRAM REPORT: CPT | Performed by: INTERNAL MEDICINE

## 2025-06-05 PROCEDURE — 71275 CT ANGIOGRAPHY CHEST: CPT

## 2025-06-05 PROCEDURE — 83880 ASSAY OF NATRIURETIC PEPTIDE: CPT | Performed by: INTERNAL MEDICINE

## 2025-06-05 PROCEDURE — 93306 TTE W/DOPPLER COMPLETE: CPT

## 2025-06-05 PROCEDURE — 70355 PANORAMIC X-RAY OF JAWS: CPT

## 2025-06-05 PROCEDURE — 85027 COMPLETE CBC AUTOMATED: CPT | Performed by: INTERNAL MEDICINE

## 2025-06-05 PROCEDURE — 85379 FIBRIN DEGRADATION QUANT: CPT | Performed by: INTERNAL MEDICINE

## 2025-06-05 PROCEDURE — 99214 OFFICE O/P EST MOD 30 MIN: CPT | Performed by: INTERNAL MEDICINE

## 2025-06-05 RX ORDER — IOPAMIDOL 755 MG/ML
100 INJECTION, SOLUTION INTRAVASCULAR
Status: COMPLETED | OUTPATIENT
Start: 2025-06-05 | End: 2025-06-05

## 2025-06-05 RX ORDER — ALUMINA, MAGNESIA, AND SIMETHICONE 2400; 2400; 240 MG/30ML; MG/30ML; MG/30ML
30 SUSPENSION ORAL EVERY 6 HOURS PRN
Status: DISCONTINUED | OUTPATIENT
Start: 2025-06-05 | End: 2025-06-06 | Stop reason: HOSPADM

## 2025-06-05 RX ORDER — PANTOPRAZOLE SODIUM 40 MG/1
40 TABLET, DELAYED RELEASE ORAL
Status: DISCONTINUED | OUTPATIENT
Start: 2025-06-05 | End: 2025-06-06 | Stop reason: HOSPADM

## 2025-06-05 RX ORDER — ALUMINA, MAGNESIA, AND SIMETHICONE 2400; 2400; 240 MG/30ML; MG/30ML; MG/30ML
15 SUSPENSION ORAL EVERY 6 HOURS PRN
Status: DISCONTINUED | OUTPATIENT
Start: 2025-06-05 | End: 2025-06-05

## 2025-06-05 RX ADMIN — LEVOTHYROXINE SODIUM 75 MCG: 0.07 TABLET ORAL at 09:08

## 2025-06-05 RX ADMIN — APIXABAN 5 MG: 5 TABLET, FILM COATED ORAL at 21:33

## 2025-06-05 RX ADMIN — APIXABAN 5 MG: 5 TABLET, FILM COATED ORAL at 09:08

## 2025-06-05 RX ADMIN — BENZTROPINE MESYLATE 1 MG: 0.5 TABLET ORAL at 21:33

## 2025-06-05 RX ADMIN — ATORVASTATIN CALCIUM 40 MG: 20 TABLET, FILM COATED ORAL at 09:07

## 2025-06-05 RX ADMIN — DIGOXIN 125 MCG: 0.12 TABLET ORAL at 12:55

## 2025-06-05 RX ADMIN — IOPAMIDOL 95 ML: 755 INJECTION, SOLUTION INTRAVENOUS at 14:42

## 2025-06-05 RX ADMIN — BENZTROPINE MESYLATE 1 MG: 0.5 TABLET ORAL at 09:08

## 2025-06-05 RX ADMIN — PERFLUTREN 2 ML: 6.52 INJECTION, SUSPENSION INTRAVENOUS at 08:38

## 2025-06-05 RX ADMIN — METOPROLOL SUCCINATE 25 MG: 25 TABLET, EXTENDED RELEASE ORAL at 09:07

## 2025-06-05 RX ADMIN — PANTOPRAZOLE SODIUM 40 MG: 40 TABLET, DELAYED RELEASE ORAL at 17:22

## 2025-06-05 RX ADMIN — DIVALPROEX SODIUM 2000 MG: 500 TABLET, DELAYED RELEASE ORAL at 21:33

## 2025-06-05 NOTE — PROGRESS NOTES
"Nutrition Services    Patient Name: Helder Abbott  YOB: 1975  MRN: [id number]  Admission date: 6/4/2025    Assessment Date:  06/05/25    NUTRITION EVALUATION    Patient meets ASPEN/AND criteria for nutrition diagnosis of severe malnutrition of chronic illness based on:   Severe weight loss of 18% in past 3 months consistent with severe deficit in nutrient intake  Severe loss of Muscle Mass  Moderate loss of Subcutaneous Fat  Mild accumulation of fluid        Reason for Encounter BMI   Diagnosis/Problem Admission Diagnosis:  Chest discomfort [R07.89]  Chest pain [R07.9]  Atrial fibrillation, unspecified type [I48.91]    Problem List:    Chest pain    Essential hypertension    Hypothyroidism (acquired)    GERD without esophagitis    HLD (hyperlipidemia)    Atrial fibrillation, chronic     Narrative 51 yo male adm with chest pain, h/o intelectual disability.    When asked if patient has any problems chewing/swallowing he stated his gums hurt.  He showed me very reddened tissue and tongue that also appeared red.  Very cooperative to let me complete the NFPE on him. - Notified RN of pt gums and need for further evaluation.       PO Diet Diet: Regular/House; Texture: Soft to Chew (NDD 3); Soft to Chew: Chopped Meat; Fluid Consistency: Thin (IDDSI 0)   Allergies NKFA   Supplements n/a   PO Intake % ~ 25% breakfast       Chewing/Swallowing Difficulty other:very red gums, ? tongue       Medications reviewed   Labs  reviewed       Physical Findings alert, anxious, loss of muscle mass, loss of subcutaneous fat, room air, underweight     Edema 1+ (trace)    GI Function WDL   Skin Status MASD    Lines/Drains none   I/O reviewed        Height  Weight  BMI  Weight Trend     Height: 182.9 cm (72\")  Weight: 57 kg (125 lb 10.6 oz) (06/05/25 0837)  Body mass index is 17.04 kg/m².  Loss    Weight change: weight loss of 28 lbs (18%) over 3 month(s)    Significant?  Yes       NFPE See Malnutrition Severity Assessment, " Date Completed: 6/5       Nutrition Problem (PES) Problem: Malnutrition (severe)  Etiology: Factors Affecting Nutrition - reddened gums   Signs/Symptoms: Report of Minimal PO Intake, NFPE Results, BMI, Unintended Weight Change, and Report/Observation       Intervention/Plan Modified diet to Soft with chopped meats for pt gums and ease to eat    Added Boost Original TID (Provides 720 kcals, 30 g protein if consumed)  and   Magic Cups at breakfast, lunch and dinner (Provides 870 kcals, 27 g protein if consumed) to aid nutrient intake without hurting gums.    RD to follow up per protocol.     Estimated Requirements         Weight used  57 kg    Calories  8120-7685 (30-35 kcal/kg)    Protein   (1.5 - 2.0 gm/kg)    Fluid  0851-1899 (25 mL/kg, 30 mL/kg)     Malnutrition Severity Assessment      Patient meets criteria for : Severe Malnutrition  Malnutrition Type (Last 8 Hours)       Malnutrition Severity Assessment       Row Name 06/05/25 1321       Malnutrition Severity Assessment    Malnutrition Type Chronic Disease - Related Malnutrition      Row Name 06/05/25 1321       Insufficient Energy Intake     Insufficient Energy Intake Findings Severe    Insufficient Energy Intake  <75% of est. energy requirement for > or equal to 3 months      Row Name 06/05/25 1321       Unintentional Weight Loss     Unintentional Weight Loss Findings Severe    Unintentional Weight Loss  Weight loss greater than 7.5% in three months  weight loss of 18% in past 3 months      Row Name 06/05/25 1321       Muscle Loss    Loss of Muscle Mass Findings Severe    Buddhist Region Moderate - slight depression    Clavicle Bone Region Severe - protruding prominent bone    Acromion Bone Region Severe - squared shoulders, bones, and acromion process protrusion prominent    Scapular Bone Region Severe - prominent bones, depressions easily visible between ribs, scapula, spine, shoulders    Dorsal Hand Region Moderate - slight depression    Patellar  "Region Severe - prominent bone, square looking, very little muscle definition    Posterior Calf Region Severe - thin with very little definition/firmness      Row Name 06/05/25 1321       Fat Loss    Subcutaneous Fat Loss Findings Moderate    Orbital Region  Moderate -  somewhat hollowness, slightly dark circles    Upper Arm Region Severe - mostly skin, very little space between folds, fingers touch      Row Name 06/05/25 1321       Fluid Accumulation (Edema)    Fluid Acumulation Findings Mild    Fluid Accumulation  Mild equals 1+ pitting edema      Row Name 06/05/25 1321       Criteria Met (Must meet criteria for severity in at least 2 of these categories: M Wasting, Fat Loss, Fluid, Secondary Signs, Wt. Status, Intake)    Patient meets criteria for  Severe Malnutrition                         Results from last 7 days   Lab Units 06/05/25  0426 06/04/25  1556   SODIUM mmol/L 132* 134*   POTASSIUM mmol/L 5.3* 4.2   CHLORIDE mmol/L 101 99   CO2 mmol/L 21.0* 23.2   BUN mg/dL 14.0 15.0   CREATININE mg/dL 0.96 1.16   CALCIUM mg/dL 8.6 9.2   BILIRUBIN mg/dL  --  0.5   ALK PHOS U/L  --  94   ALT (SGPT) U/L  --  13   AST (SGOT) U/L  --  31   GLUCOSE mg/dL 67 70     Results from last 7 days   Lab Units 06/05/25  0426   HEMOGLOBIN g/dL 13.2   HEMATOCRIT % 39.6     No results found for: \"HGBA1C\"  Wt Readings from Last 10 Encounters:   06/05/25 57 kg (125 lb 10.6 oz)   03/28/25 69.3 kg (152 lb 12.5 oz)   03/13/25 69.3 kg (152 lb 12.5 oz)   07/12/24 67.7 kg (149 lb 4 oz)   07/09/24 52.5 kg (115 lb 11.9 oz)   06/12/24 52.5 kg (115 lb 11.9 oz)   05/06/24 73.1 kg (161 lb 2.5 oz)   11/30/22 86.2 kg (190 lb)       Electronically signed by:  Tigre Gann RD  06/05/25 13:11 EDT  "

## 2025-06-05 NOTE — NURSING NOTE
Reason for Visit: WOC Team consult for MASD glutel, scrotum, possible pimple near rectum.     Patient able to stand for assessment. Skin is red, pink from incontinence. Patient confirms having recent diarrhea. Additionally there is a small firm scab near the anus that one of the nurses noticed prior. It is tender touch, firm but appears if there was a small abscess, it has been draining since there is a scab. Barrier ointment has been applied to the skin.        06/05/25 1611   Wound 06/04/25 2148 Bilateral gluteal Irritant Contact Incontinence   Placement Date/Time: 06/04/25 2148   Present on Original Admission: Yes  Side: Bilateral  Location: (c) gluteal  Primary Wound Type: Irritant Contact  Secondary Wound Type - Irritant Contact Dermatitis: Incontinence   Dressing Appearance open to air   Base pink;red;scab   Dressing Care   (continue barrier ointment)       Treatment Plan/Recommendations: Continue barrier ointment to help protect the skin. Monitor perirectal skin.       Wound Team Follow up Plan: CWOCN will sign off. Please reconsult if needed.

## 2025-06-05 NOTE — CASE MANAGEMENT/SOCIAL WORK
Discharge Planning Assessment  [facility]     Patient Name: Helder Abbott  MRN: [id number]  Today's Date: 6/5/2025    Admit Date: 6/4/2025    Plan: Return to group home, caregiver to transport   Discharge Needs Assessment       Row Name 06/05/25 1523       Living Environment    People in Home other (see comments)  Pt lives in a group home    Current Living Arrangements group home    Potentially Unsafe Housing Conditions none    In the past 12 months has the electric, gas, oil, or water company threatened to shut off services in your home? No    Primary Care Provided by self    Provides Primary Care For no one, unable/limited ability to care for self    Family Caregiver if Needed other (see comments)  [place] a caregiver/[place] the state    Quality of Family Relationships unable to assess    Able to Return to Prior Arrangements yes       Resource/Environmental Concerns    Resource/Environmental Concerns none    Transportation Concerns none       Transportation Needs    In the past 12 months, has lack of transportation kept you from medical appointments or from getting medications? no    In the past 12 months, has lack of transportation kept you from meetings, work, or from getting things needed for daily living? No       Food Insecurity    Within the past 12 months, you worried that your food would run out before you got the money to buy more. Never true    Within the past 12 months, the food you bought just didn't last and you didn't have money to get more. Never true       Transition Planning    Patient/Family Anticipates Transition to other (see comments)  group home    Patient/Family Anticipated Services at Transition     Transportation Anticipated family or friend will provide       Discharge Needs Assessment    Equipment Currently Used at Home walker, rolling    Concerns to be Addressed no discharge needs identified;denies needs/concerns at this time    Do you want help finding or keeping work or  a job? Patient unable to answer    Do you want help with school or training? For example, starting or completing job training or getting a high school diploma, GED or equivalent Patient unable to answer    Anticipated Changes Related to Illness none    Equipment Needed After Discharge none    Outpatient/Agency/Support Group Needs group home                   Discharge Plan       Row Name 06/05/25 1527       Plan    Plan Return to group home, caregiver to transport    Patient/Family in Agreement with Plan yes    Plan Comments Met with pt at bedside. Introduced self and explained role of . CCP contacted caregiver, Karan Kim, confirmed face sheet. Pt PCP is Marbella Davis with [facility]. Pt receives his medications from [place], [place]. Pt lives in a group home, according to Karan, pt is able to perform most of his own self care, but he requires verbal cues. Pt is mobile with a walker, and requires verbal cues, as he becomes impulsive. Pt has a legal guardian. CCP spokw with Elier Syed and informed of admission. Permission obtained for pt to return to his group home at discharge and for care giver to transport home. Explained that CCP would follow to assess for discharge needs.                  Selected Continued Care - Prior Encounters Includes continued care and service providers with selected services from prior encounters from 3/6/2025 to 6/5/2025      Discharged on 4/3/2025 Admission date: 3/28/2025 - Discharge disposition: Home-Health Care [facility] [place] [facility]      Home Medical Care       Service Provider Services Address Phone Fax Patient Preferred    [facility] - [facility] [id number] [place] [address] [phone] --                          Expected Discharge Date and Time       Expected Discharge Date Expected Discharge Time    Jun 6, 2025            Demographic Summary    No documentation.                   Functional Status    No documentation.                  Psychosocial    No documentation.                  Abuse/Neglect    No documentation.                  Legal    No documentation.                  Substance Abuse    No documentation.                  Patient Forms    No documentation.                     Roxana Dye RN

## 2025-06-05 NOTE — NURSING NOTE
SBP in 70's this morning while sleeping, pt awakened, symptomatic. Within 15 minutes /50 , 135/87 prior to leaving unit for Echo. Cardio notified.

## 2025-06-05 NOTE — PLAN OF CARE
Goal Outcome Evaluation:  Plan of Care Reviewed With: patient        Progress: no change  Outcome Evaluation: Admit from ER with Dx of chest pain, denies chest pain or pressure. Pleasant and cooperative with care. Cardio to see today. Safety maintained.

## 2025-06-05 NOTE — PLAN OF CARE
Goal Outcome Evaluation:         Vss. Echo and CTA done. Panorex pending.

## 2025-06-05 NOTE — H&P
HISTORY AND PHYSICAL   [facility]        Date of Admission: 2025  Patient Identification:  Name: Helder Abbott  Age: 50 y.o.  Sex: male  :  1975  MRN: [id number]                     Primary Care Physician: Provider, No Known    Chief Complaint:  50 year old gentleman presented to the emergency room with chest pain which started earlier today; he denies shortness of breath; he says he fell earlier today; he is not able to give a detailed history due to intellectual disability; no visitors are present with him; he denies fever or chills;     History of Present Illness:   As above    Past Medical History:  Past Medical History:   Diagnosis Date    Benign essential hypertension     Bipolar disorder     Dyslipidemia     GERD (gastroesophageal reflux disease)     Heart disease     History of stroke     Hypothyroidism      Past Surgical History:  No past surgical history on file.   Home Meds:  Medications Prior to Admission   Medication Sig Dispense Refill Last Dose/Taking    Acetaminophen Extra Strength 500 MG tablet Take 1 tablet by mouth 4 (Four) Times a Day As Needed.       ARIPiprazole ER (Abilify Maintena) 400 MG prefilled syringe IM prefilled syringe Inject 400 mg into the appropriate muscle as directed by prescriber Every 28 (Twenty-Eight) Days.       atorvastatin (LIPITOR) 40 MG tablet Take 1 tablet by mouth Daily.       benztropine (COGENTIN) 1 MG tablet Take 1 tablet by mouth 2 (Two) Times a Day.       digoxin (LANOXIN) 125 MCG tablet Take 1 tablet by mouth Daily.       divalproex (DEPAKOTE) 500 MG DR tablet Take 4 tablets by mouth Every Night.       Eliquis 5 MG tablet tablet Take 1 tablet by mouth Every 12 (Twelve) Hours.       haloperidol (HALDOL) 5 MG tablet Take 1.5 tablets by mouth 2 (Two) Times a Day.       levothyroxine (SYNTHROID, LEVOTHROID) 75 MCG tablet Take 1 tablet by mouth Daily.       melatonin 5 MG tablet tablet Take 2 tablets by mouth Every Night.       metoprolol  succinate XL (TOPROL-XL) 25 MG 24 hr tablet Take 1 tablet by mouth Daily.       OLANZapine (zyPREXA) 10 MG tablet Take 1 tablet by mouth Every Night.       polyethylene glycol (MIRALAX) 17 GM/SCOOP powder Dissolve 17 g (1 capful) in liquid and drink by mouth Daily. 510 g 1        Allergies:  Allergies   Allergen Reactions    Clonazepam Unknown (See Comments)    Fluoxetine Unknown (See Comments)    Grass Unknown - High Severity    Grass Pollen(K-O-R-T-Swt Kennedy) Unknown - Low Severity    Hydroxyzine Unknown (See Comments)    Methylphenidate Unknown (See Comments)    Quetiapine Unknown - Low Severity    Risperidone Unknown (See Comments)    Topamax [Topiramate] Unknown - High Severity     Immunizations:  Immunization History   Administered Date(s) Administered    COVID-19 (PFIZER) Purple Cap Monovalent 2021, 2021, 2022     Social History:   Social History     Social History Narrative    Not on file     Social History     Socioeconomic History    Marital status: Single   Tobacco Use    Smoking status: Never    Smokeless tobacco: Never   Vaping Use    Vaping status: Never Used   Substance and Sexual Activity    Alcohol use: Never    Drug use: Never    Sexual activity: Defer       Family History:  Family History   Problem Relation Age of Onset    Diabetes Other     Hypertension Other     Heart disease Other         Review of Systems    Not obtainable from the patient  Objective:  T Max 24 hrs: Temp (24hrs), Av.1 °F (36.7 °C), Min:98.1 °F (36.7 °C), Max:98.1 °F (36.7 °C)    Vitals Ranges:   Temp:  [98.1 °F (36.7 °C)] 98.1 °F (36.7 °C)  Heart Rate:  [58-80] 61  Resp:  [18] 18  BP: (101-125)/(66-85) 101/66      Exam:  /66   Pulse 61   Temp 98.1 °F (36.7 °C) (Oral)   Resp 18   SpO2 100%     General Appearance:    Alert, cooperative, no distress, appears stated age   Head:    Normocephalic, without obvious abnormality, atraumatic   Eyes:    PERRL, conjunctivae/corneas clear, EOM's intact, both  eyes   Ears:    Normal external ear canals, both ears   Nose:   Nares normal, septum midline, mucosa normal, no drainage    or sinus tenderness   Throat:   Lips, mucosa, and tongue normal   Neck:   Supple, symmetrical, trachea midline, no adenopathy;     thyroid:  no enlargement/tenderness/nodules; no carotid    bruit or JVD   Back:     Symmetric, no curvature, ROM normal, no CVA tenderness   Lungs:     Clear to auscultation bilaterally, respirations unlabored   Chest Wall:    No tenderness or deformity    Heart:    Regular rate and rhythm, S1 and S2 normal, no murmur, rub   or gallop   Abdomen:     Soft, nontender, bowel sounds active all four quadrants,     no masses, no hepatomegaly, no splenomegaly   Extremities:   Extremities normal, atraumatic, no cyanosis or edema      .    Data Review:  Labs in chart were reviewed.  WBC   Date Value Ref Range Status   06/04/2025 6.36 3.40 - 10.80 10*3/mm3 Final     Hemoglobin   Date Value Ref Range Status   06/04/2025 14.8 13.0 - 17.7 g/dL Final     Hematocrit   Date Value Ref Range Status   06/04/2025 42.5 37.5 - 51.0 % Final     Platelets   Date Value Ref Range Status   06/04/2025 95 (L) 140 - 450 10*3/mm3 Final     Sodium   Date Value Ref Range Status   06/04/2025 134 (L) 136 - 145 mmol/L Final     Potassium   Date Value Ref Range Status   06/04/2025 4.2 3.5 - 5.2 mmol/L Final     Chloride   Date Value Ref Range Status   06/04/2025 99 98 - 107 mmol/L Final     CO2   Date Value Ref Range Status   06/04/2025 23.2 22.0 - 29.0 mmol/L Final     BUN   Date Value Ref Range Status   06/04/2025 15.0 6.0 - 20.0 mg/dL Final     Creatinine   Date Value Ref Range Status   06/04/2025 1.16 0.76 - 1.27 mg/dL Final     Glucose   Date Value Ref Range Status   06/04/2025 70 65 - 99 mg/dL Final     Calcium   Date Value Ref Range Status   06/04/2025 9.2 8.6 - 10.5 mg/dL Final     AST (SGOT)   Date Value Ref Range Status   06/04/2025 31 1 - 40 U/L Final     ALT (SGPT)   Date Value Ref Range  Status   06/04/2025 13 1 - 41 U/L Final     Alkaline Phosphatase   Date Value Ref Range Status   06/04/2025 94 39 - 117 U/L Final                Imaging Results (All)       Procedure Component Value Units Date/Time    XR Chest 1 View [[id number]] Collected: 06/04/25 1620     Updated: 06/04/25 1624    Narrative:      XR CHEST 1 VW-     Clinical: Chest pain     COMPARISON 3/28/2025     FINDINGS: Heart size within normal limits, no mediastinal or hilar  abnormality and the lungs are clear.     CONCLUSION: No active disease in the chest     This report was finalized on 6/4/2025 4:21 PM by Dr. Benji Grant M.D  on Workstation: [id number]                 Assessment:  Active Hospital Problems    Diagnosis  POA    **Chest pain [R07.9]  Yes      Resolved Hospital Problems   No resolved problems to display.   Intellectual disability  Hypertension  Hyperlipidemia  A fib  thrombocytopenia    Plan:  Ask cardiology to see him  Monitor on telemetry  Repeat echo  Check ct chest since her reported a fall  Troponin is slightly high but trending down  Dw patient and ed provider    Dafne Weinberg MD  6/4/2025  20:22 EDT

## 2025-06-05 NOTE — NURSING NOTE
This nurse went to CT3 for pt c/o CP and SOA. Upon arrival pt sitting on side of CT table. Pt reports no CP just SOA at this time. /66 O2 sats 94-96% on room air and HR . Pt alert and talking to nurse. Then pt slumped over and non-verbal with sternal rub. Pt laid down on CT table, RRT called. This nurse spoke with Alexandra pt's nurse. RRT arrived. Pt moved to [facility] Pt began talking and stating its ok, its ok.

## 2025-06-05 NOTE — PROGRESS NOTES
Name: Helder Abbott ADMIT: 2025   : 1975  PCP: Provider, No Known    MRN: [id number] LOS: 0 days   AGE/SEX: 50 y.o. male  ROOM: [facility]/     Subjective   Subjective   Chief Complaint   Patient presents with    Chest Pain     Patient was sleeping.  Awoke easily.  He was interactive and knew his name but rest of speech was garbled and he could not provide me history or answer review of systems questions.  He has chronic intellectual disability, schizophrenia, bipolar, previous stroke.     Objective   Objective   Vital Signs  Temp:  [97.3 °F (36.3 °C)-98.1 °F (36.7 °C)] 97.5 °F (36.4 °C)  Heart Rate:  [57-80] 75  Resp:  [18] 18  BP: ()/(50-94) 135/87  SpO2:  [97 %-100 %] 100 %  on   ;   Device (Oxygen Therapy): room air  Body mass index is 17.04 kg/m².    Physical Exam  Vitals and nursing note reviewed.   Constitutional:       General: He is not in acute distress.     Appearance: He is not diaphoretic.   HENT:      Head: Atraumatic.   Eyes:      Pupils: Pupils are equal, round, and reactive to light.   Cardiovascular:      Rate and Rhythm: Normal rate. Rhythm irregular.      Pulses: Normal pulses.   Pulmonary:      Effort: Pulmonary effort is normal.      Breath sounds: No wheezing.   Abdominal:      General: There is no distension.      Palpations: Abdomen is soft.      Tenderness: There is no abdominal tenderness. There is no guarding or rebound.   Skin:     General: Skin is warm and dry.   Neurological:      Mental Status: He is alert. Mental status is at baseline.   Psychiatric:         Mood and Affect: Mood normal.         Speech: Speech is slurred.         Behavior: Behavior normal.       Results Review  I reviewed the patient's new clinical results.    Results from last 7 days   Lab Units 25  0426 25  1556   WBC 10*3/mm3 6.42 6.36   HEMOGLOBIN g/dL 13.2 14.8   PLATELETS 10*3/mm3 99* 95*     Results from last 7 days   Lab Units 25  0426 25  1556   SODIUM mmol/L 132*  "134*   POTASSIUM mmol/L 5.3* 4.2   CHLORIDE mmol/L 101 99   CO2 mmol/L 21.0* 23.2   BUN mg/dL 14.0 15.0   CREATININE mg/dL 0.96 1.16   GLUCOSE mg/dL 67 70   EGFR mL/min/1.73 96.3 76.7     Results from last 7 days   Lab Units 06/04/25  1556   ALBUMIN g/dL 3.5   BILIRUBIN mg/dL 0.5   ALK PHOS U/L 94   AST (SGOT) U/L 31   ALT (SGPT) U/L 13     Results from last 7 days   Lab Units 06/05/25  0426 06/04/25  1556   CALCIUM mg/dL 8.6 9.2   ALBUMIN g/dL  --  3.5         No results found for: \"HGBA1C\", \"POCGLU\"    No radiology results for the last day    I have personally reviewed all medications:  Scheduled Medications  apixaban, 5 mg, Oral, Q12H  aspirin, 325 mg, Oral, Once  atorvastatin, 40 mg, Oral, Daily  benztropine, 1 mg, Oral, BID  digoxin, 125 mcg, Oral, Daily  divalproex, 2,000 mg, Oral, Nightly  levothyroxine, 75 mcg, Oral, Q AM  metoprolol succinate XL, 25 mg, Oral, Daily      Infusions     Diet  Diet: Regular/House; Texture: Soft to Chew (NDD 3); Soft to Chew: Chopped Meat; Fluid Consistency: Thin (IDDSI 0)       Assessment/Plan     Active Hospital Problems    Diagnosis  POA    **Chest pain [R07.9]  Yes      Resolved Hospital Problems   No resolved problems to display.       50 y.o. male admitted with Chest pain.    Chest pain: He had some improvement after GI cocktail in the ER.  Normal LV contractility on echo.  Will start Protonix.  There was a CT noncontrast ordered however I am going to stop that.  We can screen a D-dimer to determine if we need to pursue contrasted image.  Chest x-ray did not show any acute infiltrate.  Cardiology consulted.  HLD: Statin  Hypertension: Monitoring  Hypothyroidism: Synthroid  GERD: PPI as above  Chronic A-fib: Rate okay on exam.  Continue beta-blocker digoxin and Eliquis  Bipolar disorder/schizophrenia/intellectual disability: Continue home regimen.  Will check Depakote level.  Has legal guardian.  History of stroke  PPX: AC as above  Disposition: TBD/possibly " tomorrow    Addendum: Patient reported more severe chest pain to nursing.  His D-dimer was low but he also had reported to nursing that he was having difficulty breathing.  Will need to proceed with CTA chest.  Can try another GI cocktail.  Repeat EKG and troponin has been ordered as well.  Could consider esophagram/GI workup if above workup is negative.    Expected Discharge Date: 6/6/2025; Expected Discharge Time:      Alexandre Mcgrath MD  [facility]  06/05/25  11:33 EDT    Dictated portions of note using Dragon dictation software.  Copied text in this note has been reviewed by me and remains accurate as of 06/05/25

## 2025-06-05 NOTE — CONSULTS
Date of Hospital Visit: 2025  Date of consult:   Encounter Provider: Donovan Ochoa MD  Place of Service: [facility] CARDIOLOGY  Patient Name: Helder Abbott  :1975  Referral Provider: Jeremy Farooq MD    Chief complaint: chest discomfort     Reason for consult: chest pain     History of Present Illness Helder Abbott is a 50 year old pt with a history of chronic Afib on apixaban, MR, schizophrenia, bipolar disorder, essential hypertension, prior CVA, hypothyroidism and [place].       Patient is followed by Dr. Baldwin and [facility].  He was initially seen in 2023 to establish care.  When he was first seen his caregiver was not able to give a good past medical history.  It was noted that his previous stroke was related to atrial fibrillation.  Patient was on anticoagulation and rate controlled and was previously followed by cardiology.  He then moved here to [place] to establish care Dr. Baldwin.  And we saw him he denied any complaints and been taken off of metoprolol, atorvastatin and lisinopril.  Started him on metoprolol and he was to follow-up with his PCP.      Patient was seen by Dr. Baldwin at  [facility] on 5/15/2024 for follow-up.  He was doing well from a cardiac standpoint and had no complaints of palpitations or tachycardia.    Patient presented to the ER in 2024 with GERD and abdominal pain, chest pain, altered mental status after starting trazodone.  He was  found to be hyperkalemic.  The abdomen showed nothing acute.  He had intermittent chest discomfort with multiple problems that showed no significant abnormality.  EKG showed no evidence of ischemia.  Echo showed no abnormality.  Patient had some issues with hypotension initially and was treated with IV fluids.  His trazodone was discontinued and his mental status improved.  He then presented back to the emergency room in July after an episode of syncope  when home health came to check on him.  EMS reported that he was hypotensive.  Patient reported he felt shaky, unsteady and weak for the last several days.  EKG was unremarkable dig level was normal.  We were consulted to see for syncope.  His metoprolol was decreased and no further workup was recommended.       Patient presented to the ER on 6/4/2025 with complaints of chest discomfort that began around 1430 yesterday.  He denied any nausea or dyspnea.  In ER, BUN 15, creatinine 1.16, sodium 134, troponin T 41, lipase normal at 19, dig level normal at 1.0, chest x-ray showed nothing active, EKG showed A-fib rate of 80.  Patient admitted for chest pain and I been asked to see.           ECHO 6/12/24      Left ventricular systolic function is hyperdynamic (EF > 70%).    Left ventricular wall thickness is consistent with borderline concentric hypertrophy.    Left ventricular diastolic function was indeterminate.    Normal right ventricular cavity size noted. Hyperdynamic right ventricular systolic function noted.    Calculated right ventricular systolic pressure from tricuspid regurgitation is 18 mmHg    There is a small (<1cm) pericardial effusion adjacent to the left ventricle. There is no evidence of cardiac tamponade.    Past Medical History:   Diagnosis Date    Benign essential hypertension     Bipolar disorder     Dyslipidemia     GERD (gastroesophageal reflux disease)     Heart disease     History of stroke     Hypothyroidism        History reviewed. No pertinent surgical history.    Medications Prior to Admission   Medication Sig Dispense Refill Last Dose/Taking    Acetaminophen Extra Strength 500 MG tablet Take 1 tablet by mouth 4 (Four) Times a Day As Needed.   Taking As Needed    ARIPiprazole ER (Abilify Maintena) 400 MG prefilled syringe IM prefilled syringe Inject 400 mg into the appropriate muscle as directed by prescriber Every 28 (Twenty-Eight) Days.   Taking    atorvastatin (LIPITOR) 40 MG tablet Take  1 tablet by mouth Daily.   Taking    benztropine (COGENTIN) 1 MG tablet Take 1 tablet by mouth 2 (Two) Times a Day.   Taking    digoxin (LANOXIN) 125 MCG tablet Take 1 tablet by mouth Daily.   Taking    divalproex (DEPAKOTE) 500 MG DR tablet Take 4 tablets by mouth Every Night.   Taking    Eliquis 5 MG tablet tablet Take 1 tablet by mouth Every 12 (Twelve) Hours.   Taking    haloperidol (HALDOL) 5 MG tablet Take 1.5 tablets by mouth 2 (Two) Times a Day.   Taking    levothyroxine (SYNTHROID, LEVOTHROID) 75 MCG tablet Take 1 tablet by mouth Daily.   Taking    melatonin 5 MG tablet tablet Take 2 tablets by mouth Every Night.   Taking    metoprolol succinate XL (TOPROL-XL) 25 MG 24 hr tablet Take 1 tablet by mouth Daily.   Taking    OLANZapine (zyPREXA) 10 MG tablet Take 1 tablet by mouth Every Night.   Taking    polyethylene glycol (MIRALAX) 17 GM/SCOOP powder Dissolve 17 g (1 capful) in liquid and drink by mouth Daily. 510 g 1 Taking       Current Meds  Scheduled Meds:apixaban, 5 mg, Oral, Q12H  aspirin, 325 mg, Oral, Once  atorvastatin, 40 mg, Oral, Daily  benztropine, 1 mg, Oral, BID  digoxin, 125 mcg, Oral, Daily  divalproex, 2,000 mg, Oral, Nightly  levothyroxine, 75 mcg, Oral, Q AM  metoprolol succinate XL, 25 mg, Oral, Daily  pantoprazole, 40 mg, Oral, BID AC      Continuous Infusions:   PRN Meds:.  acetaminophen **OR** acetaminophen **OR** acetaminophen    aluminum-magnesium hydroxide-simethicone    senna-docusate sodium **AND** polyethylene glycol **AND** bisacodyl **AND** bisacodyl    magic mouthwash oral suspension (mixture) (diphenhydrAMINE HCl - aluminum & magnesium hydroxide-simethicone - lidocaine viscous) solution 55 mL    melatonin    ondansetron ODT **OR** ondansetron    sodium chloride    Allergies as of 06/04/2025 - Reviewed 06/04/2025   Allergen Reaction Noted    Clonazepam Unknown (See Comments) 05/21/2023    Fluoxetine Unknown (See Comments) 05/21/2023    Grass Unknown - High Severity 04/30/2024     Grass pollen(k-o-r-t-swt benigno) Unknown - Low Severity 05/21/2023    Hydroxyzine Unknown (See Comments) 05/21/2023    Methylphenidate Unknown (See Comments) 05/21/2023    Quetiapine Unknown - Low Severity 04/30/2024    Risperidone Unknown (See Comments) 05/21/2023    Topamax [topiramate] Unknown - High Severity 03/11/2025       Social History     Socioeconomic History    Marital status: Single   Tobacco Use    Smoking status: Never    Smokeless tobacco: Never   Vaping Use    Vaping status: Never Used   Substance and Sexual Activity    Alcohol use: Never    Drug use: Never    Sexual activity: Defer       Family History   Problem Relation Age of Onset    Diabetes Other     Hypertension Other     Heart disease Other        REVIEW OF SYSTEMS:   All systems reviewed and pertinent positives include in HPI otherwise negative review of systems.       Objective:   Temp:  [97.3 °F (36.3 °C)-98 °F (36.7 °C)] 97.5 °F (36.4 °C)  Heart Rate:  [57-80] 76  Resp:  [18] 18  BP: ()/(50-94) 127/84  Body mass index is 17.04 kg/m².  Flowsheet Rows      Flowsheet Row First Filed Value   Admission Height --   Admission Weight 57.1 kg (125 lb 12.8 oz) Documented at 06/04/2025 2009          Vitals:    06/05/25 1312   BP: 127/84   Pulse:    Resp:    Temp:    SpO2:        General Appearance:    Alert, cooperative, in no acute distress   Head:    Normocephalic, without obvious abnormality, atraumatic   Eyes:            Lids and lashes normal, conjunctivae and sclerae normal, no   icterus, no pallor, corneas clear, PERRLA   Ears:    Ears appear intact with no abnormalities noted   Throat:   No oral lesions, no thrush, oral mucosa moist   Neck:   No adenopathy, supple, trachea midline, no thyromegaly, no   carotid bruit, no JVD   Back:     No kyphosis present, no scoliosis present, no skin lesions, erythema or scars, no tenderness to percussion or palpation, range of motion normal   Lungs:     Clear to auscultation,respirations regular,  even and unlabored    Heart:    Regular rhythm and normal rate, normal S1 and S2, no murmur, no gallop, no rub, no click   Chest Wall:    No abnormalities observed   Abdomen:     Normal bowel sounds, no masses, no organomegaly, soft nontender, nondistended, no guarding, no rebound  tenderness   Extremities:   Moves all extremities well, no edema, no cyanosis, no redness   Pulses:   Pulses palpable and equal bilaterally. Normal radial, carotid, femoral, dorsalis pedis and posterior tibial pulses bilaterally. Normal abdominal aorta   Skin:  Neurology:   Psychiatric:   No bleeding, bruising or rash  Dysarthria-not new   Alert              Review of Data:      Results from last 7 days   Lab Units 06/05/25  0426 06/04/25  1556   SODIUM mmol/L 132* 134*   POTASSIUM mmol/L 5.3* 4.2   CHLORIDE mmol/L 101 99   CO2 mmol/L 21.0* 23.2   BUN mg/dL 14.0 15.0   CREATININE mg/dL 0.96 1.16   CALCIUM mg/dL 8.6 9.2   BILIRUBIN mg/dL  --  0.5   ALK PHOS U/L  --  94   ALT (SGPT) U/L  --  13   AST (SGOT) U/L  --  31   GLUCOSE mg/dL 67 70     Results from last 7 days   Lab Units 06/05/25  1322 06/04/25  1713 06/04/25  1556   HSTROP T ng/L 29* 29* 41*     @LABRCNTbnp@  Results from last 7 days   Lab Units 06/05/25  0426 06/04/25  1556   WBC 10*3/mm3 6.42 6.36   HEMOGLOBIN g/dL 13.2 14.8   HEMATOCRIT % 39.6 42.5   PLATELETS 10*3/mm3 99* 95*             @LABRCNTIP(chol,trig,hdl,ldl)                      I personally viewed and interpreted the patient's EKG/Telemetry data  )   Chest pain    Essential hypertension    Hypothyroidism (acquired)    GERD without esophagitis    HLD (hyperlipidemia)    Atrial fibrillation, chronic        Assessment and Plan:    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  EKG with A-fib and no evidence for ischemia.  Borderline elevated troponin.  At time of  discomfort today EKG was unremarkable.  Normal echocardiogram  This is unlikely to be of cardiac etiology.Agree rx with PPI  No further cardiac specific testing warranted  Cardiology will sign off but call with any questions    Donovan Ochoa MD  06/05/25  16:00 EDT.      Time spent in reviewing chart, discussion and examination:

## 2025-06-06 VITALS
RESPIRATION RATE: 18 BRPM | TEMPERATURE: 97.9 F | WEIGHT: 128.4 LBS | OXYGEN SATURATION: 100 % | BODY MASS INDEX: 17.39 KG/M2 | HEIGHT: 72 IN | SYSTOLIC BLOOD PRESSURE: 108 MMHG | DIASTOLIC BLOOD PRESSURE: 72 MMHG | HEART RATE: 76 BPM

## 2025-06-06 LAB
ALBUMIN SERPL-MCNC: 3.3 G/DL (ref 3.5–5.2)
ANION GAP SERPL CALCULATED.3IONS-SCNC: 7.9 MMOL/L (ref 5–15)
BUN SERPL-MCNC: 13 MG/DL (ref 6–20)
BUN/CREAT SERPL: 14.4 (ref 7–25)
CALCIUM SPEC-SCNC: 9 MG/DL (ref 8.6–10.5)
CHLORIDE SERPL-SCNC: 99 MMOL/L (ref 98–107)
CO2 SERPL-SCNC: 23.1 MMOL/L (ref 22–29)
CREAT SERPL-MCNC: 0.9 MG/DL (ref 0.76–1.27)
DEPRECATED RDW RBC AUTO: 49 FL (ref 37–54)
EGFRCR SERPLBLD CKD-EPI 2021: 104 ML/MIN/1.73
ERYTHROCYTE [DISTWIDTH] IN BLOOD BY AUTOMATED COUNT: 14.8 % (ref 12.3–15.4)
GLUCOSE SERPL-MCNC: 69 MG/DL (ref 65–99)
HCT VFR BLD AUTO: 41.6 % (ref 37.5–51)
HGB BLD-MCNC: 14.1 G/DL (ref 13–17.7)
MAGNESIUM SERPL-MCNC: 1.9 MG/DL (ref 1.6–2.6)
MCH RBC QN AUTO: 30.8 PG (ref 26.6–33)
MCHC RBC AUTO-ENTMCNC: 33.9 G/DL (ref 31.5–35.7)
MCV RBC AUTO: 90.8 FL (ref 79–97)
PHOSPHATE SERPL-MCNC: 2.9 MG/DL (ref 2.5–4.5)
PLATELET # BLD AUTO: 83 10*3/MM3 (ref 140–450)
PMV BLD AUTO: 9.7 FL (ref 6–12)
POTASSIUM SERPL-SCNC: 4.6 MMOL/L (ref 3.5–5.2)
PROCALCITONIN SERPL-MCNC: <0.02 NG/ML (ref 0–0.25)
RBC # BLD AUTO: 4.58 10*6/MM3 (ref 4.14–5.8)
SODIUM SERPL-SCNC: 130 MMOL/L (ref 136–145)
VALPROATE SERPL-MCNC: 177 MCG/ML (ref 50–125)
WBC NRBC COR # BLD AUTO: 7.08 10*3/MM3 (ref 3.4–10.8)

## 2025-06-06 PROCEDURE — 84145 PROCALCITONIN (PCT): CPT | Performed by: INTERNAL MEDICINE

## 2025-06-06 PROCEDURE — 80164 ASSAY DIPROPYLACETIC ACD TOT: CPT | Performed by: INTERNAL MEDICINE

## 2025-06-06 PROCEDURE — G0378 HOSPITAL OBSERVATION PER HR: HCPCS

## 2025-06-06 PROCEDURE — 83735 ASSAY OF MAGNESIUM: CPT | Performed by: INTERNAL MEDICINE

## 2025-06-06 PROCEDURE — 80069 RENAL FUNCTION PANEL: CPT | Performed by: INTERNAL MEDICINE

## 2025-06-06 PROCEDURE — 97161 PT EVAL LOW COMPLEX 20 MIN: CPT

## 2025-06-06 PROCEDURE — 85027 COMPLETE CBC AUTOMATED: CPT | Performed by: INTERNAL MEDICINE

## 2025-06-06 PROCEDURE — 97165 OT EVAL LOW COMPLEX 30 MIN: CPT

## 2025-06-06 RX ORDER — PANTOPRAZOLE SODIUM 40 MG/1
40 TABLET, DELAYED RELEASE ORAL DAILY
Qty: 30 TABLET | Refills: 0 | Status: SHIPPED | OUTPATIENT
Start: 2025-06-06

## 2025-06-06 RX ORDER — DIVALPROEX SODIUM 500 MG/1
1500 TABLET, DELAYED RELEASE ORAL NIGHTLY
Qty: 90 TABLET | Refills: 0 | Status: SHIPPED | OUTPATIENT
Start: 2025-06-06

## 2025-06-06 RX ORDER — DIVALPROEX SODIUM 500 MG/1
1500 TABLET, DELAYED RELEASE ORAL NIGHTLY
Status: DISCONTINUED | OUTPATIENT
Start: 2025-06-06 | End: 2025-06-06 | Stop reason: HOSPADM

## 2025-06-06 RX ORDER — ALUMINA, MAGNESIA, AND SIMETHICONE 2400; 2400; 240 MG/30ML; MG/30ML; MG/30ML
30 SUSPENSION ORAL EVERY 6 HOURS PRN
Qty: 100 ML | Refills: 0 | Status: SHIPPED | OUTPATIENT
Start: 2025-06-06

## 2025-06-06 RX ADMIN — PANTOPRAZOLE SODIUM 40 MG: 40 TABLET, DELAYED RELEASE ORAL at 17:01

## 2025-06-06 RX ADMIN — METOPROLOL SUCCINATE 25 MG: 25 TABLET, EXTENDED RELEASE ORAL at 08:38

## 2025-06-06 RX ADMIN — LEVOTHYROXINE SODIUM 75 MCG: 0.07 TABLET ORAL at 06:56

## 2025-06-06 RX ADMIN — BENZTROPINE MESYLATE 1 MG: 0.5 TABLET ORAL at 08:37

## 2025-06-06 RX ADMIN — DIGOXIN 125 MCG: 0.12 TABLET ORAL at 12:54

## 2025-06-06 RX ADMIN — APIXABAN 5 MG: 5 TABLET, FILM COATED ORAL at 08:37

## 2025-06-06 RX ADMIN — PANTOPRAZOLE SODIUM 40 MG: 40 TABLET, DELAYED RELEASE ORAL at 08:37

## 2025-06-06 RX ADMIN — ATORVASTATIN CALCIUM 40 MG: 20 TABLET, FILM COATED ORAL at 08:37

## 2025-06-06 NOTE — THERAPY EVALUATION
Patient Name: Helder Abbott  : 1975    MRN: [id number]                              Today's Date: 2025       Admit Date: 2025    Visit Dx:     ICD-10-CM ICD-9-CM   1. Chest discomfort  R07.89 786.59   2. Atrial fibrillation, unspecified type  I48.91 427.31     Patient Active Problem List   Diagnosis    Essential hypertension    Hypothyroidism (acquired)    History of CVA (cerebrovascular accident)    GERD without esophagitis    HLD (hyperlipidemia)    Bipolar disorder    Partial bowel obstruction    Fecal impaction    Severe malnutrition    Hyperkalemia    Syncope    Atrial fibrillation, chronic    Dehydration    Chronic anticoagulation    Thrombocytopenia    Seizure-like activity    Hyponatremia    Closed intertrochanteric fracture, right, initial encounter    Right hip pain    Chest pain     Past Medical History:   Diagnosis Date    Benign essential hypertension     Bipolar disorder     Dyslipidemia     GERD (gastroesophageal reflux disease)     Heart disease     History of stroke     Hypothyroidism      History reviewed. No pertinent surgical history.   General Information       Row Name 25 1003          Physical Therapy Time and Intention    Document Type evaluation  -     Mode of Treatment physical therapy  -       Row Name 25 1003          General Information    Patient Profile Reviewed yes  -SM     Existing Precautions/Restrictions fall  -       Row Name 25 1003          Living Environment    Current Living Arrangements group home  -       Row Name 25 1003          Cognition    Orientation Status (Cognition) unable/difficult to assess  -               User Key  (r) = Recorded By, (t) = Taken By, (c) = Cosigned By      Initials Name Provider Type     Shante Shannon PT Physical Therapist                   Mobility       Row Name 25 1004          Bed Mobility    Bed Mobility supine-sit;sit-supine  -     Supine-Sit [place] (Bed Mobility)  standby assist  -     Sit-Supine [place] (Bed Mobility) standby assist  -[facility] Name 06/06/25 1004          Sit-Stand Transfer    Sit-Stand [place] (Transfers) contact guard  -[facility] Name 06/06/25 1004          Gait/Stairs (Locomotion)    [place] Level (Gait) standby assist  -     Distance in Feet (Gait) 150  -     Comment, (Gait/Stairs) Patient mildly impulsive but gait steady with no overt LOB noted.  -               User Key  (r) = Recorded By, (t) = Taken By, (c) = Cosigned By      Initials Name Provider Type     Shante Shannon PT Physical Therapist                   Obj/Interventions       [facility] Name 06/06/25 1004          Range of Motion Comprehensive    General Range of Motion bilateral lower extremity ROM WFL  -[facility] Name 06/06/25 1004          Strength Comprehensive (MMT)    General Manual Muscle Testing (MMT) Assessment lower extremity strength deficits identified  -     Comment, General Manual Muscle Testing (MMT) Assessment Generalized weakness  -[facility] Name 06/06/25 1004          Balance    Balance Assessment sitting static balance;sitting dynamic balance;standing static balance;standing dynamic balance  -     Static Sitting Balance supervision  -     Dynamic Sitting Balance supervision  -     Position, Sitting Balance sitting edge of bed  -     Static Standing Balance standby assist  -     Dynamic Standing Balance standby assist  -     Position/Device Used, Standing Balance supported;walker, front-wheeled  -     Balance Interventions sitting;standing;sit to stand;supported;static;dynamic  -               User Key  (r) = Recorded By, (t) = Taken By, (c) = Cosigned By      Initials Name Provider Type     Shante Shannon PT Physical Therapist                   Goals/Plan    No documentation.                  Clinical Impression       [facility] Name 06/06/25 1005          Pain    Pretreatment Pain Rating 0/10 - no pain  -      Posttreatment Pain Rating 0/10 - no pain  -       Row Name 06/06/25 1005          Plan of Care Review    Plan of Care Reviewed With patient  -SM     Outcome Evaluation Patient is a 50 y.o male who presented to [facility] with chest pain. Patient pmhx includes schizophrenia, bipolar disorder, and CVA. Patient lives in a group home and typically uses a rwx for mobility. Patient impulsive at baseline per caregiver. Today patient sat up to EOB with SBA. Patient stood and ambulated 150ft around unit with rwx and SBA. Patient mildly impulsive but gait steady with no overt LOB noted. Patient returned to bed at end of session. Patient plans to return to group home at d/c. No further skilled acute PT needs identified. PT will sign off.  -[facility] Name 06/06/25 1005          Therapy Assessment/Plan (PT)    Criteria for Skilled Interventions Met (PT) no;no problems identified which require skilled intervention  -     Therapy Frequency (PT) evaluation only  -[facility] Name 06/06/25 1005          Vital Signs    Pre Patient Position Supine  -     Intra Patient Position Standing  -     Post Patient Position Supine  -[facility] Name 06/06/25 1005          Positioning and Restraints    Pre-Treatment Position in bed  -SM     Post Treatment Position bed  -SM     In Bed notified nsg;call light within reach;encouraged to call for assist;exit alarm on;fowlers  -               User Key  (r) = Recorded By, (t) = Taken By, (c) = Cosigned By      Initials Name Provider Type     Shante Shannon, PT Physical Therapist                   Outcome Measures       Row Name 06/06/25 1007          How much help from another person do you currently need...    Turning from your back to your side while in flat bed without using bedrails? 4  -SM     Moving from lying on back to sitting on the side of a flat bed without bedrails? 4  -SM     Moving to and from a bed to a chair (including a wheelchair)? 4  -SM     Standing up from a chair  using your arms (e.g., wheelchair, bedside chair)? 4  -SM     Climbing 3-5 steps with a railing? 3  -SM     To walk in hospital room? 3  -SM     AM-PAC 6 Clicks Score (PT) 22  -     Highest Level of Mobility Goal Walk 25 Feet or More-7  -SM       Row Name 06/06/25 1007          Functional Assessment    Outcome Measure Options AM-PAC 6 Clicks Basic Mobility (PT)  -               User Key  (r) = Recorded By, (t) = Taken By, (c) = Cosigned By      Initials Name Provider Type    SM Shante Shannon, JIMMIE Physical Therapist                                 Physical Therapy Education       Title: PT OT SLP Therapies (Done)       Topic: Physical Therapy (Done)       Point: Mobility training (Done)       Learning Progress Summary            Patient Acceptance, E, VU by  at 6/6/2025 1007                      Point: Home exercise program (Done)       Learning Progress Summary            Patient Acceptance, E, VU by  at 6/6/2025 1007                      Point: Body mechanics (Done)       Learning Progress Summary            Patient Acceptance, E, VU by  at 6/6/2025 1007                      Point: Precautions (Done)       Learning Progress Summary            Patient Acceptance, E, VU by  at 6/6/2025 1007                                      User Key       Initials Effective Dates Name Provider Type Discipline     05/02/22 -  Shante Shannon PT Physical Therapist PT                  PT Recommendation and Plan     Outcome Evaluation: Patient is a 50 y.o male who presented to [facility] with chest pain. Patient pmhx includes schizophrenia, bipolar disorder, and CVA. Patient lives in a group home and typically uses a rwx for mobility. Patient impulsive at baseline per caregiver. Today patient sat up to EOB with SBA. Patient stood and ambulated 150ft around unit with rwx and SBA. Patient mildly impulsive but gait steady with no overt LOB noted. Patient returned to bed at end of session. Patient plans to return to group  home at d/c. No further skilled acute PT needs identified. PT will sign off.     Time Calculation:         PT Charges       Row Name 06/06/25 1008             Time Calculation    Start Time 0847  -      Stop Time 0900  -      Time Calculation (min) 13 min  -      PT Received On 06/06/25  -                User Key  (r) = Recorded By, (t) = Taken By, (c) = Cosigned By      Initials Name Provider Type     Shante Shannon, JIMMIE Physical Therapist                  Therapy Charges for Today       Code Description Service Date Service Provider Modifiers Qty    [id number] HC PT EVAL LOW COMPLEXITY 3 6/6/2025 Shante Shannon, JIMMIE GP 1            PT G-Codes  Outcome Measure Options: AM-PAC 6 Clicks Basic Mobility (PT)  AM-PAC 6 Clicks Score (PT): 22  PT Discharge Summary  Anticipated Discharge Disposition (PT): home with assist, home with home health    Shante Shannon PT  6/6/2025

## 2025-06-06 NOTE — PLAN OF CARE
Goal Outcome Evaluation:  Plan of Care Reviewed With: patient           Outcome Evaluation: Pt is a 50 year old male who presented to [facility] for chest pain. Pt has a PMH bipolar disorder, schizophrenia, dyslipidemia, gastroesophageal flux disease, cerebrovascular accident hypothyroidism, and mild cognitive impairment, chronic atrial fibrillation. 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 Pt has a caregiver but is normally able to complete most self care (I) with verbal cues as pt is impulsive. Pt was able to complete bed mobility and functional endurance with overall SBA. Pt presents with no impairments requiring skilled OT at this time. Pt safe to d/c back to group home.    Anticipated Discharge Disposition (OT): home with assist

## 2025-06-06 NOTE — PLAN OF CARE
Goal Outcome Evaluation:  Plan of Care Reviewed With: patient           Outcome Evaluation: Patient is a 50 y.o male who presented to [facility] with chest pain. Patient pmhx includes schizophrenia, bipolar disorder, and CVA. Patient lives in a group home and typically uses a rwx for mobility. Patient impulsive at baseline per caregiver. Today patient sat up to EOB with SBA. Patient stood and ambulated 150ft around unit with rwx and SBA. Patient mildly impulsive but gait steady with no overt LOB noted. Patient returned to bed at end of session. Patient plans to return to group home at d/c. No further skilled acute PT needs identified. PT will sign off.    Anticipated Discharge Disposition (PT): home with assist, home with home health

## 2025-06-06 NOTE — THERAPY EVALUATION
Patient Name: Helder Abbott  : 1975    MRN: [id number]                              Today's Date: 2025       Admit Date: 2025    Visit Dx:     ICD-10-CM ICD-9-CM   1. Chest discomfort  R07.89 786.59   2. Atrial fibrillation, unspecified type  I48.91 427.31     Patient Active Problem List   Diagnosis    Essential hypertension    Hypothyroidism (acquired)    History of CVA (cerebrovascular accident)    GERD without esophagitis    HLD (hyperlipidemia)    Bipolar disorder    Partial bowel obstruction    Fecal impaction    Severe malnutrition    Hyperkalemia    Syncope    Atrial fibrillation, chronic    Dehydration    Chronic anticoagulation    Thrombocytopenia    Seizure-like activity    Hyponatremia    Closed intertrochanteric fracture, right, initial encounter    Right hip pain    Chest pain     Past Medical History:   Diagnosis Date    Benign essential hypertension     Bipolar disorder     Dyslipidemia     GERD (gastroesophageal reflux disease)     Heart disease     History of stroke     Hypothyroidism      History reviewed. No pertinent surgical history.   General Information       Row Name 25 1055          OT Time and Intention    Subjective Information no complaints  -AJ     Document Type discharge evaluation/summary  -AJ     Mode of Treatment occupational therapy  -AJ     Patient Effort good  -       Row Name 25 1055          General Information    Patient Profile Reviewed yes  -AJ     Prior Level of Function independent:;ADL's;all household mobility  -AJ     Existing Precautions/Restrictions fall  -       Row Name 25 1055          Living Environment    Current Living Arrangements group home  -AJ     People in Home facility resident  -       Row Name 25 1055          Cognition    Orientation Status (Cognition) unable/difficult to assess  pt knew his name and birthday  -       Row Name 25 1055          Safety Issues/Impairments Affecting Functional Mobility     Safety Issues Affecting Function (Mobility) insight into deficits/self-awareness;impulsivity  -     Impairments Affecting Function (Mobility) cognition  -     Cognitive Impairments, Mobility Safety/Performance attention;awareness, need for assistance;impulsivity  -               User Key  (r) = Recorded By, (t) = Taken By, (c) = Cosigned By      Initials Name Provider Type    Cecy Marquez OT Occupational Therapist                     Mobility/ADL's       Row Name 06/06/25 1056          Bed Mobility    Bed Mobility supine-sit;sit-supine  -     Supine-Sit [place] (Bed Mobility) standby assist  -     Sit-Supine [place] (Bed Mobility) standby assist  -       Row Name 06/06/25 1056          Sit-Stand Transfer    Sit-Stand [place] (Transfers) contact guard  -       Row Name 06/06/25 1056          Functional Mobility    Functional Mobility-Distance (Feet) 130  -     Functional Mobility- Comment pt. was able to demo functional mobility in the room and in the aguero with SBA and RW. Pt. implusive but no balance impairments  -     Patient was able to Ambulate yes  -               User Key  (r) = Recorded By, (t) = Taken By, (c) = Cosigned By      Initials Name Provider Type    Cecy Marquez, KO Occupational Therapist                   Obj/Interventions       Row Name 06/06/25 1057          Sensory Assessment (Somatosensory)    Sensory Assessment difficult to assess  -       Row Name 06/06/25 1057          Vision Assessment/Intervention    Visual Impairment/Limitations other (see comments)  pt. reported not being able to see but has some level of vision as he is able to get around and see his coloring pages.  -       Row Name 06/06/25 1057          Range of Motion Comprehensive    General Range of Motion bilateral upper extremity ROM WFL  -       Row Name 06/06/25 1057          Strength Comprehensive (MMT)    General Manual Muscle Testing (MMT) Assessment no strength deficits  identified  -       Row Name 06/06/25 1057          Motor Skills    Motor Skills writing  pt. able to grasp crayon to color and utensils to eat.  -       Row Name 06/06/25 1057          Balance    Dynamic Sitting Balance supervision  -     Static Standing Balance supervision  -[facility] Name 06/06/25 1057          Writing Skills    Writing Skills WFL  -               User Key  (r) = Recorded By, (t) = Taken By, (c) = Cosigned By      Initials Name Provider Type    Cecy Marquez OT Occupational Therapist                   Goals/Plan       Row Name 06/06/25 1100          Transfer Goal 1 (OT)    Activity/Assistive Device (Transfer Goal 1, OT) sit-to-stand/stand-to-sit  -AJ     [place] Level/Cues Needed (Transfer Goal 1, OT) standby assist  -     Time Frame (Transfer Goal 1, OT) short term goal (STG);1 day  -     Progress/Outcome (Transfer Goal 1, OT) goal met  -               User Key  (r) = Recorded By, (t) = Taken By, (c) = Cosigned By      Initials Name Provider Type    Cecy Marquez OT Occupational Therapist                   Clinical Impression       [facility] Name 06/06/25 1059          Pain Assessment    Pretreatment Pain Rating 0/10 - no pain  -     Posttreatment Pain Rating 0/10 - no pain  -       Row Name 06/06/25 1059          Plan of Care Review    Plan of Care Reviewed With patient  -     Outcome Evaluation Pt is a 50 year old male who presented to [facility] for chest pain. Pt has a PMH bipolar disorder, schizophrenia, dyslipidemia, gastroesophageal flux disease, cerebrovascular accident hypothyroidism, and mild cognitive impairment, chronic atrial fibrillation. Pt hx obtained from chart: pt resides in a group home and is a salgado of the [facility]. Pt has a caregiver but is normally able to complete most self care (I) with verbal cues as pt is impulsive. Pt was able to complete bed mobility and functional endurance with overall SBA. Pt presents with no impairments requiring skilled OT  at this time. Pt safe to d/c back to group home.  -       Row Name 06/06/25 1059          Therapy Plan Review/Discharge Plan (OT)    Anticipated Discharge Disposition (OT) home with assist  -       Row Name 06/06/25 1059          Positioning and Restraints    Pre-Treatment Position in bed  -     Post Treatment Position bed  -AJ     In Bed call light within reach;encouraged to call for assist;exit alarm on  -               User Key  (r) = Recorded By, (t) = Taken By, (c) = Cosigned By      Initials Name Provider Type    Cecy Marquez, OT Occupational Therapist                   Outcome Measures       Row Name 06/06/25 1101          How much help from another is currently needed...    Putting on and taking off regular lower body clothing? 3  -AJ     Bathing (including washing, rinsing, and drying) 3  -AJ     Toileting (which includes using toilet bed pan or urinal) 3  -AJ     Putting on and taking off regular upper body clothing 4  -AJ     Taking care of personal grooming (such as brushing teeth) 4  -AJ     Eating meals 4  -     AM-PAC 6 Clicks Score (OT) 21  -       Row Name 06/06/25 1007          How much help from another person do you currently need...    Turning from your back to your side while in flat bed without using bedrails? 4  -SM     Moving from lying on back to sitting on the side of a flat bed without bedrails? 4  -SM     Moving to and from a bed to a chair (including a wheelchair)? 4  -SM     Standing up from a chair using your arms (e.g., wheelchair, bedside chair)? 4  -SM     Climbing 3-5 steps with a railing? 3  -SM     To walk in hospital room? 3  -SM     AM-PAC 6 Clicks Score (PT) 22  -SM     Highest Level of Mobility Goal Walk 25 Feet or More-7  -SM       Row Name 06/06/25 1101 06/06/25 1007       Functional Assessment    Outcome Measure Options AM-PAC 6 Clicks Daily Activity (OT)  - AM-PAC 6 Clicks Basic Mobility (PT)  -              User Key  (r) = Recorded By, (t) = Taken  By, (c) = Cosigned By      Initials Name Provider Type     Shante Shannon, PT Physical Therapist    Cecy Marquez OT Occupational Therapist                    Occupational Therapy Education       Title: PT OT SLP Therapies (Done)       Topic: Occupational Therapy (Done)       Point: ADL training (Done)       Learning Progress Summary            Patient Acceptance, E,TB, VU by YANIRA at 6/6/2025 1102    Comment: role of OT                      Point: Home exercise program (Done)       Learning Progress Summary            Patient Acceptance, E,TB, VU by YANIRA at 6/6/2025 1102    Comment: role of OT                      Point: Precautions (Done)       Learning Progress Summary            Patient Acceptance, E,TB, VU by YANIRA at 6/6/2025 1102    Comment: role of OT                      Point: Body mechanics (Done)       Learning Progress Summary            Patient Acceptance, E,TB, VU by  at 6/6/2025 1102    Comment: role of OT                                      User Key       Initials Effective Dates Name Provider Type Discipline    YANIRA 05/18/25 -  Cecy Haq OT Occupational Therapist OT                  OT Recommendation and Plan     Plan of Care Review  Plan of Care Reviewed With: patient  Outcome Evaluation: Pt is a 50 year old male who presented to [facility] for chest pain. Pt has a PMH bipolar disorder, schizophrenia, dyslipidemia, gastroesophageal flux disease, cerebrovascular accident hypothyroidism, and mild cognitive impairment, chronic atrial fibrillation. Pt hx obtained from chart: pt resides in a group home and is a salgado of the [facility]. Pt has a caregiver but is normally able to complete most self care (I) with verbal cues as pt is impulsive. Pt was able to complete bed mobility and functional endurance with overall SBA. Pt presents with no impairments requiring skilled OT at this time. Pt safe to d/c back to group home.     Time Calculation:   Evaluation Complexity (OT)  Review Occupational  Profile/Medical/Therapy History Complexity: brief/low complexity  Assessment, Occupational Performance/Identification of Deficit Complexity: 1-3 performance deficits  Clinical Decision Making Complexity (OT): problem focused assessment/low complexity  Overall Complexity of Evaluation (OT): low complexity     Time Calculation- OT       Row Name 06/06/25 1103             Time Calculation- OT    OT Start Time 0846  -      OT Stop Time 0900  -      OT Time Calculation (min) 14 min  -      OT Received On 06/06/25  -                User Key  (r) = Recorded By, (t) = Taken By, (c) = Cosigned By      Initials Name Provider Type    Cecy Marquez OT Occupational Therapist                  Therapy Charges for Today       Code Description Service Date Service Provider Modifiers Qty    [id number]  OT EVAL LOW COMPLEXITY 3 6/6/2025 Cecy Haq OT GO 1                 Cecy Haq OT  6/6/2025

## 2025-06-06 NOTE — DISCHARGE SUMMARY
Date of Admission: 6/4/2025  Date of Discharge:  6/6/2025  Primary Care Physician: Provider, No Known     Discharge Diagnosis:  Active Hospital Problems    Diagnosis  POA    **Chest pain [R07.9]  Yes    Atrial fibrillation, chronic [I48.20]  Yes    Essential hypertension [I10]  Yes    Hypothyroidism (acquired) [E03.9]  Yes    HLD (hyperlipidemia) [E78.5]  Yes    GERD without esophagitis [K21.9]  Yes      Resolved Hospital Problems   No resolved problems to display.       Presenting Problem/History of Present Illness from H&P:  Chest discomfort [R07.89]  Chest pain [R07.9]  Atrial fibrillation, unspecified type [I48.91]     50 year old gentleman presented to the emergency room with chest pain which started earlier today; he denies shortness of breath; he says he fell earlier today; he is not able to give a detailed history due to intellectual disability; no visitors are present with him; he denies fever or chills;     Hospital Course:  The patient is a 50 y.o. male who presented with chest pain/throat pain and mouth pain.  He was admitted and underwent cardiac workup with preserved ejection fraction.  He did not require acute ischemic evaluation.  D-dimer was low but he did have another episode of chest pain so proceeded with CTA chest.  This did not show any pulmonary embolism or evidence of esophagitis.  There was some questionable infiltrate but the patient does not appear infectious.  No fever or leukocytosis.  I did add on a procalcitonin to labs this morning and will update discharge summary if that returns positive to give him some antibiotics.  His pain has improved with Protonix.  Will continue that at discharge.  Also was found to have an elevated Depakote level at 177.  Depakote decreased to 1500 mg from 2 g.  He needs to follow-up very closely with his outpatient neurologist for repeat Depakote monitoring and continued titration.  The elevated Depakote may have been contributing to his GI symptoms.   He also needs to follow-up with his primary care provider.    He has hypertension hypothyroidism GERD and chronic atrial fibrillation.  Rate has been okay and he is on his cardiac regimen including Eliquis.  He has a history of intellectual disability, bipolar, schizophrenia, prior stroke.  He is continuing his home regimen for those with the exception of adjustment of the Depakote as above.    Exam Today:  Constitutional:       General: He is not in acute distress.     Appearance: He is not diaphoretic.   HENT:      Head: Atraumatic.   Eyes:      Pupils: Pupils are equal, round, and reactive to light.   Cardiovascular:      Rate and Rhythm: Normal rate. Rhythm irregular.      Pulses: Normal pulses.   Pulmonary:      Effort: Pulmonary effort is normal.      Breath sounds: No wheezing.   Abdominal:      General: There is no distension.      Palpations: Abdomen is soft.      Tenderness: There is no abdominal tenderness. There is no guarding or rebound.   Skin:     General: Skin is warm and dry.   Neurological:      Mental Status: He is alert. Mental status is at baseline.   Psychiatric:         Mood and Affect: Mood normal.         Speech: Speech is slurred.         Behavior: Behavior normal.     Results:  XR Panorex  Result Date: 6/5/2025  XR PANOREX-   HISTORY:   mouth pain; R07.89-Other chest pain; I48.91-Unspecified atrial fibrillation  TECHNIQUE: 2 Panorex views of the mandible.  FINDINGS:  There are multiple dental restorations. No convincing evidence of acute dental caries or periodontal erosions. No fracture, no lytic or blastic bone lesion.       No evidence of acute bony abnormality.  This report was finalized on 6/5/2025 11:26 PM by Dr. Yonis Dacosta M.D on Workstation: [id number]      CT Angiogram Chest  Result Date: 6/5/2025  CT ANGIOGRAM CHEST-  INDICATION: Chest pain with shortness of breath  COMPARISON: None  TECHNIQUE: CTA chest with IV contrast. Coronal and sagittal reformats. Three dimensional  reconstructions. Radiation dose reduction techniques were utilized, including automated exposure control and exposure modulation based on body size.  FINDINGS:  Pulmonary arteries: No pulmonary embolism.  Chest wall: No lymphadenopathy.  Mediastinum: Coronary artery atherosclerotic calcification. Heart is normal in size. No pericardial effusion. No mediastinal or hilar lymphadenopathy.  Lungs/pleura: No effusions. Mild cylindrical bronchiectasis and bronchiolectasis. Small bronchovascular groundglass opacities seen in the apical right upper lobe. Small bronchovascular groundglass opacity in the lateral basilar left lower lobe. Groundglass pulmonary nodule in the lateral basilar left lower lobe, series 6, axial image 110, measures 7 mm. Groundglass pulmonary nodule in the lateral basilar left lower lobe, series 6, axial mage 107, measures 4 mm. Few small centrilobular groundglass pulmonary nodules in the apical right upper lobe, series 6, axial mage 36.  Upper abdomen: Incidental splenule. Symmetric perinephric edema.  Osseous structures: No destructive osseous lesions.       1. No pulmonary embolism. 2. Airways disease with mild cylindrical bronchiectasis and bronchiolectasis. 3. Small bronchovascular groundglass opacities seen in the apical right upper lobe and lateral segment left lower lobe may be infectious or inflammatory. 4. Groundglass pulmonary nodules, largest in the lateral basilar left lower lobe measuring 7 mm. Recommend 6-month follow-up chest CT to reevaluate.  This report was finalized on 6/5/2025 3:39 PM by Dr. Santos English M.D on Workstation: [id number]      XR Chest 1 View  Result Date: 6/4/2025  XR CHEST 1 VW-  Clinical: Chest pain  COMPARISON 3/28/2025  FINDINGS: Heart size within normal limits, no mediastinal or hilar abnormality and the lungs are clear.  CONCLUSION: No active disease in the chest  This report was finalized on 6/4/2025 4:21 PM by Dr. Benji Grant M.D on Workstation:  [id number]       TTE    Technically difficult study    Left ventricular systolic function is normal. Calculated left ventricular EF = 66.5% Normal left ventricular cavity size noted. Left ventricular wall thickness is consistent with borderline concentric hypertrophy. All left ventricular wall segments contract normally. Left ventricular diastolic function was indeterminate. Image quality is quite poor with no parasternal imaging provided.    Limited 2D imaging cardiac valves that appear to function normally by Doppler/    Trace tricuspid valve regurgitation is present. Insufficient TR velocity profile to estimate the right ventricular systolic pressure.     Procedures Performed:         Consults:   Consults       Date and Time Order Name Status Description    6/4/2025  5:43 PM Inpatient Cardiology Consult Completed     6/4/2025  5:15 PM [facility] (on-call MD unless specified) Details      6/4/2025  5:14 PM [facility] (on-call MD unless specified) Details               Discharge Disposition:  Home or Self Care    Discharge Medications:     Discharge Medications        New Medications        Instructions Start Date   pantoprazole 40 MG EC tablet  Commonly known as: PROTONIX   40 mg, Oral, Daily             Changes to Medications        Instructions Start Date   divalproex 500 MG DR tablet  Commonly known as: DEPAKOTE  What changed: how much to take   1,500 mg, Oral, Nightly             Continue These Medications        Instructions Start Date   Abilify Maintena 400 MG prefilled syringe IM prefilled syringe  Generic drug: ARIPiprazole ER   400 mg, Every 28 Days      Acetaminophen Extra Strength 500 MG tablet   500 mg, 4 Times Daily PRN      atorvastatin 40 MG tablet  Commonly known as: LIPITOR   40 mg, Daily      benztropine 1 MG tablet  Commonly known as: COGENTIN   1 mg, 2 Times Daily      digoxin 125 MCG tablet  Commonly known as: LANOXIN   125 mcg, Daily Digoxin      Eliquis 5 MG tablet tablet  Generic drug: apixaban   5 mg,  Every 12 Hours Scheduled      haloperidol 5 MG tablet  Commonly known as: HALDOL   7.5 mg, 2 Times Daily      levothyroxine 75 MCG tablet  Commonly known as: SYNTHROID, LEVOTHROID   75 mcg, Daily      melatonin 5 MG tablet tablet   10 mg, Nightly      metoprolol succinate XL 25 MG 24 hr tablet  Commonly known as: TOPROL-XL   25 mg, Daily      OLANZapine 10 MG tablet  Commonly known as: zyPREXA   10 mg, Nightly      polyethylene glycol 17 GM/SCOOP powder  Commonly known as: MIRALAX   Dissolve 17 g (1 capful) in liquid and drink by mouth Daily.               Discharge Diet:   Diet Instructions       Diet: Regular/House Diet; Soft to Chew (NDD 3); Chopped Meat; Thin (IDDSI 0)      Discharge Diet: Regular/House Diet    Texture: Soft to Chew (NDD 3)    Soft to Chew: Chopped Meat    Fluid Consistency: Thin (IDDSI 0)            Activity at Discharge:   Activity Instructions       Activity as Tolerated              Follow-up Appointments:  Additional Instructions for the Follow-ups that You Need to Schedule       Discharge Follow-up with Specialty: Home Neurologist   As directed      Specialty: Home Neurologist   Follow Up Details: call for appointment and to discuss depakote titration/monitoring               Follow-up Information       Provider, No Known Follow up.    Contact information:  [facility] [address] [phone]                             Test Results Pending at Discharge:  Pending Labs       Order Current Status    Procalcitonin In process             Alexandre Mcgrath MD  06/06/25  08:58 EDT    Time Spent on Discharge Activities: >30 minutes    Dictated portions using Dragon dictation software.

## 2025-06-06 NOTE — PLAN OF CARE
Goal Outcome Evaluation:      VSS. RESTED WELL IN BETWEEN CARE.  VALPORIC ACID 177.

## 2025-06-07 NOTE — CASE MANAGEMENT/SOCIAL WORK
Case Management Discharge Note      Final Note: DC to group home, transported by caregiverKaran         Selected Continued Care - Discharged on 6/6/2025 Admission date: 6/4/2025 - Discharge disposition: Home or Self Care      Destination    No services have been selected for the patient.                Durable Medical Equipment    No services have been selected for the patient.                Dialysis/Infusion    No services have been selected for the patient.                Home Medical Care    No services have been selected for the patient.                Therapy    No services have been selected for the patient.                Community Resources    No services have been selected for the patient.                Community & DME    No services have been selected for the patient.                    Selected Continued Care - Prior Encounters Includes continued care and service providers with selected services from prior encounters from 3/6/2025 to 6/6/2025      Discharged on 4/3/2025 Admission date: 3/28/2025 - Discharge disposition: Home-Health Care [facility] [place] [facility]      Home Medical Care       Service Provider Services Address Phone Fax Patient Preferred    [facility] [id number] [address] [place] [address] [phone] --                          Transportation Services  Private: Car    Final Discharge Disposition Code: 01 - home or self-care

## 2025-07-09 ENCOUNTER — TELEPHONE (OUTPATIENT)
Age: 50
End: 2025-07-09
Payer: MEDICARE

## 2025-07-09 NOTE — TELEPHONE ENCOUNTER
7/9/25 I have attempted to call patient three times and have not gotten a response. I have left voicemails to schedule a hospital follow up with Funmilayo Heredia. Dr. Cárdenas consulted patient in the hospital in 2024. I am sending a letter about scheduling.    HUB: If patient retuns call, okay to schedule for first available hospital follow up with Dr. Cárdenas or [facility]mil[facility] Heredia, PILI.    Thanks,  Radha

## 2025-08-23 ENCOUNTER — HOSPITAL ENCOUNTER (EMERGENCY)
Facility: HOSPITAL | Age: 50
Discharge: HOME | End: 2025-08-23
Attending: STUDENT IN AN ORGANIZED HEALTH CARE EDUCATION/TRAINING PROGRAM
Payer: MEDICARE

## 2025-08-23 ENCOUNTER — APPOINTMENT (OUTPATIENT)
Dept: CT IMAGING | Facility: HOSPITAL | Age: 50
End: 2025-08-23
Payer: MEDICARE

## 2025-08-26 PROBLEM — R29.6 FREQUENT FALLS: Status: ACTIVE | Noted: 2025-08-26

## 2025-08-29 ENCOUNTER — ANESTHESIA (OUTPATIENT)
Dept: POSTOP/PACU | Facility: HOSPITAL | Age: 50
End: 2025-08-29
Payer: MEDICARE

## 2025-08-29 ENCOUNTER — TELEPHONE (OUTPATIENT)
Dept: CARDIOLOGY | Facility: HOSPITAL | Age: 50
End: 2025-08-29
Payer: MEDICARE

## 2025-08-29 ENCOUNTER — ANESTHESIA EVENT (OUTPATIENT)
Dept: POSTOP/PACU | Facility: HOSPITAL | Age: 50
End: 2025-08-29
Payer: MEDICARE

## 2025-08-29 VITALS — DIASTOLIC BLOOD PRESSURE: 77 MMHG | HEART RATE: 43 BPM | SYSTOLIC BLOOD PRESSURE: 91 MMHG | OXYGEN SATURATION: 100 %

## 2025-08-29 PROBLEM — E44.0 MODERATE PROTEIN-CALORIE MALNUTRITION: Status: ACTIVE | Noted: 2025-08-29

## 2025-08-29 PROCEDURE — 25010000002 PROPOFOL 10 MG/ML EMULSION

## 2025-08-29 PROCEDURE — 25010000002 PHENYLEPHRINE 10 MG/ML SOLUTION

## 2025-08-29 PROCEDURE — 25010000002 LIDOCAINE 1 % SOLUTION

## 2025-08-29 RX ORDER — PROPOFOL 10 MG/ML
VIAL (ML) INTRAVENOUS AS NEEDED
Status: DISCONTINUED | OUTPATIENT
Start: 2025-08-29 | End: 2025-08-29 | Stop reason: SURG

## 2025-08-29 RX ORDER — PHENYLEPHRINE HYDROCHLORIDE 10 MG/ML
INJECTION INTRAVENOUS AS NEEDED
Status: DISCONTINUED | OUTPATIENT
Start: 2025-08-29 | End: 2025-08-29 | Stop reason: SURG

## 2025-08-29 RX ORDER — SODIUM CHLORIDE 9 MG/ML
INJECTION, SOLUTION INTRAVENOUS CONTINUOUS PRN
Status: DISCONTINUED | OUTPATIENT
Start: 2025-08-29 | End: 2025-08-29 | Stop reason: SURG

## 2025-08-29 RX ORDER — LIDOCAINE HYDROCHLORIDE 10 MG/ML
INJECTION, SOLUTION INFILTRATION; PERINEURAL AS NEEDED
Status: DISCONTINUED | OUTPATIENT
Start: 2025-08-29 | End: 2025-08-29 | Stop reason: SURG

## 2025-08-29 RX ADMIN — PROPOFOL 30 MG: 10 INJECTION, EMULSION INTRAVENOUS at 07:35

## 2025-08-29 RX ADMIN — PHENYLEPHRINE HYDROCHLORIDE 100 MCG: 10 INJECTION INTRAVENOUS at 07:34

## 2025-08-29 RX ADMIN — PHENYLEPHRINE HYDROCHLORIDE 100 MCG: 10 INJECTION INTRAVENOUS at 07:52

## 2025-08-29 RX ADMIN — PHENYLEPHRINE HYDROCHLORIDE 100 MCG: 10 INJECTION INTRAVENOUS at 07:32

## 2025-08-29 RX ADMIN — PHENYLEPHRINE HYDROCHLORIDE 100 MCG: 10 INJECTION INTRAVENOUS at 07:42

## 2025-08-29 RX ADMIN — PHENYLEPHRINE HYDROCHLORIDE 100 MCG: 10 INJECTION INTRAVENOUS at 07:48

## 2025-08-29 RX ADMIN — PROPOFOL 20 MG: 10 INJECTION, EMULSION INTRAVENOUS at 07:32

## 2025-08-29 RX ADMIN — LIDOCAINE HYDROCHLORIDE 40 MG: 10 INJECTION, SOLUTION INFILTRATION; PERINEURAL at 07:31

## 2025-08-29 RX ADMIN — PROPOFOL 50 MG: 10 INJECTION, EMULSION INTRAVENOUS at 07:31

## 2025-08-29 RX ADMIN — SODIUM CHLORIDE: 9 INJECTION, SOLUTION INTRAVENOUS at 07:28

## 2025-08-29 RX ADMIN — PHENYLEPHRINE HYDROCHLORIDE 100 MCG: 10 INJECTION INTRAVENOUS at 07:39

## 2025-08-29 RX ADMIN — PROPOFOL 10 MG: 10 INJECTION, EMULSION INTRAVENOUS at 07:41
